# Patient Record
Sex: FEMALE | Race: BLACK OR AFRICAN AMERICAN | NOT HISPANIC OR LATINO | Employment: OTHER | URBAN - METROPOLITAN AREA
[De-identification: names, ages, dates, MRNs, and addresses within clinical notes are randomized per-mention and may not be internally consistent; named-entity substitution may affect disease eponyms.]

---

## 2017-01-09 ENCOUNTER — ANESTHESIA EVENT (OUTPATIENT)
Dept: SURGERY | Facility: HOSPITAL | Age: 67
End: 2017-01-09
Payer: MEDICARE

## 2017-01-09 DIAGNOSIS — Z01.818 PREOPERATIVE TESTING: Primary | ICD-10-CM

## 2017-01-09 RX ORDER — FLUOXETINE HYDROCHLORIDE 40 MG/1
CAPSULE ORAL
Qty: 30 CAPSULE | Refills: 5 | Status: SHIPPED | OUTPATIENT
Start: 2017-01-09 | End: 2017-03-07

## 2017-01-09 NOTE — ANESTHESIA PREPROCEDURE EVALUATION
Pre Admission Screening  Gaye Quintanilla RN      []Hide copied text  Anesthesia Assessment: Preoperative EQUATION     Planned Procedure: Procedure(s) (LRB):  CYSTOSCOPY (N/A)  INJECTION-BULKING AGENT URETERAL OUTLET-MACROPLASTIQUE (N/A)  Requested Anesthesia Type:Monitor Anesthesia Care  Surgeon: Kristen Melo MD  Service: Urology  Known or anticipated Date of Surgery:1/17/2017     Surgeon notes: reviewed     Electronic QUestionnaire Assessment completed via nurse interview with patient.                    Suze Chino [[id number]] - 66 y.o. Female         Providers Outside of [facility]       No data to display        Surgical Risk Level       Surgical Risk Level:   1              caRDScore (Clinical Anesthesia Rapid Decision Score)         Low  Total Score: 14       14 Sum of Clinical Scores        caRDScores (Grouped)       caRDScore - Ane:   6                       caRDScore - CVD:   2                       caRDScore - Pul:   3                       caRDScore - Met:   3                       caRDScore - Phy:   0              caRDScore Items             Pre-admit from 1/17/2017 in [facility]      Anesthesia        Has decreased jaw mobility/TMJ dysfunction   Yes [jaw pops and hurts]      Has sleep apnea confirmed by a sleep study / on CPAP   Yes      Has GERD, hiatal hernia, or chronic heartburn/dyspepsia requiring Rx some or all times   Yes      Has chronic anxiety   Yes      CVD        Activity similar to best ability for maximal activity or exercise   METS 4      Diagnosed with high blood pressure   Yes      Typical BP runs <150/90   Yes      Known heart murmur / could be due to valve abnormality   Yes [HX MVP]      Pulmonary        Some SOB with moderate activity or exertion   Yes      Has sleep apnea confirmed by a sleep study / on CPAP   Yes      Metabolic        Type 2 Diabetes   Yes [prediabetic-last A1c 6.0 2015]      Is on oral Rx and/or non-insulin injectable for diabetes   Yes       Physiologic        Obesity Status   Mild Obesity (BMI 30-34.9)        Flags       Red Flag Score:   0                       Yellow Flag Score:   7              Red Flags             Pre-admit from 1/17/2017 in [facility]      Obesity Status   Mild Obesity (BMI 30-34.9)        Yellow Flags             Pre-admit from 1/17/2017 in [facility]      Obesity Status   Mild Obesity (BMI 30-34.9)      NSAID   Yes [daily celebrex]      Has decreased jaw mobility/TMJ dysfunction   Yes [jaw pops and hurts]      Some SOB with moderate activity or exertion   Yes      Has sleep apnea confirmed by a sleep study / on CPAP   Yes      Known heart murmur / could be due to valve abnormality   Yes [HX MVP]      Has pain   Yes      No A1C in last 6 months or results unknown   Yes        PONV Risk Score (assumes periop narcotic use = +1, Max=4)       PONV Risk Score:   3              PONV Risk Factors  Total Score: 2       1 Female      1 Non-Smoker at present        Sleep Apnea  Total Score: 1       1 Has sleep apnea confirmed by a sleep study / on CPAP        ARMANDO STOP-Bang Risk Factors (Max=8)  Total Score: 3       1 Has loud snoring      1 Takes medication for high blood pressure      1 Age >50        ARMANDO Risk Level - 1 (Low), 2 (Moderate), 3 (High)       ARMANDO Risk Level:   2              RCRI (Revised Cardiac Risk Indices of ACC/AHA guidelines, Max=6)  Total Score: 0         CAD Risk Factors  Total Score: 3       1 Female. Age >55      1 Diagnosed with high blood pressure      1 Has Diabetes        CHADS Score if applicable (history of atrial fib/flutter, Max=6)  Total Score: 2       1 Diagnosed with high blood pressure      1 Has Diabetes        Maximal Exercise Capacity             Pre-admit from 1/17/2017 in [facility]      Maximal Exercise Capacity   METS 4        Summary of Dependence  Total Score: 1       1 Is totally independent of others for activities of daily  living        Phone Fraility Score (Max = 17)  Total Score: 2       1 Uses 5 or more meds on reg basis      1 Has a problem losing control of urine        Pain Factors             Pre-admit from 1/17/2017 in [facility]      Has pain   Yes      Location and description of pain   headache, jaw [and knee pain]      Typical Pain Scores   0 to 4      Not using strong pain medications   Yes [daily celebrex]        Risk Triggers (Evidence-Based Risk Triggers)         Pulmonary Risk Triggers  Total Score: 4       1 Age > or = 60      1 Obesity Status: Mild Obesity (BMI 30-34.9)      1 Some SOB with moderate activity or exertion      1 Has sleep apnea confirmed by a sleep study / on CPAP        Renal Risk Triggers  Total Score: 2       1 Diagnosed with high blood pressure      1 Type 2 Diabetes        Delirium Risk Triggers  Total Score: 0         Urologic Risk Triggers  Total Score: 1       1 Has a problem losing control of urine        Logistics         Pre-op Clinic Logistics  Total Score: 8       1 Has had anesthesia, either as adult or as a child      1 Previous transfusions      2 NSAID      1 Takes medication for high blood pressure      1 Known heart murmur / could be due to valve abnormality      2 No A1C in last 6 months or results unknown        DOSC Logistics  Total Score: 1       1 NSAID        Discharge Logistics  Total Score: 2       1 Some SOB with moderate activity or exertion      1 Has sleep apnea confirmed by a sleep study / on CPAP        Discharge Planning             Pre-admit from 1/17/2017 in [facility]      Discharge Planning        Will assist patient 24/7, if needed   sister      Who will transport you to therapy, if need   sister        Fast Track <For office use only>       Total Score: 11          Surgical Risk Level Assessment                       Triage considerations:      The patient has no apparent active cardiac condition (No unstable coronary  Syndrome such as severe unstable angina or recent [<1 month] myocardial infarction, decompensated CHF, severe valvular disease or significant arrhythmia)     Previous anesthesia records:MAC and No problems PT DOES HAVE TMJ Airway/Jaw/Neck:  Airway Findings: Mouth Opening: Normal Tongue: Normal General Airway Assessment: Adult Mallampati: IV Improves to III with phonation. TM Distance: 4-6 cm      Last PCP note: 3-6 months ago , within [facility]  Dr Lewis 10/2016  Subspecialty notes: n/a     Other important co-morbidities: HTN, MVP, ARMANDO, borderline diabetic, GERD      Tests already available:  Available tests, 3-6 months ago , within [facility] . 8/2016 CBC, CMP. 2015 A1c. 2014 EKG      Instructions given. (See in Nurse's note)     Optimization:  Anesthesia Preop Clinic Assessment Indicated-not required for fast track pt      Plan:   Testing: BMP  Patient has previously scheduled Medical Appointment:none  Navigation: Tests Scheduled. Am of surgery          Straight Line to surgery.       Electronically signed by Gaye Quintanilla RN at 1/9/2017  1:56 PM        Pre-admit on 1/17/2017              Detailed Report                                                                                                                        01/09/2017  Suze Chino is a 66 y.o., female.    [facility] Anesthesia Evaluation    I have reviewed the Patient Summary Reports.    I have reviewed the Nursing Notes.   I have reviewed the Medications.     Review of Systems      Physical Exam  General:  Obesity    Airway/Jaw/Neck:  Airway Findings: Mouth Opening: Normal Tongue: Large  General Airway Assessment: Average  Mallampati: III  Improves to II with phonation.  TM Distance: Normal, at least 6 cm  Jaw/Neck Findings:  Neck ROM: Normal ROM            Mental Status:  Mental Status Findings:  Alert and Oriented         Anesthesia Plan  Type of Anesthesia, risks & benefits discussed:  Anesthesia Type:  general, MAC  Patient's Preference:   Intra-op  Monitoring Plan:   Intra-op Monitoring Plan Comments:   Post Op Pain Control Plan:   Post Op Pain Control Plan Comments:   Induction:   IV  Beta Blocker:  Patient is not currently on a Beta-Blocker (No further documentation required).       Informed Consent: Patient understands risks and agrees with Anesthesia plan.  Questions answered. Anesthesia consent signed with patient.  ASA Score: 2     Day of Surgery Review of History & Physical:    H&P update referred to the surgeon.         Ready For Surgery From Anesthesia Perspective.

## 2017-01-16 ENCOUNTER — TELEPHONE (OUTPATIENT)
Dept: UROLOGY | Facility: CLINIC | Age: 67
End: 2017-01-16

## 2017-01-16 NOTE — TELEPHONE ENCOUNTER
Called pt to confirm arrival time of 10am for procedure. Gave pt NPO instructions and gave pt opportunity to ask questions. Pt verbalized understanding.

## 2017-01-17 ENCOUNTER — ANESTHESIA (OUTPATIENT)
Dept: SURGERY | Facility: HOSPITAL | Age: 67
End: 2017-01-17
Payer: MEDICARE

## 2017-01-17 PROBLEM — N39.3 SUI (STRESS URINARY INCONTINENCE, FEMALE): Status: ACTIVE | Noted: 2017-01-17

## 2017-01-17 PROCEDURE — 25000003 PHARM REV CODE 250: Performed by: NURSE ANESTHETIST, CERTIFIED REGISTERED

## 2017-01-17 PROCEDURE — D9220A PRA ANESTHESIA: Mod: CRNA,,, | Performed by: NURSE ANESTHETIST, CERTIFIED REGISTERED

## 2017-01-17 PROCEDURE — D9220A PRA ANESTHESIA: Mod: ANES,,, | Performed by: ANESTHESIOLOGY

## 2017-01-17 PROCEDURE — 25000003 PHARM REV CODE 250: Performed by: STUDENT IN AN ORGANIZED HEALTH CARE EDUCATION/TRAINING PROGRAM

## 2017-01-17 PROCEDURE — 63600175 PHARM REV CODE 636 W HCPCS: Performed by: NURSE ANESTHETIST, CERTIFIED REGISTERED

## 2017-01-17 RX ORDER — PROPOFOL 10 MG/ML
VIAL (ML) INTRAVENOUS CONTINUOUS PRN
Status: DISCONTINUED | OUTPATIENT
Start: 2017-01-17 | End: 2017-01-17

## 2017-01-17 RX ORDER — MIDAZOLAM HYDROCHLORIDE 1 MG/ML
INJECTION, SOLUTION INTRAMUSCULAR; INTRAVENOUS
Status: DISCONTINUED | OUTPATIENT
Start: 2017-01-17 | End: 2017-01-17

## 2017-01-17 RX ORDER — LIDOCAINE HCL/PF 100 MG/5ML
SYRINGE (ML) INTRAVENOUS
Status: DISCONTINUED | OUTPATIENT
Start: 2017-01-17 | End: 2017-01-17

## 2017-01-17 RX ORDER — PROPOFOL 10 MG/ML
VIAL (ML) INTRAVENOUS
Status: DISCONTINUED | OUTPATIENT
Start: 2017-01-17 | End: 2017-01-17

## 2017-01-17 RX ORDER — ALBUTEROL SULFATE 90 UG/1
AEROSOL, METERED RESPIRATORY (INHALATION)
Status: DISCONTINUED | OUTPATIENT
Start: 2017-01-17 | End: 2017-01-17

## 2017-01-17 RX ORDER — ONDANSETRON 2 MG/ML
INJECTION INTRAMUSCULAR; INTRAVENOUS
Status: DISCONTINUED | OUTPATIENT
Start: 2017-01-17 | End: 2017-01-17

## 2017-01-17 RX ORDER — DEXAMETHASONE SODIUM PHOSPHATE 4 MG/ML
INJECTION, SOLUTION INTRA-ARTICULAR; INTRALESIONAL; INTRAMUSCULAR; INTRAVENOUS; SOFT TISSUE
Status: DISCONTINUED | OUTPATIENT
Start: 2017-01-17 | End: 2017-01-17

## 2017-01-17 RX ORDER — GLYCOPYRROLATE 0.2 MG/ML
INJECTION INTRAMUSCULAR; INTRAVENOUS
Status: DISCONTINUED | OUTPATIENT
Start: 2017-01-17 | End: 2017-01-17

## 2017-01-17 RX ORDER — FENTANYL CITRATE 50 UG/ML
INJECTION, SOLUTION INTRAMUSCULAR; INTRAVENOUS
Status: DISCONTINUED | OUTPATIENT
Start: 2017-01-17 | End: 2017-01-17

## 2017-01-17 RX ORDER — KETAMINE HCL IN 0.9 % NACL 50 MG/5 ML
SYRINGE (ML) INTRAVENOUS
Status: DISCONTINUED | OUTPATIENT
Start: 2017-01-17 | End: 2017-01-17

## 2017-01-17 RX ADMIN — PROPOFOL 70 MG: 10 INJECTION, EMULSION INTRAVENOUS at 12:01

## 2017-01-17 RX ADMIN — DEXAMETHASONE SODIUM PHOSPHATE 4 MG: 4 INJECTION, SOLUTION INTRAMUSCULAR; INTRAVENOUS at 12:01

## 2017-01-17 RX ADMIN — Medication 2 G: at 12:01

## 2017-01-17 RX ADMIN — FENTANYL CITRATE 50 MCG: 50 INJECTION, SOLUTION INTRAMUSCULAR; INTRAVENOUS at 12:01

## 2017-01-17 RX ADMIN — PROPOFOL 100 MG: 10 INJECTION, EMULSION INTRAVENOUS at 12:01

## 2017-01-17 RX ADMIN — ONDANSETRON 4 MG: 2 INJECTION INTRAMUSCULAR; INTRAVENOUS at 12:01

## 2017-01-17 RX ADMIN — PROPOFOL 150 MCG/KG/MIN: 10 INJECTION, EMULSION INTRAVENOUS at 12:01

## 2017-01-17 RX ADMIN — Medication 20 MG: at 12:01

## 2017-01-17 RX ADMIN — GLYCOPYRROLATE 0.2 MG: 0.2 INJECTION, SOLUTION INTRAMUSCULAR; INTRAVENOUS at 12:01

## 2017-01-17 RX ADMIN — LIDOCAINE HYDROCHLORIDE 50 MG: 20 INJECTION, SOLUTION INTRAVENOUS at 12:01

## 2017-01-17 RX ADMIN — ALBUTEROL SULFATE 10 PUFF: 90 AEROSOL, METERED RESPIRATORY (INHALATION) at 12:01

## 2017-01-17 RX ADMIN — MIDAZOLAM HYDROCHLORIDE 2 MG: 1 INJECTION, SOLUTION INTRAMUSCULAR; INTRAVENOUS at 11:01

## 2017-01-17 NOTE — TRANSFER OF CARE
"Anesthesia Transfer of Care Note    Patient: Suze Chino    Procedure(s) Performed: Procedure(s) (LRB):  CYSTOSCOPY (N/A)  INJECTION-BULKING AGENT URETERAL OUTLET-MACROPLASTIQUE (N/A)    Patient location: PACU    Anesthesia Type: general    Transport from OR: Transported from OR on 6-10 L/min O2 by face mask with adequate spontaneous ventilation    Post pain: adequate analgesia    Post assessment: no apparent anesthetic complications and tolerated procedure well    Post vital signs: stable    Level of consciousness: awake and alert    Nausea/Vomiting: no vomiting    Complications: none          Last vitals:   Visit Vitals    BP (!) 105/59    Pulse 92    Temp 36.7 °C (98.1 °F) (Oral)    Resp 16    Ht 5' 8" (1.727 m)    Wt 90.7 kg (200 lb)    SpO2 97%    Breastfeeding No    BMI 30.41 kg/m2     "

## 2017-01-17 NOTE — ANESTHESIA POSTPROCEDURE EVALUATION
"Anesthesia Post Evaluation    Patient: Suze Chino    Procedure(s) Performed: Procedure(s) (LRB):  CYSTOSCOPY (N/A)  INJECTION-BULKING AGENT URETERAL OUTLET-MACROPLASTIQUE (N/A)    Final Anesthesia Type: general  Patient location during evaluation: PACU  Patient participation: No - Unable to Participate, Other Reason (see comments)  Level of consciousness: awake  Post-procedure vital signs: reviewed and stable  Pain management: adequate  Airway patency: patent  PONV status at discharge: No PONV  Anesthetic complications: no      Cardiovascular status: stable  Respiratory status: unassisted  Hydration status: euvolemic  Follow-up not needed.        Visit Vitals    /72    Pulse 76    Temp 36.7 °C (98.1 °F) (Oral)    Resp 17    Ht 5' 8" (1.727 m)    Wt 90.7 kg (200 lb)    SpO2 98%    Breastfeeding No    BMI 30.41 kg/m2       Pain/Ravi Score: Pain Assessment Performed: Yes (1/17/2017  1:56 PM)  Presence of Pain: denies (1/17/2017  1:56 PM)  Pain Rating Prior to Med Admin: 5 (1/17/2017  1:25 PM)  Ravi Score: 10 (1/17/2017  1:56 PM)      "

## 2017-01-17 NOTE — ANESTHESIA RELEASE NOTE
"Anesthesia Release from PACU Note    Patient: Suze Chino    Procedure(s) Performed: Procedure(s) (LRB):  CYSTOSCOPY (N/A)  INJECTION-BULKING AGENT URETERAL OUTLET-MACROPLASTIQUE (N/A)    Anesthesia type: general    Post pain: Adequate analgesia    Post assessment: no apparent anesthetic complications    Last Vitals:   Visit Vitals    /72    Pulse 76    Temp 36.7 °C (98.1 °F) (Oral)    Resp 17    Ht 5' 8" (1.727 m)    Wt 90.7 kg (200 lb)    SpO2 98%    Breastfeeding No    BMI 30.41 kg/m2       Post vital signs: stable    Level of consciousness: awake    Nausea/Vomiting: no nausea/no vomiting    Complications: none    Airway Patency: patent    Respiratory: unassisted    Cardiovascular: stable and blood pressure at baseline    Hydration: euvolemic  "

## 2017-01-24 ENCOUNTER — APPOINTMENT (OUTPATIENT)
Dept: OPHTHALMOLOGY | Facility: CLINIC | Age: 67
End: 2017-01-24
Payer: MEDICARE

## 2017-01-24 ENCOUNTER — OFFICE VISIT (OUTPATIENT)
Dept: OPHTHALMOLOGY | Facility: CLINIC | Age: 67
End: 2017-01-24
Payer: MEDICARE

## 2017-01-24 DIAGNOSIS — H40.1133 PRIMARY OPEN ANGLE GLAUCOMA OF BOTH EYES, SEVERE STAGE: Primary | ICD-10-CM

## 2017-01-24 DIAGNOSIS — H25.13 NUCLEAR SCLEROSIS, BILATERAL: ICD-10-CM

## 2017-01-24 PROCEDURE — 92083 EXTENDED VISUAL FIELD XM: CPT | Mod: PBBFAC,PO | Performed by: OPHTHALMOLOGY

## 2017-01-24 PROCEDURE — 99999 PR PBB SHADOW E&M-EST. PATIENT-LVL I: CPT | Mod: PBBFAC,,, | Performed by: OPHTHALMOLOGY

## 2017-01-24 PROCEDURE — 92133 CPTRZD OPH DX IMG PST SGM ON: CPT | Mod: PBBFAC,PO | Performed by: OPHTHALMOLOGY

## 2017-01-24 PROCEDURE — 92012 INTRM OPH EXAM EST PATIENT: CPT | Mod: S$PBB,,, | Performed by: OPHTHALMOLOGY

## 2017-01-24 PROCEDURE — 99211 OFF/OP EST MAY X REQ PHY/QHP: CPT | Mod: PBBFAC,PO,25 | Performed by: OPHTHALMOLOGY

## 2017-01-24 RX ORDER — BETAXOLOL HYDROCHLORIDE 5 MG/ML
1 SOLUTION/ DROPS OPHTHALMIC 2 TIMES DAILY
Qty: 5 ML | Refills: 6 | Status: SHIPPED | OUTPATIENT
Start: 2017-01-24 | End: 2017-04-07

## 2017-01-24 NOTE — PROGRESS NOTES
HPI     Glaucoma    Additional comments: Latanoprost OU QHS (slows pulse rate), Trusopt bid   ou           Comments   Patient denies changes from last vist. Using gtts as directed. 98%   compliant      Referred by Dr. Lorenzana  Bleb Needling with [facility] Inj. OD 9/17/15  LTG   H/O Non-compliance   Intolerant of Coag Drops    Stopped Alphagan OS TID because of dermatitis    Trab OD     repeat SLT od done 8/9/2012 - good initial response IOP 18 to 13  Primary SLT done os 9/13/2012 - minimal response 18 to 16  SLT os 9/29/2016    Latanoprost OU QHS (slows pulse rate), Trusopt bid ou       Last edited by Rhiannon Arambula MA on 1/24/2017  2:20 PM. (History)            Assessment /Plan     For exam results, see Encounter Report.      ICD-10-CM ICD-9-CM    1. Primary open angle glaucoma of both eyes, severe stage H40.1133 365.11 Arvizu Visual Field - OU - Extended - Both Eyes     365.73 Posterior Segment OCT Optic Nerve- Both eyes      betaxolol 0.5% (BETOPTIC-S) 0.5 % Drop     IOP not within acceptable range relative to target IOP with risk of irreversible visual loss. Additional treatment required.  Discussed options, risks, and benefits of additional medication, SLT laser, or incisional glaucoma surgery.     recommend betoptic bid ou    Patient chooses as above    Reviewed importance of continued compliance with treatment and follow up.     Will perform visual fields more often.      2. Nuclear sclerosis, bilateral H25.13 366.16        Latanoprost qhs ou, trusopt tid ou,   Add betoptic bid ou.    Return to clinic 2 months with iop check (betoptic trial)

## 2017-02-01 ENCOUNTER — OFFICE VISIT (OUTPATIENT)
Dept: UROLOGY | Facility: CLINIC | Age: 67
End: 2017-02-01
Payer: MEDICARE

## 2017-02-01 VITALS
DIASTOLIC BLOOD PRESSURE: 86 MMHG | WEIGHT: 205 LBS | BODY MASS INDEX: 31.07 KG/M2 | SYSTOLIC BLOOD PRESSURE: 149 MMHG | HEIGHT: 68 IN | HEART RATE: 55 BPM

## 2017-02-01 DIAGNOSIS — Z98.890 POST-OPERATIVE STATE: Primary | ICD-10-CM

## 2017-02-01 PROBLEM — N39.3 SUI (STRESS URINARY INCONTINENCE, FEMALE): Status: RESOLVED | Noted: 2017-01-17 | Resolved: 2017-02-01

## 2017-02-01 PROCEDURE — 99999 PR PBB SHADOW E&M-EST. PATIENT-LVL III: CPT | Mod: PBBFAC,,, | Performed by: UROLOGY

## 2017-02-01 PROCEDURE — 99213 OFFICE O/P EST LOW 20 MIN: CPT | Mod: PBBFAC | Performed by: UROLOGY

## 2017-02-01 PROCEDURE — 99024 POSTOP FOLLOW-UP VISIT: CPT | Mod: ,,, | Performed by: UROLOGY

## 2017-02-01 NOTE — PROGRESS NOTES
CHIEF COMPLAINT:    Mrs. Chino is a 66 y.o. female presenting for a follow up after injection with Macroplastique.  .    PRESENTING ILLNESS:    Suze Chino is a 66 y.o. female who returns after injection with Macroplastique.  She states that overall, she is doing well.  She had laryngospasm after and is still sore at the base of her tongue and under her jaw.  She states that she has had coughing and sneezing and no stress incontinence.  She has not tried dancing as of yet but anticipates it will be OK.      Allergies:  Alphagan [brimonidine]    Medications:  Outpatient Encounter Prescriptions as of 2/1/2017   Medication Sig Dispense Refill    alprazolam (XANAX) 0.25 MG tablet Take 1 tablet (0.25 mg total) by mouth 3 (three) times daily as needed for Anxiety. 30 tablet 0    betaxolol 0.5% (BETOPTIC-S) 0.5 % Drop Place 1 drop into both eyes 2 (two) times daily. 5 mL 6    celecoxib (CELEBREX) 200 MG capsule TAKE ONE CAPSULE BY MOUTH EVERY DAY 30 capsule 11    DIPHENHYDRAMINE HCL (BENADRYL ALLERGY ORAL) Take 1 tablet by mouth as needed.      dorzolamide (TRUSOPT) 2 % ophthalmic solution Place 1 drop into both eyes 3 (three) times daily. 10 mL 6    ERGOCALCIFEROL, VITAMIN D2, (VITAMIN D ORAL) Take by mouth as needed.      estradiol (ESTRACE) 1 MG tablet TAKE 1 TABLET (1 MG TOTAL) BY MOUTH ONCE DAILY. 30 tablet 11    fluoxetine (PROZAC) 40 MG capsule TAKE ONE CAPSULE BY MOUTH EVERY DAY 30 capsule 5    hydrochlorothiazide (HYDRODIURIL) 25 MG tablet TAKE 1 TABLET BY MOUTH EVERY DAY 90 tablet 0    LACTOBACILLUS ACIDOPHILUS (PROBIOTIC ORAL) Take by mouth once daily.      latanoprost 0.005 % ophthalmic solution       metformin (GLUCOPHAGE-XR) 500 MG 24 hr tablet TAKE 1 TABLET BY MOUTH TWICE A DAY 60 tablet 5    pantoprazole (PROTONIX) 40 MG tablet TAKE 1 TABLET BY MOUTH EVERY DAY 90 tablet 3    polyethylene glycol (GLYCOLAX) 17 gram PwPk Take 17 g by mouth once daily. 30 packet 0    meclizine (ANTIVERT) 25  mg tablet Take 1 tablet (25 mg total) by mouth every 8 (eight) hours as needed for Dizziness. 20 tablet 0    ondansetron (ZOFRAN) 4 MG tablet Take 1 tablet (4 mg total) by mouth every 8 (eight) hours as needed. 12 tablet 0     No facility-administered encounter medications on file as of 2/1/2017.          PHYSICAL EXAMINATION:    The patient generally appears in good health, is appropriately interactive, and is in no apparent distress.    Skin: No lesions.    Mental: Cooperative with normal affect.    Neuro: Grossly intact.    HEENT: Normal. No evidence of lymphadenopathy.    Chest: normal inspiratory effort.    Extremities: No clubbing, cyanosis, or edema      LABS:    UA 1.005, pH 7, otherwise, negative    IMPRESSION:    Encounter Diagnoses   Name Primary?    Post-operative state Yes       PLAN:    1.  Follow up as needed.  She will know when she needs another injection   2.  Discussed that I had asked anesthesia if there was any other precautions to take given the history of laryngospasms, they stated no.

## 2017-02-17 RX ORDER — ALPRAZOLAM 0.25 MG/1
TABLET ORAL
Qty: 30 TABLET | OUTPATIENT
Start: 2017-02-17

## 2017-02-21 RX ORDER — METFORMIN HYDROCHLORIDE 500 MG/1
500 TABLET, EXTENDED RELEASE ORAL 2 TIMES DAILY
Qty: 60 TABLET | Refills: 5 | Status: SHIPPED | OUTPATIENT
Start: 2017-02-21 | End: 2017-06-27 | Stop reason: SDUPTHER

## 2017-02-24 ENCOUNTER — LAB VISIT (OUTPATIENT)
Dept: LAB | Facility: HOSPITAL | Age: 67
End: 2017-02-24
Attending: FAMILY MEDICINE
Payer: MEDICARE

## 2017-02-24 DIAGNOSIS — E03.9 HYPOTHYROIDISM: ICD-10-CM

## 2017-02-24 DIAGNOSIS — I10 ESSENTIAL HYPERTENSION, BENIGN: ICD-10-CM

## 2017-02-24 LAB
ANION GAP SERPL CALC-SCNC: 8 MMOL/L
BUN SERPL-MCNC: 24 MG/DL
CALCIUM SERPL-MCNC: 10.2 MG/DL
CHLORIDE SERPL-SCNC: 101 MMOL/L
CO2 SERPL-SCNC: 29 MMOL/L
CREAT SERPL-MCNC: 1 MG/DL
EST. GFR  (AFRICAN AMERICAN): >60 ML/MIN/1.73 M^2
EST. GFR  (NON AFRICAN AMERICAN): 58.8 ML/MIN/1.73 M^2
GLUCOSE SERPL-MCNC: 97 MG/DL
POTASSIUM SERPL-SCNC: 4.1 MMOL/L
SODIUM SERPL-SCNC: 138 MMOL/L
TSH SERPL DL<=0.005 MIU/L-ACNC: 2.91 UIU/ML

## 2017-02-24 PROCEDURE — 84443 ASSAY THYROID STIM HORMONE: CPT

## 2017-02-24 PROCEDURE — 36415 COLL VENOUS BLD VENIPUNCTURE: CPT | Mod: PO

## 2017-02-24 PROCEDURE — 80048 BASIC METABOLIC PNL TOTAL CA: CPT

## 2017-03-07 ENCOUNTER — OFFICE VISIT (OUTPATIENT)
Dept: FAMILY MEDICINE | Facility: CLINIC | Age: 67
End: 2017-03-07
Payer: MEDICARE

## 2017-03-07 VITALS
SYSTOLIC BLOOD PRESSURE: 148 MMHG | HEART RATE: 59 BPM | DIASTOLIC BLOOD PRESSURE: 68 MMHG | BODY MASS INDEX: 31.15 KG/M2 | WEIGHT: 205.56 LBS | HEIGHT: 68 IN

## 2017-03-07 DIAGNOSIS — F41.9 ANXIETY: ICD-10-CM

## 2017-03-07 DIAGNOSIS — I10 ESSENTIAL HYPERTENSION: ICD-10-CM

## 2017-03-07 DIAGNOSIS — F32.0 MILD MAJOR DEPRESSION: ICD-10-CM

## 2017-03-07 DIAGNOSIS — M17.11 PRIMARY OSTEOARTHRITIS OF RIGHT KNEE: Primary | ICD-10-CM

## 2017-03-07 PROCEDURE — 99214 OFFICE O/P EST MOD 30 MIN: CPT | Mod: S$PBB,,, | Performed by: FAMILY MEDICINE

## 2017-03-07 PROCEDURE — 99213 OFFICE O/P EST LOW 20 MIN: CPT | Mod: PBBFAC,PO | Performed by: FAMILY MEDICINE

## 2017-03-07 PROCEDURE — 99999 PR PBB SHADOW E&M-EST. PATIENT-LVL III: CPT | Mod: PBBFAC,,, | Performed by: FAMILY MEDICINE

## 2017-03-07 RX ORDER — ALPRAZOLAM 0.25 MG/1
0.25 TABLET ORAL 3 TIMES DAILY PRN
Qty: 30 TABLET | Refills: 1 | Status: SHIPPED | OUTPATIENT
Start: 2017-03-07 | End: 2017-12-05 | Stop reason: SDUPTHER

## 2017-03-07 RX ORDER — LISINOPRIL 10 MG/1
10 TABLET ORAL DAILY
Qty: 30 TABLET | Refills: 11 | Status: SHIPPED | OUTPATIENT
Start: 2017-03-07 | End: 2017-03-14 | Stop reason: SDUPTHER

## 2017-03-07 RX ORDER — FLUOXETINE HYDROCHLORIDE 20 MG/1
60 CAPSULE ORAL DAILY
Qty: 90 CAPSULE | Refills: 11 | Status: SHIPPED | OUTPATIENT
Start: 2017-03-07 | End: 2018-03-03 | Stop reason: SDUPTHER

## 2017-03-07 NOTE — MR AVS SNAPSHOT
[facility] [place], [facility]  [address] [address]  Phone: [phone]  Fax: [phone]                  Suze Chino   3/7/2017 1:20 PM   Office Visit    Description:  Female : 1950   Provider:  Cody Lewis MD   Department:  [facility] [place]           Reason for Visit     Medication Refill     Knee Pain           Diagnoses this Visit        Comments    Primary osteoarthritis of right knee    -  Primary     Essential hypertension         Anxiety         Mild major depression                To Do List           Future Appointments        Provider Department Dept Phone    3/20/2017 11:30 AM London French MD [facility] [phone]    3/27/2017 9:15 AM Macario Shankar MD [place] [facility] [phone]    2017 1:20 PM Cody Lewis MD [facility] [place] [phone]      Goals (5 Years of Data)     None       These Medications        Disp Refills Start End    fluoxetine (PROZAC) 20 MG capsule 90 capsule 11 3/7/2017 3/7/2018    Take 3 capsules (60 mg total) by mouth once daily. - Oral    Pharmacy: [facility] #[id number] - [place]  [address] Ph #: [phone]       alprazolam (XANAX) 0.25 MG tablet 30 tablet 1 3/7/2017     Take 1 tablet (0.25 mg total) by mouth 3 (three) times daily as needed for Anxiety. - Oral    Pharmacy: [facility]/pharmacy #[id number] - [place] - [address] Ph #: [phone]       lisinopril 10 MG tablet 30 tablet 11 3/7/2017 3/7/2018    Take 1 tablet (10 mg total) by mouth once daily. - Oral    Pharmacy: [facility]/pharmacy #[id number] - [place] - [address] Ph #: [phone]         [facility] On Call     [facility] On Call Nurse Care Line -  Assistance  Registered nurses in the [facility] provide clinical advisement, health education, appointment booking, and other advisory services.  Call for this free service at [phone].             Medications           Message regarding  Medications     Verify the changes and/or additions to your medication regime listed below are the same as discussed with your clinician today.  If any of these changes or additions are incorrect, please notify your healthcare provider.        START taking these NEW medications        Refills    fluoxetine (PROZAC) 20 MG capsule 11    Sig: Take 3 capsules (60 mg total) by mouth once daily.    Class: Normal    Route: Oral    lisinopril 10 MG tablet 11    Sig: Take 1 tablet (10 mg total) by mouth once daily.    Class: Normal    Route: Oral      STOP taking these medications     dorzolamide (TRUSOPT) 2 % ophthalmic solution Place 1 drop into both eyes 3 (three) times daily.    LACTOBACILLUS ACIDOPHILUS (PROBIOTIC ORAL) Take by mouth once daily.    latanoprost 0.005 % ophthalmic solution     meclizine (ANTIVERT) 25 mg tablet Take 1 tablet (25 mg total) by mouth every 8 (eight) hours as needed for Dizziness.    ondansetron (ZOFRAN) 4 MG tablet Take 1 tablet (4 mg total) by mouth every 8 (eight) hours as needed.    polyethylene glycol (GLYCOLAX) 17 gram PwPk Take 17 g by mouth once daily.           Verify that the below list of medications is an accurate representation of the medications you are currently taking.  If none reported, the list may be blank. If incorrect, please contact your healthcare provider. Carry this list with you in case of emergency.           Current Medications     betaxolol 0.5% (BETOPTIC-S) 0.5 % Drop Place 1 drop into both eyes 2 (two) times daily.    celecoxib (CELEBREX) 200 MG capsule TAKE ONE CAPSULE BY MOUTH EVERY DAY    DIPHENHYDRAMINE HCL (BENADRYL ALLERGY ORAL) Take 1 tablet by mouth as needed.    estradiol (ESTRACE) 1 MG tablet TAKE 1 TABLET (1 MG TOTAL) BY MOUTH ONCE DAILY.    hydrochlorothiazide (HYDRODIURIL) 25 MG tablet TAKE 1 TABLET BY MOUTH EVERY DAY    metformin (GLUCOPHAGE-XR) 500 MG 24 hr tablet Take 1 tablet (500 mg total) by mouth 2 (two) times daily.    pantoprazole (PROTONIX)  "40 MG tablet TAKE 1 TABLET BY MOUTH EVERY DAY    alprazolam (XANAX) 0.25 MG tablet Take 1 tablet (0.25 mg total) by mouth 3 (three) times daily as needed for Anxiety.    ERGOCALCIFEROL, VITAMIN D2, (VITAMIN D ORAL) Take by mouth as needed.    fluoxetine (PROZAC) 20 MG capsule Take 3 capsules (60 mg total) by mouth once daily.    lisinopril 10 MG tablet Take 1 tablet (10 mg total) by mouth once daily.           Clinical Reference Information           Your Vitals Were     BP Pulse Height Weight BMI    148/68 59 5' 8" (1.727 m) 93.2 kg (205 lb 9.3 oz) 31.26 kg/m2      Blood Pressure          Most Recent Value    BP  (!)  148/68      Allergies as of 3/7/2017     Alphagan [Brimonidine]      Immunizations Administered on Date of Encounter - 3/7/2017     None      Orders Placed During Today's Visit      Normal Orders This Visit    Ambulatory referral to Orthopedics       Language Assistance Services     ATTENTION: Language assistance services are available, free of charge. Please call [phone].      ATENCIÓN: Si habla maurizio, tiene a couch disposición servicios gratuitos de asistencia lingüística. Llame al [phone].     JAYDEN Ý: N?u b?n nói Ti?ng Vi?t, có các d?ch v? h? tr? ngôn ng? mi?n phí dành cho b?n. G?i s? [phone].         [facility] [place] complies with applicable Federal civil rights laws and does not discriminate on the basis of race, color, national origin, age, disability, or sex.        "

## 2017-03-08 NOTE — PROGRESS NOTES
Chronic right knee pain related to arthritis .  Symptoms currently improved today, but comes and goes.  Notices lateral discomfort.  Previously saw orthopedics.  Use knee brace, but seemed to be more uncomfortable with this.  Currently taking Celebrex but only partial improvement.  Denies any recent injury.  Reviewed occasionally previous x-ray.  Walks with limp.    Chronic anxiety, depression.  Occasional Xanax.  Compliant fluoxetine, feels only partial response.  No SI/HI    Blood pressure elevated.  States compliance medication    Past Medical History:  Past Medical History:   Diagnosis Date    Anxiety     Cataract     Depression     GERD (gastroesophageal reflux disease)     Glaucoma     History of colon polyps 2009    history as per patient    History of colonoscopy 2009    ok per pt    Hypertension     Hypothyroid     ?    Mitral valve prolapse     Peptic ulcer disease     with gi bleed    Prediabetes     Right knee DJD     Right knee DJD     Sleep apnea     TMJ (temporomandibular joint disorder)      Past Surgical History:   Procedure Laterality Date    ARGON TRABECULOPLASTY - OU - BOTH EYES      BILATERAL SALPINGOOPHORECTOMY      EYE SURGERY      laser sx    HYSTERECTOMY      peptic ulcer surgery        TRABECULECTOMY Right 4/2/14    OD (dr. grossman)     Social History     Social History    Marital status:      Spouse name: N/A    Number of children: N/A    Years of education: N/A     Occupational History    Not on file.     Social History Main Topics    Smoking status: Never Smoker    Smokeless tobacco: Not on file    Alcohol use Yes      Comment: occasional    Drug use: No    Sexual activity: Not on file     Other Topics Concern    Not on file     Social History Narrative     Family History   Problem Relation Age of Onset    Heart disease Father      before age 50    Diabetes Mother     Glaucoma Mother     Leukemia Mother     Cataracts Mother     Diabetes  Sister     Glaucoma Sister     Diabetes Brother     Glaucoma Brother     Lung cancer Brother      Review of patient's allergies indicates:   Allergen Reactions    Alphagan [brimonidine] Dermatitis     Current Outpatient Prescriptions on File Prior to Visit   Medication Sig Dispense Refill    betaxolol 0.5% (BETOPTIC-S) 0.5 % Drop Place 1 drop into both eyes 2 (two) times daily. 5 mL 6    celecoxib (CELEBREX) 200 MG capsule TAKE ONE CAPSULE BY MOUTH EVERY DAY 30 capsule 11    DIPHENHYDRAMINE HCL (BENADRYL ALLERGY ORAL) Take 1 tablet by mouth as needed.      estradiol (ESTRACE) 1 MG tablet TAKE 1 TABLET (1 MG TOTAL) BY MOUTH ONCE DAILY. 30 tablet 11    hydrochlorothiazide (HYDRODIURIL) 25 MG tablet TAKE 1 TABLET BY MOUTH EVERY DAY 90 tablet 0    metformin (GLUCOPHAGE-XR) 500 MG 24 hr tablet Take 1 tablet (500 mg total) by mouth 2 (two) times daily. 60 tablet 5    pantoprazole (PROTONIX) 40 MG tablet TAKE 1 TABLET BY MOUTH EVERY DAY 90 tablet 3    [DISCONTINUED] fluoxetine (PROZAC) 40 MG capsule TAKE ONE CAPSULE BY MOUTH EVERY DAY 30 capsule 5    ERGOCALCIFEROL, VITAMIN D2, (VITAMIN D ORAL) Take by mouth as needed.      [DISCONTINUED] alprazolam (XANAX) 0.25 MG tablet Take 1 tablet (0.25 mg total) by mouth 3 (three) times daily as needed for Anxiety. 30 tablet 0    [DISCONTINUED] dorzolamide (TRUSOPT) 2 % ophthalmic solution Place 1 drop into both eyes 3 (three) times daily. 10 mL 6    [DISCONTINUED] LACTOBACILLUS ACIDOPHILUS (PROBIOTIC ORAL) Take by mouth once daily.      [DISCONTINUED] latanoprost 0.005 % ophthalmic solution       [DISCONTINUED] meclizine (ANTIVERT) 25 mg tablet Take 1 tablet (25 mg total) by mouth every 8 (eight) hours as needed for Dizziness. 20 tablet 0    [DISCONTINUED] ondansetron (ZOFRAN) 4 MG tablet Take 1 tablet (4 mg total) by mouth every 8 (eight) hours as needed. 12 tablet 0    [DISCONTINUED] polyethylene glycol (GLYCOLAX) 17 gram PwPk Take 17 g by mouth once daily. 30  "packet 0     No current facility-administered medications on file prior to visit.              OBJECTIVE:     Vitals:    03/07/17 1311   BP: (!) 148/68   Pulse: (!) 59   Weight: 93.2 kg (205 lb 9.3 oz)   Height: 5' 8" (1.727 m)     Wt Readings from Last 3 Encounters:   03/07/17 93.2 kg (205 lb 9.3 oz)   02/01/17 93 kg (205 lb 0.4 oz)   01/17/17 90.7 kg (200 lb)     APPEARANCE: Well nourished, well developed, in no acute distress.    HEAD: Normocephalic.  Atraumatic.  No sinus tenderness.  EYES:   Right eye: Pupil reactive.  Conjunctiva clear.    Left eye: Pupil reactive.  Conjunctiva clear.    Both fundi:  Grossly normal to nondilated exam. EOMI.    EARS: TM's intact. Light reflex normal. No retraction or perforation.    NOSE:  clear.  MOUTH & THROAT:  No pharyngeal erythema or exudate. No lesions.  NECK: Supple. No bruits.  No JVD.  No cervical lymphadenopathy.  No thyromegaly.    CHEST: Breath sounds clear bilaterally.  Normal respiratory effort  CARDIOVASCULAR: Normal rate.  Regular rhythm.  No murmurs.  No rub.  No gallops.  ABDOMEN: Bowel sounds normal.  Soft.  No tenderness.  No organomegaly.  PERIPHERAL VASCULAR: No cyanosis.  No clubbing.  No edema.  NEUROLOGIC: No focal findings.  MENTAL STATUS: Alert.  Oriented x 3.  The right knee has full range of motion.  Mild tenderness palpation laterally.  Negative Lachman's and Malinda's no swelling.  Normal gait today.    Suze was seen today for medication refill and knee pain.    Diagnoses and all orders for this visit:    Primary osteoarthritis of right knee  -     Ambulatory referral to Orthopedics    Essential hypertension    Anxiety    Mild major depression    Other orders  -     fluoxetine (PROZAC) 20 MG capsule; Take 3 capsules (60 mg total) by mouth once daily.  -     alprazolam (XANAX) 0.25 MG tablet; Take 1 tablet (0.25 mg total) by mouth 3 (three) times daily as needed for Anxiety.  -     lisinopril 10 MG tablet; Take 1 tablet (10 mg total) by mouth " once daily.      She may continue Celebrex.  Increase fluoxetine, Continue other medication as currently.  Follow-up appointment one month.

## 2017-03-11 DIAGNOSIS — H40.1193 PRIMARY OPEN-ANGLE GLAUCOMA, SEVERE STAGE: ICD-10-CM

## 2017-03-13 RX ORDER — LATANOPROST 50 UG/ML
SOLUTION/ DROPS OPHTHALMIC
Qty: 2.5 ML | Refills: 6 | Status: SHIPPED | OUTPATIENT
Start: 2017-03-13 | End: 2018-01-09 | Stop reason: ALTCHOICE

## 2017-03-14 RX ORDER — LISINOPRIL 10 MG/1
10 TABLET ORAL DAILY
Qty: 90 TABLET | Refills: 3 | Status: SHIPPED | OUTPATIENT
Start: 2017-03-14 | End: 2017-06-16

## 2017-03-17 DIAGNOSIS — M25.569 KNEE PAIN, UNSPECIFIED CHRONICITY, UNSPECIFIED LATERALITY: Primary | ICD-10-CM

## 2017-03-20 ENCOUNTER — HOSPITAL ENCOUNTER (OUTPATIENT)
Dept: RADIOLOGY | Facility: HOSPITAL | Age: 67
Discharge: HOME | End: 2017-03-20
Attending: ORTHOPAEDIC SURGERY
Payer: MEDICARE

## 2017-03-20 ENCOUNTER — OFFICE VISIT (OUTPATIENT)
Dept: ORTHOPEDICS | Facility: CLINIC | Age: 67
End: 2017-03-20
Payer: MEDICARE

## 2017-03-20 VITALS
HEIGHT: 68 IN | BODY MASS INDEX: 30.82 KG/M2 | WEIGHT: 203.38 LBS | DIASTOLIC BLOOD PRESSURE: 80 MMHG | SYSTOLIC BLOOD PRESSURE: 118 MMHG | HEART RATE: 58 BPM

## 2017-03-20 DIAGNOSIS — M25.569 KNEE PAIN, UNSPECIFIED CHRONICITY, UNSPECIFIED LATERALITY: ICD-10-CM

## 2017-03-20 DIAGNOSIS — M25.561 RIGHT KNEE PAIN, UNSPECIFIED CHRONICITY: ICD-10-CM

## 2017-03-20 DIAGNOSIS — M21.061 ACQUIRED VALGUS DEFORMITY KNEE, RIGHT: ICD-10-CM

## 2017-03-20 DIAGNOSIS — M17.11 PRIMARY OSTEOARTHRITIS OF RIGHT KNEE: Primary | ICD-10-CM

## 2017-03-20 PROCEDURE — 73564 X-RAY EXAM KNEE 4 OR MORE: CPT | Mod: 26,LT,, | Performed by: RADIOLOGY

## 2017-03-20 PROCEDURE — 99213 OFFICE O/P EST LOW 20 MIN: CPT | Mod: PBBFAC,PO | Performed by: ORTHOPAEDIC SURGERY

## 2017-03-20 PROCEDURE — 99214 OFFICE O/P EST MOD 30 MIN: CPT | Mod: S$PBB,,, | Performed by: ORTHOPAEDIC SURGERY

## 2017-03-20 PROCEDURE — 73564 X-RAY EXAM KNEE 4 OR MORE: CPT | Mod: 26,RT,, | Performed by: RADIOLOGY

## 2017-03-20 PROCEDURE — 99999 PR PBB SHADOW E&M-EST. PATIENT-LVL III: CPT | Mod: PBBFAC,,, | Performed by: ORTHOPAEDIC SURGERY

## 2017-03-20 RX ORDER — CELECOXIB 200 MG/1
200 CAPSULE ORAL 2 TIMES DAILY
Qty: 60 CAPSULE | Refills: 11 | Status: SHIPPED | OUTPATIENT
Start: 2017-03-20 | End: 2018-10-26 | Stop reason: SDUPTHER

## 2017-03-20 NOTE — MR AVS SNAPSHOT
[facility]  [address] [address]  Phone: [phone]                  Suze Chino   3/20/2017 11:30 AM   Office Visit    Description:  Female : 1950   Provider:  London French MD   Department:  [facility]           Reason for Visit     Right Knee - Pain           Diagnoses this Visit        Comments    Primary osteoarthritis of right knee    -  Primary     Right knee pain, unspecified chronicity         Acquired valgus deformity knee, right                To Do List           Future Appointments        Provider Department Dept Phone    3/27/2017 9:15 AM Macario Shankar MD [facility] [phone]    2017 1:20 PM Cody Lewis MD [facility] [phone]      Goals (5 Years of Data)     None       These Medications        Disp Refills Start End    celecoxib (CELEBREX) 200 MG capsule 60 capsule 11 3/20/2017     Take 1 capsule (200 mg total) by mouth 2 (two) times daily. - Oral    Pharmacy: [facility]/pharmacy #[id number] - [place] - [address] #: [phone]         [facility] On Call     [facility] On Call [facility] Care Line -  Assistance  Registered nurses in the [facility] provide clinical advisement, health education, appointment booking, and other advisory services.  Call for this free service at [phone].             Medications           Message regarding Medications     Verify the changes and/or additions to your medication regime listed below are the same as discussed with your clinician today.  If any of these changes or additions are incorrect, please notify your healthcare provider.        CHANGE how you are taking these medications     Start Taking Instead of    celecoxib (CELEBREX) 200 MG capsule celecoxib (CELEBREX) 200 MG capsule    Dosage:  Take 1 capsule (200 mg total) by mouth 2 (two) times daily. Dosage:  TAKE ONE CAPSULE BY MOUTH EVERY DAY    Reason for Change:  Reorder      "       Verify that the below list of medications is an accurate representation of the medications you are currently taking.  If none reported, the list may be blank. If incorrect, please contact your healthcare provider. Carry this list with you in case of emergency.           Current Medications     alprazolam (XANAX) 0.25 MG tablet Take 1 tablet (0.25 mg total) by mouth 3 (three) times daily as needed for Anxiety.    betaxolol 0.5% (BETOPTIC-S) 0.5 % Drop Place 1 drop into both eyes 2 (two) times daily.    celecoxib (CELEBREX) 200 MG capsule Take 1 capsule (200 mg total) by mouth 2 (two) times daily.    DIPHENHYDRAMINE HCL (BENADRYL ALLERGY ORAL) Take 1 tablet by mouth as needed.    ERGOCALCIFEROL, VITAMIN D2, (VITAMIN D ORAL) Take by mouth as needed.    estradiol (ESTRACE) 1 MG tablet TAKE 1 TABLET (1 MG TOTAL) BY MOUTH ONCE DAILY.    fluoxetine (PROZAC) 20 MG capsule Take 3 capsules (60 mg total) by mouth once daily.    hydrochlorothiazide (HYDRODIURIL) 25 MG tablet TAKE 1 TABLET BY MOUTH EVERY DAY    latanoprost 0.005 % ophthalmic solution PLACE 1 DROP INTO THE LEFT EYE EVERY EVENING.    lisinopril 10 MG tablet Take 1 tablet (10 mg total) by mouth once daily.    metformin (GLUCOPHAGE-XR) 500 MG 24 hr tablet Take 1 tablet (500 mg total) by mouth 2 (two) times daily.    pantoprazole (PROTONIX) 40 MG tablet TAKE 1 TABLET BY MOUTH EVERY DAY           Clinical Reference Information           Your Vitals Were     BP Pulse Height Weight BMI    118/80 58 5' 8" (1.727 m) 92.3 kg (203 lb 6.4 oz) 30.93 kg/m2      Blood Pressure          Most Recent Value    BP  118/80      Allergies as of 3/20/2017     Alphagan [Brimonidine]      Immunizations Administered on Date of Encounter - 3/20/2017     None      Instructions        What is Cosamin® ASU?    Cosamin ASU is an advanced proprietary formulation that combines [id number]® avocado/soybean unsaponifiables (ASU) with Cosamin's FCHG49® glucosamine and pharmaceutical-grade " RGF705® sodium chondroitin sulfate.    For over a decade, Cosamin DS has served as the premium joint health supplement on the market. By combining the exclusive ingredients found in Cosamin DS with ASU, [employer] has made the best even better.    Cosamin ASU is a dual synergistic formula: its specific combination of glucosamine and sodium chondroitin sulfate have demonstrated synergy in stimulating cartilage production,1 while ASU also acts synergistically with glucosamine.  What is ASU and how does it work?    ASU (avocado/soybean unsaponifiables) is obtained from avocados and soybeans. A potent ingredient demonstrated to protect cartilage which leads to improved joint function, ASU complements the effects of the other ingredients. While working through their own primary mechanisms of action, ASU, glucosamine and chondroitin sulfate together deliver comprehensive joint health support. Our trademarked glucosamine hydrochloride, chondroitin sulfate, and avocado/soybean unsaponifiables together were shown in cell studies to be better than the combination of glucosamine and chondroitin sulfate at inhibiting expression of several agents involved in cartilage breakdown.    Cosamin ASU is available at [facility] pharmacies nationwide ([address]) and online.    How do I take Cosamin® ASU?        Maximum Protection:      Take 4 capsules per day. Capsules may be taken all at once or divided with meals throughout the day.           Maintenance:      Once desired effect is noticed, you may gradually reduce the number of capsules to maintain comfort level.      Some individuals may respond sooner than others depending on the status of their cartilage and joint health. Once response has been seen, the number of capsules per day may be decreased to maintain comfort level. Some individuals on this lower level may wish to go back to four capsules a day during the weekends or times of increased activity.      The  maintenance level can also be used long-term to help maintain healthy joints. At any time, the number of capsules may be increased back to four capsules per day.      Where to Buy Cosamin® ASU?:    Retail Stores:        [employer]PL[employer]      [employer]      [facility]      [facility]      [employer]      [facility]      [facility]      [employer]      [facility] with [facility]      [employer]     Online Stores:        [employer]      BJs.com      Costco.com      CVS.com      drugstore.com      iherb.com      Luckyvitamin.com      NutramaxStore.com      Valleynaturals.com      Vitacost.com      VitaminShoppe.com      Walgreens.com          WHAT IS EUFLEXXA®?  EUFLEXXA® is a gel-like, elastic, sterile product containing natural, highly purified hyaluronan (zny-w-hgm-LORY-nan), (also known as sodium hyaluronate). Hyaluronanis a natural substance found in the body and is present in particularly high amounts in joint tissues and in the fluid that fills the joints. The bodys own hyaluronan acts like a lubricant and a shock absorber in the joint, and is needed for the joint to function properly. Osteoarthritis is a condition that involves the wearing down of cartilage (the protective covering on the ends of your bones). In osteoarthritis, there may not be enough hyaluronan, and there may be a decrease in the quality of the hyaluronan in joint fluid and tissues.  EUFLEXXA® comes in pre-filled syringes containing 2 ml (about half a teaspoon) of product. EUFLEXXA® is given by injection directly into the knee joint.    WHAT IS EUFLEXXA® USED FOR?  EUFLEXXA® is used to relieve knee pain due to osteoarthritis. It is used for patients who do not get enough relief from simple pain medications such as acetaminophen or from exercise and physical therapy.    WHAT ARE THE BENEFITS OF EUFLEXXA®?  A clinical study involving 321 patients between the  ages of 50 and 80years of age with knee pain due to osteoarthritis was performed in [facility]. The study investigated the safety and effectiveness of EUFLEXXA®. The patients were placed in one of two groups. One group was given an injection of EUFLEXXA® into one or both knee joints once a week for 3 weeks. The second group was given an injection of another commercially available hyaluronan once a week for 3 weeks. Pain, stiffness and function of the knee joint and patients and doctors judgment of the success of treatment were measured for 12 weeks. Patients with osteoarthritic knee joint pain, who had not obtained painrelief with other medications, experienced pain relief from the EUFLEXXA® injections into the knee joint, similar to those patients who received injections of the other hyaluronan.     Cortisone Injection  What is cortisone?   Cortisone is in a family of medicines called corticosteroids, which are strong anti-inflammatory drugs. Corticosteroids are used for many conditions. They can be taken by mouth or in a variety of other ways including creams, inhalers, or injections (shots).   Because they have a similar name, corticosteroids are sometimes confused with anabolic steroids. They are not the same. Anabolic steroids are a group of drugs that increase muscle mass and strength. These are often used illegally by athletes and can have many harmful side effects.   What is a cortisone injection used for?   A cortisone shot is often used to give short-term pain relief and reduce the swelling from inflammation of a joint, tendon, or bursa (sac that provides cushion in a joint). These problems are common in knee, elbow, and shoulder joints. Reducing the swelling helps relieve pain and discomfort and can speed up recovery from an injury.   A shot of cortisone may also be given to reduce inflammation over the whole body (for example, if you have an allergic reaction or a flare-up of rheumatoid arthritis).   How  is the shot given?   The corticosteroid medicine is usually mixed with a local anesthetic and then injected into the painful area. At first, the shot may feel uncomfortable, but the local anesthetic will help with the discomfort.     What happens after I get the shot?   When the anesthetic wears off, the area where the shot was given may be quite sore. Your healthcare provider may recommend putting an ice bag on the area for 20 to 30 minutes every 3 to 4 hours after the shot and taking an anti-inflammatory medicine (such as ibuprofen). The cortisone will start to reduce the inflammation and give you pain relief within 2 to 3 days. In some cases, the cortisone will permanently relieve your symptoms. In other cases, the pain relief is temporary and can last anywhere from a couple of weeks to years.   How well the shot works depends on many things, including the amount of drug given, the cause of the problem, if the shot is given in the right area, and other factors.   Many chronic (ongoing) inflammatory conditions are caused by overuse. If you continue activities that overuse the injured area, the inflammation may return and the cortisone shot will probably not help that much.   What are the risks?   Side effects from cortisone shots are rare. Possible side effects at the site where the shot was given include:    slight bruising of the skin    shrinkage of the fatty tissue under the skin where the shot was given    loss of skin pigment where the shot was given    increase in pain after the shot    infection    weakening of the tendons or tendon rupture    allergic reaction to the medicine   Diabetics may have a temporary increase in their blood sugar after a shot.   Cortisone can temporarily weaken the immune system. While receiving these shots, you should not get certain vaccines. Also, avoid contact with anyone who has chickenpox or measles, especially if you have never had these diseases. Your immune system  may not be strong enough to fight off the infection while you are taking cortisone.     Published by [facility].           Language Assistance Services     ATTENTION: Language assistance services are available, free of charge. Please call [phone].      ATENCIÓN: Si eduardo steven, tiene a couch disposición servicios gratuitos de asistencia lingüística. Llame al [phone].     CHÚ Ý: N?u b?n nói Ti?ng Vi?t, có các d?ch v? h? tr? ngôn ng? mi?n phí dành cho b?n. G?i s? [phone].         [facility] complies with applicable Federal civil rights laws and does not discriminate on the basis of race, color, national origin, age, disability, or sex.

## 2017-03-20 NOTE — PATIENT INSTRUCTIONS
What is Cosamin® ASU?    Cosamin ASU is an advanced proprietary formulation that combines DQR0621® avocado/soybean unsaponifiables (ASU) with Cosamin's FCHG49® glucosamine and pharmaceutical-grade VIB483® sodium chondroitin sulfate.    For over a decade, Cosamin DS has served as the premium joint health supplement on the market. By combining the exclusive ingredients found in Cosamin DS with ASU, [employer], [employer]. has made the best even better.    Cosamin ASU is a dual synergistic formula: its specific combination of glucosamine and sodium chondroitin sulfate have demonstrated synergy in stimulating cartilage production,1 while ASU also acts synergistically with glucosamine.  What is ASU and how does it work?    ASU (avocado/soybean unsaponifiables) is obtained from avocados and soybeans. A potent ingredient demonstrated to protect cartilage which leads to improved joint function, ASU complements the effects of the other ingredients. While working through their own primary mechanisms of action, ASU, glucosamine and chondroitin sulfate together deliver comprehensive joint health support. Our trademarked glucosamine hydrochloride, chondroitin sulfate, and avocado/soybean unsaponifiables together were shown in cell studies to be better than the combination of glucosamine and chondroitin sulfate at inhibiting expression of several agents involved in cartilage breakdown.    Cosamin ASU is available at leading pharmacies nationwide ([employer]) and online.    How do I take Cosamin® ASU?        Maximum Protection:      Take 4 capsules per day. Capsules may be taken all at once or divided with meals throughout the day.           Maintenance:      Once desired effect is noticed, you may gradually reduce the number of capsules to maintain comfort level.      Some individuals may respond sooner than others depending on the status of their cartilage and joint health. Once response has been seen, the number of  capsules per day may be decreased to maintain comfort level. Some individuals on this lower level may wish to go back to four capsules a day during the weekends or times of increased activity.      The maintenance level can also be used long-term to help maintain healthy joints. At any time, the number of capsules may be increased back to four capsules per day.      Where to Buy Cosamin® ASU?:    Retail Stores:        [employer]      Giant Food      Giant [employer] [facility] [facility]      [facility]      Kroger      Meijer      [facility]      [facility]      Publix      [facility] with [facility] [employer]      [employer]     Online Stores:        Eloxx.com      BJs.com      Costco.com      CVS.com      drugstore.com      iherb.com      Luckyvitamin.com      NutramaxStore.com      Valleynaturals.com      Vitacost.com      VitaminShoppe.com      Walgreens.com          WHAT IS EUFLEXXA®?  EUFLEXXA® is a gel-like, elastic, sterile product containing natural, highly purified hyaluronan (bet-c-kjz-LORY-nan), (also known as sodium hyaluronate). Hyaluronanis a natural substance found in the body and is present in particularly high amounts in joint tissues and in the fluid that fills the joints. The bodys own hyaluronan acts like a lubricant and a shock absorber in the joint, and is needed for the joint to function properly. Osteoarthritis is a condition that involves the wearing down of cartilage (the protective covering on the ends of your bones). In osteoarthritis, there may not be enough hyaluronan, and there may be a decrease in the quality of the hyaluronan in joint fluid and tissues.  EUFLEXXA® comes in pre-filled syringes containing 2 ml (about half a teaspoon) of product. EUFLEXXA® is given by injection directly into the knee joint.    WHAT IS EUFLEXXA® USED FOR?  EUFLEXXA® is used to relieve knee pain due to osteoarthritis. It is used for patients who do  not get enough relief from simple pain medications such as acetaminophen or from exercise and physical therapy.    WHAT ARE THE BENEFITS OF EUFLEXXA®?  A clinical study involving 321 patients between the ages of 50 and 80years of age with knee pain due to osteoarthritis was performed in [facility]. The study investigated the safety and effectiveness of EUFLEXXA®. The patients were placed in one of two groups. One group was given an injection of EUFLEXXA® into one or both knee joints once a week for 3 weeks. The second group was given an injection of another commercially available hyaluronan once a week for 3 weeks. Pain, stiffness and function of the knee joint and patients and doctors judgment of the success of treatment were measured for 12 weeks. Patients with osteoarthritic knee joint pain, who had not obtained painrelief with other medications, experienced pain relief from the EUFLEXXA® injections into the knee joint, similar to those patients who received injections of the other hyaluronan.     Cortisone Injection  What is cortisone?   Cortisone is in a family of medicines called corticosteroids, which are strong anti-inflammatory drugs. Corticosteroids are used for many conditions. They can be taken by mouth or in a variety of other ways including creams, inhalers, or injections (shots).   Because they have a similar name, corticosteroids are sometimes confused with anabolic steroids. They are not the same. Anabolic steroids are a group of drugs that increase muscle mass and strength. These are often used illegally by athletes and can have many harmful side effects.   What is a cortisone injection used for?   A cortisone shot is often used to give short-term pain relief and reduce the swelling from inflammation of a joint, tendon, or bursa (sac that provides cushion in a joint). These problems are common in knee, elbow, and shoulder joints. Reducing the swelling helps relieve pain and discomfort and can speed  up recovery from an injury.   A shot of cortisone may also be given to reduce inflammation over the whole body (for example, if you have an allergic reaction or a flare-up of rheumatoid arthritis).   How is the shot given?   The corticosteroid medicine is usually mixed with a local anesthetic and then injected into the painful area. At first, the shot may feel uncomfortable, but the local anesthetic will help with the discomfort.     What happens after I get the shot?   When the anesthetic wears off, the area where the shot was given may be quite sore. Your healthcare provider may recommend putting an ice bag on the area for 20 to 30 minutes every 3 to 4 hours after the shot and taking an anti-inflammatory medicine (such as ibuprofen). The cortisone will start to reduce the inflammation and give you pain relief within 2 to 3 days. In some cases, the cortisone will permanently relieve your symptoms. In other cases, the pain relief is temporary and can last anywhere from a couple of weeks to years.   How well the shot works depends on many things, including the amount of drug given, the cause of the problem, if the shot is given in the right area, and other factors.   Many chronic (ongoing) inflammatory conditions are caused by overuse. If you continue activities that overuse the injured area, the inflammation may return and the cortisone shot will probably not help that much.   What are the risks?   Side effects from cortisone shots are rare. Possible side effects at the site where the shot was given include:    slight bruising of the skin    shrinkage of the fatty tissue under the skin where the shot was given    loss of skin pigment where the shot was given    increase in pain after the shot    infection    weakening of the tendons or tendon rupture    allergic reaction to the medicine   Diabetics may have a temporary increase in their blood sugar after a shot.   Cortisone can temporarily weaken the immune  system. While receiving these shots, you should not get certain vaccines. Also, avoid contact with anyone who has chickenpox or measles, especially if you have never had these diseases. Your immune system may not be strong enough to fight off the infection while you are taking cortisone.     Published by [employer].

## 2017-03-20 NOTE — LETTER
March 21, 2017      Cody Lewis MD  [address] [facility] [address]           [place] [facility]  [address] [facility] [address]  Phone: [phone]          Patient: Suze Chino   MR Number: [id number]   YOB: 1950   Date of Visit: 3/20/2017       Dear Dr. Cody Lewsi:    Thank you for referring Suze Chino to me for evaluation. Attached you will find relevant portions of my assessment and plan of care.    If you have questions, please do not hesitate to call me. I look forward to following Suze Chino along with you.    Sincerely,    London French MD    Enclosure  CC:  No Recipients    If you would like to receive this communication electronically, please contact [email or web address] or [phone] to request more information on [employer] Link access.    For providers and/or their staff who would like to refer a patient to [facility], please contact us through our one-stop-shop provider referral line, [facility], at [phone].    If you feel you have received this communication in error or would no longer like to receive these types of communications, please e-mail [email or web address]

## 2017-03-21 PROBLEM — M25.561 RIGHT KNEE PAIN: Status: ACTIVE | Noted: 2017-03-21

## 2017-03-21 PROBLEM — M21.061 ACQUIRED GENU VALGUM OF RIGHT KNEE: Status: ACTIVE | Noted: 2017-03-21

## 2017-03-21 NOTE — PROGRESS NOTES
CC:right KNEE pain    HISTORY OF PRESENT ILLNESS:  Suze Chino is a 66 y.o. right hand dominant Female who reports right knee pain refractory to conservative mgmt.     History isnegative fortrauma but has been having pain since 2015.    Today the patient rates pain at a 8/10 on visual analog scale.      The patient has tried Celebrex to make the pain better with some relief of the pain.    She reports that the pain is worse with walking; running; dancing; wearing high heels.  It does wake the patient at night.    negative for mechanical symptoms, negative forinstability    It does affect the performance of ADLs. It does affect the ability to perform exercises or recreational activities which include: dancing; walking; running.    Occupation: Retired     Past Medical History:   Diagnosis Date    Anxiety     Cataract     Depression     GERD (gastroesophageal reflux disease)     Glaucoma     History of colon polyps 2009    history as per patient    History of colonoscopy 2009    ok per pt    Hypertension     Hypothyroid     ?    Mitral valve prolapse     Peptic ulcer disease     with gi bleed    Prediabetes     Right knee DJD     Right knee DJD     Sleep apnea     TMJ (temporomandibular joint disorder)        Past Surgical History:   Procedure Laterality Date    ARGON TRABECULOPLASTY - OU - BOTH EYES      BILATERAL SALPINGOOPHORECTOMY      EYE SURGERY      laser sx    HYSTERECTOMY      peptic ulcer surgery        TRABECULECTOMY Right 4/2/14    OD (dr. grossman)       Family History   Problem Relation Age of Onset    Heart disease Father      before age 50    Diabetes Mother     Glaucoma Mother     Leukemia Mother     Cataracts Mother     Diabetes Sister     Glaucoma Sister     Diabetes Brother     Glaucoma Brother     Lung cancer Brother          Current Outpatient Prescriptions:     alprazolam (XANAX) 0.25 MG tablet, Take 1 tablet (0.25 mg total) by mouth 3 (three) times daily as  needed for Anxiety., Disp: 30 tablet, Rfl: 1    betaxolol 0.5% (BETOPTIC-S) 0.5 % Drop, Place 1 drop into both eyes 2 (two) times daily., Disp: 5 mL, Rfl: 6    celecoxib (CELEBREX) 200 MG capsule, Take 1 capsule (200 mg total) by mouth 2 (two) times daily., Disp: 60 capsule, Rfl: 11    DIPHENHYDRAMINE HCL (BENADRYL ALLERGY ORAL), Take 1 tablet by mouth as needed., Disp: , Rfl:     ERGOCALCIFEROL, VITAMIN D2, (VITAMIN D ORAL), Take by mouth as needed., Disp: , Rfl:     estradiol (ESTRACE) 1 MG tablet, TAKE 1 TABLET (1 MG TOTAL) BY MOUTH ONCE DAILY., Disp: 30 tablet, Rfl: 11    fluoxetine (PROZAC) 20 MG capsule, Take 3 capsules (60 mg total) by mouth once daily., Disp: 90 capsule, Rfl: 11    hydrochlorothiazide (HYDRODIURIL) 25 MG tablet, TAKE 1 TABLET BY MOUTH EVERY DAY, Disp: 90 tablet, Rfl: 0    latanoprost 0.005 % ophthalmic solution, PLACE 1 DROP INTO THE LEFT EYE EVERY EVENING., Disp: 2.5 mL, Rfl: 6    lisinopril 10 MG tablet, Take 1 tablet (10 mg total) by mouth once daily., Disp: 90 tablet, Rfl: 3    metformin (GLUCOPHAGE-XR) 500 MG 24 hr tablet, Take 1 tablet (500 mg total) by mouth 2 (two) times daily., Disp: 60 tablet, Rfl: 5    pantoprazole (PROTONIX) 40 MG tablet, TAKE 1 TABLET BY MOUTH EVERY DAY, Disp: 90 tablet, Rfl: 3    Review of patient's allergies indicates:   Allergen Reactions    Alphagan [brimonidine] Dermatitis       Review of Systems   Constitutional: Positive for diaphoresis.        Weight gain; difficulty sleeping   HENT: Positive for ear pain, nosebleeds and tinnitus.    Respiratory: Positive for cough and shortness of breath.    Cardiovascular: Positive for chest pain and palpitations.        HTN   Gastrointestinal: Positive for abdominal pain, blood in stool, constipation, diarrhea, heartburn, nausea and vomiting.        Ulcers; hemorrhoids   Genitourinary:        Incontinence   Musculoskeletal: Positive for back pain, joint pain and myalgias.        Weakness; stiffness    Neurological: Positive for dizziness, tingling, sensory change and headaches.   Endo/Heme/Allergies:        Anemia; Thyroid; cholesterol; Diabetes   Psychiatric/Behavioral: Positive for depression. The patient is nervous/anxious.    All other systems reviewed and are negative.      OBJECTIVE:  Vitals:    03/20/17 1123   BP: 118/80   Pulse: (!) 58       PHYSICAL EXAMINATION    General:  The patient is alert and oriented x 3.  Mood is pleasant.  Observation of ears, eyes and nose reveal no gross abnormalities.  No labored breathing observed.    Bilateral KNEE EXAMINATION     OBSERVATION / INSPECTION   Gait:   antalgic   Alignment:  Neutral   Scars:   None   Muscle atrophy: Mild  Effusion:  None   Warmth:  None   Discoloration:   none     TENDERNESS / CREPITUS (T / C):          T / C      T / C   Patella   +/ -   Lateral joint line   - / -    Peripatellar medial  +  Medial joint line    ++ / -    Peripatellar lateral +  Medial plica   - / -    Patellar tendon -   Popliteal fossa   - / -    Quad tendon   -   Gastrocnemius   -   Prepatellar Bursa - / -   Quadricep   -   Tibial tubercle  -  Thigh/hamstring  -   Pes anserine/HS -  Fibula    -   ITB   - / -  Tibia     -   Tib/fib joint  - / -  LCL    -     MFC   - / -   MCL: Proximal  -    LFC   - / -    Distal   -          ROM: (* = pain)  PASSIVE   ACTIVE    Left :   5 / 0 / 145   5 / 0 / 145     Right :    5 / 0 / 145   5 / 0 / 145    Patellofemoral examination:  See above noted areas of tenderness.   Patella position    Subluxation / dislocation: Centered           Sup. / Inf;   Normal   Crepitus (PF):    ++  Patellar Mobility:       Medial-lateral:   Normal    Superior-inferior:  Normal    Inferior tilt   Normal    Patellar tendon:  Normal   Lateral tilt:    Normal   J-sign:     None   Patellofemoral grind:   ++      MENISCAL SIGNS:     Pain on terminal extension:  +  Pain on terminal flexion:  +  Malindas maneuver:  + for pain  Squat     + posterior joint  pain    LIGAMENT EXAMINATION:  ACL / Lachman:  normal (-1 to 2mm)    PCL-Post.  drawer: normal 0 to 2mm  MCL- Valgus:  normal 0 to 2mm  LCL- Varus:  normal 0 to 2mm    STRENGTH: (* = with pain) PAINFUL SIDE   Quadricep   5/5   Hamstrin/5    EXTREMITY NEURO-VASCULAR EXAMINATION:   Sensation:  Grossly intact to light touch all dermatomal regions.   Motor Function:  Fully intact motor function at hip, knee, foot and ankle    Vascular status:  DP and PT pulses 2+, brisk capillary refill, symmetric.       Xrays: ordered and reviewed personally by me (standing AP/flexion, lateral, sunrise) show:  Findings:  AP and PA flexion standing views of both knees as well as lateral and Merchant views of both knees were obtained.  There is right greater than left degenerative change.  Suprapatellar joint effusion present on the right.  No acute fracture or dislocation.       ASSESSMENT:    Bilateral Knee Osteoarthritis with right knee pain exacerbation    PLAN:   Celebrex has worked well for the patient in the past, however she is only taking one per day. I have recommended increasing to two per day.  Information regarding Euflexxa; Cortisone provided as well  F/U if she desires injection  All questions were answered, pt will contact us for questions or concerns in the interim.

## 2017-03-27 ENCOUNTER — PATIENT OUTREACH (OUTPATIENT)
Dept: ADMINISTRATIVE | Facility: HOSPITAL | Age: 67
End: 2017-03-27
Payer: MEDICARE

## 2017-03-27 ENCOUNTER — OFFICE VISIT (OUTPATIENT)
Dept: OPHTHALMOLOGY | Facility: CLINIC | Age: 67
End: 2017-03-27
Payer: MEDICARE

## 2017-03-27 DIAGNOSIS — H25.13 NUCLEAR SCLEROSIS, BILATERAL: ICD-10-CM

## 2017-03-27 DIAGNOSIS — H40.1133 PRIMARY OPEN ANGLE GLAUCOMA OF BOTH EYES, SEVERE STAGE: Primary | ICD-10-CM

## 2017-03-27 DIAGNOSIS — Z12.31 ENCOUNTER FOR SCREENING MAMMOGRAM FOR BREAST CANCER: Primary | ICD-10-CM

## 2017-03-27 PROCEDURE — 92012 INTRM OPH EXAM EST PATIENT: CPT | Mod: S$PBB,,, | Performed by: OPHTHALMOLOGY

## 2017-03-27 PROCEDURE — 99999 PR PBB SHADOW E&M-EST. PATIENT-LVL I: CPT | Mod: PBBFAC,,, | Performed by: OPHTHALMOLOGY

## 2017-03-27 PROCEDURE — 99211 OFF/OP EST MAY X REQ PHY/QHP: CPT | Mod: PBBFAC | Performed by: OPHTHALMOLOGY

## 2017-03-27 RX ORDER — DORZOLAMIDE HCL 20 MG/ML
1 SOLUTION/ DROPS OPHTHALMIC 2 TIMES DAILY
COMMUNITY
End: 2017-05-05 | Stop reason: SDUPTHER

## 2017-03-27 NOTE — PROGRESS NOTES
HPI     Here for 2 mos. IOP check, Betoptic trial.  Pt. Stopped Betoptic due to   decreased pulse rate, extreme fatigue.    Referred by Dr. Lorenzana  Bleb Needling with [facility] Inj. OD 9/17/15  LTG   H/O Non-compliance   Intolerant of Coag Drops    Stopped Alphagan OS TID because of dermatitis    Trab OD     repeat SLT od done 8/9/2012 - good initial response IOP 18 to 13  Primary SLT done os 9/13/2012 - minimal response 18 to 16  SLT os 9/29/2016    Latanoprost OU QHS (slows pulse rate),   Trusopt bid OU  Betoptic bid OU  Stopped due to extreme fatigue after using for 1 week.       Last edited by Dory Johnson on 3/27/2017  9:56 AM.         Assessment /Plan     For exam results, see Encounter Report.      ICD-10-CM ICD-9-CM    1. Primary open angle glaucoma of both eyes, severe stage H40.1133 365.11 Increased IOP, pt is intolerant of most meds and will likely need surgery     May need Trab OS, repeat SLT OD      365.73    2. Nuclear sclerosis, bilateral H25.13 366.16        Latanoprost OU QHS (slows pulse rate),   Trusopt bid OU    RETURN TO CLINIC 1 months HVF, DOA (test OS HVF first)

## 2017-03-27 NOTE — LETTER
March 27, 2017    Suze Chino  [address]  [place]            [facility]  [address]  [facility] [address]  Phone: [phone] Dear Mrs. Chino:    [facility] is committed to your overall health.  To help you get the most out of each of your visits, we will review your information to make sure you are up to date on all of your recommended tests and/or procedures.      Cody Lewis MD has found that you may be due for   Health Maintenance Due   Topic    TETANUS VACCINE     Zoster Vaccine     Pneumococcal (65+) (1 of 2 - PCV13)    Influenza Vaccine     Mammogram         If you have had any of the above done at another facility, please bring the records or information with you so that your record at [facility] will be complete.    If you are currently taking medication, please bring it with you to your appointment for review.    We will be happy to assist you with scheduling any necessary appointments or you may contact the [facility] appointment desk at [phone] to schedule at your convenience.     Thank you for choosing [facility] for your healthcare needs,        If you have any questions or concerns, please don't hesitate to call.    Sincerely,    Megan HUMPHRIES LPN  Care Coordination Department  [facility]

## 2017-04-07 ENCOUNTER — OFFICE VISIT (OUTPATIENT)
Dept: FAMILY MEDICINE | Facility: CLINIC | Age: 67
End: 2017-04-07
Payer: MEDICARE

## 2017-04-07 VITALS
DIASTOLIC BLOOD PRESSURE: 73 MMHG | HEART RATE: 54 BPM | SYSTOLIC BLOOD PRESSURE: 136 MMHG | HEIGHT: 68 IN | BODY MASS INDEX: 30.15 KG/M2 | WEIGHT: 198.94 LBS

## 2017-04-07 DIAGNOSIS — Z12.31 ENCOUNTER FOR SCREENING MAMMOGRAM FOR MALIGNANT NEOPLASM OF BREAST: ICD-10-CM

## 2017-04-07 DIAGNOSIS — I10 ESSENTIAL HYPERTENSION: Primary | ICD-10-CM

## 2017-04-07 DIAGNOSIS — F41.9 ANXIETY: ICD-10-CM

## 2017-04-07 PROCEDURE — 99213 OFFICE O/P EST LOW 20 MIN: CPT | Mod: S$PBB,,, | Performed by: FAMILY MEDICINE

## 2017-04-07 PROCEDURE — 99999 PR PBB SHADOW E&M-EST. PATIENT-LVL III: CPT | Mod: PBBFAC,,, | Performed by: FAMILY MEDICINE

## 2017-04-07 PROCEDURE — 99213 OFFICE O/P EST LOW 20 MIN: CPT | Mod: PBBFAC,PO | Performed by: FAMILY MEDICINE

## 2017-04-07 RX ORDER — MINERAL OIL
180 ENEMA (ML) RECTAL DAILY PRN
COMMUNITY
End: 2021-09-23

## 2017-04-07 NOTE — MR AVS SNAPSHOT
[facility]  [address] [address]  Phone: [phone]  Fax: [phone]                  Suze Chino   2017 1:20 PM   Office Visit    Description:  Female : 1950   Provider:  Cody Lewis MD   Department:  [facility]           Reason for Visit     Follow-up           Diagnoses this Visit        Comments    Essential hypertension    -  Primary     Anxiety         Encounter for screening mammogram for malignant neoplasm of breast                To Do List           Future Appointments        Provider Department Dept Phone    2017 9:00 AM [facility] DEXA1C [facility] [phone]    2017 12:00 PM MILLIGAN, VISUAL-ONE [facility] - Ophthalmology [phone]    2017 1:00 PM Macario Shankar MD [facility] Ophthalmology [phone]      Goals (5 Years of Data)     None      [facility]     [facility] Nurse Care Line -  Assistance  Unless otherwise directed by your provider, please contact [facility], our nurse care line that is available for  assistance.     Registered nurses in the [facility] Center provide: appointment scheduling, clinical advisement, health education, and other advisory services.  Call: [phone] (toll free)               Medications           Message regarding Medications     Verify the changes and/or additions to your medication regime listed below are the same as discussed with your clinician today.  If any of these changes or additions are incorrect, please notify your healthcare provider.        STOP taking these medications     betaxolol 0.5% (BETOPTIC-S) 0.5 % Drop Place 1 drop into both eyes 2 (two) times daily.           Verify that the below list of medications is an accurate representation of the medications you are currently taking.  If none reported, the list may be blank. If incorrect, please contact your healthcare provider. Carry this list with you in case  "of emergency.           Current Medications     alprazolam (XANAX) 0.25 MG tablet Take 1 tablet (0.25 mg total) by mouth 3 (three) times daily as needed for Anxiety.    celecoxib (CELEBREX) 200 MG capsule Take 1 capsule (200 mg total) by mouth 2 (two) times daily.    DIPHENHYDRAMINE HCL (BENADRYL ALLERGY ORAL) Take 1 tablet by mouth as needed.    dorzolamide (TRUSOPT) 2 % ophthalmic solution Place 1 drop into both eyes 2 (two) times daily.    ERGOCALCIFEROL, VITAMIN D2, (VITAMIN D ORAL) Take by mouth as needed.    estradiol (ESTRACE) 1 MG tablet TAKE 1 TABLET (1 MG TOTAL) BY MOUTH ONCE DAILY.    fexofenadine (ALLEGRA) 180 MG tablet Take 180 mg by mouth once daily.    fluoxetine (PROZAC) 20 MG capsule Take 3 capsules (60 mg total) by mouth once daily.    hydrochlorothiazide (HYDRODIURIL) 25 MG tablet TAKE 1 TABLET BY MOUTH EVERY DAY    latanoprost 0.005 % ophthalmic solution PLACE 1 DROP INTO THE LEFT EYE EVERY EVENING.    lisinopril 10 MG tablet Take 1 tablet (10 mg total) by mouth once daily.    metformin (GLUCOPHAGE-XR) 500 MG 24 hr tablet Take 1 tablet (500 mg total) by mouth 2 (two) times daily.    pantoprazole (PROTONIX) 40 MG tablet TAKE 1 TABLET BY MOUTH EVERY DAY           Clinical Reference Information           Your Vitals Were     BP Pulse Height Weight BMI    136/73 54 5' 8" (1.727 m) 90.2 kg (198 lb 15.4 oz) 30.25 kg/m2      Blood Pressure          Most Recent Value    BP  136/73      Allergies as of 4/7/2017     Alphagan [Brimonidine]      Immunizations Administered on Date of Encounter - 4/7/2017     None      Orders Placed During Today's Visit     Future Labs/Procedures Expected by Expires    Mammo Digital Screening Bilat with CAD  4/7/2017 4/7/2018      Language Assistance Services     ATTENTION: Language assistance services are available, free of charge. Please call [phone].      ATENCIÓN: Si habla español, tiene a couch disposición servicios gratuitos de asistencia lingüística. Llame al " [phone].     JAYDEN Ý: N?u b?n nói Ti?ng Vi?t, có các d?ch v? h? tr? ngôn ng? mi?n phí dành cho b?n. G?i s? [phone].         [facility] complies with applicable Federal civil rights laws and does not discriminate on the basis of race, color, national origin, age, disability, or sex.

## 2017-04-08 NOTE — PROGRESS NOTES
Follow-up anxiety, depression.  Occasional Xanax.  Compliant fluoxetine, feels better with recent dose increase.  No SI/HI    Blood better with medication.  Compliant.  No complaint of side effects    Past Medical History:  Past Medical History:   Diagnosis Date    Anxiety     Cataract     Depression     GERD (gastroesophageal reflux disease)     Glaucoma     History of colon polyps 2009    history as per patient    History of colonoscopy 2009    ok per pt    Hypertension     Hypothyroid     ?    Mitral valve prolapse     Peptic ulcer disease     with gi bleed    Prediabetes     Right knee DJD     Right knee DJD     Sleep apnea     TMJ (temporomandibular joint disorder)      Past Surgical History:   Procedure Laterality Date    ARGON TRABECULOPLASTY - OU - BOTH EYES      BILATERAL SALPINGOOPHORECTOMY      EYE SURGERY      laser sx    HYSTERECTOMY      peptic ulcer surgery        TRABECULECTOMY Right 4/2/14    OD (dr. grossman)     Social History     Social History    Marital status:      Spouse name: N/A    Number of children: N/A    Years of education: N/A     Occupational History    Not on file.     Social History Main Topics    Smoking status: Never Smoker    Smokeless tobacco: Not on file    Alcohol use Yes      Comment: occasional    Drug use: No    Sexual activity: Not on file     Other Topics Concern    Not on file     Social History Narrative     Family History   Problem Relation Age of Onset    Heart disease Father      before age 50    Diabetes Mother     Glaucoma Mother     Leukemia Mother     Cataracts Mother     Diabetes Sister     Glaucoma Sister     Diabetes Brother     Glaucoma Brother     Lung cancer Brother      Review of patient's allergies indicates:   Allergen Reactions    Alphagan [brimonidine] Dermatitis     Current Outpatient Prescriptions on File Prior to Visit   Medication Sig Dispense Refill    alprazolam (XANAX) 0.25 MG tablet Take 1  "tablet (0.25 mg total) by mouth 3 (three) times daily as needed for Anxiety. 30 tablet 1    celecoxib (CELEBREX) 200 MG capsule Take 1 capsule (200 mg total) by mouth 2 (two) times daily. 60 capsule 11    DIPHENHYDRAMINE HCL (BENADRYL ALLERGY ORAL) Take 1 tablet by mouth as needed.      dorzolamide (TRUSOPT) 2 % ophthalmic solution Place 1 drop into both eyes 2 (two) times daily.      ERGOCALCIFEROL, VITAMIN D2, (VITAMIN D ORAL) Take by mouth as needed.      estradiol (ESTRACE) 1 MG tablet TAKE 1 TABLET (1 MG TOTAL) BY MOUTH ONCE DAILY. 30 tablet 11    fluoxetine (PROZAC) 20 MG capsule Take 3 capsules (60 mg total) by mouth once daily. 90 capsule 11    hydrochlorothiazide (HYDRODIURIL) 25 MG tablet TAKE 1 TABLET BY MOUTH EVERY DAY 90 tablet 0    latanoprost 0.005 % ophthalmic solution PLACE 1 DROP INTO THE LEFT EYE EVERY EVENING. (Patient taking differently: PLACE 1 DROP INTO BOTH EYES 1x a day) 2.5 mL 6    lisinopril 10 MG tablet Take 1 tablet (10 mg total) by mouth once daily. 90 tablet 3    metformin (GLUCOPHAGE-XR) 500 MG 24 hr tablet Take 1 tablet (500 mg total) by mouth 2 (two) times daily. 60 tablet 5    pantoprazole (PROTONIX) 40 MG tablet TAKE 1 TABLET BY MOUTH EVERY DAY 90 tablet 3    [DISCONTINUED] betaxolol 0.5% (BETOPTIC-S) 0.5 % Drop Place 1 drop into both eyes 2 (two) times daily. 5 mL 6     No current facility-administered medications on file prior to visit.        Vitals:    04/07/17 1317   BP: 136/73   Pulse: (!) 54   Weight: 90.2 kg (198 lb 15.4 oz)   Height: 5' 8" (1.727 m)       Wt Readings from Last 3 Encounters:   04/07/17 90.2 kg (198 lb 15.4 oz)   03/20/17 92.3 kg (203 lb 6.4 oz)   03/07/17 93.2 kg (205 lb 9.3 oz)       APPEARANCE: Well nourished, well developed, in no acute distress.    HEAD: Normocephalic.  Atraumatic.  EYES:   Right eye: Pupil reactive.  Conjunctiva clear.    Left eye: Pupil reactive.  Conjunctiva clear.    NECK: Supple. No bruits.  No JVD.  No cervical " lymphadenopathy.  No thyromegaly.    CHEST: Breath sounds clear bilaterally.  Normal respiratory effort  CARDIOVASCULAR: Normal rate.  Regular rhythm.  No murmurs.  No rub.  No gallops.   No edema.  MENTAL STATUS: Alert.  Oriented x 3.    Suze was seen today for follow-up.    Diagnoses and all orders for this visit:    Essential hypertension    Anxiety    Encounter for screening mammogram for malignant neoplasm of breast  -     Mammo Digital Screening Bilat with CAD; Future      She may continue current medication.

## 2017-04-13 ENCOUNTER — HOSPITAL ENCOUNTER (OUTPATIENT)
Dept: RADIOLOGY | Facility: HOSPITAL | Age: 67
Discharge: HOME | End: 2017-04-13
Attending: FAMILY MEDICINE
Payer: MEDICARE

## 2017-04-13 DIAGNOSIS — Z12.31 ENCOUNTER FOR SCREENING MAMMOGRAM FOR MALIGNANT NEOPLASM OF BREAST: ICD-10-CM

## 2017-04-13 PROCEDURE — 77067 SCR MAMMO BI INCL CAD: CPT | Mod: 26,,, | Performed by: RADIOLOGY

## 2017-04-13 PROCEDURE — 77067 SCR MAMMO BI INCL CAD: CPT | Mod: TC

## 2017-04-13 PROCEDURE — 77063 BREAST TOMOSYNTHESIS BI: CPT | Mod: 26,,, | Performed by: RADIOLOGY

## 2017-05-02 ENCOUNTER — APPOINTMENT (OUTPATIENT)
Dept: OPHTHALMOLOGY | Facility: CLINIC | Age: 67
End: 2017-05-02
Payer: MEDICARE

## 2017-05-02 ENCOUNTER — OFFICE VISIT (OUTPATIENT)
Dept: OPHTHALMOLOGY | Facility: CLINIC | Age: 67
End: 2017-05-02
Payer: MEDICARE

## 2017-05-02 DIAGNOSIS — H40.1233 LOW-TENSION GLAUCOMA OF BOTH EYES, SEVERE STAGE: ICD-10-CM

## 2017-05-02 DIAGNOSIS — H25.13 NUCLEAR SCLEROSIS, BILATERAL: ICD-10-CM

## 2017-05-02 DIAGNOSIS — H40.1133 PRIMARY OPEN ANGLE GLAUCOMA OF BOTH EYES, SEVERE STAGE: Primary | ICD-10-CM

## 2017-05-02 PROCEDURE — 99212 OFFICE O/P EST SF 10 MIN: CPT | Mod: PBBFAC,25,PO | Performed by: OPHTHALMOLOGY

## 2017-05-02 PROCEDURE — 92083 EXTENDED VISUAL FIELD XM: CPT | Mod: PBBFAC,PO | Performed by: OPHTHALMOLOGY

## 2017-05-02 PROCEDURE — 99999 PR PBB SHADOW E&M-EST. PATIENT-LVL II: CPT | Mod: PBBFAC,,, | Performed by: OPHTHALMOLOGY

## 2017-05-02 PROCEDURE — 92014 COMPRE OPH EXAM EST PT 1/>: CPT | Mod: S$PBB,,, | Performed by: OPHTHALMOLOGY

## 2017-05-02 PROCEDURE — 92250 FUNDUS PHOTOGRAPHY W/I&R: CPT | Mod: PBBFAC,PO | Performed by: OPHTHALMOLOGY

## 2017-05-02 NOTE — PROGRESS NOTES
HPI     Here for IOP check, HVF review and FD with SDPs.  Pt states vision   unchanged since last visit.    Bleb Needling with [facility] Inj. OD 9/17/15  LTG   H/O Non-compliance   Intolerant of Coag Drops    Stopped Alphagan OS TID because of dermatitis  Stopped Betoptic bid ou due to slow pulse rate and feeling tired.  Trab OD     repeat SLT od done 8/9/2012 - good initial response IOP 18 to 13  Primary SLT done os 9/13/2012 - minimal response 18 to 16  SLT os 9/29/2016    Latanoprost OU QHS (slows pulse rate),   Trusopt bid ou 98% compliance.       Last edited by Dory Johnson on 5/2/2017  1:14 PM.         Assessment /Plan     For exam results, see Encounter Report.      ICD-10-CM ICD-9-CM    1. Primary open angle glaucoma of both eyes, severe stage H40.1133 365.11 Arvizu Visual Field - OU - Extended - Both Eyes     365.73 Color Fundus Photography - OU - Both Eyes Done today    2. Nuclear sclerosis, bilateral H25.13 366.16 Trace, discussed RBA with xen-gel    3. Low-tension glaucoma of both eyes, severe stage H40.1233 365.12      365.73        Will repeat HVF in early June, if improved in the left eye , may wait in surgery-   If still worse, will likely consider to book Xen-Gel stent   Continue   Latanoprost OU QHS (slows pulse rate),   Trusopt bid ou 98% compliance.   Encourage proper compliance      RTC June 24/2 HVF and IOP,

## 2017-05-05 RX ORDER — DORZOLAMIDE HCL 20 MG/ML
1 SOLUTION/ DROPS OPHTHALMIC 2 TIMES DAILY
Qty: 10 ML | Refills: 6 | Status: SHIPPED | OUTPATIENT
Start: 2017-05-05 | End: 2018-01-09 | Stop reason: SDUPTHER

## 2017-05-05 NOTE — TELEPHONE ENCOUNTER
----- Message from Hue Alicea sent at 5/5/2017 11:09 AM CDT -----  Contact: pt  Need refill on dorzolamide pls [facility] at [phone] pt can be called back at [phone] pt is waiting

## 2017-05-17 RX ORDER — HYDROCHLOROTHIAZIDE 25 MG/1
TABLET ORAL
Qty: 90 TABLET | Refills: 3 | Status: SHIPPED | OUTPATIENT
Start: 2017-05-17 | End: 2018-03-17 | Stop reason: SDUPTHER

## 2017-05-31 ENCOUNTER — TELEPHONE (OUTPATIENT)
Dept: FAMILY MEDICINE | Facility: CLINIC | Age: 67
End: 2017-05-31

## 2017-06-16 ENCOUNTER — LAB VISIT (OUTPATIENT)
Dept: LAB | Facility: HOSPITAL | Age: 67
End: 2017-06-16
Attending: FAMILY MEDICINE
Payer: MEDICARE

## 2017-06-16 ENCOUNTER — OFFICE VISIT (OUTPATIENT)
Dept: FAMILY MEDICINE | Facility: CLINIC | Age: 67
End: 2017-06-16
Payer: MEDICARE

## 2017-06-16 VITALS
HEIGHT: 68 IN | HEART RATE: 61 BPM | TEMPERATURE: 98 F | WEIGHT: 197.56 LBS | DIASTOLIC BLOOD PRESSURE: 87 MMHG | BODY MASS INDEX: 29.94 KG/M2 | SYSTOLIC BLOOD PRESSURE: 146 MMHG

## 2017-06-16 DIAGNOSIS — F63.3 HAIR PULLING: ICD-10-CM

## 2017-06-16 DIAGNOSIS — R53.83 FATIGUE, UNSPECIFIED TYPE: ICD-10-CM

## 2017-06-16 DIAGNOSIS — G47.30 SLEEP APNEA, UNSPECIFIED TYPE: ICD-10-CM

## 2017-06-16 DIAGNOSIS — F32.0 MILD MAJOR DEPRESSION: ICD-10-CM

## 2017-06-16 DIAGNOSIS — I10 ESSENTIAL HYPERTENSION: Primary | ICD-10-CM

## 2017-06-16 DIAGNOSIS — F41.9 ANXIETY: ICD-10-CM

## 2017-06-16 LAB
ALBUMIN SERPL BCP-MCNC: 4 G/DL
ALP SERPL-CCNC: 98 U/L
ALT SERPL W/O P-5'-P-CCNC: 18 U/L
ANION GAP SERPL CALC-SCNC: 8 MMOL/L
AST SERPL-CCNC: 24 U/L
BASOPHILS # BLD AUTO: 0.03 K/UL
BASOPHILS NFR BLD: 0.4 %
BILIRUB SERPL-MCNC: 0.8 MG/DL
BUN SERPL-MCNC: 23 MG/DL
CALCIUM SERPL-MCNC: 10.3 MG/DL
CHLORIDE SERPL-SCNC: 100 MMOL/L
CO2 SERPL-SCNC: 29 MMOL/L
CREAT SERPL-MCNC: 1.1 MG/DL
DIFFERENTIAL METHOD: ABNORMAL
EOSINOPHIL # BLD AUTO: 0.1 K/UL
EOSINOPHIL NFR BLD: 1.7 %
ERYTHROCYTE [DISTWIDTH] IN BLOOD BY AUTOMATED COUNT: 13.1 %
EST. GFR  (AFRICAN AMERICAN): >60 ML/MIN/1.73 M^2
EST. GFR  (NON AFRICAN AMERICAN): 52.4 ML/MIN/1.73 M^2
GLUCOSE SERPL-MCNC: 102 MG/DL
HCT VFR BLD AUTO: 42.7 %
HGB BLD-MCNC: 13.6 G/DL
LYMPHOCYTES # BLD AUTO: 2.3 K/UL
LYMPHOCYTES NFR BLD: 32.4 %
MCH RBC QN AUTO: 28.8 PG
MCHC RBC AUTO-ENTMCNC: 31.9 %
MCV RBC AUTO: 91 FL
MONOCYTES # BLD AUTO: 0.4 K/UL
MONOCYTES NFR BLD: 5.7 %
NEUTROPHILS # BLD AUTO: 4.3 K/UL
NEUTROPHILS NFR BLD: 59.7 %
PLATELET # BLD AUTO: 364 K/UL
PMV BLD AUTO: 11.6 FL
POTASSIUM SERPL-SCNC: 3.9 MMOL/L
PROT SERPL-MCNC: 7.7 G/DL
RBC # BLD AUTO: 4.72 M/UL
SODIUM SERPL-SCNC: 137 MMOL/L
TSH SERPL DL<=0.005 MIU/L-ACNC: 1.77 UIU/ML
WBC # BLD AUTO: 7.15 K/UL

## 2017-06-16 PROCEDURE — 99214 OFFICE O/P EST MOD 30 MIN: CPT | Mod: S$PBB,,, | Performed by: FAMILY MEDICINE

## 2017-06-16 PROCEDURE — 1159F MED LIST DOCD IN RCRD: CPT | Mod: ,,, | Performed by: FAMILY MEDICINE

## 2017-06-16 PROCEDURE — 84443 ASSAY THYROID STIM HORMONE: CPT

## 2017-06-16 PROCEDURE — 80053 COMPREHEN METABOLIC PANEL: CPT

## 2017-06-16 PROCEDURE — 36415 COLL VENOUS BLD VENIPUNCTURE: CPT | Mod: PO

## 2017-06-16 PROCEDURE — 99999 PR PBB SHADOW E&M-EST. PATIENT-LVL IV: CPT | Mod: PBBFAC,,, | Performed by: FAMILY MEDICINE

## 2017-06-16 PROCEDURE — 85025 COMPLETE CBC W/AUTO DIFF WBC: CPT

## 2017-06-16 PROCEDURE — 1126F AMNT PAIN NOTED NONE PRSNT: CPT | Mod: ,,, | Performed by: FAMILY MEDICINE

## 2017-06-16 RX ORDER — LOSARTAN POTASSIUM 50 MG/1
50 TABLET ORAL DAILY
Qty: 30 TABLET | Refills: 11 | Status: SHIPPED | OUTPATIENT
Start: 2017-06-16 | End: 2017-06-21 | Stop reason: SDUPTHER

## 2017-06-16 NOTE — PROGRESS NOTES
Patient presents with multiple somatic complaints.  Primarily has been feeling more fatigued for couple of weeks.  She has sleep difficulties.  Previously diagnosed sleep apnea, but did not tolerate CPAP.  This was approximately 10+ years ago.  She also had some sleep disturbances where she was waking up with nightmares.  She does have some mild chronic depression and anxiety which is mostly controlled, still has some symptoms, but states functional.  Not interested in other medication today.  She did stop lisinopril recently because she states she had a pimple develop on her lip and thought it might be related.  She does not clearly describe angioedema.  Blood pressure is elevated.  States she also has a habit of pulling her hair.  Also grinds her teeth during the day    Past Medical History:  Past Medical History:   Diagnosis Date    Anxiety     Cataract     Depression     GERD (gastroesophageal reflux disease)     Glaucoma     History of colon polyps 2009    history as per patient    History of colonoscopy 2009    ok per pt    Hypertension     Hypothyroid     ?    Mitral valve prolapse     Peptic ulcer disease     with gi bleed    Prediabetes     Right knee DJD     Right knee DJD     Sleep apnea     TMJ (temporomandibular joint disorder)      Past Surgical History:   Procedure Laterality Date    ARGON TRABECULOPLASTY - OU - BOTH EYES      BILATERAL SALPINGOOPHORECTOMY      EYE SURGERY      laser sx    HYSTERECTOMY      peptic ulcer surgery        TRABECULECTOMY Right 4/2/14    OD (dr. grossman)     Social History     Social History    Marital status:      Spouse name: N/A    Number of children: N/A    Years of education: N/A     Occupational History    Not on file.     Social History Main Topics    Smoking status: Never Smoker    Smokeless tobacco: Not on file    Alcohol use Yes      Comment: occasional    Drug use: No    Sexual activity: Not on file     Other Topics  Concern    Not on file     Social History Narrative    No narrative on file     Family History   Problem Relation Age of Onset    Heart disease Father      before age 50    Diabetes Mother     Glaucoma Mother     Leukemia Mother     Cataracts Mother     Diabetes Sister     Glaucoma Sister     Diabetes Brother     Glaucoma Brother     Lung cancer Brother      Review of patient's allergies indicates:   Allergen Reactions    Betoptic [betaxolol] Other (See Comments)     Fatigue, slowed heart rate.    Alphagan [brimonidine] Dermatitis     Current Outpatient Prescriptions on File Prior to Visit   Medication Sig Dispense Refill    alprazolam (XANAX) 0.25 MG tablet Take 1 tablet (0.25 mg total) by mouth 3 (three) times daily as needed for Anxiety. 30 tablet 1    celecoxib (CELEBREX) 200 MG capsule Take 1 capsule (200 mg total) by mouth 2 (two) times daily. (Patient taking differently: Take 200 mg by mouth once daily. ) 60 capsule 11    DIPHENHYDRAMINE HCL (BENADRYL ALLERGY ORAL) Take 1 tablet by mouth as needed.      dorzolamide (TRUSOPT) 2 % ophthalmic solution Place 1 drop into both eyes 2 (two) times daily. 10 mL 6    estradiol (ESTRACE) 1 MG tablet TAKE 1 TABLET (1 MG TOTAL) BY MOUTH ONCE DAILY. 30 tablet 11    fexofenadine (ALLEGRA) 180 MG tablet Take 180 mg by mouth once daily.      fluoxetine (PROZAC) 20 MG capsule Take 3 capsules (60 mg total) by mouth once daily. 90 capsule 11    hydrochlorothiazide (HYDRODIURIL) 25 MG tablet TAKE 1 TABLET BY MOUTH EVERY DAY 90 tablet 3    latanoprost 0.005 % ophthalmic solution PLACE 1 DROP INTO THE LEFT EYE EVERY EVENING. (Patient taking differently: PLACE 1 DROP INTO BOTH EYES 1x a day) 2.5 mL 6    metformin (GLUCOPHAGE-XR) 500 MG 24 hr tablet Take 1 tablet (500 mg total) by mouth 2 (two) times daily. 60 tablet 5    pantoprazole (PROTONIX) 40 MG tablet TAKE 1 TABLET BY MOUTH EVERY DAY 90 tablet 3    ERGOCALCIFEROL, VITAMIN D2, (VITAMIN D ORAL) Take by  "mouth as needed.      [DISCONTINUED] lisinopril 10 MG tablet Take 1 tablet (10 mg total) by mouth once daily. 90 tablet 3     No current facility-administered medications on file prior to visit.            ROS:  GENERAL: No fever, chills.   CARDIOVASCULAR: Denies exertional chest pain, PND, orthopnea or reduced exercise tolerance.  ABDOMEN: Appetite fine. Denies diarrhea, abdominal pain, hematemesis or blood in stool.  URINARY: No flank pain, dysuria or hematuria.      OBJECTIVE:     Vitals:    06/16/17 1051   BP: (!) 146/87   Pulse: 61   Temp: 98.2 °F (36.8 °C)   TempSrc: Oral   Weight: 89.6 kg (197 lb 8.5 oz)   Height: 5' 8" (1.727 m)     Wt Readings from Last 3 Encounters:   06/16/17 89.6 kg (197 lb 8.5 oz)   04/07/17 90.2 kg (198 lb 15.4 oz)   03/20/17 92.3 kg (203 lb 6.4 oz)     APPEARANCE: Well nourished, well developed, in no acute distress.    HEAD: Normocephalic.  Atraumatic.  No sinus tenderness.  EYES:   Right eye: Pupil reactive.  Conjunctiva clear.    Left eye: Pupil reactive.  Conjunctiva clear.    Both fundi:  Grossly normal to nondilated exam. EOMI.    EARS: TM's intact. Light reflex normal. No retraction or perforation.    NOSE:  clear.  MOUTH & THROAT:  No pharyngeal erythema or exudate. No lesions.  NECK: Supple. No bruits.  No JVD.  No cervical lymphadenopathy.  No thyromegaly.    CHEST: Breath sounds clear bilaterally.  Normal respiratory effort  CARDIOVASCULAR: Normal rate.  Regular rhythm.  No murmurs.  No rub.  No gallops.  ABDOMEN: Bowel sounds normal.  Soft.  No tenderness.  No organomegaly.  PERIPHERAL VASCULAR: No cyanosis.  No clubbing.  No edema.  NEUROLOGIC: No focal findings.  MENTAL STATUS: Alert.  Oriented x 3.    Suze was seen today for fatigue, breathing problem and insomnia.    Diagnoses and all orders for this visit:    Essential hypertension    Anxiety    Mild major depression    Hair pulling    Sleep apnea, unspecified type  -     TSH; Future  -     Ambulatory consult to " Sleep Disorders    Fatigue, unspecified type  -     CBC auto differential; Future  -     Comprehensive metabolic panel; Future  -     TSH; Future    Other orders  -     losartan (COZAAR) 50 MG tablet; Take 1 tablet (50 mg total) by mouth once daily.     continue other medication as currently.  Declines psychology or psychiatry evaluation for now.  Recheck blood pressure with nurse in 4 weeks

## 2017-06-16 NOTE — PROGRESS NOTES
Left message on voice mail to return call if appt date and time not acceptable, for [place] for BP check, appt letter mailed

## 2017-06-21 RX ORDER — LOSARTAN POTASSIUM 50 MG/1
50 TABLET ORAL DAILY
Qty: 90 TABLET | Refills: 0 | Status: SHIPPED | OUTPATIENT
Start: 2017-06-21 | End: 2018-06-21

## 2017-06-27 RX ORDER — METFORMIN HYDROCHLORIDE 500 MG/1
500 TABLET, EXTENDED RELEASE ORAL 2 TIMES DAILY
Qty: 180 TABLET | Refills: 1 | Status: SHIPPED | OUTPATIENT
Start: 2017-06-27 | End: 2018-06-27 | Stop reason: SDUPTHER

## 2017-07-13 ENCOUNTER — TELEPHONE (OUTPATIENT)
Dept: FAMILY MEDICINE | Facility: CLINIC | Age: 67
End: 2017-07-13

## 2017-07-13 ENCOUNTER — CLINICAL SUPPORT (OUTPATIENT)
Dept: FAMILY MEDICINE | Facility: CLINIC | Age: 67
End: 2017-07-13
Payer: MEDICARE

## 2017-07-13 VITALS — HEART RATE: 57 BPM | SYSTOLIC BLOOD PRESSURE: 132 MMHG | DIASTOLIC BLOOD PRESSURE: 67 MMHG

## 2017-07-13 DIAGNOSIS — I10 ESSENTIAL HYPERTENSION: Primary | ICD-10-CM

## 2017-07-13 PROCEDURE — 99212 OFFICE O/P EST SF 10 MIN: CPT | Mod: PBBFAC,PO

## 2017-07-13 PROCEDURE — 99499 UNLISTED E&M SERVICE: CPT | Mod: S$PBB,,, | Performed by: FAMILY MEDICINE

## 2017-07-13 PROCEDURE — 99999 PR PBB SHADOW E&M-EST. PATIENT-LVL II: CPT | Mod: PBBFAC,,,

## 2017-07-18 ENCOUNTER — OFFICE VISIT (OUTPATIENT)
Dept: OPHTHALMOLOGY | Facility: CLINIC | Age: 67
End: 2017-07-18
Payer: MEDICARE

## 2017-07-18 ENCOUNTER — APPOINTMENT (OUTPATIENT)
Dept: OPHTHALMOLOGY | Facility: CLINIC | Age: 67
End: 2017-07-18
Payer: MEDICARE

## 2017-07-18 DIAGNOSIS — H25.13 NUCLEAR SCLEROSIS, BILATERAL: ICD-10-CM

## 2017-07-18 DIAGNOSIS — H40.1233 LOW-TENSION GLAUCOMA OF BOTH EYES, SEVERE STAGE: ICD-10-CM

## 2017-07-18 DIAGNOSIS — H40.1133 PRIMARY OPEN ANGLE GLAUCOMA OF BOTH EYES, SEVERE STAGE: Primary | ICD-10-CM

## 2017-07-18 PROCEDURE — 92012 INTRM OPH EXAM EST PATIENT: CPT | Mod: S$PBB,,, | Performed by: OPHTHALMOLOGY

## 2017-07-18 PROCEDURE — 99999 PR PBB SHADOW E&M-EST. PATIENT-LVL II: CPT | Mod: PBBFAC,,, | Performed by: OPHTHALMOLOGY

## 2017-07-18 PROCEDURE — 92083 EXTENDED VISUAL FIELD XM: CPT | Mod: PBBFAC,PO | Performed by: OPHTHALMOLOGY

## 2017-07-18 PROCEDURE — 99212 OFFICE O/P EST SF 10 MIN: CPT | Mod: PBBFAC,PO,25 | Performed by: OPHTHALMOLOGY

## 2017-07-18 RX ORDER — BIMATOPROST 0.3 MG/ML
1 SOLUTION/ DROPS OPHTHALMIC NIGHTLY
Qty: 2.5 ML | Refills: 6 | Status: SHIPPED | OUTPATIENT
Start: 2017-07-18 | End: 2017-07-18 | Stop reason: SDUPTHER

## 2017-07-18 RX ORDER — BIMATOPROST 0.3 MG/ML
1 SOLUTION/ DROPS OPHTHALMIC NIGHTLY
Qty: 2.5 ML | Refills: 6 | Status: SHIPPED | OUTPATIENT
Start: 2017-07-18 | End: 2018-07-18

## 2017-07-18 NOTE — PROGRESS NOTES
HPI     Patient returns for a 2 month f review and iop check, patient states   she is 95% compliant with drop usage.    Last edited by PETR Rojas on 7/18/2017 10:32 AM. (History)            Assessment /Plan     For exam results, see Encounter Report.      ICD-10-CM ICD-9-CM    1. Primary open angle glaucoma of both eyes, severe stage H40.1133 365.11 Arvizu Visual Field - OU - Extended - Both Eyes     365.73 bimatoprost (LUMIGAN) 0.03 % ophthalmic drops    IOP not within acceptable range relative to target IOP with risk of irreversible visual loss. Additional treatment required.  Discussed options, risks, and benefits of additional medication, SLT laser, or incisional glaucoma surgery.     recommend lumigan 0.03% qhs ou. Will do 0.01% if too expensive.     Patient chooses above. Consider SLT os or Trab OS if IOP not improved.     Reviewed importance of continued compliance with treatment and follow up.      2. Nuclear sclerosis, bilateral H25.13 366.16    3. Low-tension glaucoma of both eyes, severe stage H40.1233 365.12 Arvizu Visual Field - OU - Extended - Both Eyes     365.73      D/c latanoprost  Begin lumigan 0.03% qhs ou (if not too expensive. Will do 0.01% if needed)  Continue dorzolamide bid ou  Return to clinic 3 weeks with IOP check.

## 2017-07-21 ENCOUNTER — TELEPHONE (OUTPATIENT)
Dept: PULMONOLOGY | Facility: CLINIC | Age: 67
End: 2017-07-21

## 2017-07-21 DIAGNOSIS — G47.30 SLEEP DISORDER BREATHING: Primary | ICD-10-CM

## 2017-08-03 ENCOUNTER — HOSPITAL ENCOUNTER (OUTPATIENT)
Dept: RADIOLOGY | Facility: HOSPITAL | Age: 67
Discharge: HOME | End: 2017-08-03
Attending: INTERNAL MEDICINE
Payer: MEDICARE

## 2017-08-03 ENCOUNTER — OFFICE VISIT (OUTPATIENT)
Dept: SLEEP MEDICINE | Facility: CLINIC | Age: 67
End: 2017-08-03
Payer: MEDICARE

## 2017-08-03 VITALS
HEIGHT: 68 IN | WEIGHT: 204.56 LBS | OXYGEN SATURATION: 99 % | RESPIRATION RATE: 18 BRPM | DIASTOLIC BLOOD PRESSURE: 68 MMHG | SYSTOLIC BLOOD PRESSURE: 118 MMHG | HEART RATE: 64 BPM | BODY MASS INDEX: 31 KG/M2

## 2017-08-03 DIAGNOSIS — G47.52 DREAM ENACTMENT BEHAVIOR: ICD-10-CM

## 2017-08-03 DIAGNOSIS — G47.30 SLEEP DISORDER BREATHING: ICD-10-CM

## 2017-08-03 DIAGNOSIS — G47.50 PARASOMNIA: ICD-10-CM

## 2017-08-03 DIAGNOSIS — G47.33 OBSTRUCTIVE SLEEP APNEA SYNDROME: Primary | ICD-10-CM

## 2017-08-03 PROCEDURE — 99999 PR PBB SHADOW E&M-EST. PATIENT-LVL III: CPT | Mod: PBBFAC,,, | Performed by: INTERNAL MEDICINE

## 2017-08-03 PROCEDURE — 1159F MED LIST DOCD IN RCRD: CPT | Mod: ,,, | Performed by: INTERNAL MEDICINE

## 2017-08-03 PROCEDURE — 1125F AMNT PAIN NOTED PAIN PRSNT: CPT | Mod: ,,, | Performed by: INTERNAL MEDICINE

## 2017-08-03 PROCEDURE — 71020 XR CHEST PA AND LATERAL: CPT | Mod: 26,,, | Performed by: RADIOLOGY

## 2017-08-03 PROCEDURE — 99205 OFFICE O/P NEW HI 60 MIN: CPT | Mod: S$PBB,,, | Performed by: INTERNAL MEDICINE

## 2017-08-03 PROCEDURE — 99213 OFFICE O/P EST LOW 20 MIN: CPT | Mod: PBBFAC,25,PO | Performed by: INTERNAL MEDICINE

## 2017-08-03 RX ORDER — LISINOPRIL 10 MG/1
TABLET ORAL
COMMUNITY
Start: 2017-06-30 | End: 2018-02-21

## 2017-08-03 NOTE — PROGRESS NOTES
History & Physical    SUBJECTIVE:     History of Present Illness:  Patient is a 66 y.o. female presents with  disorder breathing.  Patient referred to me by Dr. Mario Lewis  Patient has a history of obstructive sleep apnea.  He reading a sleep study was not available to me.    The only study available was on done on 3/23/2004   she was titrated to CPAP 10 cm water pressure.  Patient tells me she used the machine for a while and quit around 2007 2008.    She may have had a repeat sleep study done at [facility].  She does not have a CPAP machine next  The real bothersome sleep symptoms today because she has this recurrent episodes of dream enactment  This looks like REM behavioral disorder  Events occur once every 2-3 months.  Denies walking out of house, eating raw food, engaging in physical activity or complex motor activity  Scratched herself, fell out of bed, kicked something  Retired   She was placed on Prozac  Patient has lots of anxiety proximal to sleep and isn't currently is sleeping in her living room  She has snoring and daytime sleepiness.  Munith sleepiness score is 10  She frequently gets dry mouth and sinus trouble  Sleeps with light on.      Bedtime is between 10 PM until 12 midnight.      Wakeup time is 8 AM in the morning she frequently may wake up at 2 or 3 AM in the morning    STOP - BANG Questionnaire:     1. Snoring : Do you snore loudly ?    Yes    2. Tired : Do you often feel tired, fatigued, or sleepy during daytime? Yes    3. Observed: Has anyone observed you stop breathing during your sleep?   Yes     4. Blood pressure : Do you have or are you being treated for high blood pressure?   Yes    5. BMI :BMI more than 35 kg/m2?   No    6. Age : Age over 50 yr old?   Yes    7. Neck circumference: Neck circumference greater than 40 cm?   No    8. Gender: Gender male?   No    High risk of ARMANDO: Yes 5 - 8         References:   STOP Questionnaire   A Tool to Screen Patients for  Obstructive Sleep Apnea: MADELEINE PikeC.P.C., LEIA Rosales.B.B.S., Jony Rodríguez M.D.,Earlene Mclaughlin, Ph.D., LEIA eMndoza.B.B.S.,_ Beba Zuluaga.,_ Dakota Hauser M.D., Jarred Barajas, F.R.C.P.C.; Anesthesiology 2008; 108:812-21 Copyright © 2008, the American Society of Anesthesiologists, Inc. [employer] Inc[employer]        Chief Complaint   Patient presents with    Sleeping Problem     rev cxr       Review of patient's allergies indicates:   Allergen Reactions    Betoptic [betaxolol] Other (See Comments)     Fatigue, slowed heart rate.    Alphagan [brimonidine] Dermatitis       Current Outpatient Prescriptions   Medication Sig Dispense Refill    alprazolam (XANAX) 0.25 MG tablet Take 1 tablet (0.25 mg total) by mouth 3 (three) times daily as needed for Anxiety. 30 tablet 1    bimatoprost (LUMIGAN) 0.03 % ophthalmic drops Place 1 drop into both eyes every evening. 2.5 mL 6    celecoxib (CELEBREX) 200 MG capsule Take 1 capsule (200 mg total) by mouth 2 (two) times daily. (Patient taking differently: Take 200 mg by mouth once daily. ) 60 capsule 11    DIPHENHYDRAMINE HCL (BENADRYL ALLERGY ORAL) Take 1 tablet by mouth as needed.      dorzolamide (TRUSOPT) 2 % ophthalmic solution Place 1 drop into both eyes 2 (two) times daily. 10 mL 6    ERGOCALCIFEROL, VITAMIN D2, (VITAMIN D ORAL) Take by mouth as needed.      estradiol (ESTRACE) 1 MG tablet TAKE 1 TABLET (1 MG TOTAL) BY MOUTH ONCE DAILY. 30 tablet 11    fexofenadine (ALLEGRA) 180 MG tablet Take 180 mg by mouth once daily.      fluoxetine (PROZAC) 20 MG capsule Take 3 capsules (60 mg total) by mouth once daily. 90 capsule 11    hydrochlorothiazide (HYDRODIURIL) 25 MG tablet TAKE 1 TABLET BY MOUTH EVERY DAY 90 tablet 3    latanoprost 0.005 % ophthalmic solution PLACE 1 DROP INTO THE LEFT EYE EVERY EVENING. (Patient taking differently: PLACE 1 DROP INTO BOTH EYES 1x a day) 2.5 mL 6    lisinopril 10 MG tablet        losartan (COZAAR) 50 MG tablet Take 1 tablet (50 mg total) by mouth once daily. 90 tablet 0    metformin (GLUCOPHAGE-XR) 500 MG 24 hr tablet Take 1 tablet (500 mg total) by mouth 2 (two) times daily. 180 tablet 1    pantoprazole (PROTONIX) 40 MG tablet TAKE 1 TABLET BY MOUTH EVERY DAY 90 tablet 3     No current facility-administered medications for this visit.        Past Medical History:   Diagnosis Date    Anxiety     Cataract     Depression     GERD (gastroesophageal reflux disease)     Glaucoma     History of colon polyps 2009    history as per patient    History of colonoscopy 2009    ok per pt    Hypertension     Hypothyroid     ?    Mitral valve prolapse     Peptic ulcer disease     with gi bleed    Prediabetes     Right knee DJD     Right knee DJD     Sleep apnea     TMJ (temporomandibular joint disorder)      Past Surgical History:   Procedure Laterality Date    ARGON TRABECULOPLASTY - OU - BOTH EYES      BILATERAL SALPINGOOPHORECTOMY      EYE SURGERY      laser sx    HYSTERECTOMY      peptic ulcer surgery        TRABECULECTOMY Right 4/2/14    OD (dr. grossman)     Family History   Problem Relation Age of Onset    Heart disease Father      before age 50    Diabetes Mother     Glaucoma Mother     Leukemia Mother     Cataracts Mother     Diabetes Sister     Glaucoma Sister     Diabetes Brother     Glaucoma Brother     Lung cancer Brother      Social History   Substance Use Topics    Smoking status: Never Smoker    Smokeless tobacco: Never Used    Alcohol use Yes      Comment: occasional        Review of Systems:  Review of Systems   Constitutional: Positive for fatigue.   HENT: Negative.    Eyes: Negative.    Respiratory: Positive for apnea.    Cardiovascular: Negative.    Gastrointestinal: Negative.    Endocrine: Negative.    Genitourinary: Negative.    Musculoskeletal: Negative.    Allergic/Immunologic: Negative.    Neurological: Negative.    Hematological:  "Negative.    Psychiatric/Behavioral: Negative.        OBJECTIVE:     Vital Signs (Most Recent)  Pulse: 64 (08/03/17 1352)  Resp: 18 (08/03/17 1352)  BP: 118/68 (08/03/17 1352)  SpO2: 99 % (08/03/17 1352)  5' 8" (1.727 m)  92.8 kg (204 lb 9.4 oz)     Physical Exam:  Physical Exam   Constitutional: She is oriented to person, place, and time. She appears well-developed and well-nourished.   HENT:   Head: Normocephalic and atraumatic.   Nose: Nose normal.   Mouth/Throat: Oropharynx is clear and moist.   Mallampati score 2   Eyes: Conjunctivae and EOM are normal. Pupils are equal, round, and reactive to light.   Neck: Normal range of motion. Neck supple. No JVD present.   Neck 16"   Cardiovascular: Normal rate, regular rhythm, normal heart sounds and intact distal pulses.    No murmur heard.  Pulmonary/Chest: Effort normal and breath sounds normal. No respiratory distress.   Musculoskeletal: Normal range of motion. She exhibits no tenderness.   Neurological: She is alert and oriented to person, place, and time. She has normal reflexes.   Nursing note and vitals reviewed.      Laboratory  CBC: Reviewed  BMP: Reviewed    Diagnostic Results:  PFT  Done Maloy    Spirometry and flow volume loops are not indicative of obstructive lung   disease.  The FEV1/FVC is 83.      Total lung capacity is slightly low at 77% of predicted.     Diffusion capacity for carbon monoxide is mildly diminished at 78% of   predicted.    IMPRESSION:  Very mild restriction and DLCO impairment.  This can be   consistent with a parenchymal lung process.  Overall, no significant   ventilatory impairment based on the FEV1 which is 84% of predicted at 2.3   liters.\    Two-view chest x-ray was reviewed clear lung fields    ASSESSMENT/PLAN:     Problem List Items Addressed This Visit     Obstructive sleep apnea syndrome - Primary    Dream enactment behavior     Counseled about REM dream enactment  Avoid stress  Avoid alcohol  Good sleep hygeine  No " sharp objects or fire arms in bed room  Place door alarm on bedroom door.               Parasomnia      Other Visit Diagnoses    None.         PLAN:Plan      Based on age pneumonia vaccination is recommended patient will defer this to her primary care.    Return in about 6 weeks (around 9/14/2017) for Sleep Hygeine, regular bed and wake time, PSG.    This note was prepared using voice recognition system and is likely to have sound alike errors that may have been overlooked even after proof reading.  Please call me with any questions    Discussed diagnosis, its evaluation, treatment and usual course. All questions answered.    Thank you for the courtesy of participating in the care of this patient; DR Cody Bourne MD

## 2017-08-03 NOTE — LETTER
August 3, 2017      Cody Lewis MD  [address] [address] [address]           [facility] [place] [facility]  [address] [place] [address]  Phone: [phone]          Patient: Suze Chino   MR Number: [id number]   YOB: 1950   Date of Visit: 8/3/2017       Dear Dr. Cody Lewis:    Thank you for referring Suze Chino to me for evaluation. Attached you will find relevant portions of my assessment and plan of care.    If you have questions, please do not hesitate to call me. I look forward to following Suze Chino along with you.    Sincerely,    Khoa Bourne MD    Enclosure  CC:  No Recipients    If you would like to receive this communication electronically, please contact [email or web address] or [phone] to request more information on [facility] access.    For providers and/or their staff who would like to refer a patient to [facility], please contact us through our one-stop-shop provider referral line, Elian Linder, at [phone].    If you feel you have received this communication in error or would no longer like to receive these types of communications, please e-mail [email or web address]

## 2017-08-03 NOTE — ASSESSMENT & PLAN NOTE
Counseled about REM dream enactment  Avoid stress  Avoid alcohol  Good sleep hygeine  No sharp objects or fire arms in bed room  Place door alarm on bedroom door.

## 2017-08-03 NOTE — PATIENT INSTRUCTIONS
Safe sleeping environment -- All patients with RBD and their bed partners should be counseled on ways to alter the sleeping environment to prevent injury. For patients with mild symptoms, this may be all that is needed.  Safety for both patients and bed partners is the paramount concern. Firearms should not be accessible, and sharp or easily breakable items (such as lamps) should be removed from the immediate sleeping area. In the event of continued vigorous behaviors, sleeping alone is advised. Many patients resort to using padded bed rails or sleeping in a sleeping bag [79].  Other novel strategies are in development. Exiting the bed while acting out a dream is a high-risk behavior that may result in traumatic injury [83]. A bed alarm that delivers a customized calming message at the onset of dream enactment can prevent a patient from exiting the bed and avert sleep-related injury [84].    Your provider has scheduled you for a sleep study.   You should be receiving a phone call from the sleep lab shortly after your study has been approved by your insurance. Please make sure you have your current phone numbers in the [facility] system. If you do not hear from anyone in the next 10 business days, please call the sleep lab at [phone] to schedule your sleep study. The sleep studies are performed at [facility] seven nights a week.  When you are scheduling your sleep study, they will also make you a follow up appointment with your provider. This follow up appointment will be 10-14 days after your sleep study to review the results. If it is noted that you do have sleep apnea on your initial sleep study, you may receive a call back for a second night study with the CPAP before you come back to the office.

## 2017-08-07 ENCOUNTER — OFFICE VISIT (OUTPATIENT)
Dept: OPHTHALMOLOGY | Facility: CLINIC | Age: 67
End: 2017-08-07
Payer: MEDICARE

## 2017-08-07 DIAGNOSIS — H40.1133 PRIMARY OPEN ANGLE GLAUCOMA OF BOTH EYES, SEVERE STAGE: Primary | ICD-10-CM

## 2017-08-07 DIAGNOSIS — H25.13 NUCLEAR SCLEROSIS, BILATERAL: ICD-10-CM

## 2017-08-07 PROCEDURE — 92012 INTRM OPH EXAM EST PATIENT: CPT | Mod: S$PBB,,, | Performed by: OPHTHALMOLOGY

## 2017-08-07 PROCEDURE — 99999 PR PBB SHADOW E&M-EST. PATIENT-LVL II: CPT | Mod: PBBFAC,,, | Performed by: OPHTHALMOLOGY

## 2017-08-07 PROCEDURE — 99212 OFFICE O/P EST SF 10 MIN: CPT | Mod: PBBFAC | Performed by: OPHTHALMOLOGY

## 2017-08-07 RX ORDER — KETOROLAC TROMETHAMINE 5 MG/ML
1 SOLUTION OPHTHALMIC 4 TIMES DAILY
Qty: 5 ML | Refills: 1 | Status: SHIPPED | OUTPATIENT
Start: 2017-08-07 | End: 2017-08-14

## 2017-08-07 NOTE — PROGRESS NOTES
HPI     Here for IOP check.    Referred by Dr. Lorenzana  Bleb Needling with [facility] Inj. OD 9/17/15  LTG   H/O Non-compliance   Intolerant of Coag Drops    Stopped Alphagan OS TID because of dermatitis  Stopped Betoptic bid ou due to slow pulse rate and feeling tired.  Trab OD     repeat SLT od done 8/9/2012 - good initial response IOP 18 to 13  Primary SLT done os 9/13/2012 - minimal response 18 to 16  SLT os 9/29/2016    OU LUMIGAN 0.03% QHS, DORZOLAMIDE BID    Last edited by Dory Johnson on 8/7/2017 10:31 AM. (History)            Assessment /Plan     For exam results, see Encounter Report.      ICD-10-CM ICD-9-CM    1. Primary open angle glaucoma of both eyes, severe stage H40.1133 365.11 Good response to Lumigan 0.03% OS but OD still higher than ideal.     365.73    2. Nuclear sclerosis, bilateral H25.13 366.16 follow       OU LUMIGAN 0.03% QHS, try to increase DORZOLAMIDE to TID OU    Return to clinic for Slt OD

## 2017-08-12 RX ORDER — LISINOPRIL 10 MG/1
10 TABLET ORAL DAILY
Qty: 90 TABLET | Refills: 3 | OUTPATIENT
Start: 2017-08-12

## 2017-08-17 ENCOUNTER — TELEPHONE (OUTPATIENT)
Dept: OPHTHALMOLOGY | Facility: CLINIC | Age: 67
End: 2017-08-17

## 2017-08-17 NOTE — TELEPHONE ENCOUNTER
Left message for Ms. Chino that Dr. Shankar forgot he was out of office on Monday and Tuesday.  I will ask Dr. Hallman to see her if she wishes on Monday or Tuesday afternoon.  Please call back and we will reschedule appointment

## 2017-08-23 DIAGNOSIS — G47.33 OBSTRUCTIVE SLEEP APNEA SYNDROME: Primary | ICD-10-CM

## 2017-08-23 DIAGNOSIS — G47.52 DREAM ENACTMENT BEHAVIOR: ICD-10-CM

## 2017-08-23 DIAGNOSIS — G47.50 PARASOMNIA: ICD-10-CM

## 2017-08-28 ENCOUNTER — OFFICE VISIT (OUTPATIENT)
Dept: OPHTHALMOLOGY | Facility: CLINIC | Age: 67
End: 2017-08-28
Payer: MEDICARE

## 2017-08-28 DIAGNOSIS — Z98.890 POST-OPERATIVE STATE: Primary | ICD-10-CM

## 2017-08-28 DIAGNOSIS — H40.1133 PRIMARY OPEN ANGLE GLAUCOMA OF BOTH EYES, SEVERE STAGE: ICD-10-CM

## 2017-08-28 PROCEDURE — 99211 OFF/OP EST MAY X REQ PHY/QHP: CPT | Mod: PBBFAC | Performed by: OPHTHALMOLOGY

## 2017-08-28 PROCEDURE — 92012 INTRM OPH EXAM EST PATIENT: CPT | Mod: S$PBB,,, | Performed by: OPHTHALMOLOGY

## 2017-08-28 PROCEDURE — 99999 PR PBB SHADOW E&M-EST. PATIENT-LVL I: CPT | Mod: PBBFAC,,, | Performed by: OPHTHALMOLOGY

## 2017-08-28 NOTE — PROGRESS NOTES
HPI     Sp SLT OD 8/17/17     Bleb Needling with [facility] Inj. OD 9/17/15  LTG   H/O Non-compliance   Intolerant of Coag Drops    Stopped Alphagan OS TID because of dermatitis  Stopped Betoptic bid ou due to slow pulse rate and feeling tired.  Trab OD     repeat SLT od done 8/9/2012 - good initial response IOP 18 to 13, SLT OD   08/17/17  Primary SLT done os 9/13/2012 - minimal response 18 to 16  SLT os 9/29/2016  LATANOPROST slows pulse rate    OU LUMIGAN 0.03% QHS (slows pulse rate), DORZOLAMIDE BID        Last edited by Macario Shankar MD on 8/28/2017 11:20 AM. (History)            Assessment /Plan     For exam results, see Encounter Report.      ICD-10-CM ICD-9-CM    1. Post-operative state Z98.890 V45.89        S/P SLT right eye   Doing well  Stop Ketorolac  Continue glaucoma medications as ordered  RTC 2 months    OU LUMIGAN 0.03% QHS , DORZOLAMIDE BID OU

## 2017-09-14 ENCOUNTER — HOSPITAL ENCOUNTER (OUTPATIENT)
Dept: SLEEP MEDICINE | Facility: HOSPITAL | Age: 67
Discharge: HOME | End: 2017-09-14
Attending: INTERNAL MEDICINE
Payer: MEDICARE

## 2017-09-14 DIAGNOSIS — G47.50 PARASOMNIA: ICD-10-CM

## 2017-09-14 DIAGNOSIS — G47.33 OBSTRUCTIVE SLEEP APNEA SYNDROME: ICD-10-CM

## 2017-09-14 DIAGNOSIS — G47.52 DREAM ENACTMENT BEHAVIOR: ICD-10-CM

## 2017-09-14 DIAGNOSIS — R06.83 PRIMARY SNORING: ICD-10-CM

## 2017-09-14 PROCEDURE — 95810 POLYSOM 6/> YRS 4/> PARAM: CPT

## 2017-09-14 PROCEDURE — 95810 POLYSOM 6/> YRS 4/> PARAM: CPT | Mod: 26,,, | Performed by: INTERNAL MEDICINE

## 2017-09-14 NOTE — Clinical Note
"STUDY PARAMETERS: The study was performed with a sleep technologist in attendance for the entire test period.  Video monitoring was carried out throughout the study, and the following clinical parameters were recorded:  SUMMARY STATEMENTS: 1. Findings related to sleep diagnoses: " Moderate snoring " The overall AHI was 0.7 (4 events). 2. EEG abnormalities: " Delayed sleep latency. Wake after sleep onset was 61 minutes. " No PLM's " No abnormal activity seen during REM sleep. " Slow-wave sleep was reduced  3. ECG abnormalities: " Average HR 50 BPM.   INTERPRETATION:  1. Normal Apnea hypopnea index 2. Primary snoring 3. Clinical history suggest parasomnia. 4. Reduced slow wave sleep and low sleep efficiency 5. No periodic limb movements  RECOMMENDATIONS:   1. No PSG criteria for Obstructive sleep apnea however no supine sleep was observed during this study. If suspicion remain high then repeat testing indicated. 2. Pharmacological interventions for parasomnia to be considered  3. Evalu"

## 2017-09-18 ENCOUNTER — PATIENT MESSAGE (OUTPATIENT)
Dept: SLEEP MEDICINE | Facility: HOSPITAL | Age: 67
End: 2017-09-18

## 2017-09-18 NOTE — PROCEDURES
"STUDY PARAMETERS: The study was performed with a sleep technologist in attendance for the entire test period.  Video monitoring was carried out throughout the study, and the following clinical parameters were recorded:    SUMMARY STATEMENTS:  1. Findings related to sleep diagnoses:   Moderate snoring   The overall AHI was 0.7 (4 events).  2. EEG abnormalities:   Delayed sleep latency. Wake after sleep onset was 61 minutes.   No PLMs   No abnormal activity seen during REM sleep.   Slow-wave sleep was reduced    3. ECG abnormalities:   Average HR 50 BPM.     INTERPRETATION:   1. Normal Apnea hypopnea index  2. Primary snoring  3. Clinical history suggest parasomnia.  4. Reduced slow wave sleep and low sleep efficiency  5. No periodic limb movements    RECOMMENDATIONS:     1. No PSG criteria for Obstructive sleep apnea however no supine sleep was observed during this study. If suspicion remain high then repeat testing indicated.  2. Pharmacological interventions for parasomnia to be considered   3. Evaluation for ENT, gastric and or airway disease causing sleep disruption at night.  4. Optimism therapy for reflux  5. Multiple sleep latency testing if symptoms dont resolve.      See imported Sleep Study result in "Chart Review" under the   "Media tab".      (This Sleep Study was interpreted by a Board Certified Sleep   Specialist who conducted an epoch-by-epoch review of the entire   raw data recording.)     (The indication for this sleep study was reviewed and deemed   appropriate by [facility] Practice Parameters or other reasons by a   Board Certified Sleep Specialist.)    Khoa Bourne MD      "

## 2017-09-19 ENCOUNTER — OFFICE VISIT (OUTPATIENT)
Dept: PULMONOLOGY | Facility: CLINIC | Age: 67
End: 2017-09-19
Payer: MEDICARE

## 2017-09-19 VITALS
DIASTOLIC BLOOD PRESSURE: 80 MMHG | OXYGEN SATURATION: 98 % | RESPIRATION RATE: 18 BRPM | HEART RATE: 65 BPM | SYSTOLIC BLOOD PRESSURE: 132 MMHG | WEIGHT: 200.38 LBS | BODY MASS INDEX: 30.37 KG/M2 | HEIGHT: 68 IN

## 2017-09-19 DIAGNOSIS — F32.0 MILD MAJOR DEPRESSION: ICD-10-CM

## 2017-09-19 DIAGNOSIS — K21.9 GASTROESOPHAGEAL REFLUX DISEASE, ESOPHAGITIS PRESENCE NOT SPECIFIED: ICD-10-CM

## 2017-09-19 DIAGNOSIS — R06.83 PRIMARY SNORING: Primary | ICD-10-CM

## 2017-09-19 DIAGNOSIS — G47.54: ICD-10-CM

## 2017-09-19 DIAGNOSIS — J30.9 ALLERGIC SINUSITIS: ICD-10-CM

## 2017-09-19 PROCEDURE — 99214 OFFICE O/P EST MOD 30 MIN: CPT | Mod: PBBFAC | Performed by: INTERNAL MEDICINE

## 2017-09-19 PROCEDURE — 99999 PR PBB SHADOW E&M-EST. PATIENT-LVL IV: CPT | Mod: PBBFAC,,, | Performed by: INTERNAL MEDICINE

## 2017-09-19 PROCEDURE — 99214 OFFICE O/P EST MOD 30 MIN: CPT | Mod: S$PBB,,, | Performed by: INTERNAL MEDICINE

## 2017-09-19 PROCEDURE — 3079F DIAST BP 80-89 MM HG: CPT | Mod: ,,, | Performed by: INTERNAL MEDICINE

## 2017-09-19 PROCEDURE — 1159F MED LIST DOCD IN RCRD: CPT | Mod: ,,, | Performed by: INTERNAL MEDICINE

## 2017-09-19 PROCEDURE — 3075F SYST BP GE 130 - 139MM HG: CPT | Mod: ,,, | Performed by: INTERNAL MEDICINE

## 2017-09-19 NOTE — ASSESSMENT & PLAN NOTE
Has lots of stress and depression since mother  3 years ago  Prefers to sleep in living room on couch in 4 bed room house

## 2017-09-19 NOTE — PROGRESS NOTES
"Subjective:       Patient ID: Suze Chino is a 66 y.o. female.    Chief Complaint: Sleep Apnea    Mrs. Suze Zhang is 66 years old  She had diagnostic polysomnography Study  Results were reviewed with patient.  Of interest patient had a prior history of obstructive sleep apnea and had a CPAP machine  Study did not show any PSG criteria for obstructive sleep apnea  We discussed with patient various aspects of her sleep study.  There was robust amount of REM sleep.  Patient has poor sleep hygiene spending most of the night on her couch in the living room.  She'll documented history of depression on Prozac.  This has been triggered after her mother   She is reluctant to seek any interventions  With regard to her cyanosis she is taking some ALLERGY medication.  She is agreeable to getting  sinus x-rays  She also wants to see a dentist about her TMJ and snoring and braces  I like her to see Dr. Anthony Mcintyre  Patient will come to see me in 6 months to review sleep diary and sleep hygiene      Review of Systems   Constitutional: Negative.    HENT:        TMJ   Eyes: Negative.    Respiratory: Negative.  Negative for snoring, sputum production, shortness of breath, wheezing, previous hospitialization due to pulmonary problems, pleurisy and use of rescue inhaler.    Genitourinary: Negative.    Endocrine: endocrine negative   Musculoskeletal: Negative.    Skin: Negative.    Gastrointestinal: Positive for acid reflux.   Neurological: Negative.    Psychiatric/Behavioral: Positive for sleep disturbance.       Objective:       Vitals:    17 1141   BP: 132/80   Pulse: 65   Resp: 18   SpO2: 98%   Weight: 90.9 kg (200 lb 6.4 oz)   Height: 5' 8" (1.727 m)     Physical Exam   Constitutional: She appears well-developed and well-nourished.   Musculoskeletal: Normal range of motion.   Skin: Skin is warm.   Psychiatric: She has a normal mood and affect.   Nursing note and vitals reviewed.    Personal Diagnostic Review  PSG: " normal    SUMMARY STATEMENTS:  1. Findings related to sleep diagnoses:  · Moderate snoring  · The overall AHI was 0.7 (4 events).  2. EEG abnormalities:  · Delayed sleep latency. Wake after sleep onset was 61 minutes.  · No PLMs  · No abnormal activity seen during REM sleep.  · Slow-wave sleep was reduced     3. ECG abnormalities:  · Average HR 50 BPM.      INTERPRETATION:   1. Normal Apnea hypopnea index  2. Primary snoring  3. Clinical history suggest parasomnia.  4. Reduced slow wave sleep and low sleep efficiency  5. No periodic limb movements     RECOMMENDATIONS:      1. No PSG criteria for Obstructive sleep apnea however no supine sleep was observed during this study. If suspicion remain high then repeat testing indicated.  2. Pharmacological interventions for parasomnia to be considered   3. Evaluation for ENT, gastric and or airway disease causing sleep disruption at night.  4. Optimism therapy for reflux  5. Multiple sleep latency testing if symptoms dont resolve.     No flowsheet data found.      Assessment:       Problem List Items Addressed This Visit     Mild major depression     Declines psychology eval           GERD (gastroesophageal reflux disease)     PPI  HOB 30 degrees           Secondary parasomnia     Has lots of stress and depression since mother  3 years ago  Prefers to sleep in living room on couch in 4 bed room house           Primary snoring - Primary     Consider Dental appliance   Also has Bruxism and TMJ  Could try THEREVENT         Allergic sinusitis     Allegra  Flonase   Benadryl    Tried xyzal did not work    No recent sinus xrays             Relevant Orders    X-Ray Sinuses 3 or more views      Other Visit Diagnoses    None.       Plan:         Return in about 6 months (around 3/19/2018) for Sleep diary , Morning exercise,, Sleep Hygeine, regular bed and wake time, Xray sinuses.    This note was prepared using voice recognition system and is likely to have sound alike errors  that may have been overlooked even after proof reading.  Please call me with any questions    Time spent: 20 minutes in face to face  discussion concerning diagnosis, prognosis, review of lab and test results, benefits of treatment as well as management of disease, counseling of patient and coordination of care between various health  care providers . Greater than half the time spent was used for coordination of care and counseling of patient.     Khoa Bourne    Pulmonary/Critical care/Sleepmedicine

## 2017-09-22 ENCOUNTER — HOSPITAL ENCOUNTER (OUTPATIENT)
Dept: RADIOLOGY | Facility: HOSPITAL | Age: 67
Discharge: HOME | End: 2017-09-22
Attending: INTERNAL MEDICINE
Payer: MEDICARE

## 2017-09-22 DIAGNOSIS — J30.9 ALLERGIC SINUSITIS: ICD-10-CM

## 2017-09-22 PROCEDURE — 70220 X-RAY EXAM OF SINUSES: CPT | Mod: TC,PO

## 2017-09-22 PROCEDURE — 70220 X-RAY EXAM OF SINUSES: CPT | Mod: 26,,, | Performed by: RADIOLOGY

## 2017-10-30 ENCOUNTER — OFFICE VISIT (OUTPATIENT)
Dept: OPHTHALMOLOGY | Facility: CLINIC | Age: 67
End: 2017-10-30
Payer: MEDICARE

## 2017-10-30 DIAGNOSIS — H25.13 NUCLEAR SCLEROSIS, BILATERAL: ICD-10-CM

## 2017-10-30 DIAGNOSIS — H40.1133 PRIMARY OPEN ANGLE GLAUCOMA OF BOTH EYES, SEVERE STAGE: Primary | ICD-10-CM

## 2017-10-30 PROCEDURE — 92012 INTRM OPH EXAM EST PATIENT: CPT | Mod: S$PBB,,, | Performed by: OPHTHALMOLOGY

## 2017-10-30 PROCEDURE — 99211 OFF/OP EST MAY X REQ PHY/QHP: CPT | Mod: PBBFAC | Performed by: OPHTHALMOLOGY

## 2017-10-30 PROCEDURE — 99999 PR PBB SHADOW E&M-EST. PATIENT-LVL I: CPT | Mod: PBBFAC,,, | Performed by: OPHTHALMOLOGY

## 2017-10-30 RX ORDER — METFORMIN HYDROCHLORIDE 500 MG/1
500 TABLET ORAL 2 TIMES DAILY WITH MEALS
COMMUNITY
End: 2018-06-27

## 2017-10-30 RX ORDER — BIMATOPROST 0.3 MG/ML
1 SOLUTION/ DROPS OPHTHALMIC NIGHTLY
COMMUNITY
End: 2018-01-09 | Stop reason: SDUPTHER

## 2017-10-30 NOTE — PROGRESS NOTES
HPI     Patient returns for a 2 month iop check, patient states she is 95%   compliant with drop usage.    Lumigan qd OU  Dorz TID OU    Last edited by Macario Shankar MD on 10/30/2017 11:27 AM. (History)            Assessment /Plan     For exam results, see Encounter Report.      ICD-10-CM ICD-9-CM    1. Primary open angle glaucoma of both eyes, severe stage H40.1133 365.11 No real effect from the Slt OD  Will watch closely and follow Hvf and gOCT     365.73    2. Nuclear sclerosis, bilateral H25.13 366.16 follow       Lumigan qd OU  Dorz TID OU  Return to clinic 2 months with Hvf and gOCT

## 2017-11-20 RX ORDER — ESTRADIOL 1 MG/1
TABLET ORAL
Qty: 30 TABLET | Refills: 5 | Status: SHIPPED | OUTPATIENT
Start: 2017-11-20 | End: 2018-04-18

## 2017-11-27 RX ORDER — PANTOPRAZOLE SODIUM 40 MG/1
TABLET, DELAYED RELEASE ORAL
Qty: 90 TABLET | Refills: 3 | Status: SHIPPED | OUTPATIENT
Start: 2017-11-27 | End: 2019-01-18 | Stop reason: SDUPTHER

## 2017-12-05 RX ORDER — ALPRAZOLAM 0.25 MG/1
TABLET ORAL
Qty: 30 TABLET | Refills: 0 | Status: SHIPPED | OUTPATIENT
Start: 2017-12-05 | End: 2018-02-22 | Stop reason: SDUPTHER

## 2017-12-14 ENCOUNTER — TELEPHONE (OUTPATIENT)
Dept: FAMILY MEDICINE | Facility: CLINIC | Age: 67
End: 2017-12-14

## 2017-12-14 NOTE — TELEPHONE ENCOUNTER
----- Message from Krystin Castillo sent at 12/14/2017 11:29 AM CST -----  Contact: [phone]   Pt would like a completed form for a handicap tag, please call back when form is completed at [phone].  Thx-AH

## 2018-01-09 ENCOUNTER — OFFICE VISIT (OUTPATIENT)
Dept: OPHTHALMOLOGY | Facility: CLINIC | Age: 68
End: 2018-01-09
Payer: MEDICARE

## 2018-01-09 DIAGNOSIS — H40.1133 PRIMARY OPEN ANGLE GLAUCOMA OF BOTH EYES, SEVERE STAGE: Primary | ICD-10-CM

## 2018-01-09 DIAGNOSIS — H25.13 NUCLEAR SCLEROSIS, BILATERAL: ICD-10-CM

## 2018-01-09 DIAGNOSIS — Z91.148 NONCOMPLIANCE WITH MEDICATION REGIMEN: ICD-10-CM

## 2018-01-09 PROCEDURE — 92133 CPTRZD OPH DX IMG PST SGM ON: CPT | Mod: PBBFAC,PO | Performed by: OPHTHALMOLOGY

## 2018-01-09 PROCEDURE — 99211 OFF/OP EST MAY X REQ PHY/QHP: CPT | Mod: PBBFAC,PO,25 | Performed by: OPHTHALMOLOGY

## 2018-01-09 PROCEDURE — 99999 PR PBB SHADOW E&M-EST. PATIENT-LVL I: CPT | Mod: PBBFAC,,, | Performed by: OPHTHALMOLOGY

## 2018-01-09 PROCEDURE — 92012 INTRM OPH EXAM EST PATIENT: CPT | Mod: S$PBB,,, | Performed by: OPHTHALMOLOGY

## 2018-01-09 RX ORDER — DORZOLAMIDE HCL 20 MG/ML
1 SOLUTION/ DROPS OPHTHALMIC 3 TIMES DAILY
Qty: 10 ML | Refills: 6
Start: 2018-01-09 | End: 2019-07-02 | Stop reason: SDUPTHER

## 2018-01-09 NOTE — PROGRESS NOTES
HPI     Glaucoma    Additional comments: dorz-sola bid ou, lumigan qhs ou           Comments   Patient returns for a 2 month iop check, and goct hvf not scheduled,   patient states she has she only used 75% last month and thus far this   month.    Referred by Dr. Lorenzana  Bleb Needling with [facility] Inj. OD 9/17/15  LTG   H/O Non-compliance   Intolerant of Coag Drops    Stopped Alphagan OS TID because of dermatitis  Stopped Betoptic bid ou due to slow pulse rate and feeling tired.  Trab OD     repeat SLT od done 8/9/2012 - good initial response IOP 18 to 13, SLT OD   08/17/17  Primary SLT done os 9/13/2012 - minimal response 18 to 16  SLT os 9/29/2016  LATANOPROST slows pulse rate    OU LUMIGAN 0.03% QHS (slows pulse rate), DORZOLAMIDE BID (patient non   compliant with drop usage)       Last edited by Macario Shankar MD on 1/9/2018 10:33 AM. (History)            Assessment /Plan     For exam results, see Encounter Report.      ICD-10-CM ICD-9-CM    1. Primary open angle glaucoma of both eyes, severe stage H40.1133 365.11 Posterior Segment OCT Optic Nerve- Both eyes     365.73 dorzolamide (TRUSOPT) 2 % ophthalmic solution  IOP slightly above ideal, failed previous meds   Add truspot TID OU    2. Nuclear sclerosis, bilateral H25.13 366.16   You were found to have an early cataract in your eye(s) today, however the cataract is not affecting your activities of daily living, such as reading and driving.You do not need  surgery at this time. We will recheck your cataract at your next visit. You are welcome to call for an earlier appointment if your vision gets worse.      3. Noncompliance with medication regimen Z91.14 V15.81 Pt will try another med and increase compliance with treatment/dosing        Continue Lumigan QHS OU  Add Dorzolamide TID OU     Return to clinic 3 months DOA, HVF and SDP

## 2018-02-21 ENCOUNTER — OFFICE VISIT (OUTPATIENT)
Dept: SURGERY | Facility: CLINIC | Age: 68
End: 2018-02-21
Payer: MEDICARE

## 2018-02-21 VITALS
DIASTOLIC BLOOD PRESSURE: 65 MMHG | TEMPERATURE: 98 F | BODY MASS INDEX: 30.41 KG/M2 | HEIGHT: 68 IN | WEIGHT: 200.63 LBS | SYSTOLIC BLOOD PRESSURE: 123 MMHG | HEART RATE: 57 BPM

## 2018-02-21 DIAGNOSIS — N64.4 BREAST PAIN: Primary | ICD-10-CM

## 2018-02-21 PROCEDURE — 1125F AMNT PAIN NOTED PAIN PRSNT: CPT | Mod: ,,, | Performed by: SURGERY

## 2018-02-21 PROCEDURE — 1159F MED LIST DOCD IN RCRD: CPT | Mod: ,,, | Performed by: SURGERY

## 2018-02-21 PROCEDURE — 99213 OFFICE O/P EST LOW 20 MIN: CPT | Mod: PBBFAC,PO | Performed by: SURGERY

## 2018-02-21 PROCEDURE — 99203 OFFICE O/P NEW LOW 30 MIN: CPT | Mod: S$PBB,,, | Performed by: SURGERY

## 2018-02-21 PROCEDURE — 99999 PR PBB SHADOW E&M-EST. PATIENT-LVL III: CPT | Mod: PBBFAC,,, | Performed by: SURGERY

## 2018-02-21 RX ORDER — ALPRAZOLAM 0.25 MG/1
TABLET ORAL
Qty: 30 TABLET | Refills: 0 | OUTPATIENT
Start: 2018-02-21

## 2018-02-22 ENCOUNTER — OFFICE VISIT (OUTPATIENT)
Dept: FAMILY MEDICINE | Facility: CLINIC | Age: 68
End: 2018-02-22
Payer: MEDICARE

## 2018-02-22 VITALS
WEIGHT: 199.81 LBS | HEIGHT: 68 IN | BODY MASS INDEX: 30.28 KG/M2 | HEART RATE: 66 BPM | SYSTOLIC BLOOD PRESSURE: 135 MMHG | DIASTOLIC BLOOD PRESSURE: 77 MMHG

## 2018-02-22 DIAGNOSIS — Z86.39 HISTORY OF HYPOTHYROIDISM: ICD-10-CM

## 2018-02-22 DIAGNOSIS — F32.0 MILD MAJOR DEPRESSION: ICD-10-CM

## 2018-02-22 DIAGNOSIS — I10 ESSENTIAL HYPERTENSION: ICD-10-CM

## 2018-02-22 DIAGNOSIS — R73.03 PREDIABETES: ICD-10-CM

## 2018-02-22 DIAGNOSIS — F41.9 ANXIETY: Primary | ICD-10-CM

## 2018-02-22 PROCEDURE — 1126F AMNT PAIN NOTED NONE PRSNT: CPT | Mod: ,,, | Performed by: FAMILY MEDICINE

## 2018-02-22 PROCEDURE — 99214 OFFICE O/P EST MOD 30 MIN: CPT | Mod: S$PBB,,, | Performed by: FAMILY MEDICINE

## 2018-02-22 PROCEDURE — 1159F MED LIST DOCD IN RCRD: CPT | Mod: ,,, | Performed by: FAMILY MEDICINE

## 2018-02-22 PROCEDURE — 99213 OFFICE O/P EST LOW 20 MIN: CPT | Mod: PBBFAC,PO | Performed by: FAMILY MEDICINE

## 2018-02-22 PROCEDURE — 99999 PR PBB SHADOW E&M-EST. PATIENT-LVL III: CPT | Mod: PBBFAC,,, | Performed by: FAMILY MEDICINE

## 2018-02-22 RX ORDER — ALPRAZOLAM 0.25 MG/1
0.25 TABLET ORAL 3 TIMES DAILY PRN
Qty: 30 TABLET | Refills: 5 | Status: SHIPPED | OUTPATIENT
Start: 2018-02-22 | End: 2019-01-18 | Stop reason: SDUPTHER

## 2018-02-22 NOTE — PROGRESS NOTES
Breast Surgery  [facility]  Department of Surgery      REFERRING PROVIDER: Aaareferral Self  No address on file    Chief Complaint: Consult (New Patient Left Breast Pain .)      Subjective:      Patient ID: Suze Chino is a 67 y.o. female who presents with L  breast pain that is mostly sharp but sometimes dull and achy.  It comes and goes and was first noted about 6 months ago.  There have been no changes noted in terms of masses, skin or nipple changes of either breast.    Patient does not routinely do self breast exams.  Patient has not noted a change on breast exam.  Patient denies nipple discharge. Patient denies to previous breast biopsy but has had callbacks for US due to dense breasts. Patient denies a personal history of breast cancer.      GYN History:  Age of menarche was 11. Age of menopause was 52 at the time of her hysterectomy and BSO for fibroids but was having regular cycles until that time.  Patient denies hormonal therapy. Patient is . Age of first live birth was 31.     Past Medical History:   Diagnosis Date    Anxiety     Cataract     Depression     Diabetes mellitus     GERD (gastroesophageal reflux disease)     Glaucoma     History of colon polyps     history as per patient    History of colonoscopy 2009    ok per pt    Hypertension     Hypothyroid     ?    Mitral valve prolapse     Peptic ulcer disease     with gi bleed    Prediabetes     Right knee DJD     Right knee DJD     Sleep apnea     TMJ (temporomandibular joint disorder)      Past Surgical History:   Procedure Laterality Date    ARGON TRABECULOPLASTY - OU - BOTH EYES      BILATERAL SALPINGOOPHORECTOMY      EYE SURGERY      laser sx    HYSTERECTOMY      peptic ulcer surgery        TRABECULECTOMY Right 14    OD (dr. grossman)     Current Outpatient Prescriptions on File Prior to Visit   Medication Sig Dispense Refill    bimatoprost (LUMIGAN) 0.03 % ophthalmic drops Place 1 drop into both  eyes every evening. 2.5 mL 6    celecoxib (CELEBREX) 200 MG capsule Take 1 capsule (200 mg total) by mouth 2 (two) times daily. (Patient taking differently: Take 200 mg by mouth once daily. ) 60 capsule 11    DIPHENHYDRAMINE HCL (BENADRYL ALLERGY ORAL) Take 1 tablet by mouth as needed.      dorzolamide (TRUSOPT) 2 % ophthalmic solution Place 1 drop into both eyes 3 (three) times daily. 10 mL 6    ERGOCALCIFEROL, VITAMIN D2, (VITAMIN D ORAL) Take by mouth as needed.      fexofenadine (ALLEGRA) 180 MG tablet Take 180 mg by mouth once daily.      fluoxetine (PROZAC) 20 MG capsule Take 3 capsules (60 mg total) by mouth once daily. 90 capsule 11    hydrochlorothiazide (HYDRODIURIL) 25 MG tablet TAKE 1 TABLET BY MOUTH EVERY DAY 90 tablet 3    losartan (COZAAR) 50 MG tablet Take 1 tablet (50 mg total) by mouth once daily. 90 tablet 0    metFORMIN (GLUCOPHAGE) 500 MG tablet Take 500 mg by mouth 2 (two) times daily with meals.      estradiol (ESTRACE) 1 MG tablet TAKE 1 TABLET EVERY DAY 30 tablet 5    pantoprazole (PROTONIX) 40 MG tablet TAKE 1 TABLET BY MOUTH EVERY DAY 90 tablet 3     No current facility-administered medications on file prior to visit.      Social History     Social History    Marital status:      Spouse name: N/A    Number of children: N/A    Years of education: N/A     Occupational History    Not on file.     Social History Main Topics    Smoking status: Never Smoker    Smokeless tobacco: Never Used    Alcohol use Yes      Comment: occasional    Drug use: No    Sexual activity: Not on file     Other Topics Concern    Not on file     Social History Narrative    No narrative on file     Family History   Problem Relation Age of Onset    Heart disease Father      before age 50    Diabetes Mother     Glaucoma Mother     Leukemia Mother     Cataracts Mother     Breast cancer Mother 60    Diabetes Sister     Glaucoma Sister     Diabetes Brother     Glaucoma Brother      "Lung cancer Brother     Breast cancer Other 40     genetics -        Review of Systems   Constitutional: Negative for appetite change, chills, fever and unexpected weight change.   HENT: Negative for facial swelling, postnasal drip and sore throat.    Eyes: Negative for redness and itching.   Respiratory: Negative for chest tightness and shortness of breath.    Cardiovascular: Negative for chest pain and palpitations.   Gastrointestinal: Negative for blood in stool, diarrhea, nausea and vomiting.   Genitourinary: Negative for difficulty urinating and dysuria.   Musculoskeletal: Negative for arthralgias and joint swelling.   Skin: Negative for rash and wound.   Neurological: Negative for dizziness and syncope.   Hematological: Negative for adenopathy.   Psychiatric/Behavioral: Negative for agitation. The patient is not nervous/anxious.      Objective:   /65 (BP Location: Left arm, Patient Position: Sitting, BP Method: Medium (Automatic))   Pulse (!) 57   Temp 97.9 °F (36.6 °C) (Oral)   Ht 5' 8" (1.727 m)   Wt 91 kg (200 lb 9.6 oz)   BMI 30.50 kg/m²     Physical Exam   Constitutional: She appears well-developed and well-nourished.   HENT:   Head: Normocephalic.   Eyes: No scleral icterus.   Neck: Neck supple. No tracheal deviation present.   Cardiovascular: Normal rate and regular rhythm.    Pulmonary/Chest: Breath sounds normal. No respiratory distress. Right breast exhibits no inverted nipple, no mass, no nipple discharge and no skin change. Left breast exhibits no inverted nipple, no mass, no nipple discharge and no skin change.   Abdominal: Soft. She exhibits no mass. There is no tenderness.   Musculoskeletal: She exhibits no edema.   Lymphadenopathy:     She has no cervical adenopathy.   Neurological: She is alert.   Skin: No rash noted. No erythema.     Psychiatric: She has a normal mood and affect.       Radiology review: Images personally reviewed by me in the clinic. From 4/2017.  Mammogram:There " are scattered fibroglandular densities.    No significant masses, significant calcifications, or other abnormalities  are seen.  Digital tomosynthesis was performed and used in the interpretation of the  images.  Images were evaluated with a Computer Aided Detection (CAD) system.    Impression  There is no mammographic evidence of malignancy.    Routine follow-up mammogram in 1 year is recommended.    Assessment:       1. Breast pain        Plan:     There are no concerning findings on exam and this pain has been ongoing for about 6 months.  Will proceed with her bilateral diagnostic MMG 4/2018.  I have advised her to try anti-inflammatory, limit caffeine intake (currently 32 oz per day of coke), and try heating pad.  I recommended a good support bra to help also.  I will see her back when she is here for the mammogram.     All her questions were answered.    Total time spent with the patient: 45 minutes.  30 minutes of face to face consultation and 15 minutes of chart review and coordination of care.

## 2018-02-22 NOTE — PROGRESS NOTES
Anxiety fairly well controlled with infrequent Xanax use.  Follow depression stable.  Still gets some symptoms intermittently.  Has sleep issues.  Poor sleep hygiene.  Pending follow-up with pulmonary and sleep medicine.  Prediabetes needs follow-up laboratory.  Previous hypothyroidism has been euthyroid with normal TSH off medication.  No SI/HI.  Not interested in seeing psychiatry at present.    Past Medical History:  Past Medical History:   Diagnosis Date    Anxiety     Cataract     Depression     GERD (gastroesophageal reflux disease)     Glaucoma     History of colon polyps 2009    history as per patient    History of colonoscopy 2009    ok per pt    Hypertension     Hypothyroid     ?    Mitral valve prolapse     Peptic ulcer disease     with gi bleed    Prediabetes     Right knee DJD     Right knee DJD     Sleep apnea     TMJ (temporomandibular joint disorder)      Past Surgical History:   Procedure Laterality Date    ARGON TRABECULOPLASTY - OU - BOTH EYES      BILATERAL SALPINGOOPHORECTOMY      EYE SURGERY      laser sx    HYSTERECTOMY      peptic ulcer surgery        TRABECULECTOMY Right 4/2/14    OD (dr. grossman)     Social History     Social History    Marital status:      Spouse name: N/A    Number of children: N/A    Years of education: N/A     Occupational History    Not on file.     Social History Main Topics    Smoking status: Never Smoker    Smokeless tobacco: Never Used    Alcohol use Yes      Comment: occasional    Drug use: No    Sexual activity: Not on file     Other Topics Concern    Not on file     Social History Narrative    No narrative on file     Family History   Problem Relation Age of Onset    Heart disease Father      before age 50    Diabetes Mother     Glaucoma Mother     Leukemia Mother     Cataracts Mother     Diabetes Sister     Glaucoma Sister     Diabetes Brother     Glaucoma Brother     Lung cancer Brother      Review of  patient's allergies indicates:   Allergen Reactions    Betoptic [betaxolol] Other (See Comments)     Fatigue, slowed heart rate.    Alphagan [brimonidine] Dermatitis     Current Outpatient Prescriptions on File Prior to Visit   Medication Sig Dispense Refill    bimatoprost (LUMIGAN) 0.03 % ophthalmic drops Place 1 drop into both eyes every evening. 2.5 mL 6    celecoxib (CELEBREX) 200 MG capsule Take 1 capsule (200 mg total) by mouth 2 (two) times daily. (Patient taking differently: Take 200 mg by mouth once daily. ) 60 capsule 11    DIPHENHYDRAMINE HCL (BENADRYL ALLERGY ORAL) Take 1 tablet by mouth as needed.      dorzolamide (TRUSOPT) 2 % ophthalmic solution Place 1 drop into both eyes 3 (three) times daily. 10 mL 6    ERGOCALCIFEROL, VITAMIN D2, (VITAMIN D ORAL) Take by mouth as needed.      fexofenadine (ALLEGRA) 180 MG tablet Take 180 mg by mouth once daily.      fluoxetine (PROZAC) 20 MG capsule Take 3 capsules (60 mg total) by mouth once daily. 90 capsule 11    hydrochlorothiazide (HYDRODIURIL) 25 MG tablet TAKE 1 TABLET BY MOUTH EVERY DAY 90 tablet 3    losartan (COZAAR) 50 MG tablet Take 1 tablet (50 mg total) by mouth once daily. 90 tablet 0    metFORMIN (GLUCOPHAGE) 500 MG tablet Take 500 mg by mouth 2 (two) times daily with meals.      pantoprazole (PROTONIX) 40 MG tablet TAKE 1 TABLET BY MOUTH EVERY DAY 90 tablet 3    [DISCONTINUED] ALPRAZolam (XANAX) 0.25 MG tablet TAKE 1 TABLET BY MOUTH 3 TIMES DAILY AS NEEDED FOR ANXIETY. 30 tablet 0    estradiol (ESTRACE) 1 MG tablet TAKE 1 TABLET EVERY DAY 30 tablet 5     No current facility-administered medications on file prior to visit.            ROS:  GENERAL: No fever, chills,  or significant weight changes.   CARDIOVASCULAR: Denies chest pain, PND, orthopnea or reduced exercise tolerance.  ABDOMEN: Appetite fine. Denies diarrhea, abdominal pain, hematemesis or blood in stool.  URINARY: No flank pain, dysuria or hematuria.      OBJECTIVE:  "    Vitals:    02/22/18 1047   BP: 135/77   Pulse: 66   Weight: 90.6 kg (199 lb 12.8 oz)   Height: 5' 8" (1.727 m)     Wt Readings from Last 3 Encounters:   02/22/18 90.6 kg (199 lb 12.8 oz)   02/21/18 91 kg (200 lb 9.6 oz)   09/19/17 90.9 kg (200 lb 6.4 oz)     APPEARANCE: Well nourished, well developed, in no acute distress.    HEAD: Normocephalic.  Atraumatic.  No sinus tenderness.  EYES:   Right eye: Pupil reactive.  Conjunctiva clear.    Left eye: Pupil reactive.  Conjunctiva clear.    Both fundi:  Grossly normal to nondilated exam. EOMI.    EARS: TM's intact. Light reflex normal. No retraction or perforation.    NOSE:  clear.  MOUTH & THROAT:  No pharyngeal erythema or exudate. No lesions.  NECK: Supple. No bruits.  No JVD.  No cervical lymphadenopathy.  No thyromegaly.    CHEST: Breath sounds clear bilaterally.  Normal respiratory effort  CARDIOVASCULAR: Normal rate.  Regular rhythm.  No murmurs.  No rub.  No gallops.  ABDOMEN: Bowel sounds normal.  Soft.  No tenderness.  No organomegaly.  PERIPHERAL VASCULAR: No cyanosis.  No clubbing.  No edema.  NEUROLOGIC: No focal findings.  MENTAL STATUS: Alert.  Oriented x 3.          Suze was seen today for medication refill.    Diagnoses and all orders for this visit:    Anxiety    Mild major depression    Essential hypertension  -     Comprehensive metabolic panel; Future  -     Lipid panel; Future  -     TSH; Future    Prediabetes    History of hypothyroidism    Other orders  -     ALPRAZolam (XANAX) 0.25 MG tablet; Take 1 tablet (0.25 mg total) by mouth 3 (three) times daily as needed for Anxiety.      Continue current medication.  Check laboratory in June.  Keep follow-up sleep medicine.  "

## 2018-02-25 PROBLEM — N64.4 BREAST PAIN: Status: ACTIVE | Noted: 2018-02-25

## 2018-03-05 RX ORDER — FLUOXETINE HYDROCHLORIDE 20 MG/1
CAPSULE ORAL
Qty: 90 CAPSULE | Refills: 11 | Status: SHIPPED | OUTPATIENT
Start: 2018-03-05 | End: 2019-01-18 | Stop reason: SDUPTHER

## 2018-03-19 RX ORDER — HYDROCHLOROTHIAZIDE 25 MG/1
TABLET ORAL
Qty: 90 TABLET | Refills: 1 | Status: SHIPPED | OUTPATIENT
Start: 2018-03-19 | End: 2018-06-11

## 2018-04-18 ENCOUNTER — HOSPITAL ENCOUNTER (OUTPATIENT)
Dept: RADIOLOGY | Facility: HOSPITAL | Age: 68
Discharge: HOME | End: 2018-04-18
Attending: SURGERY
Payer: MEDICARE

## 2018-04-18 ENCOUNTER — OFFICE VISIT (OUTPATIENT)
Dept: SURGERY | Facility: CLINIC | Age: 68
End: 2018-04-18
Payer: MEDICARE

## 2018-04-18 VITALS
BODY MASS INDEX: 30.41 KG/M2 | WEIGHT: 200.63 LBS | TEMPERATURE: 98 F | HEIGHT: 68 IN | HEART RATE: 56 BPM | DIASTOLIC BLOOD PRESSURE: 75 MMHG | SYSTOLIC BLOOD PRESSURE: 135 MMHG

## 2018-04-18 DIAGNOSIS — N64.4 BREAST PAIN: Primary | ICD-10-CM

## 2018-04-18 DIAGNOSIS — N64.4 BREAST PAIN: ICD-10-CM

## 2018-04-18 PROCEDURE — 77066 DX MAMMO INCL CAD BI: CPT | Mod: TC,PO

## 2018-04-18 PROCEDURE — 76642 ULTRASOUND BREAST LIMITED: CPT | Mod: 26,LT,, | Performed by: RADIOLOGY

## 2018-04-18 PROCEDURE — 99999 PR PBB SHADOW E&M-EST. PATIENT-LVL III: CPT | Mod: PBBFAC,,, | Performed by: SURGERY

## 2018-04-18 PROCEDURE — 99213 OFFICE O/P EST LOW 20 MIN: CPT | Mod: S$PBB,,, | Performed by: SURGERY

## 2018-04-18 PROCEDURE — 77066 DX MAMMO INCL CAD BI: CPT | Mod: 26,,, | Performed by: RADIOLOGY

## 2018-04-18 PROCEDURE — 76642 ULTRASOUND BREAST LIMITED: CPT | Mod: TC,PO,LT

## 2018-04-18 PROCEDURE — 99213 OFFICE O/P EST LOW 20 MIN: CPT | Mod: PBBFAC,25,PO | Performed by: SURGERY

## 2018-04-18 PROCEDURE — 77062 BREAST TOMOSYNTHESIS BI: CPT | Mod: 26,,, | Performed by: RADIOLOGY

## 2018-04-18 NOTE — PROGRESS NOTES
Breast Surgery  [facility]  Department of Surgery    Chief Complaint: Follow-up (Follow up Breast Pain w/Bilat MMG @ 1pm .)    Subjective:      Patient ID: Suze Chino is a 67 y.o. female who presents for follow with imaging for left breast pain. The pain remains an intermittent sharp pain that radiates toward her axilla. The quaility of the pain has not changed since her previous visit 6 months ago. She did not try to decrease caffeine intake (continues to drink 32 oz), take NSAIDS (due to history of PUD), or use heating pad.  There have been no changes in self breast exam. No reported masses, skin or nipple changes, nipple discharge or retracted of either breast.    Patient denies to previous breast biopsy but has had callbacks for US due to dense breasts. Patient denies a personal history of breast cancer.    GYN History:  Age of menarche was 11. Age of menopause was 52 at the time of her hysterectomy and BSO for fibroids but was having regular cycles until that time.  Patient denies hormonal therapy. Patient is . Age of first live birth was 31.     Past Medical History:   Diagnosis Date    Anxiety     Cataract     Depression     Diabetes mellitus     GERD (gastroesophageal reflux disease)     Glaucoma     History of colon polyps 2009    history as per patient    History of colonoscopy     ok per pt    Hypertension     Hypothyroid     ?    Mitral valve prolapse     Peptic ulcer disease     with gi bleed    Prediabetes     Right knee DJD     Right knee DJD     Sleep apnea     TMJ (temporomandibular joint disorder)      Past Surgical History:   Procedure Laterality Date    ARGON TRABECULOPLASTY - OU - BOTH EYES      BILATERAL SALPINGOOPHORECTOMY      EYE SURGERY      laser sx    HYSTERECTOMY      peptic ulcer surgery        TRABECULECTOMY Right 14    OD (dr. grossman)     Current Outpatient Prescriptions on File Prior to Visit   Medication Sig Dispense Refill     ALPRAZolam (XANAX) 0.25 MG tablet Take 1 tablet (0.25 mg total) by mouth 3 (three) times daily as needed for Anxiety. 30 tablet 5    bimatoprost (LUMIGAN) 0.03 % ophthalmic drops Place 1 drop into both eyes every evening. 2.5 mL 6    celecoxib (CELEBREX) 200 MG capsule Take 1 capsule (200 mg total) by mouth 2 (two) times daily. (Patient taking differently: Take 200 mg by mouth once daily. ) 60 capsule 11    DIPHENHYDRAMINE HCL (BENADRYL ALLERGY ORAL) Take 1 tablet by mouth as needed.      fexofenadine (ALLEGRA) 180 MG tablet Take 180 mg by mouth once daily.      FLUoxetine (PROZAC) 20 MG capsule TAKE 3 CAPSULES BY MOUTH ONCE DAILY. 90 capsule 11    hydroCHLOROthiazide (HYDRODIURIL) 25 MG tablet TAKE 1 TABLET EVERY DAY 90 tablet 1    losartan (COZAAR) 50 MG tablet Take 1 tablet (50 mg total) by mouth once daily. 90 tablet 0    metFORMIN (GLUCOPHAGE) 500 MG tablet Take 500 mg by mouth 2 (two) times daily with meals.      pantoprazole (PROTONIX) 40 MG tablet TAKE 1 TABLET BY MOUTH EVERY DAY 90 tablet 3    [DISCONTINUED] ERGOCALCIFEROL, VITAMIN D2, (VITAMIN D ORAL) Take by mouth as needed.      dorzolamide (TRUSOPT) 2 % ophthalmic solution Place 1 drop into both eyes 3 (three) times daily. 10 mL 6    [DISCONTINUED] estradiol (ESTRACE) 1 MG tablet TAKE 1 TABLET EVERY DAY 30 tablet 5     No current facility-administered medications on file prior to visit.      Social History     Social History    Marital status:      Spouse name: N/A    Number of children: N/A    Years of education: N/A     Occupational History    Not on file.     Social History Main Topics    Smoking status: Never Smoker    Smokeless tobacco: Never Used    Alcohol use Yes      Comment: occasional    Drug use: No    Sexual activity: Not on file     Other Topics Concern    Not on file     Social History Narrative    No narrative on file     Family History   Problem Relation Age of Onset    Heart disease Father      before  "age 50    Diabetes Mother     Glaucoma Mother     Leukemia Mother     Cataracts Mother     Breast cancer Mother 60    Diabetes Sister     Glaucoma Sister     Diabetes Brother     Glaucoma Brother     Lung cancer Brother     Breast cancer Other 40     genetics -        Review of Systems   Constitutional: Negative for appetite change, chills, fever and unexpected weight change.   HENT: Negative for facial swelling, postnasal drip and sore throat.    Eyes: Negative for redness and itching.   Respiratory: Negative for chest tightness and shortness of breath.    Cardiovascular: Negative for chest pain and palpitations.   Gastrointestinal: Positive for blood in stool and nausea. Negative for diarrhea and vomiting.        Reflux   Genitourinary: Negative for difficulty urinating and dysuria.   Musculoskeletal: Negative for arthralgias and joint swelling.   Skin: Negative for rash and wound.   Neurological: Negative for dizziness and syncope.   Hematological: Negative for adenopathy.   Psychiatric/Behavioral: Negative for agitation. The patient is not nervous/anxious.      Objective:   /75 (BP Location: Right arm, Patient Position: Sitting, BP Method: Medium (Automatic))   Pulse (!) 56   Temp 97.9 °F (36.6 °C) (Oral)   Ht 5' 8" (1.727 m)   Wt 91 kg (200 lb 9.6 oz)   BMI 30.50 kg/m²     Physical Exam   Constitutional: She appears well-developed and well-nourished.   HENT:   Head: Normocephalic.   Eyes: No scleral icterus.   Neck: Neck supple. No tracheal deviation present.   Cardiovascular: Normal rate and regular rhythm.    Pulmonary/Chest: Breath sounds normal. No respiratory distress. Right breast exhibits no inverted nipple, no mass, no nipple discharge and no skin change. Left breast exhibits no inverted nipple, no mass, no nipple discharge and no skin change.   Abdominal: Soft. She exhibits no mass. There is no tenderness.   Musculoskeletal: She exhibits no edema.   Lymphadenopathy:     She has no " cervical adenopathy.   Neurological: She is alert.   Skin: No rash noted. No erythema.     Psychiatric: She has a normal mood and affect.     Radiology review:  MMG and Ultrasound 4/18/18  There is no mammographic or sonographic evidence of malignancy.     BI-RADS Category 1: Negative     Recommendation:  Routine screening mammogram in 1 year is recommended.  Assessment:       68 yo female with left breast pain with normal annual MMG.  Plan:     Bilateral MMG and U/s today 4/18/18 showed no abnormality of evidence of malignancy.   Again advised her to limit caffeine intake (currently 32 oz per day of coke) and try heating pad if pain continues. Also recommended trying evening primrose oil   Recommend her to resume regular annual screening MMG. Return to clinic PRN.

## 2018-04-23 ENCOUNTER — TELEPHONE (OUTPATIENT)
Dept: GASTROENTEROLOGY | Facility: CLINIC | Age: 68
End: 2018-04-23

## 2018-04-23 ENCOUNTER — PATIENT MESSAGE (OUTPATIENT)
Dept: GASTROENTEROLOGY | Facility: CLINIC | Age: 68
End: 2018-04-23

## 2018-04-23 ENCOUNTER — OFFICE VISIT (OUTPATIENT)
Dept: GASTROENTEROLOGY | Facility: CLINIC | Age: 68
End: 2018-04-23
Payer: MEDICARE

## 2018-04-23 VITALS
HEIGHT: 68 IN | BODY MASS INDEX: 30.37 KG/M2 | SYSTOLIC BLOOD PRESSURE: 124 MMHG | HEART RATE: 78 BPM | DIASTOLIC BLOOD PRESSURE: 69 MMHG | WEIGHT: 200.38 LBS

## 2018-04-23 DIAGNOSIS — K21.9 GASTROESOPHAGEAL REFLUX DISEASE WITHOUT ESOPHAGITIS: ICD-10-CM

## 2018-04-23 DIAGNOSIS — E88.819 INSULIN RESISTANCE: ICD-10-CM

## 2018-04-23 DIAGNOSIS — K62.5 RECTAL BLEEDING: Primary | ICD-10-CM

## 2018-04-23 DIAGNOSIS — R10.13 ABDOMINAL PAIN, EPIGASTRIC: ICD-10-CM

## 2018-04-23 PROCEDURE — 99999 PR PBB SHADOW E&M-EST. PATIENT-LVL III: CPT | Mod: PBBFAC,,, | Performed by: INTERNAL MEDICINE

## 2018-04-23 PROCEDURE — 99213 OFFICE O/P EST LOW 20 MIN: CPT | Mod: PBBFAC | Performed by: INTERNAL MEDICINE

## 2018-04-23 PROCEDURE — 99204 OFFICE O/P NEW MOD 45 MIN: CPT | Mod: S$PBB,,, | Performed by: INTERNAL MEDICINE

## 2018-04-23 NOTE — LETTER
April 23, 2018      Cody Lewis MD  [address] [facility] [address]           [facility]ly [facility]  [address] [place] [id number]  Phone: [phone]  Fax: [phone]          Patient: Suze Chino   MR Number: [id number]   YOB: 1950   Date of Visit: 4/23/2018       Dear Dr. Cody Lewis:    Thank you for referring Suze Chino to me for evaluation. Attached you will find relevant portions of my assessment and plan of care.    If you have questions, please do not hesitate to call me. I look forward to following Suze Chino along with you.    Sincerely,    Solo Aguirre MD    Enclosure  CC:  No Recipients    If you would like to receive this communication electronically, please contact [email or web address] or [phone] to request more information on [employer] Link access.    For providers and/or their staff who would like to refer a patient to [facility], please contact us through our one-stop-shop provider referral line, [facility], at [phone].    If you feel you have received this communication in error or would no longer like to receive these types of communications, please e-mail [email or web address]

## 2018-04-23 NOTE — PROGRESS NOTES
Subjective:       Patient ID: Suze Chino is a 67 y.o. female.    Chief Complaint: GI Problem and Rectal Bleeding    GI Problem   The primary symptoms include abdominal pain, nausea and hematochezia. Primary symptoms do not include fever, weight loss, fatigue, vomiting, dysuria, arthralgias or rash. The illness began more than 7 days ago. The problem has been resolved.   The illness is also significant for bloating. The illness does not include chills, anorexia, constipation, tenesmus or back pain. Significant associated medical issues include GERD. Associated medical issues do not include inflammatory bowel disease, liver disease, gastric bypass, bowel resection or irritable bowel syndrome.   Rectal Bleeding   This is a new problem. The current episode started 1 to 4 weeks ago. The problem occurs intermittently. The problem has been resolved. Associated symptoms include abdominal pain and nausea. Pertinent negatives include no anorexia, arthralgias, chills, coughing, diaphoresis, fatigue, fever, headaches, neck pain, rash, sore throat, swollen glands, vomiting or weakness. Nothing aggravates the symptoms. She has tried nothing for the symptoms.   Gastroesophageal Reflux   She complains of abdominal pain, heartburn and nausea. She reports no coughing, no sore throat or no wheezing. This is a chronic problem. The current episode started more than 1 year ago. The problem has been waxing and waning. The heartburn duration is several minutes. The heartburn is located in the substernum. The heartburn wakes her from sleep. The heartburn limits her activity. The heartburn changes with position. The symptoms are aggravated by certain foods and lying down. Pertinent negatives include no fatigue, orthopnea or weight loss. Risk factors include obesity and lack of exercise. She has tried a PPI for the symptoms. The treatment provided moderate relief. Past procedures include an EGD.     Past Medical History:   Diagnosis Date     Anxiety     Cataract     Colon polyp     Depression     Diabetes mellitus     GERD (gastroesophageal reflux disease)     Glaucoma     History of colon polyps 2009    history as per patient    History of colonoscopy 2009    ok per pt    Hypertension     Hypothyroid     ?    Mitral valve prolapse     Peptic ulcer disease     with gi bleed    Prediabetes     Right knee DJD     Right knee DJD     Sleep apnea     TMJ (temporomandibular joint disorder)      Past Surgical History:   Procedure Laterality Date    ARGON TRABECULOPLASTY - OU - BOTH EYES      BILATERAL SALPINGOOPHORECTOMY      COLONOSCOPY  2015    EYE SURGERY      laser sx    HYSTERECTOMY      TRABECULECTOMY Right 4/2/14    OD (dr. grossman)    UPPER GASTROINTESTINAL ENDOSCOPY  2003     Current Outpatient Prescriptions on File Prior to Visit   Medication Sig Dispense Refill    ALPRAZolam (XANAX) 0.25 MG tablet Take 1 tablet (0.25 mg total) by mouth 3 (three) times daily as needed for Anxiety. 30 tablet 5    bimatoprost (LUMIGAN) 0.03 % ophthalmic drops Place 1 drop into both eyes every evening. 2.5 mL 6    celecoxib (CELEBREX) 200 MG capsule Take 1 capsule (200 mg total) by mouth 2 (two) times daily. (Patient taking differently: Take 200 mg by mouth once daily. ) 60 capsule 11    DIPHENHYDRAMINE HCL (BENADRYL ALLERGY ORAL) Take 1 tablet by mouth as needed.      dorzolamide (TRUSOPT) 2 % ophthalmic solution Place 1 drop into both eyes 3 (three) times daily. 10 mL 6    fexofenadine (ALLEGRA) 180 MG tablet Take 180 mg by mouth once daily.      FLUoxetine (PROZAC) 20 MG capsule TAKE 3 CAPSULES BY MOUTH ONCE DAILY. 90 capsule 11    hydroCHLOROthiazide (HYDRODIURIL) 25 MG tablet TAKE 1 TABLET EVERY DAY 90 tablet 1    losartan (COZAAR) 50 MG tablet Take 1 tablet (50 mg total) by mouth once daily. 90 tablet 0    metFORMIN (GLUCOPHAGE) 500 MG tablet Take 500 mg by mouth 2 (two) times daily with meals.      pantoprazole (PROTONIX)  40 MG tablet TAKE 1 TABLET BY MOUTH EVERY DAY 90 tablet 3     No current facility-administered medications on file prior to visit.      Review of patient's allergies indicates:   Allergen Reactions    Betoptic [betaxolol] Other (See Comments)     Fatigue, slowed heart rate.    Alphagan [brimonidine] Dermatitis     Social History     Social History    Marital status:      Spouse name: N/A    Number of children: N/A    Years of education: N/A     Occupational History    Not on file.     Social History Main Topics    Smoking status: Never Smoker    Smokeless tobacco: Never Used    Alcohol use Yes      Comment: occasional    Drug use: No    Sexual activity: Not on file     Other Topics Concern    Not on file     Social History Narrative    No narrative on file     Family History   Problem Relation Age of Onset    Heart disease Father      before age 50    Diabetes Mother     Glaucoma Mother     Leukemia Mother     Cataracts Mother     Breast cancer Mother 60    Diabetes Sister     Glaucoma Sister     Diabetes Brother     Glaucoma Brother     Lung cancer Brother     Breast cancer Other 40     genetics -    Colon cancer Maternal Aunt        Review of Systems   Constitutional: Negative for activity change, chills, diaphoresis, fatigue, fever and weight loss.   HENT: Negative for ear pain, facial swelling, nosebleeds, rhinorrhea, sneezing, sore throat and trouble swallowing.    Eyes: Negative for photophobia, pain and visual disturbance.   Respiratory: Negative for apnea, cough, chest tightness, shortness of breath and wheezing.    Gastrointestinal: Positive for abdominal pain, anal bleeding, bloating, heartburn, hematochezia and nausea. Negative for anorexia, blood in stool, constipation, rectal pain and vomiting.   Genitourinary: Negative for decreased urine volume, difficulty urinating, dysuria, flank pain and hematuria.   Musculoskeletal: Negative for arthralgias, back pain, gait  problem, neck pain and neck stiffness.   Skin: Negative for pallor and rash.   Neurological: Negative for seizures, syncope, speech difficulty, weakness and headaches.   Hematological: Negative for adenopathy. Does not bruise/bleed easily.   Psychiatric/Behavioral: Negative for behavioral problems, confusion and hallucinations.       Objective:      Physical Exam   Constitutional: She is oriented to person, place, and time. She appears well-developed and well-nourished.   HENT:   Head: Normocephalic and atraumatic.   Eyes: EOM are normal. Pupils are equal, round, and reactive to light.   Neck: Normal range of motion. Neck supple. No thyromegaly present.   Cardiovascular: Normal rate, regular rhythm, normal heart sounds and intact distal pulses.    No murmur heard.  Pulmonary/Chest: Effort normal and breath sounds normal. No respiratory distress. She has no wheezes. She has no rales.   Abdominal: Soft. Bowel sounds are normal. She exhibits no distension and no mass. There is no tenderness.   Musculoskeletal: Normal range of motion. She exhibits no edema or tenderness.   Lymphadenopathy:     She has no cervical adenopathy.   Neurological: She is alert and oriented to person, place, and time. She has normal reflexes. No cranial nerve deficit.   Skin: Skin is warm and dry. No erythema.   Psychiatric: She has a normal mood and affect. Her behavior is normal.       Assessment:       1. Rectal bleeding    2. Gastroesophageal reflux disease without esophagitis    3. Abdominal pain, epigastric    4. Insulin resistance        Plan:       Rectal bleeding  -     Fecal Immunochemical Test (iFOBT); Future; Expected date: 04/23/2018    Gastroesophageal reflux disease without esophagitis  -     Case request GI: ESOPHAGOGASTRODUODENOSCOPY (EGD)    Abdominal pain, epigastric    Insulin resistance  -     Ambulatory referral to [facility] Diabetes Prevention Program      Risks, benefits and alternative options were discussed with the  patient. Risks including but not limited to infection, bleeding, heart or respiratory problems and perforation that may require surgery were all explained to the patient. The patient had a chance for questions if there were doubts or concerns about the test. The referring provider will be notified that our consultation is complete and available through the patient's records.    Thanks for letting us participate in the care of this nice patient,    Solo Aguirre

## 2018-04-23 NOTE — PATIENT INSTRUCTIONS
Rectal bleeding  -     Fecal Immunochemical Test (iFOBT); Future; Expected date: 04/23/2018    Gastroesophageal reflux disease without esophagitis  -     Case request GI: ESOPHAGOGASTRODUODENOSCOPY (EGD)    Abdominal pain, epigastric    Insulin resistance  -     Ambulatory referral to [facility]      Thanks for trusting us with your healthcare needs and using [facility]. If you want to ask us a question, you can do so by replying to this message or by calling [phone].    Sincerely,    Solo Aguirre M.D.      To rate your experience with Dr. Aguirre please click on the link below:    [email or web address]          GERD (Adult)    The esophagus is a tube that carries food from the mouth to the stomach. A valve at the lower end of the esophagus prevents stomach acid from flowing upward. When this valve doesn't work properly, stomach contents may repeatedly flow back up (reflux) into the esophagus. This is called gastroesophageal reflux disease (GERD). GERD can irritate the esophagus. It can cause problems with swallowing or breathing. In severe cases, GERD can cause recurrent pneumonia or other serious problems.  Symptoms of reflux include burning, pressure or sharp pain in the upper abdomen or mid to lower chest. The pain can spread to the neck, back, or shoulder. There may be belching, an acid taste in the back of the throat, chronic cough, or sore throat or hoarseness. GERD symptoms often occur during the day after a big meal. They can also occur at night when lying down.   Home care  Lifestyle changes can help reduce symptoms. If needed, medicines may be prescribed. Symptoms often improve with treatment, but if treatment is stopped, the symptoms often return after a few months. So most persons with GERD will need to continue treatment.  Lifestyle changes  · Limit or avoid fatty, fried, and spicy foods, as well as coffee, chocolate, mint, and  "foods with high acid content such as tomatoes and citrus fruit and juices (orange, grapefruit, lemon).  · Dont eat large meals, especially at night. Frequent, smaller meals are best. Do not lie down right after eating. And dont eat anything 3 hours before going to bed.  · Avoid drinking alcohol and smoking. As much as possible, stay away from second hand smoke.  · If you are overweight, losing weight will reduce symptoms.   · Avoid wearing tight clothing around your stomach area.  · If your symptoms occur during sleep, use a foam wedge to elevate your upper body (not just your head.) Or, place 4" blocks under the head of your bed.  Medicines  If needed, medicines can help relieve the symptoms of GERD and prevent damage to the esophagus. Discuss a medicine plan with your healthcare provider. This may include one or more of the following medicines:  · Antacids to help neutralize the normal acids in your stomach.  · Acid blockers (H2 blockers) to decrease acid production.  · Acid inhibitors (PPIs) to decrease acid production in a different way than the blockers. They may work better, but can take a little longer to take effect.  Take an antacid 30-60 minutes after eating and at bedtime, but not at the same time as an acid blocker.  Try not to take medicines such as ibuprofen and aspirin. If you are taking aspirin for your heart or other medical reasons, talk to your healthcare provider about stopping it.  Follow-up care  Follow up with your healthcare provider or as advised by our staff.  When to seek medical advice  Call your healthcare provider if any of the following occur:  · Stomach pain gets worse or moves to the lower right abdomen (appendix area)  · Chest pain appears or gets worse, or spreads to the back, neck, shoulder, or arm  · Frequent vomiting (cant keep down liquids)  · Blood in the stool or vomit (red or black in color)  · Feeling weak or dizzy  · Fever of 100.4ºF (38ºC) or higher, or as directed by " your healthcare provider  Date Last Reviewed: 6/23/2015  © 7305-1434 [employer]. [address] [place] [address]. All rights reserved. This information is not intended as a substitute for professional medical care. Always follow your healthcare professional's instructions.        Tips to Control Acid Reflux    To control acid reflux, youll need to make some basic diet and lifestyle changes. The simple steps outlined below may be all youll need to ease discomfort.  Watch what you eat  · Avoid fatty foods and spicy foods.  · Eat fewer acidic foods, such as citrus and tomato-based foods. These can increase symptoms.  · Limit drinking alcohol, caffeine, and fizzy beverages. All increase acid reflux.  · Try limiting chocolate, peppermint, and spearmint. These can worsen acid reflux in some people.  Watch when you eat  · Avoid lying down for 3 hours after eating.  · Do not snack before going to bed.  Raise your head  Raising your head and upper body by 4 to 6 inches helps limit reflux when youre lying down. Put blocks under the head of your bed frame to raise it.  Other changes  · Lose weight, if you need to  · Dont exercise near bedtime  · Avoid tight-fitting clothes  · Limit aspirin and ibuprofen  · Stop smoking   Date Last Reviewed: 7/1/2016  © 7673-9959 [employer]. [address] [place] [address]. All rights reserved. This information is not intended as a substitute for professional medical care. Always follow your healthcare professional's instructions.        Lifestyle Changes for Controlling GERD  When you have GERD, stomach acid feels as if its backing up toward your mouth. Whether or not you take medicine to control your GERD, your symptoms can often be improved with lifestyle changes. Talk to your healthcare provider about the following suggestions. These suggestions may help you get relief from your symptoms.      Raise your head  Reflux is more likely to  strike when youre lying down flat, because stomach fluid can flow backward more easily. Raising the head of your bed 4 to 6 inches can help. To do this:  · Slide blocks or books under the legs at the head of your bed. Or, place a wedge under the mattress. Many foam stores can make a suitable wedge for you. The wedge should run from your waist to the top of your head.  · Dont just prop your head on several pillows. This increases pressure on your stomach. It can make GERD worse.  Watch your eating habits  Certain foods may increase the acid in your stomach or relax the lower esophageal sphincter. This makes GERD more likely. Its best to avoid the following if they cause you symptoms:  · Coffee, tea, and carbonated drinks (with and without caffeine)  · Fatty, fried, or spicy food  · Mint, chocolate, onions, and tomatoes  · Peppermint  · Any other foods that seem to irritate your stomach or cause you pain  Relieve the pressure  Tips include the following:  · Eat smaller meals, even if you have to eat more often.  · Dont lie down right after you eat. Wait a few hours for your stomach to empty.  · Avoid tight belts and tight-fitting clothes.  · Lose excess weight.  Tobacco and alcohol  Avoid smoking tobacco and drinking alcohol. They can make GERD symptoms worse.  Date Last Reviewed: 7/1/2016 © 2000-2017 [employer]. [address] [place] [address]. All rights reserved. This information is not intended as a substitute for professional medical care. Always follow your healthcare professional's instructions.        Metabolic Syndrome: Losing Excess Weight    Metabolic syndrome is a set of five health factors that can lead to serious health problems. The factors greatly increase your risk for diabetes, heart attack, or stroke. Extra weight with a large waist is one of the factors for metabolic syndrome. Being overweight or obese means that you weigh too much for what is healthy for your height.  A large waist size is 40 inches or more for men, and 35 inches or more for women.  But you can take steps to lose weight and lower your risk for serious health problems.  Benefits of weight loss  Even with a small weight loss, you may have more energy and feel better. Losing even a small amount of weight can affect your blood pressure, triglycerides, HDL cholesterol, and blood sugar. You may be able to take less medicine for blood pressure, cholesterol, or blood sugar. Or you may be able to stop taking medicine. As you lose weight, your risk for diabetes, heart attack, and stroke will get lower.  Getting started  The best way to lose weight is to do it gradually. For example, lose 1/2 to 1 pound a week. You will need to be more active and eat healthier foods. Make sure to:  · Exercise every day. Talk with your healthcare provider to make sure it is safe for you to exercise. Make sure you start slowly. Begin with 10 to 15 minutes of activity. Try to exercise or be active for at least 30 minutes most days of the week. You can exercise all at once or break it up into 10- or 15-minute sessions. And think about other ways you can be more active throughout the day.  · Eat healthy foods. Most successful dieters make changes in what, when, and how much they eat. The best way to lose weight is to eat fewer calories. You should make sure you check your portion sizes, eat breakfast, plan your meals and snacks, and eat slowly.  Working with your healthcare provider  Talk with your healthcare provider. He or she can guide you through the process of losing weight. As you begin to make changes, your healthcare provider will:  · Check your weight loss progress  · Check your blood pressure and blood test results  · Talk with you about your results  · Make suggestions about diet and exercise  · Recommend other experts or programs  · Make changes to your medicines and help with any side effects  Getting additional support  It can be  hard to make healthy lifestyle changes. It may take some time to create new habits.  Your healthcare provider may suggest other experts or programs to help you, such as:  · Health . A health  gives ongoing support and makes suggestions to help you with healthy lifestyle changes, like weight loss.  · Weight loss programs. There are many safe weight loss programs. Some are free or low-cost.  · Dietitian. He or she can help you make changes to your diet.  · Exercise specialist. He or she can help you with an exercise plan.  · Occupational therapist. He or she can help you make lifestyle changes to help you lose weight more effectively, particularly if you already have health issues or complications.   · Counselor. A counselor can help you deal with your feelings and emotions. There are psychiatrists, psychologists, and social workers who specialize in weight problems.  · Bariatric or obesity specialist. These healthcare providers are experts in obesity. They can help with diet, exercise, behavioral therapy or counseling, medicines for weight loss, and very low-calorie diets.  · Bariatric surgeon. Weight loss surgery may be a choice. But it is only advised for people who are over a certain weight, who have health problems because of their weight, and who have not been able to lose weight with other treatments.  Keeping the weight off  After losing weight, keeping it off can be even harder. Dont give up. Make sure to:  · Keep exercising. That means at least 40 to 60 minutes most days of the week.  · Keep eating healthy foods. Continue eating foods that are healthy and avoiding those that arent.  · Stay motivated. Watch your health improve. If you eat something unhealthy or skip exercising, dont give up. Simply make your next choice a healthy one.  Date Last Reviewed: 7/1/2016  © 8118-2092 [employer]. [address] [place] [address], [place] [address]. All rights reserved. This information is not  intended as a substitute for professional medical care. Always follow your healthcare professional's instructions.        Metabolic Syndrome  Metabolic syndrome is a set of five health factors that can lead to serious health problems. The factors greatly increase your risk for diabetes, heart attack, or stroke.  What is metabolic syndrome?  Metabolic syndrome means you have three or more of the following five health factors:  · Extra weight with a large waist. Being overweight or obese means that you weigh too much for what is healthy for your height. A large waist size is 40 inches or more for men, 35 inches or more for women.  · High blood pressure (hypertension). Blood pressure is the force of blood against the inside of your blood vessels as your heart pumps. High blood pressure is a measurement of 130/85 mmHg or higher. This makes your heart work harder. And it may damage your blood vessels.  · High triglyceride level. Triglycerides are fats in the blood. A high triglyceride level is 150 mg/dL or more. If your level is high, you may have fatty deposits on the walls of your blood vessels.  · Low HDL cholesterol. HDL is known as good cholesterol. It protects your blood vessels from harmful LDL cholesterol. Low HDL cholesterol means less than 40 mg/dL for men or 50 mg/dL for women. If your level is low, your blood vessels are not as protected.  · High blood sugar (glucose). This means you have too much glucose in your blood. This happens when your body is unable to break down glucose as it should. Too much fasting glucose is 100 mg/dL or more. The extra glucose in the blood can damage the blood vessels.  Symptoms of metabolic syndrome  Most of the health factors that make up metabolic syndrome dont have symptoms, other than extra weight and waist size.  Diagnosing metabolic syndrome  Your healthcare provider will diagnose metabolic syndrome based on a physical exam and tests. He or she will check your weight  and waist size. Your blood pressure will be measured with a blood pressure cuff. Blood tests will be done to check your levels of triglycerides, HDL cholesterol, and blood glucose.  Treatment for metabolic syndrome  The goals of treatment for metabolic syndrome are to:  · Lower your risk for heart disease and stroke  · Prevent or manage type 2 diabetes  Treatment for metabolic syndrome includes:  · Healthy lifestyle changes. This includes losing the extra weight, being physically active, following a healthy diet, and quitting smoking. Get help from your healthcare provider to make these changes. He or she can give you information, offer support, and recommend other resources or specialists.  · Medicines. You may take medicine to control blood pressure, cholesterol, or blood sugar levels. You may also take other medicines to reduce your risk for heart and blood vessel problems.  Possible complications of metabolic syndrome  If not treated, metabolic syndrome can lead to:  · Diabetes. This condition can lead to kidney disease, vision loss, and foot or leg amputation.  · Heart attack (myocardial infarction)  · Stroke  · Other heart and blood vessel problems (cardiovascular disease)  · Obstructive sleep apnea  · Chronic kidney disease  · Gout   Can metabolic syndrome be prevented?  In many cases, metabolic syndrome can be prevented by having a healthy lifestyle.  Sticking to healthy changes  Making lifestyle changes isnt always easy. Make sure to:  · Create a plan with the help of your healthcare provider.  · See your healthcare provider regularly. Make changes to your plan as needed.  · Keep in mind that you will have times where its hard to stick to your plan.  · Work at it every day. Talk with your healthcare provider if you need help.  Date Last Reviewed: 6/1/2016  © 8586-2442 [employer]. [address] [place] [address], [place] [address]. All rights reserved. This information is not intended as a substitute  for professional medical care. Always follow your healthcare professional's instructions.

## 2018-04-26 ENCOUNTER — SURGERY (OUTPATIENT)
Age: 68
End: 2018-04-26

## 2018-04-26 ENCOUNTER — HOSPITAL ENCOUNTER (OUTPATIENT)
Facility: HOSPITAL | Age: 68
Discharge: HOME | End: 2018-04-26
Attending: INTERNAL MEDICINE | Admitting: INTERNAL MEDICINE
Payer: MEDICARE

## 2018-04-26 ENCOUNTER — ANESTHESIA EVENT (OUTPATIENT)
Dept: ENDOSCOPY | Facility: HOSPITAL | Age: 68
End: 2018-04-26
Payer: MEDICARE

## 2018-04-26 ENCOUNTER — ANESTHESIA (OUTPATIENT)
Dept: ENDOSCOPY | Facility: HOSPITAL | Age: 68
End: 2018-04-26
Payer: MEDICARE

## 2018-04-26 ENCOUNTER — LAB VISIT (OUTPATIENT)
Dept: LAB | Facility: HOSPITAL | Age: 68
End: 2018-04-26
Attending: INTERNAL MEDICINE
Payer: MEDICARE

## 2018-04-26 VITALS
HEIGHT: 68 IN | RESPIRATION RATE: 18 BRPM | SYSTOLIC BLOOD PRESSURE: 132 MMHG | BODY MASS INDEX: 30.16 KG/M2 | WEIGHT: 199 LBS | HEART RATE: 60 BPM | OXYGEN SATURATION: 99 % | TEMPERATURE: 98 F | DIASTOLIC BLOOD PRESSURE: 68 MMHG

## 2018-04-26 DIAGNOSIS — K21.9 GERD (GASTROESOPHAGEAL REFLUX DISEASE): ICD-10-CM

## 2018-04-26 DIAGNOSIS — K62.5 RECTAL BLEEDING: ICD-10-CM

## 2018-04-26 LAB
GLUCOSE SERPL-MCNC: 100 MG/DL (ref 70–110)
HEMOCCULT STL QL IA: NEGATIVE
POCT GLUCOSE: 100 MG/DL (ref 70–110)

## 2018-04-26 PROCEDURE — 43239 EGD BIOPSY SINGLE/MULTIPLE: CPT | Performed by: INTERNAL MEDICINE

## 2018-04-26 PROCEDURE — 27201012 HC FORCEPS, HOT/COLD, DISP: Performed by: INTERNAL MEDICINE

## 2018-04-26 PROCEDURE — 43239 EGD BIOPSY SINGLE/MULTIPLE: CPT | Mod: ,,, | Performed by: INTERNAL MEDICINE

## 2018-04-26 PROCEDURE — 25000003 PHARM REV CODE 250: Performed by: INTERNAL MEDICINE

## 2018-04-26 PROCEDURE — 37000009 HC ANESTHESIA EA ADD 15 MINS: Performed by: INTERNAL MEDICINE

## 2018-04-26 PROCEDURE — 88305 TISSUE EXAM BY PATHOLOGIST: CPT | Performed by: PATHOLOGY

## 2018-04-26 PROCEDURE — 25000003 PHARM REV CODE 250: Performed by: NURSE ANESTHETIST, CERTIFIED REGISTERED

## 2018-04-26 PROCEDURE — 82274 ASSAY TEST FOR BLOOD FECAL: CPT

## 2018-04-26 PROCEDURE — 88305 TISSUE EXAM BY PATHOLOGIST: CPT | Mod: 26,,, | Performed by: PATHOLOGY

## 2018-04-26 PROCEDURE — 37000008 HC ANESTHESIA 1ST 15 MINUTES: Performed by: INTERNAL MEDICINE

## 2018-04-26 PROCEDURE — 63600175 PHARM REV CODE 636 W HCPCS: Performed by: NURSE ANESTHETIST, CERTIFIED REGISTERED

## 2018-04-26 RX ORDER — LIDOCAINE HCL/PF 100 MG/5ML
SYRINGE (ML) INTRAVENOUS
Status: DISCONTINUED | OUTPATIENT
Start: 2018-04-26 | End: 2018-04-26

## 2018-04-26 RX ORDER — SODIUM CHLORIDE, SODIUM LACTATE, POTASSIUM CHLORIDE, CALCIUM CHLORIDE 600; 310; 30; 20 MG/100ML; MG/100ML; MG/100ML; MG/100ML
INJECTION, SOLUTION INTRAVENOUS CONTINUOUS
Status: DISCONTINUED | OUTPATIENT
Start: 2018-04-26 | End: 2018-04-26 | Stop reason: HOSPADM

## 2018-04-26 RX ORDER — PROPOFOL 10 MG/ML
INJECTION, EMULSION INTRAVENOUS
Status: DISCONTINUED | OUTPATIENT
Start: 2018-04-26 | End: 2018-04-26

## 2018-04-26 RX ORDER — SODIUM CHLORIDE, SODIUM LACTATE, POTASSIUM CHLORIDE, CALCIUM CHLORIDE 600; 310; 30; 20 MG/100ML; MG/100ML; MG/100ML; MG/100ML
INJECTION, SOLUTION INTRAVENOUS CONTINUOUS PRN
Status: DISCONTINUED | OUTPATIENT
Start: 2018-04-26 | End: 2018-04-26

## 2018-04-26 RX ADMIN — SODIUM CHLORIDE, SODIUM LACTATE, POTASSIUM CHLORIDE, AND CALCIUM CHLORIDE: 600; 310; 30; 20 INJECTION, SOLUTION INTRAVENOUS at 11:04

## 2018-04-26 RX ADMIN — PROPOFOL 140 MG: 10 INJECTION, EMULSION INTRAVENOUS at 11:04

## 2018-04-26 RX ADMIN — PROPOFOL 40 MG: 10 INJECTION, EMULSION INTRAVENOUS at 11:04

## 2018-04-26 RX ADMIN — SODIUM CHLORIDE, SODIUM LACTATE, POTASSIUM CHLORIDE, AND CALCIUM CHLORIDE: .6; .31; .03; .02 INJECTION, SOLUTION INTRAVENOUS at 10:04

## 2018-04-26 RX ADMIN — LIDOCAINE HYDROCHLORIDE 100 MG: 20 INJECTION, SOLUTION INTRAVENOUS at 11:04

## 2018-04-26 NOTE — ANESTHESIA PREPROCEDURE EVALUATION
04/26/2018  Suze Chino is a 67 y.o., female.    Anesthesia Evaluation    I have reviewed the Patient Summary Reports.    I have reviewed the Nursing Notes.   I have reviewed the Medications.     Review of Systems  Anesthesia Hx:  No problems with previous Anesthesia    Social:  Social Alcohol Use, Non-Smoker    Hematology/Oncology:     Oncology Normal    -- Anemia:   EENT/Dental:   chronic allergic rhinitis   Cardiovascular:   Exercise tolerance: poor Hypertension, well controlled Mitral valve prolapse  RBBB   Pulmonary:   Sleep Apnea    Renal/:  Renal/ Normal     Hepatic/GI:   PUD, GERD, well controlled 0000 last drink of fluid.   Musculoskeletal:   Arthritis     Neurological:  Neurology Normal    Endocrine:   Diabetes, type 2 Hypothyroidism    Dermatological:  Skin Normal    Psych:   Psychiatric History          Physical Exam  General:  Well nourished, Obesity    Airway/Jaw/Neck:  Airway Findings: Mallampati: IV                Anesthesia Plan  Type of Anesthesia, risks & benefits discussed:  Anesthesia Type:  MAC  Patient's Preference:   Intra-op Monitoring Plan:   Intra-op Monitoring Plan Comments:   Post Op Pain Control Plan:   Post Op Pain Control Plan Comments:   Induction:   IV  Beta Blocker:  Patient is not currently on a Beta-Blocker (No further documentation required).       Informed Consent: Patient understands risks and agrees with Anesthesia plan.  Questions answered. Anesthesia consent signed with patient.  ASA Score: 3     Day of Surgery Review of History & Physical: I have interviewed and examined the patient. I have reviewed the patient's H&P dated: 04/26/18. There are no significant changes.  H&P update referred to the provider.         Ready For Surgery From Anesthesia Perspective.

## 2018-04-26 NOTE — TRANSFER OF CARE
"Anesthesia Transfer of Care Note    Patient: Suze Chino    Procedure(s) Performed: Procedure(s) (LRB):  ESOPHAGOGASTRODUODENOSCOPY (EGD) (Left)    Patient location: PACU    Anesthesia Type: MAC    Transport from OR: Transported from OR on room air with adequate spontaneous ventilation    Post pain: adequate analgesia    Post assessment: no apparent anesthetic complications    Post vital signs: stable    Level of consciousness: awake    Nausea/Vomiting: no nausea/vomiting    Complications: none    Transfer of care protocol was followed      Last vitals:   Visit Vitals  BP (!) 140/69 (BP Location: Left arm, Patient Position: Lying)   Pulse (!) 50   Temp 36.9 °C (98.4 °F) (Oral)   Resp 16   Ht 5' 8" (1.727 m)   Wt 90.3 kg (199 lb)   SpO2 96%   Breastfeeding? No   BMI 30.26 kg/m²     "

## 2018-04-26 NOTE — ANESTHESIA POSTPROCEDURE EVALUATION
"Anesthesia Post Evaluation    Patient: Suze Chino    Procedure(s) Performed: Procedure(s) (LRB):  ESOPHAGOGASTRODUODENOSCOPY (EGD) (Left)    Final Anesthesia Type: MAC  Patient location during evaluation: PACU  Patient participation: Yes- Able to Participate  Level of consciousness: awake and alert  Post-procedure vital signs: reviewed and stable  Pain management: adequate  Airway patency: patent  PONV status at discharge: No PONV  Anesthetic complications: no      Cardiovascular status: blood pressure returned to baseline  Respiratory status: unassisted, spontaneous ventilation and room air  Hydration status: euvolemic  Follow-up not needed.        Visit Vitals  BP (!) 140/69 (BP Location: Left arm, Patient Position: Lying)   Pulse (!) 50   Temp 36.9 °C (98.4 °F) (Oral)   Resp 16   Ht 5' 8" (1.727 m)   Wt 90.3 kg (199 lb)   SpO2 96%   Breastfeeding? No   BMI 30.26 kg/m²       Pain/Ravi Score: Pain Assessment Performed: Yes (4/26/2018 11:40 AM)  Presence of Pain: denies (4/26/2018 11:40 AM)      "

## 2018-04-26 NOTE — ANESTHESIA RELEASE NOTE
"Anesthesia Release from PACU Note    Patient: Suze Chino    Procedure(s) Performed: Procedure(s) (LRB):  ESOPHAGOGASTRODUODENOSCOPY (EGD) (Left)    Anesthesia type: MAC    Post pain: Adequate analgesia    Post assessment: no apparent anesthetic complications    Last Vitals:   Visit Vitals  BP (!) 140/69 (BP Location: Left arm, Patient Position: Lying)   Pulse (!) 50   Temp 36.9 °C (98.4 °F) (Oral)   Resp 16   Ht 5' 8" (1.727 m)   Wt 90.3 kg (199 lb)   SpO2 96%   Breastfeeding? No   BMI 30.26 kg/m²       Post vital signs: stable    Level of consciousness: awake and alert     Nausea/Vomiting: no nausea/no vomiting    Complications: none    Airway Patency: patent    Respiratory: unassisted, spontaneous ventilation, room air    Cardiovascular: stable    Hydration: euvolemic  "

## 2018-04-26 NOTE — PLAN OF CARE
Dr Pascual came to bedside and discussed findings. NO N/V,  no abdominal pain, no GI bleeding, and vitals stable.  Pt discharged from unit.

## 2018-04-26 NOTE — H&P (VIEW-ONLY)
Subjective:       Patient ID: Suze Chino is a 67 y.o. female.    Chief Complaint: GI Problem and Rectal Bleeding    GI Problem   The primary symptoms include abdominal pain, nausea and hematochezia. Primary symptoms do not include fever, weight loss, fatigue, vomiting, dysuria, arthralgias or rash. The illness began more than 7 days ago. The problem has been resolved.   The illness is also significant for bloating. The illness does not include chills, anorexia, constipation, tenesmus or back pain. Significant associated medical issues include GERD. Associated medical issues do not include inflammatory bowel disease, liver disease, gastric bypass, bowel resection or irritable bowel syndrome.   Rectal Bleeding   This is a new problem. The current episode started 1 to 4 weeks ago. The problem occurs intermittently. The problem has been resolved. Associated symptoms include abdominal pain and nausea. Pertinent negatives include no anorexia, arthralgias, chills, coughing, diaphoresis, fatigue, fever, headaches, neck pain, rash, sore throat, swollen glands, vomiting or weakness. Nothing aggravates the symptoms. She has tried nothing for the symptoms.   Gastroesophageal Reflux   She complains of abdominal pain, heartburn and nausea. She reports no coughing, no sore throat or no wheezing. This is a chronic problem. The current episode started more than 1 year ago. The problem has been waxing and waning. The heartburn duration is several minutes. The heartburn is located in the substernum. The heartburn wakes her from sleep. The heartburn limits her activity. The heartburn changes with position. The symptoms are aggravated by certain foods and lying down. Pertinent negatives include no fatigue, orthopnea or weight loss. Risk factors include obesity and lack of exercise. She has tried a PPI for the symptoms. The treatment provided moderate relief. Past procedures include an EGD.     Past Medical History:   Diagnosis Date     Anxiety     Cataract     Colon polyp     Depression     Diabetes mellitus     GERD (gastroesophageal reflux disease)     Glaucoma     History of colon polyps 2009    history as per patient    History of colonoscopy 2009    ok per pt    Hypertension     Hypothyroid     ?    Mitral valve prolapse     Peptic ulcer disease     with gi bleed    Prediabetes     Right knee DJD     Right knee DJD     Sleep apnea     TMJ (temporomandibular joint disorder)      Past Surgical History:   Procedure Laterality Date    ARGON TRABECULOPLASTY - OU - BOTH EYES      BILATERAL SALPINGOOPHORECTOMY      COLONOSCOPY  2015    EYE SURGERY      laser sx    HYSTERECTOMY      TRABECULECTOMY Right 4/2/14    OD (dr. grossman)    UPPER GASTROINTESTINAL ENDOSCOPY  2003     Current Outpatient Prescriptions on File Prior to Visit   Medication Sig Dispense Refill    ALPRAZolam (XANAX) 0.25 MG tablet Take 1 tablet (0.25 mg total) by mouth 3 (three) times daily as needed for Anxiety. 30 tablet 5    bimatoprost (LUMIGAN) 0.03 % ophthalmic drops Place 1 drop into both eyes every evening. 2.5 mL 6    celecoxib (CELEBREX) 200 MG capsule Take 1 capsule (200 mg total) by mouth 2 (two) times daily. (Patient taking differently: Take 200 mg by mouth once daily. ) 60 capsule 11    DIPHENHYDRAMINE HCL (BENADRYL ALLERGY ORAL) Take 1 tablet by mouth as needed.      dorzolamide (TRUSOPT) 2 % ophthalmic solution Place 1 drop into both eyes 3 (three) times daily. 10 mL 6    fexofenadine (ALLEGRA) 180 MG tablet Take 180 mg by mouth once daily.      FLUoxetine (PROZAC) 20 MG capsule TAKE 3 CAPSULES BY MOUTH ONCE DAILY. 90 capsule 11    hydroCHLOROthiazide (HYDRODIURIL) 25 MG tablet TAKE 1 TABLET EVERY DAY 90 tablet 1    losartan (COZAAR) 50 MG tablet Take 1 tablet (50 mg total) by mouth once daily. 90 tablet 0    metFORMIN (GLUCOPHAGE) 500 MG tablet Take 500 mg by mouth 2 (two) times daily with meals.      pantoprazole (PROTONIX)  40 MG tablet TAKE 1 TABLET BY MOUTH EVERY DAY 90 tablet 3     No current facility-administered medications on file prior to visit.      Review of patient's allergies indicates:   Allergen Reactions    Betoptic [betaxolol] Other (See Comments)     Fatigue, slowed heart rate.    Alphagan [brimonidine] Dermatitis     Social History     Social History    Marital status:      Spouse name: N/A    Number of children: N/A    Years of education: N/A     Occupational History    Not on file.     Social History Main Topics    Smoking status: Never Smoker    Smokeless tobacco: Never Used    Alcohol use Yes      Comment: occasional    Drug use: No    Sexual activity: Not on file     Other Topics Concern    Not on file     Social History Narrative    No narrative on file     Family History   Problem Relation Age of Onset    Heart disease Father      before age 50    Diabetes Mother     Glaucoma Mother     Leukemia Mother     Cataracts Mother     Breast cancer Mother 60    Diabetes Sister     Glaucoma Sister     Diabetes Brother     Glaucoma Brother     Lung cancer Brother     Breast cancer Other 40     genetics -    Colon cancer Maternal Aunt        Review of Systems   Constitutional: Negative for activity change, chills, diaphoresis, fatigue, fever and weight loss.   HENT: Negative for ear pain, facial swelling, nosebleeds, rhinorrhea, sneezing, sore throat and trouble swallowing.    Eyes: Negative for photophobia, pain and visual disturbance.   Respiratory: Negative for apnea, cough, chest tightness, shortness of breath and wheezing.    Gastrointestinal: Positive for abdominal pain, anal bleeding, bloating, heartburn, hematochezia and nausea. Negative for anorexia, blood in stool, constipation, rectal pain and vomiting.   Genitourinary: Negative for decreased urine volume, difficulty urinating, dysuria, flank pain and hematuria.   Musculoskeletal: Negative for arthralgias, back pain, gait  problem, neck pain and neck stiffness.   Skin: Negative for pallor and rash.   Neurological: Negative for seizures, syncope, speech difficulty, weakness and headaches.   Hematological: Negative for adenopathy. Does not bruise/bleed easily.   Psychiatric/Behavioral: Negative for behavioral problems, confusion and hallucinations.       Objective:      Physical Exam   Constitutional: She is oriented to person, place, and time. She appears well-developed and well-nourished.   HENT:   Head: Normocephalic and atraumatic.   Eyes: EOM are normal. Pupils are equal, round, and reactive to light.   Neck: Normal range of motion. Neck supple. No thyromegaly present.   Cardiovascular: Normal rate, regular rhythm, normal heart sounds and intact distal pulses.    No murmur heard.  Pulmonary/Chest: Effort normal and breath sounds normal. No respiratory distress. She has no wheezes. She has no rales.   Abdominal: Soft. Bowel sounds are normal. She exhibits no distension and no mass. There is no tenderness.   Musculoskeletal: Normal range of motion. She exhibits no edema or tenderness.   Lymphadenopathy:     She has no cervical adenopathy.   Neurological: She is alert and oriented to person, place, and time. She has normal reflexes. No cranial nerve deficit.   Skin: Skin is warm and dry. No erythema.   Psychiatric: She has a normal mood and affect. Her behavior is normal.       Assessment:       1. Rectal bleeding    2. Gastroesophageal reflux disease without esophagitis    3. Abdominal pain, epigastric    4. Insulin resistance        Plan:       Rectal bleeding  -     Fecal Immunochemical Test (iFOBT); Future; Expected date: 04/23/2018    Gastroesophageal reflux disease without esophagitis  -     Case request GI: ESOPHAGOGASTRODUODENOSCOPY (EGD)    Abdominal pain, epigastric    Insulin resistance  -     Ambulatory referral to [facility] Diabetes Prevention Program      Risks, benefits and alternative options were discussed with the  patient. Risks including but not limited to infection, bleeding, heart or respiratory problems and perforation that may require surgery were all explained to the patient. The patient had a chance for questions if there were doubts or concerns about the test. The referring provider will be notified that our consultation is complete and available through the patient's records.    Thanks for letting us participate in the care of this nice patient,    Solo Aguirre

## 2018-04-26 NOTE — PROVATION PATIENT INSTRUCTIONS
Discharge Summary/Instructions after an Endoscopic Procedure  Patient Name: Suze Chino  Patient MRN: [id number]  Patient YOB: 1950 Thursday, April 26, 2018 Toi Pascual MD  RESTRICTIONS:  During your procedure today, you received medications for sedation.  These   medications may affect your judgment, balance and coordination.  Therefore,   for 24 hours, you have the following restrictions:   - DO NOT drive a car, operate machinery, make legal/financial decisions,   sign important papers or drink alcohol.    ACTIVITY:  The following day: return to full activity including work, except no heavy   lifting, straining or running for 3 days if polyps were removed.  DIET:  Eat and drink normally unless instructed otherwise.     TREATMENT FOR COMMON SIDE EFFECTS:  - Mild abdominal pain, nausea, belching, bloating or excessive gas:  rest,   eat lightly and use a heating pad.  - Sore Throat: treat with throat lozenges and/or gargle with warm salt   water.  - Because air was used during the procedure, expelling large amounts of air   from your rectum or belching is normal.  - If a bowel prep was taken, you may not have a bowel movement for 1-3 days.    This is normal.  SYMPTOMS TO WATCH FOR AND REPORT TO YOUR PHYSICIAN:  1. Abdominal pain or bloating, other than gas cramps.  2. Chest pain.  3. Back pain.  4. Signs of infection such as: chills or fever occurring within 24 hours   after the procedure.  5. Rectal bleeding, which would show as bright red, maroon, or black stools.   (A tablespoon of blood from the rectum is not serious, especially if   hemorrhoids are present.)  6. Vomiting.  7. Weakness or dizziness.  GO DIRECTLY TO THE NEAREST EMERGENCY ROOM IF YOU HAVE ANY OF THE FOLLOWING:      Difficulty breathing              Chills and/or fever over 101 F   Persistent vomiting and/or vomiting blood   Severe abdominal pain   Severe chest pain   Black, tarry stools   Bleeding- more than one  tablespoon   Any other symptom or condition that you feel may need urgent attention  Your doctor recommends these additional instructions:  If any biopsies were taken, your doctors clinic will contact you in 1 to 2   weeks with any results.  - Patient has a contact number available for emergencies.  The signs and   symptoms of potential delayed complications were discussed with the   patient.  Return to normal activities tomorrow.  Written discharge   instructions were provided to the patient.   - Resume previous diet.   - Continue present medications.   - Await pathology results.   - Return to primary care physician as previously scheduled.   - Discharge patient to home (with escort).  For questions, problems or results please call your physician Toi Pascual MD at Work:  [phone]  If you have any questions about the above instructions, call the GI   department at [phone] or call the endoscopy unit at [phone]   from 7am until 3 pm.  [facility] [place], EMERGENCY ROOM PHONE NUMBER:   [phone]  IF A COMPLICATION OR EMERGENCY SITUATION ARISES AND YOU ARE UNABLE TO REACH   YOUR PHYSICIAN - GO DIRECTLY TO THE EMERGENCY ROOM.  I have read or have had read to me these discharge instructions for my   procedure and have received a written copy.  I understand these   instructions and will follow-up with my physician if I have any questions.     __________________________________       _____________________________________  Nurse Signature                                          Patient/Designated   Responsible Party Signature  Toi Pascual MD  4/26/2018 11:35:29 AM  This report has been verified and signed electronically.

## 2018-04-26 NOTE — DISCHARGE INSTRUCTIONS

## 2018-05-17 ENCOUNTER — TELEPHONE (OUTPATIENT)
Dept: FAMILY MEDICINE | Facility: CLINIC | Age: 68
End: 2018-05-17

## 2018-05-17 NOTE — TELEPHONE ENCOUNTER
Patient saw breast surgeon on 4/18/18 and her estradiol was discontinued.  Patient reports that this is because of her age.  She wants to resume it again, please advise.  Will wait for Dr Lewis to return and advise.

## 2018-05-17 NOTE — TELEPHONE ENCOUNTER
----- Message from Aramis Shaikh sent at 5/17/2018  9:54 AM CDT -----  Contact: pt  She's calling in regards to her Rx medication Estradiol 2 mg, pt is request a new Rx, pls send this [facility] in [place], [place] [facility], [phone] (home)

## 2018-05-18 NOTE — TELEPHONE ENCOUNTER
I would probably not recommend going against the advice of her breat surgeon. Patient needs appointment if would like to discuss

## 2018-06-11 ENCOUNTER — TELEPHONE (OUTPATIENT)
Dept: OPHTHALMOLOGY | Facility: CLINIC | Age: 68
End: 2018-06-11

## 2018-06-11 RX ORDER — HYDROCHLOROTHIAZIDE 25 MG/1
TABLET ORAL
Qty: 90 TABLET | Refills: 0 | Status: SHIPPED | OUTPATIENT
Start: 2018-06-11 | End: 2019-01-18 | Stop reason: SDUPTHER

## 2018-06-11 NOTE — TELEPHONE ENCOUNTER
----- Message from Kavita Alicea sent at 6/11/2018 10:44 AM CDT -----  State she is having some complications and would like to discuss. State when she woke up yesterday her left eye was blood shot red. Patient state she is out of town. Please adv/call [phone].//cw

## 2018-06-11 NOTE — TELEPHONE ENCOUNTER
Spoke with patient and she stated slight irritation in OS  Since awakening yesterday, patient denies vision changes or discharge I informed  her to use artificial tears for comfort and if she should develop discharge she will need to see at Ophtalmologist in [place] where she is vacationing.

## 2018-06-15 ENCOUNTER — LAB VISIT (OUTPATIENT)
Dept: LAB | Facility: HOSPITAL | Age: 68
End: 2018-06-15
Attending: FAMILY MEDICINE
Payer: MEDICARE

## 2018-06-15 DIAGNOSIS — I10 ESSENTIAL HYPERTENSION: ICD-10-CM

## 2018-06-15 LAB
ALBUMIN SERPL BCP-MCNC: 3.9 G/DL
ALP SERPL-CCNC: 106 U/L
ALT SERPL W/O P-5'-P-CCNC: 14 U/L
ANION GAP SERPL CALC-SCNC: 9 MMOL/L
AST SERPL-CCNC: 21 U/L
BILIRUB SERPL-MCNC: 0.9 MG/DL
BUN SERPL-MCNC: 24 MG/DL
CALCIUM SERPL-MCNC: 10.6 MG/DL
CHLORIDE SERPL-SCNC: 103 MMOL/L
CHOLEST SERPL-MCNC: 221 MG/DL
CHOLEST/HDLC SERPL: 3.3 {RATIO}
CO2 SERPL-SCNC: 26 MMOL/L
CREAT SERPL-MCNC: 0.9 MG/DL
EST. GFR  (AFRICAN AMERICAN): >60 ML/MIN/1.73 M^2
EST. GFR  (NON AFRICAN AMERICAN): >60 ML/MIN/1.73 M^2
GLUCOSE SERPL-MCNC: 101 MG/DL
HDLC SERPL-MCNC: 68 MG/DL
HDLC SERPL: 30.8 %
LDLC SERPL CALC-MCNC: 139 MG/DL
NONHDLC SERPL-MCNC: 153 MG/DL
POTASSIUM SERPL-SCNC: 4.2 MMOL/L
PROT SERPL-MCNC: 7.3 G/DL
SODIUM SERPL-SCNC: 138 MMOL/L
TRIGL SERPL-MCNC: 70 MG/DL
TSH SERPL DL<=0.005 MIU/L-ACNC: 2.39 UIU/ML

## 2018-06-15 PROCEDURE — 84443 ASSAY THYROID STIM HORMONE: CPT

## 2018-06-15 PROCEDURE — 80061 LIPID PANEL: CPT

## 2018-06-15 PROCEDURE — 36415 COLL VENOUS BLD VENIPUNCTURE: CPT | Mod: PO

## 2018-06-15 PROCEDURE — 80053 COMPREHEN METABOLIC PANEL: CPT

## 2018-06-18 ENCOUNTER — OFFICE VISIT (OUTPATIENT)
Dept: FAMILY MEDICINE | Facility: CLINIC | Age: 68
End: 2018-06-18
Payer: MEDICARE

## 2018-06-18 ENCOUNTER — TELEPHONE (OUTPATIENT)
Dept: FAMILY MEDICINE | Facility: CLINIC | Age: 68
End: 2018-06-18

## 2018-06-18 VITALS
TEMPERATURE: 98 F | HEART RATE: 62 BPM | SYSTOLIC BLOOD PRESSURE: 130 MMHG | HEIGHT: 68 IN | WEIGHT: 202 LBS | BODY MASS INDEX: 30.62 KG/M2 | DIASTOLIC BLOOD PRESSURE: 66 MMHG

## 2018-06-18 DIAGNOSIS — J02.9 SORE THROAT: Primary | ICD-10-CM

## 2018-06-18 DIAGNOSIS — E83.52 HYPERCALCEMIA: ICD-10-CM

## 2018-06-18 DIAGNOSIS — E78.5 ELEVATED LIPIDS: Primary | ICD-10-CM

## 2018-06-18 DIAGNOSIS — E89.41 SYMPTOMATIC STATES ASSOCIATED WITH ARTIFICIAL MENOPAUSE: ICD-10-CM

## 2018-06-18 DIAGNOSIS — E83.52 SERUM CALCIUM ELEVATED: ICD-10-CM

## 2018-06-18 DIAGNOSIS — E03.9 HYPOTHYROIDISM, UNSPECIFIED TYPE: ICD-10-CM

## 2018-06-18 LAB — DEPRECATED S PYO AG THROAT QL EIA: NEGATIVE

## 2018-06-18 PROCEDURE — 99213 OFFICE O/P EST LOW 20 MIN: CPT | Mod: S$PBB,,, | Performed by: FAMILY MEDICINE

## 2018-06-18 PROCEDURE — 99214 OFFICE O/P EST MOD 30 MIN: CPT | Mod: PBBFAC,PO | Performed by: FAMILY MEDICINE

## 2018-06-18 PROCEDURE — 87081 CULTURE SCREEN ONLY: CPT

## 2018-06-18 PROCEDURE — 99999 PR PBB SHADOW E&M-EST. PATIENT-LVL IV: CPT | Mod: PBBFAC,,, | Performed by: FAMILY MEDICINE

## 2018-06-18 PROCEDURE — 87880 STREP A ASSAY W/OPTIC: CPT | Mod: PO

## 2018-06-18 RX ORDER — ESTRADIOL 0.5 MG/1
0.5 TABLET ORAL DAILY
Qty: 30 TABLET | Refills: 11 | Status: SHIPPED | OUTPATIENT
Start: 2018-06-18 | End: 2019-06-03 | Stop reason: SDUPTHER

## 2018-06-18 NOTE — TELEPHONE ENCOUNTER
----- Message from Cody Lewis MD sent at 6/18/2018 12:47 PM CDT -----  Calcium slightly elevated.  Lipids mildly elevated consistent with increased cardiac risk.  Recommend pravastatin 20 mg daily and recheck CMP, PTH, vitamin-D, lipid in 3 months.... My nurse will contact you to review.  Thanks,  Dr. Lewis

## 2018-06-18 NOTE — TELEPHONE ENCOUNTER
Pt states she does not want to take medication for lipid level. Pt states she would like to try to improve this with her diet.

## 2018-06-18 NOTE — PROGRESS NOTES
Complains of a mildly scratchy throat past 2 days.  No fever chills.  No swollen glands.  42-year-old son apparently had strep.  A niece had strep.  She denies any significant upper respiratory symptoms otherwise.    She does complain of hot flashes after getting off of Estrace earlier this year.  She wants to get back on the medication.  Discussed risks benefits.  Mammogram up-to-date.    Past Medical History:  Past Medical History:   Diagnosis Date    Anxiety     Cataract     Colon polyp     Depression     Diabetes mellitus     GERD (gastroesophageal reflux disease)     Glaucoma     History of colon polyps 2009    history as per patient    History of colonoscopy 2009    ok per pt    Hypertension     Hypothyroid     ?    Mitral valve prolapse     Peptic ulcer disease     with gi bleed    Prediabetes     Right knee DJD     Right knee DJD     Sleep apnea     TMJ (temporomandibular joint disorder)      Past Surgical History:   Procedure Laterality Date    ARGON TRABECULOPLASTY - OU - BOTH EYES      BILATERAL SALPINGOOPHORECTOMY      COLONOSCOPY  2015    EYE SURGERY      laser sx    HYSTERECTOMY      TRABECULECTOMY Right 4/2/14    OD (dr. grossman)    UPPER GASTROINTESTINAL ENDOSCOPY  2003     Social History     Social History    Marital status:      Spouse name: N/A    Number of children: N/A    Years of education: N/A     Occupational History    Not on file.     Social History Main Topics    Smoking status: Never Smoker    Smokeless tobacco: Never Used    Alcohol use Yes      Comment: occasional    Drug use: No    Sexual activity: Not on file     Other Topics Concern    Not on file     Social History Narrative    No narrative on file     Family History   Problem Relation Age of Onset    Heart disease Father         before age 50    Diabetes Mother     Glaucoma Mother     Leukemia Mother     Cataracts Mother     Breast cancer Mother 60    Diabetes Sister      "Glaucoma Sister     Diabetes Brother     Glaucoma Brother     Lung cancer Brother     Breast cancer Other 40        genetics -    Colon cancer Maternal Aunt      Review of patient's allergies indicates:   Allergen Reactions    Betoptic [betaxolol] Other (See Comments)     Fatigue, slowed heart rate.    Alphagan [brimonidine] Dermatitis     Current Outpatient Prescriptions on File Prior to Visit   Medication Sig Dispense Refill    ALPRAZolam (XANAX) 0.25 MG tablet Take 1 tablet (0.25 mg total) by mouth 3 (three) times daily as needed for Anxiety. 30 tablet 5    bimatoprost (LUMIGAN) 0.03 % ophthalmic drops Place 1 drop into both eyes every evening. 2.5 mL 6    celecoxib (CELEBREX) 200 MG capsule Take 1 capsule (200 mg total) by mouth 2 (two) times daily. (Patient taking differently: Take 200 mg by mouth once daily. ) 60 capsule 11    DIPHENHYDRAMINE HCL (BENADRYL ALLERGY ORAL) Take 1 tablet by mouth as needed.      dorzolamide (TRUSOPT) 2 % ophthalmic solution Place 1 drop into both eyes 3 (three) times daily. 10 mL 6    fexofenadine (ALLEGRA) 180 MG tablet Take 180 mg by mouth once daily.      FLUoxetine (PROZAC) 20 MG capsule TAKE 3 CAPSULES BY MOUTH ONCE DAILY. 90 capsule 11    hydroCHLOROthiazide (HYDRODIURIL) 25 MG tablet TAKE 1 TABLET EVERY DAY 90 tablet 0    losartan (COZAAR) 50 MG tablet Take 1 tablet (50 mg total) by mouth once daily. 90 tablet 0    metFORMIN (GLUCOPHAGE) 500 MG tablet Take 500 mg by mouth 2 (two) times daily with meals.      pantoprazole (PROTONIX) 40 MG tablet TAKE 1 TABLET BY MOUTH EVERY DAY 90 tablet 3     No current facility-administered medications on file prior to visit.            OBJECTIVE:     Vitals:    06/18/18 1049   BP: 130/66   Pulse: 62   Temp: 97.8 °F (36.6 °C)   Weight: 91.6 kg (202 lb)   Height: 5' 8" (1.727 m)     Wt Readings from Last 3 Encounters:   06/18/18 91.6 kg (202 lb)   04/26/18 90.3 kg (199 lb)   04/23/18 90.9 kg (200 lb 6.4 oz)     APPEARANCE: " Well nourished, well developed, in no acute distress.    HEAD: Normocephalic.  Atraumatic.  No sinus tenderness.  EYES:   Right eye: Pupil reactive.  Conjunctiva clear.    Left eye: Pupil reactive.  Conjunctiva clear.     EOMI.    EARS: TM's intact. Light reflex normal. No retraction or perforation.    NOSE:  clear.  MOUTH & THROAT:  No pharyngeal erythema or exudate. No lesions.  NECK: Supple. No bruits.  No JVD.  No cervical lymphadenopathy.  No thyromegaly.    CHEST: Breath sounds clear bilaterally.  Normal respiratory effort  CARDIOVASCULAR: Normal rate.  Regular rhythm.  No murmurs.  No rub.  No gallops.  MENTAL STATUS: Alert.  Oriented x 3.      Suze was seen today for sore throat.    Diagnoses and all orders for this visit:    Sore throat  -     Throat Screen, Rapid    Symptomatic states associated with artificial menopause    Other orders  -     estradiol (ESTRACE) 0.5 MG tablet; Take 1 tablet (0.5 mg total) by mouth once daily.  -     Strep A culture, throat      Discussed risk and benefit of estrogen replacement.  Recommended shortness use at lowest dose necessary.  She is not interested in other treatment options at present and want to get back on the medication.  She may use Tylenol.  Follow up if symptoms not resolving

## 2018-06-20 LAB — BACTERIA THROAT CULT: NORMAL

## 2018-06-27 RX ORDER — METFORMIN HYDROCHLORIDE 500 MG/1
TABLET, EXTENDED RELEASE ORAL
Qty: 180 TABLET | Refills: 1 | Status: SHIPPED | OUTPATIENT
Start: 2018-06-27 | End: 2019-01-18 | Stop reason: SDUPTHER

## 2018-07-06 PROBLEM — M25.512 ACUTE PAIN OF LEFT SHOULDER: Status: ACTIVE | Noted: 2018-07-06

## 2018-07-06 PROBLEM — K59.01 SLOW TRANSIT CONSTIPATION: Status: ACTIVE | Noted: 2018-07-06

## 2018-07-06 PROBLEM — R07.9 CHEST PAIN: Status: ACTIVE | Noted: 2018-07-06

## 2018-07-06 PROBLEM — E11.9 DIABETES MELLITUS: Status: ACTIVE | Noted: 2018-07-06

## 2018-07-07 PROBLEM — M25.512 ACUTE PAIN OF LEFT SHOULDER: Status: RESOLVED | Noted: 2018-07-06 | Resolved: 2018-07-07

## 2018-07-12 ENCOUNTER — OFFICE VISIT (OUTPATIENT)
Dept: NEUROLOGY | Facility: CLINIC | Age: 68
End: 2018-07-12
Payer: MEDICARE

## 2018-07-12 ENCOUNTER — OFFICE VISIT (OUTPATIENT)
Dept: FAMILY MEDICINE | Facility: CLINIC | Age: 68
End: 2018-07-12
Payer: MEDICARE

## 2018-07-12 VITALS
RESPIRATION RATE: 20 BRPM | BODY MASS INDEX: 30.18 KG/M2 | WEIGHT: 199.13 LBS | DIASTOLIC BLOOD PRESSURE: 70 MMHG | HEIGHT: 68 IN | SYSTOLIC BLOOD PRESSURE: 135 MMHG | HEART RATE: 60 BPM

## 2018-07-12 VITALS
WEIGHT: 198 LBS | SYSTOLIC BLOOD PRESSURE: 130 MMHG | DIASTOLIC BLOOD PRESSURE: 76 MMHG | HEART RATE: 61 BPM | BODY MASS INDEX: 30.01 KG/M2 | HEIGHT: 68 IN | TEMPERATURE: 99 F

## 2018-07-12 DIAGNOSIS — J30.9 ALLERGIC RHINITIS, UNSPECIFIED SEASONALITY, UNSPECIFIED TRIGGER: ICD-10-CM

## 2018-07-12 DIAGNOSIS — H93.13 TINNITUS OF BOTH EARS: ICD-10-CM

## 2018-07-12 DIAGNOSIS — R42 VERTIGO: Primary | ICD-10-CM

## 2018-07-12 DIAGNOSIS — R07.89 CHEST PRESSURE: Primary | ICD-10-CM

## 2018-07-12 DIAGNOSIS — K21.9 GASTROESOPHAGEAL REFLUX DISEASE WITHOUT ESOPHAGITIS: ICD-10-CM

## 2018-07-12 DIAGNOSIS — R73.03 PREDIABETES: ICD-10-CM

## 2018-07-12 DIAGNOSIS — R42 DIZZINESS: ICD-10-CM

## 2018-07-12 DIAGNOSIS — H91.90 HEARING LOSS, UNSPECIFIED HEARING LOSS TYPE, UNSPECIFIED LATERALITY: ICD-10-CM

## 2018-07-12 DIAGNOSIS — Z86.39 HISTORY OF HYPOTHYROIDISM: ICD-10-CM

## 2018-07-12 PROCEDURE — 99214 OFFICE O/P EST MOD 30 MIN: CPT | Mod: PBBFAC,27,PN | Performed by: PSYCHIATRY & NEUROLOGY

## 2018-07-12 PROCEDURE — 99999 PR PBB SHADOW E&M-EST. PATIENT-LVL IV: CPT | Mod: PBBFAC,,, | Performed by: PSYCHIATRY & NEUROLOGY

## 2018-07-12 PROCEDURE — 99205 OFFICE O/P NEW HI 60 MIN: CPT | Mod: S$PBB,,, | Performed by: PSYCHIATRY & NEUROLOGY

## 2018-07-12 PROCEDURE — 99214 OFFICE O/P EST MOD 30 MIN: CPT | Mod: S$PBB,,, | Performed by: FAMILY MEDICINE

## 2018-07-12 PROCEDURE — 99213 OFFICE O/P EST LOW 20 MIN: CPT | Mod: PBBFAC,PO | Performed by: FAMILY MEDICINE

## 2018-07-12 PROCEDURE — 99999 PR PBB SHADOW E&M-EST. PATIENT-LVL III: CPT | Mod: PBBFAC,,, | Performed by: FAMILY MEDICINE

## 2018-07-12 RX ORDER — DIAZEPAM 5 MG/1
5 TABLET ORAL
Qty: 2 TABLET | Refills: 0 | Status: SHIPPED | OUTPATIENT
Start: 2018-07-12 | End: 2019-01-18 | Stop reason: ALTCHOICE

## 2018-07-12 RX ORDER — MECLIZINE HCL 12.5 MG 12.5 MG/1
12.5 TABLET ORAL 3 TIMES DAILY PRN
Qty: 30 TABLET | Refills: 0 | Status: ON HOLD | OUTPATIENT
Start: 2018-07-12 | End: 2021-09-24 | Stop reason: HOSPADM

## 2018-07-12 RX ORDER — FLUTICASONE PROPIONATE 50 MCG
SPRAY, SUSPENSION (ML) NASAL
Qty: 16 G | Refills: 12 | Status: SHIPPED | OUTPATIENT
Start: 2018-07-12 | End: 2021-02-25 | Stop reason: SDUPTHER

## 2018-07-12 NOTE — PROGRESS NOTES
NEUROLOGY  Outpatient CONSULT    [facility]  [address], [place] [address]  [phone] (office) / [phone] (fax)    Patient Name:  Suze Chino  :  1950  MR #:  [id number]  Acct #:  [id number]    Date of Neurology Consult: 2018  Name of Neurologist: Meg French D.O, ABPN, AOBNP, ABEM    Other Physicians:  Cody Lewis MD (Primary Care Physician); Cody Lewis MD (Referring)      Chief Complaint: Dizziness      History of Present Illness (HPI):  Suze Chnio is a 67 y.o. female referred here by her PCP for consultation regarding dizziness.    Patient states that she is having vertigo.  She has had this before, but it recently flared up on Saturday.  She was seen by an ENT for this in .  She had suggested seeing a neurologist at that time, but it got better so she never did.  She did not go to PT for vestibular therapy.  She had episodes of this 15 years ago as well.    She has ear fullness on the left.  She has ringing in both ears, but maybe more in the left ear.  She has slight hearing loss per her ENT assessment.  She has no double vision.  She has no trouble speaking or swallowing.  She had no vomiting this episode, but has with prior episodes.  There is no numbness or weakness.  She has no limb incoordination, but has difficulty walking.    The vertigo has been the same every time she has it.  She feels unsteady.  She will have spinning to the point she can't walk straight.  She states the spinning will be persistent for hours initially, then lets up to just spinning occasionally.  Changing positions will bring it on, such as standing up, sitting down, turning her head or rolling over in bed.      She was given meclizine in the past and was give some this time as well.  She feels that this is helpful.  Sitting still and closing her eyes helps as well.    She cannot identify any particular activity that brings it on.  She was shopping  Saturday when it started.    Treatment to date:   meclizine    Review of Systems:   General: Weight gain: No, Weight Loss: No, Fatigue: Yes,   Fever: No, Chills: No, Night Sweats: Yes, Insomnia: Yes, Excessive sleeping: No   Respiratory:  Cough: Yes, Shortness of Breath: Yes,   Wheezing: No, Excessive Snoring: No, Coughing up blood: No  Endocrine: Heat Intolerance: Yes, Cold Intolerance: No,   Excessive Thirst: No, Excessive Hunger: No,   Eyes:  Blurred Vision: Yes, Double Vision: No,   Light Sensitivity: No, Eye pain: Yes  Musculoskeletal: Muscle Aches/Pain: Yes, Joint Pain/Swelling: Yes, Muscle Cramps: No, Muscle Weakness: No, Neck Pain: No, Back Pain: Yes   Neurological: Difficulty Walking/Falls: Yes, Headache Migraine: Yes, Dizziness/Vertigo: Yes, Fainting: No, Difficulty with Speech: No, Weakness: Yes, Tingling/Numbness: Yes, Tremors: No, Memory Problems: No, Seizures: No, Difficulty Swallowing: No, Altered Taste: No.  Cardiovascular: Chest Pain: Yes, Shortness of Breath: Yes,   Palpitations: No,  Gastrointestinal: Nausea/Vomiting: Yes, Constipation: Yes, Diarrhea: No, Bloody Stools: No   Psych/Cog:  Depression: Yes, Anxiety: Yes, Hallucinations: No, Problems Concentrating: Yes  : Frequent Urination: No, Incontinence: Yes, Blood of Urine: No, Urinary Infections: No, Changes in Sex Drive: Yes   ENT:Hearing Loss: Yes, Earache: Yes, Ringing in Ears: Yes,   Facial Pain: No, Chronic Congestion: No   Immune: Seasonal Allergies: Yes, Hives and/or Rashes: No  The remainder of the review of twelve body systems was reviewed and normal.    Past Medical, Surgical, Family & Social History:   Past Medical History:   Diagnosis Date    Anxiety     Cataract     Colon polyp     Depression     GERD (gastroesophageal reflux disease)     Glaucoma     History of colon polyps 2009    history as per patient    History of colonoscopy 2009    ok per pt    Hypertension     Hypothyroid     ?    Mitral valve prolapse      Peptic ulcer disease     with gi bleed    Prediabetes     Right knee DJD     Right knee DJD     Sleep apnea     TMJ (temporomandibular joint disorder)      Past Surgical History:   Procedure Laterality Date    ARGON TRABECULOPLASTY - OU - BOTH EYES      BILATERAL SALPINGOOPHORECTOMY      COLONOSCOPY  2015    EYE SURGERY      laser sx    HYSTERECTOMY      TRABECULECTOMY Right 4/2/14    OD (dr. grossman)    UPPER GASTROINTESTINAL ENDOSCOPY  2003     Family History   Problem Relation Age of Onset    Heart disease Father         before age 50    Diabetes Mother     Glaucoma Mother     Leukemia Mother     Cataracts Mother     Breast cancer Mother 60    Diabetes Sister     Glaucoma Sister     Diabetes Brother     Glaucoma Brother     Lung cancer Brother     Breast cancer Other 40        genetics -    Colon cancer Maternal Aunt      Alcohol use:  reports that she drinks alcohol.   (Of note, 0.6 oz = 1 beer or 6 oz = 10 beers).  Tobacco use:  reports that she has never smoked. She has never used smokeless tobacco.  Street drug use:  reports that she does not use drugs.  Allergies: Betoptic [betaxolol] and Alphagan [brimonidine].    Home Medications:     Current Outpatient Prescriptions:     ALPRAZolam (XANAX) 0.25 MG tablet, Take 1 tablet (0.25 mg total) by mouth 3 (three) times daily as needed for Anxiety., Disp: 30 tablet, Rfl: 5    bimatoprost (LUMIGAN) 0.03 % ophthalmic drops, Place 1 drop into both eyes every evening., Disp: 2.5 mL, Rfl: 6    celecoxib (CELEBREX) 200 MG capsule, Take 1 capsule (200 mg total) by mouth 2 (two) times daily. (Patient taking differently: Take 200 mg by mouth once daily. ), Disp: 60 capsule, Rfl: 11    DIPHENHYDRAMINE HCL (BENADRYL ALLERGY ORAL), Take 1 tablet by mouth as needed., Disp: , Rfl:     dorzolamide (TRUSOPT) 2 % ophthalmic solution, Place 1 drop into both eyes 3 (three) times daily., Disp: 10 mL, Rfl: 6    estradiol (ESTRACE) 0.5 MG tablet, Take 1  "tablet (0.5 mg total) by mouth once daily., Disp: 30 tablet, Rfl: 11    fexofenadine (ALLEGRA) 180 MG tablet, Take 180 mg by mouth daily as needed. , Disp: , Rfl:     FLUoxetine (PROZAC) 20 MG capsule, TAKE 3 CAPSULES BY MOUTH ONCE DAILY., Disp: 90 capsule, Rfl: 11    hydroCHLOROthiazide (HYDRODIURIL) 25 MG tablet, TAKE 1 TABLET EVERY DAY, Disp: 90 tablet, Rfl: 0    losartan (COZAAR) 50 MG tablet, Take 50 mg by mouth once daily., Disp: , Rfl:     metFORMIN (GLUCOPHAGE-XR) 500 MG 24 hr tablet, TAKE 1 TABLET BY MOUTH 2 TIMES DAILY., Disp: 180 tablet, Rfl: 1    pantoprazole (PROTONIX) 40 MG tablet, TAKE 1 TABLET BY MOUTH EVERY DAY, Disp: 90 tablet, Rfl: 3    fluticasone (FLONASE) 50 mcg/actuation nasal spray, Use 2 sprays to each nostril daily, Disp: 16 g, Rfl: 12    meclizine (ANTIVERT) 12.5 mg tablet, Take 1 tablet (12.5 mg total) by mouth 3 (three) times daily as needed., Disp: 30 tablet, Rfl: 0    Physical Examination:  /70   Pulse 60   Resp 20   Ht 5' 8" (1.727 m)   Wt 90.3 kg (199 lb 1.6 oz)   BMI 30.27 kg/m²     GENERAL:  General appearance: Well, non-toxic appearing.  No apparent distress.  Fundi exam: normal.  Neck: supple.  Carotid auscultation: normal.  Heart auscultation: normal.  Peripheral pulses: normal.  Extremities: normal.    MENTAL STATUS:  Alertness, attention span & concentration: normal.  Language: normal.  Orientation to self, place & time:  normal.  Memory, recent & remote: normal.  Fund of knowledge: normal.    SPEECH:  Clear and fluent.  Follows complex commands.    CRANIAL NERVES:  Cranial Nerves II-XII were examined.  II - Visual fields: normal.  III, IV, VI: PERRL, EOMI, No ptosis, No nystagmus.  V - Facial sensation: normal.  VII - Face symmetry & mobility: normal.  VIII - Hearing: normal.  IX, X - Palate: mobile & midline.  XI - Shoulder shrug: normal.  XII - Tongue protrusion: normal.    GROSS MOTOR:  Gait & station: difficulty with tandem gait, typically going to the " left.  Tone: normal.  Abnormal movements: none.  Finger-nose & Heel-knee-shin: normal.  Rapid alternating movements & drift: normal.  Romberg: absent    MUSCLE STRENGTH:     Fascics Atrophy RIGHT    LEFT Atrophy Fascics     5 Neck Ext. 5       5 Neck Flex 5       5 Deltoids 5       5 Sh.Ext.Rot. 5       5 Sh.Int.Rot. 5       5 Biceps 5       5 Triceps 5       5 Forearm.Pr. 5       5 Wrist Ext. 5       5 Wrist Flex 5       5 Finger Ext. 5       5 Finger Flex 5       5 FPL 5       5 Inteross. 5                         5 Iliopsoas 5       5 Hip Abduct 5       5 Hip Adduct 5       5 Quads 5       5 Hams 5       5 Dorsiflex 5       5 Plantar Flex 5       5 Ankle Wally 5       5 Ankle Invert 5       5 Toe Ext. 5       5 Toe Flex 5                         REFLEXES:    RIGHT Reflex   LEFT   2+ Biceps 2+   2+ Brachiorad. 2+   2+ Triceps 2+        2+ Patellar 2+   2+ Ankle 2+        Down PLANTAR Down     SENSORY:  Light touch: Normal throughout.  Sharp touch: Normal throughout.  Vibration: Normal throughout.      Diagnostic Data Reviewed:   Records were reviewed. She was seen by ENT in 2016.  At that time she was complaining of vertigo with tinnitus, ear fullness and ear pain.  She was also complaining of unilateral headache.  She was set up for testing including VNG and hearing testing.  She was advised to see neurology for headache.  The VNG was reported as normal.    Assessment and Plan:  Suze Chino is a 67 y.o., right handed woman with a history of anxiety, depression, TMJ and sleep apnea now here with complaints of recurrent vertigo.  She has been seen by ENT in the past and underwent a VNG, however it is unclear if she was still symptomatic at that time.  Today she reports the vertigo is starting to subside again.  She has no other complaints related to the vertigo such as diplopia, dysarthria, dysphagia, or ataxia aside from occasional vomiting and difficulty walking.  She does have ear complaints such as ear  fullness and tinnitus.   She was found to have hearing loss on her hearing test.  We reviewed that the most common cause of vertigo is peripheral vertigo.  This is likely what she is experiencing.  We reviewed that meclizine can be helpful for this, but she needs to be cautious because frequent use can make it less effective.  We reviewed significant neurological conditions that result in vertigo including stroke and demyelination.  We also reviewed internal auditory canal conditions such as cysts or schwannomas.  Unfortunately, these conditions cannot be accurately diagnosed without the assistance of imaging.  An MRI of the brain and internal auditory canals with and without contrast has been ordered.  She has already had a VNG, which was reported to be normal.  Given her improvement, this will not be repeated at this time.  She has never been to vestibular therapy. A referral has been placed to PT so she can have an assessment and training on vestibular exercises.      If the MRI is normal, then no further intervention will be needed.  If the MRI IAC is abnormal, follow up with ENT would be indicated.  If she continues to have recurrent vertigo without accompanying symptoms, follow up with ENT would be indicated.     Note, she was given a prescription for Valium 5mg for her MRI.  She will take one an hour before her MRI and a second pill at the MRI if needed. She was informed that she cannot drive herself. She was also warned that she cannot take her Xanax with Valium.    Important to note, also  has a past medical history of Anxiety; Cataract; Colon polyp; Depression; GERD (gastroesophageal reflux disease); Glaucoma; History of colon polyps (2009); History of colonoscopy (2009); Hypertension; Hypothyroid; Mitral valve prolapse; Peptic ulcer disease; Prediabetes; Right knee DJD; Right knee DJD; Sleep apnea; and TMJ (temporomandibular joint disorder).        Meg French D.O, ABPN, AOBNP, ABEM    This note  was created with voice recognition software.  Grammatical, syntax and spelling errors may be inevitable.

## 2018-07-12 NOTE — LETTER
July 12, 2018      Cody Lewis MD  [address] [facility] [address]           [facility]  [address] [place] [address]  Phone: [phone]  Fax: [phone]          Patient: Suze Chino   MR Number: [id number]   YOB: 1950   Date of Visit: 7/12/2018       Dear Dr. Cody Lewis:    Thank you for referring Suze Chino to me for evaluation. Attached you will find relevant portions of my assessment and plan of care.    If you have questions, please do not hesitate to call me. I look forward to following Suze Chino along with you.    Sincerely,    Meg French, DO    Enclosure  CC:  No Recipients    If you would like to receive this communication electronically, please contact [email or web address] or [phone] to request more information on [facility] access.    For providers and/or their staff who would like to refer a patient to [facility], please contact us through our one-stop-shop provider referral line, [facility], at [phone].    If you feel you have received this communication in error or would no longer like to receive these types of communications, please e-mail [email or web address]

## 2018-07-12 NOTE — Clinical Note
This since patient does not think she can walk on a treadmill I went ahead and put in a pharmalogical stress echocardiogram order.  Please assist in scheduling.

## 2018-07-12 NOTE — PROGRESS NOTES
Follow-up after hospitalization as per below discharge summary for chest discomfort.  She has not scheduled the stress echocardiogram as yet.  She still gets some intermittent chest pressure.  She did have a slightly decreased TSH, though she had a normal TSH just last month.  She has not been on any type of thyroid medication for a couple of years.  She occasionally gets some heartburn despite Protonix, but only a couple of times per month and she uses an over-the-counter antacid as a supplement.    She also complains of some chronic intermittent dizziness/vertigo.  Occasionally feels wobbly.  Previous negative ENT evaluation.  She gets chronic headaches couple times a week for which she uses Tylenol.  She does complain of some mild postnasal drainage. No fever.    [facility] Medicine  Discharge Summary        Patient Name: Suze Chino  MRN: [id number]  Admission Date: 7/6/2018  Hospital Length of Stay: 0 days  Discharge Date and Time:  07/08/2018 3:20 PM  Attending Physician: No att. providers found   Discharging Provider: Hola Reeves MD  Primary Care Provider: Cody Lewis MD        HPI:   Patient is a 67-year-old female with history of Gerd, G.I. bleeding, diabetes, hypertension presenting to er for evaluation of retrosternal chest pain radiating into the left shoulder and associate with left upper extremity numbness and SOB. Patient reports that the chest pain is triggered by physical activity like going up a flight of stairs, being followed by dyspnea of exertion. Patient denies any recent cardiac work up, her last stress test was in 2012. Patient also reports strong family history of coronary artery disease with several siblings requiring CABG status post myocardial infarction. Preliminary work up in ER was on remarkable and the patient was asymptomatic at the time of my examination.     * No surgery found *       Hospital Course:   After initial stabilization in ER, INDU was  called to admit for farther evaluation and treatment.   Patient was placed on telemetry floor with a consultation for Cardiology.   Cardiac enzymes were trended and normal.  Chest pain resolved.  Scheduled for outpatient stress echo.      Consults:          Consults          Status Ordering Provider       Inpatient consult to Cardiology  Once     Provider:  Chago Gregory MD    Completed MARGO GEORGE             No new Assessment & Plan notes have been filed under this hospital service since the last note was generated.  Service: Hospital Medicine            Final Active Diagnoses:     Diagnosis Date Noted POA    PRINCIPAL PROBLEM:  Chest pain [R07.9] 07/06/2018 Yes    Diabetes mellitus [E11.9] 07/06/2018 Yes    Slow transit constipation [K59.01] 07/06/2018 Yes    Hypertension [I10]   Yes    Anxiety [F41.9]   Yes       Problems Resolved During this Admission:     Diagnosis Date Noted Date Resolved POA    Acute pain of left shoulder [M25.512] 07/06/2018 07/07/2018 Yes         Discharged Condition: good     Disposition: Home or Self Care     Follow Up:      Follow-up Information      Natanael Stapleton MD. Schedule an appointment as soon as possible for a visit in 1 week.    Specialty:  Cardiology  Why:  Please call for 1 week followup appointment.  Contact information:  [address] [place] [address]  [phone]                 Cody Lewis MD. Schedule an appointment as soon as possible for a visit in 1 week.    Specialty:  Family Medicine  Why:  please call for 1 week followup appointment.  Contact information:  [address] [facility] [id number] [phone]                 Past Medical History:  Past Medical History:   Diagnosis Date    Anxiety     Cataract     Colon polyp     Depression     GERD (gastroesophageal reflux disease)     Glaucoma     History of colon polyps 2009    history as per patient    History of colonoscopy 2009    ok per pt    Hypertension     Hypothyroid      ?    Mitral valve prolapse     Peptic ulcer disease     with gi bleed    Prediabetes     Right knee DJD     Right knee DJD     Sleep apnea     TMJ (temporomandibular joint disorder)      Past Surgical History:   Procedure Laterality Date    ARGON TRABECULOPLASTY - OU - BOTH EYES      BILATERAL SALPINGOOPHORECTOMY      COLONOSCOPY  2015    EYE SURGERY      laser sx    HYSTERECTOMY      TRABECULECTOMY Right 4/2/14    OD (dr. grossman)    UPPER GASTROINTESTINAL ENDOSCOPY  2003     Social History     Social History    Marital status:      Spouse name: N/A    Number of children: N/A    Years of education: N/A     Occupational History    Not on file.     Social History Main Topics    Smoking status: Never Smoker    Smokeless tobacco: Never Used    Alcohol use Yes      Comment: occasional    Drug use: No    Sexual activity: Not Currently     Other Topics Concern    Not on file     Social History Narrative    No narrative on file     Family History   Problem Relation Age of Onset    Heart disease Father         before age 50    Diabetes Mother     Glaucoma Mother     Leukemia Mother     Cataracts Mother     Breast cancer Mother 60    Diabetes Sister     Glaucoma Sister     Diabetes Brother     Glaucoma Brother     Lung cancer Brother     Breast cancer Other 40        genetics -    Colon cancer Maternal Aunt      Review of patient's allergies indicates:   Allergen Reactions    Betoptic [betaxolol] Other (See Comments)     Fatigue, slowed heart rate.    Alphagan [brimonidine] Dermatitis     Current Outpatient Prescriptions on File Prior to Visit   Medication Sig Dispense Refill    ALPRAZolam (XANAX) 0.25 MG tablet Take 1 tablet (0.25 mg total) by mouth 3 (three) times daily as needed for Anxiety. 30 tablet 5    bimatoprost (LUMIGAN) 0.03 % ophthalmic drops Place 1 drop into both eyes every evening. 2.5 mL 6    celecoxib (CELEBREX) 200 MG capsule Take 1 capsule (200 mg  "total) by mouth 2 (two) times daily. (Patient taking differently: Take 200 mg by mouth once daily. ) 60 capsule 11    DIPHENHYDRAMINE HCL (BENADRYL ALLERGY ORAL) Take 1 tablet by mouth as needed.      dorzolamide (TRUSOPT) 2 % ophthalmic solution Place 1 drop into both eyes 3 (three) times daily. 10 mL 6    estradiol (ESTRACE) 0.5 MG tablet Take 1 tablet (0.5 mg total) by mouth once daily. 30 tablet 11    fexofenadine (ALLEGRA) 180 MG tablet Take 180 mg by mouth daily as needed.       FLUoxetine (PROZAC) 20 MG capsule TAKE 3 CAPSULES BY MOUTH ONCE DAILY. 90 capsule 11    hydroCHLOROthiazide (HYDRODIURIL) 25 MG tablet TAKE 1 TABLET EVERY DAY 90 tablet 0    losartan (COZAAR) 50 MG tablet Take 50 mg by mouth once daily.      metFORMIN (GLUCOPHAGE-XR) 500 MG 24 hr tablet TAKE 1 TABLET BY MOUTH 2 TIMES DAILY. 180 tablet 1    pantoprazole (PROTONIX) 40 MG tablet TAKE 1 TABLET BY MOUTH EVERY DAY 90 tablet 3     No current facility-administered medications on file prior to visit.            ROS:  GENERAL: No fever, chills,  or significant weight changes.   CARDIOVASCULAR: Denies chest pain, PND, orthopnea or reduced exercise tolerance.  ABDOMEN: Appetite fine. Denies diarrhea, abdominal pain, hematemesis or blood in stool.  URINARY: No flank pain, dysuria or hematuria.      OBJECTIVE:     Vitals:    07/12/18 0928   BP: 130/76   Pulse: 61   Temp: 98.5 °F (36.9 °C)   Weight: 89.8 kg (198 lb)   Height: 5' 8" (1.727 m)     Wt Readings from Last 3 Encounters:   07/12/18 90.3 kg (199 lb 1.6 oz)   07/12/18 89.8 kg (198 lb)   07/06/18 92.2 kg (203 lb 4.2 oz)     APPEARANCE: Well nourished, well developed, in no acute distress.    HEAD: Normocephalic.  Atraumatic.  No sinus tenderness.  EYES:   Right eye: Pupil reactive.  Conjunctiva clear.    Left eye: Pupil reactive.  Conjunctiva clear.    Both fundi:  Grossly normal to nondilated exam. EOMI.    EARS: TM's intact. Light reflex normal. No retraction or perforation.  "   NOSE:  clear.  MOUTH & THROAT:  No pharyngeal erythema or exudate. No lesions.  NECK: Supple. No bruits.  No JVD.  No cervical lymphadenopathy.  No thyromegaly.    CHEST: Breath sounds clear bilaterally.  Normal respiratory effort  CARDIOVASCULAR: Normal rate.  Regular rhythm.  No murmurs.  No rub.  No gallops.  ABDOMEN: Bowel sounds normal.  Soft.  No tenderness.  No organomegaly.  PERIPHERAL VASCULAR: No cyanosis.  No clubbing.  No edema.  NEUROLOGIC: No focal findings.  MENTAL STATUS: Alert.  Oriented x 3.        Suze was seen today for dizziness and chest pain.    Diagnoses and all orders for this visit:    Chest pressure    Dizziness  -     Ambulatory referral to Neurology    History of hypothyroidism  -     TSH; Future    Prediabetes    Gastroesophageal reflux disease without esophagitis    Allergic rhinitis, unspecified seasonality, unspecified trigger    Other orders  -     Cancel: TSH; Future  -     fluticasone (FLONASE) 50 mcg/actuation nasal spray; Use 2 sprays to each nostril daily  -     meclizine (ANTIVERT) 12.5 mg tablet; Take 1 tablet (12.5 mg total) by mouth 3 (three) times daily as needed.     Will assist in scheduling the stress echocardiogram.  Continue Protonix.  Consider increasing dosage if persistent symptoms and negative cardiac evaluation.  Patient states she is not able to walk on a treadmill due to knee problems.  Will arrange for pharmalogical stress echocardiogram.

## 2018-07-12 NOTE — PATIENT INSTRUCTIONS
Will plan to get an MRI of the brain and internal auditory canals.  This will determine if there is any neurological issue at play here or if you need any surgical intervention from ENT.  If these are normal, then your vertigo is likely due to a benign inner ear issue.      You will take Valium (diazepam) for your MRI.  Take one pill an hour before your MRI.  You may take the second pill at MRI if you need it.  You cannot drive yourself if you are going to use the Valium.  DO NOT TAKE XANAX (ALPRAZOLAM) THE SAME DAY.    Will also plan to have you see PT for vestibular therapy to help with getting rid of the vertigo.

## 2018-07-12 NOTE — PROGRESS NOTES
Patient reports having bad knees and won't be able to walk on treadmill for nuc stress echo.  She has seen cardiology, in [place], in past and will contact them about it.  Informed her to call ASAP and contact me if any issues in getting scheduled.

## 2018-07-13 ENCOUNTER — TELEPHONE (OUTPATIENT)
Dept: FAMILY MEDICINE | Facility: CLINIC | Age: 68
End: 2018-07-13

## 2018-07-13 NOTE — TELEPHONE ENCOUNTER
She can take the metformin.  This will not cause hypoglycemia as it does not change her insulin levels

## 2018-07-13 NOTE — TELEPHONE ENCOUNTER
Pharm stress echo on 8/7/18 at 10:45am, in [place], should patient hold her metformin that am since she has to fast 4 hours, please advise.

## 2018-07-13 NOTE — TELEPHONE ENCOUNTER
Left message on voice mail as such and information provided in a letter to be mailed to her, per her request.

## 2018-07-31 ENCOUNTER — HOSPITAL ENCOUNTER (OUTPATIENT)
Dept: RADIOLOGY | Facility: HOSPITAL | Age: 68
Discharge: HOME | End: 2018-07-31
Attending: PSYCHIATRY & NEUROLOGY
Payer: MEDICARE

## 2018-07-31 DIAGNOSIS — R42 VERTIGO: ICD-10-CM

## 2018-07-31 DIAGNOSIS — H93.13 TINNITUS OF BOTH EARS: ICD-10-CM

## 2018-07-31 DIAGNOSIS — H91.90 HEARING LOSS, UNSPECIFIED HEARING LOSS TYPE, UNSPECIFIED LATERALITY: ICD-10-CM

## 2018-07-31 PROCEDURE — A9585 GADOBUTROL INJECTION: HCPCS | Mod: PO | Performed by: PSYCHIATRY & NEUROLOGY

## 2018-07-31 PROCEDURE — 70553 MRI BRAIN STEM W/O & W/DYE: CPT | Mod: TC,PO

## 2018-07-31 PROCEDURE — 25500020 PHARM REV CODE 255: Mod: PO | Performed by: PSYCHIATRY & NEUROLOGY

## 2018-07-31 PROCEDURE — 70553 MRI BRAIN STEM W/O & W/DYE: CPT | Mod: 26,,, | Performed by: RADIOLOGY

## 2018-07-31 RX ORDER — GADOBUTROL 604.72 MG/ML
9 INJECTION INTRAVENOUS
Status: COMPLETED | OUTPATIENT
Start: 2018-07-31 | End: 2018-07-31

## 2018-07-31 RX ADMIN — GADOBUTROL 9 ML: 604.72 INJECTION INTRAVENOUS at 02:07

## 2018-07-31 NOTE — PROGRESS NOTES
The MRI brain results are normal.  There is no evidence of mass, injury or stroke that would cause vertigo.  The vertigo is not of central origin. There is a benign meningioma.  This is asymptomatic and no intervention is needed.  The patient was notified by portal.

## 2018-08-03 DIAGNOSIS — R07.89 CHEST PRESSURE: Primary | ICD-10-CM

## 2018-08-06 DIAGNOSIS — H40.1133 PRIMARY OPEN ANGLE GLAUCOMA OF BOTH EYES, SEVERE STAGE: ICD-10-CM

## 2018-08-06 RX ORDER — BIMATOPROST 0.3 MG/ML
1 SOLUTION/ DROPS OPHTHALMIC NIGHTLY
Qty: 2.5 ML | Refills: 5 | Status: SHIPPED | OUTPATIENT
Start: 2018-08-06 | End: 2019-08-15 | Stop reason: SDUPTHER

## 2018-08-07 ENCOUNTER — CLINICAL SUPPORT (OUTPATIENT)
Dept: CARDIOLOGY | Facility: CLINIC | Age: 68
End: 2018-08-07
Attending: FAMILY MEDICINE
Payer: MEDICARE

## 2018-08-07 DIAGNOSIS — R07.89 CHEST PRESSURE: ICD-10-CM

## 2018-08-07 LAB
ASCENDING AORTA: 2.88 CM
CV ECHO LV RWT: 0.47 CM
CV STRESS BASE HR: 65
CVPEAKDIABP: 60
CVPEAKSYSBP: 122
CVRESTDIABP: 74
CVRESTSYSBP: 121
DOP CALC LVOT AREA: 3.46 CM2
DOP CALC LVOT DIAMETER: 2.1 CM
DOP CALC LVOT STROKE VOLUME: 57.19 CM3
DOP CALCLVOT PEAK VEL VTI: 16.52 CM
E WAVE DECELERATION TIME: 207.52 MSEC
E/A RATIO: 0.67
E/E' RATIO: 7.38
ECHO LV POSTERIOR WALL: 0.93 CM (ref 0.6–1.1)
FRACTIONAL SHORTENING: 35 % (ref 28–44)
INTERVENTRICULAR SEPTUM: 1.03 CM (ref 0.6–1.1)
IVRT: 0.1 MSEC
LA MAJOR: 3.99 CM
LA MINOR: 3.94 CM
LA WIDTH: 2.84 CM
LEFT ATRIUM SIZE: 3.15 CM
LEFT ATRIUM VOLUME: 30.15 CM3
LEFT INTERNAL DIMENSION IN SYSTOLE: 2.72 CM (ref 2.1–4)
LEFT VENTRICULAR INTERNAL DIMENSION IN DIASTOLE: 4.18 CM (ref 3.5–6)
LEFT VENTRICULAR MASS: 132.42 G
LV LATERAL E/E' RATIO: 6.86
LV SEPTAL E/E' RATIO: 8
MV PEAK A VEL: 0.72 M/S
MV PEAK E VEL: 0.48 M/S
MV STENOSIS PRESSURE HALF TIME: 60.18 MS
MV VALVE AREA P 1/2 METHOD: 3.66 CM2
PISA TR MAX VEL: 2.47 M/S
PULM VEIN S/D RATIO: 1.18
PV PEAK D VEL: 0.34 M/S
PV PEAK S VEL: 0.4 M/S
RA MAJOR: 4.21 CM
RA PRESSURE: 3 MMHG
RA WIDTH: 3.42 CM
SINUS: 3.29 CM
STJ: 3.32 CM
STRESS ECHO POST EXERCISE DUR MIN: 5 MIN
STRESS ECHO POST EXERCISE DUR SEC: 48
TDI LATERAL: 0.07
TDI SEPTAL: 0.06
TDI: 0.07
TR MAX PG: 24.4 MMHG
TV REST PULMONARY ARTERY PRESSURE: 27.4 MMHG

## 2018-08-07 PROCEDURE — 93351 STRESS TTE COMPLETE: CPT | Mod: PBBFAC,PO | Performed by: INTERNAL MEDICINE

## 2018-09-13 RX ORDER — LOSARTAN POTASSIUM 50 MG/1
TABLET ORAL
Qty: 30 TABLET | Refills: 11 | Status: SHIPPED | OUTPATIENT
Start: 2018-09-13 | End: 2018-09-18 | Stop reason: SDUPTHER

## 2018-09-18 ENCOUNTER — LAB VISIT (OUTPATIENT)
Dept: LAB | Facility: HOSPITAL | Age: 68
End: 2018-09-18
Attending: FAMILY MEDICINE
Payer: MEDICARE

## 2018-09-18 DIAGNOSIS — Z86.39 HISTORY OF HYPOTHYROIDISM: ICD-10-CM

## 2018-09-18 DIAGNOSIS — E78.5 ELEVATED LIPIDS: ICD-10-CM

## 2018-09-18 DIAGNOSIS — E83.52 SERUM CALCIUM ELEVATED: ICD-10-CM

## 2018-09-18 DIAGNOSIS — E83.52 HYPERCALCEMIA: ICD-10-CM

## 2018-09-18 DIAGNOSIS — E03.9 HYPOTHYROIDISM, UNSPECIFIED TYPE: ICD-10-CM

## 2018-09-18 LAB
25(OH)D3+25(OH)D2 SERPL-MCNC: 12 NG/ML
ALBUMIN SERPL BCP-MCNC: 3.9 G/DL
ALP SERPL-CCNC: 129 U/L
ALT SERPL W/O P-5'-P-CCNC: 19 U/L
ANION GAP SERPL CALC-SCNC: 10 MMOL/L
AST SERPL-CCNC: 22 U/L
BILIRUB SERPL-MCNC: 0.6 MG/DL
BUN SERPL-MCNC: 24 MG/DL
CALCIUM SERPL-MCNC: 10.4 MG/DL
CHLORIDE SERPL-SCNC: 100 MMOL/L
CHOLEST SERPL-MCNC: 243 MG/DL
CHOLEST/HDLC SERPL: 3.9 {RATIO}
CO2 SERPL-SCNC: 25 MMOL/L
CREAT SERPL-MCNC: 1.2 MG/DL
EST. GFR  (AFRICAN AMERICAN): 54 ML/MIN/1.73 M^2
EST. GFR  (NON AFRICAN AMERICAN): 46.9 ML/MIN/1.73 M^2
GLUCOSE SERPL-MCNC: 90 MG/DL
HDLC SERPL-MCNC: 63 MG/DL
HDLC SERPL: 25.9 %
LDLC SERPL CALC-MCNC: 165.2 MG/DL
NONHDLC SERPL-MCNC: 180 MG/DL
POTASSIUM SERPL-SCNC: 4.3 MMOL/L
PROT SERPL-MCNC: 7.6 G/DL
PTH-INTACT SERPL-MCNC: 185 PG/ML
SODIUM SERPL-SCNC: 135 MMOL/L
TRIGL SERPL-MCNC: 74 MG/DL
TSH SERPL DL<=0.005 MIU/L-ACNC: 2.74 UIU/ML

## 2018-09-18 PROCEDURE — 80053 COMPREHEN METABOLIC PANEL: CPT

## 2018-09-18 PROCEDURE — 36415 COLL VENOUS BLD VENIPUNCTURE: CPT | Mod: PO

## 2018-09-18 PROCEDURE — 82306 VITAMIN D 25 HYDROXY: CPT

## 2018-09-18 PROCEDURE — 83970 ASSAY OF PARATHORMONE: CPT

## 2018-09-18 PROCEDURE — 80061 LIPID PANEL: CPT

## 2018-09-18 PROCEDURE — 84443 ASSAY THYROID STIM HORMONE: CPT

## 2018-09-18 RX ORDER — LOSARTAN POTASSIUM 50 MG/1
50 TABLET ORAL DAILY
Qty: 90 TABLET | Refills: 3 | Status: SHIPPED | OUTPATIENT
Start: 2018-09-18 | End: 2019-01-18 | Stop reason: SDUPTHER

## 2018-10-18 RX ORDER — METFORMIN HYDROCHLORIDE 500 MG/1
TABLET, EXTENDED RELEASE ORAL
Qty: 180 TABLET | Refills: 2 | Status: SHIPPED | OUTPATIENT
Start: 2018-10-18 | End: 2018-11-12

## 2018-10-23 DIAGNOSIS — H40.1133 PRIMARY OPEN ANGLE GLAUCOMA OF BOTH EYES, SEVERE STAGE: ICD-10-CM

## 2018-10-23 RX ORDER — BIMATOPROST 0.3 MG/ML
1 SOLUTION/ DROPS OPHTHALMIC NIGHTLY
Qty: 2.5 ML | Refills: 5 | Status: SHIPPED | OUTPATIENT
Start: 2018-10-23 | End: 2019-05-17 | Stop reason: SDUPTHER

## 2018-10-26 DIAGNOSIS — M25.561 RIGHT KNEE PAIN, UNSPECIFIED CHRONICITY: ICD-10-CM

## 2018-10-26 DIAGNOSIS — M21.061 ACQUIRED VALGUS DEFORMITY KNEE, RIGHT: ICD-10-CM

## 2018-10-26 DIAGNOSIS — M17.11 PRIMARY OSTEOARTHRITIS OF RIGHT KNEE: ICD-10-CM

## 2018-10-26 RX ORDER — CELECOXIB 200 MG/1
200 CAPSULE ORAL DAILY
Qty: 30 CAPSULE | Refills: 5 | Status: SHIPPED | OUTPATIENT
Start: 2018-10-26 | End: 2019-01-18 | Stop reason: SDUPTHER

## 2018-10-26 NOTE — TELEPHONE ENCOUNTER
Patient has been out of Celebrex for a few days and worried about joint pain, Dr London French was prescriber, she is asking if you will refill it, please advise.

## 2018-10-26 NOTE — TELEPHONE ENCOUNTER
----- Message from Patricia Davis sent at 10/26/2018 12:16 PM CDT -----  Contact: pt  Pt would like nurse to return her call regarding getting a prescription filled.

## 2018-11-12 ENCOUNTER — OFFICE VISIT (OUTPATIENT)
Dept: OPHTHALMOLOGY | Facility: CLINIC | Age: 68
End: 2018-11-12
Payer: MEDICARE

## 2018-11-12 DIAGNOSIS — H25.13 NUCLEAR SCLEROSIS, BILATERAL: ICD-10-CM

## 2018-11-12 DIAGNOSIS — Z91.148 NONCOMPLIANCE WITH MEDICATION REGIMEN: ICD-10-CM

## 2018-11-12 DIAGNOSIS — H40.1133 PRIMARY OPEN ANGLE GLAUCOMA OF BOTH EYES, SEVERE STAGE: Primary | ICD-10-CM

## 2018-11-12 DIAGNOSIS — H44.131: ICD-10-CM

## 2018-11-12 PROCEDURE — 99211 OFF/OP EST MAY X REQ PHY/QHP: CPT | Mod: PBBFAC | Performed by: OPHTHALMOLOGY

## 2018-11-12 PROCEDURE — 99999 PR PBB SHADOW E&M-EST. PATIENT-LVL I: CPT | Mod: PBBFAC,,, | Performed by: OPHTHALMOLOGY

## 2018-11-12 PROCEDURE — 92012 INTRM OPH EXAM EST PATIENT: CPT | Mod: S$PBB,,, | Performed by: OPHTHALMOLOGY

## 2018-11-12 NOTE — PROGRESS NOTES
HPI     Patient was last seen on 01/09/18 patient cannot tell me the drops she is   on has no idea, patient is here for an iop check and manifest check,   patient will come to [facility] for dilation .pt had some eye pain Od>OS, feels like sinus pressure, no photphobia and usually in the afternoon/evenings         Referred by Dr. Lorenzana  Bleb Needling with [facility]. OD 9/17/15  LTG   H/O Non-compliance   Intolerant of Coag Drops    Stopped Alphagan OS TID because of dermatitis  Stopped Betoptic bid ou due to slow pulse rate and feeling tired.  Trab OD     repeat SLT od done 8/9/2012 - good initial response IOP 18 to 13, SLT OD   08/17/17  Primary SLT done os 9/13/2012 - minimal response 18 to 16  SLT os 9/29/2016  LATANOPROST slows pulse rate    OU LUMIGAN 0.03% QHS (slows pulse rate), DORZOLAMIDE BID (patient non   compliant with drop usage)               Last edited by PETR Rojas on 11/12/2018 11:27 AM. (History)            Assessment /Plan     For exam results, see Encounter Report.      ICD-10-CM ICD-9-CM    1. Primary open angle glaucoma of both eyes, severe stage H40.1133 365.11 IOP stable at this time - encourage compliance and follow up     365.73    2. Nuclear sclerosis, bilateral H25.13 366.16 va worse today, will likely need phaco    3. Noncompliance with medication regimen Z91.14 V15.81 Encouraged compliance    4. Uveitis, sympathetic, right H44.131 360.11 Based on sxs, OD only   Pt says it improves with Tylenol   Follow at this time        RETURN TO CLINIC 3-4 weeks HVF, GOCT , refract, glare and DOA   OU LUMIGAN 0.03% QHS  DORZOLAMIDE BID (

## 2019-01-02 ENCOUNTER — TELEPHONE (OUTPATIENT)
Dept: INTERNAL MEDICINE | Facility: CLINIC | Age: 69
End: 2019-01-02

## 2019-01-02 NOTE — TELEPHONE ENCOUNTER
----- Message from Melania Tariq sent at 1/2/2019 11:56 AM CST -----  Contact: pt  Pt states she left the building, states it taking to long.

## 2019-01-03 ENCOUNTER — OFFICE VISIT (OUTPATIENT)
Dept: UROLOGY | Facility: CLINIC | Age: 69
End: 2019-01-03
Payer: MEDICARE

## 2019-01-03 VITALS
WEIGHT: 201.75 LBS | BODY MASS INDEX: 30.58 KG/M2 | HEIGHT: 68 IN | DIASTOLIC BLOOD PRESSURE: 84 MMHG | HEART RATE: 62 BPM | SYSTOLIC BLOOD PRESSURE: 139 MMHG

## 2019-01-03 DIAGNOSIS — N30.90 BLADDER INFECTION: Primary | ICD-10-CM

## 2019-01-03 PROCEDURE — 99213 PR OFFICE/OUTPT VISIT, EST, LEVL III, 20-29 MIN: ICD-10-PCS | Mod: S$PBB,,, | Performed by: UROLOGY

## 2019-01-03 PROCEDURE — 81002 URINALYSIS NONAUTO W/O SCOPE: CPT | Mod: PBBFAC | Performed by: UROLOGY

## 2019-01-03 PROCEDURE — 87088 URINE BACTERIA CULTURE: CPT

## 2019-01-03 PROCEDURE — 87086 URINE CULTURE/COLONY COUNT: CPT

## 2019-01-03 PROCEDURE — 99215 OFFICE O/P EST HI 40 MIN: CPT | Mod: PBBFAC,25 | Performed by: UROLOGY

## 2019-01-03 PROCEDURE — 87186 SC STD MICRODIL/AGAR DIL: CPT

## 2019-01-03 PROCEDURE — 51701 INSERT BLADDER CATHETER: CPT | Mod: PBBFAC | Performed by: UROLOGY

## 2019-01-03 PROCEDURE — 99213 OFFICE O/P EST LOW 20 MIN: CPT | Mod: S$PBB,,, | Performed by: UROLOGY

## 2019-01-03 PROCEDURE — 99999 PR PBB SHADOW E&M-EST. PATIENT-LVL V: CPT | Mod: PBBFAC,,, | Performed by: UROLOGY

## 2019-01-03 PROCEDURE — 87077 CULTURE AEROBIC IDENTIFY: CPT

## 2019-01-03 PROCEDURE — 99999 PR PBB SHADOW E&M-EST. PATIENT-LVL V: ICD-10-PCS | Mod: PBBFAC,,, | Performed by: UROLOGY

## 2019-01-03 RX ORDER — SULFAMETHOXAZOLE AND TRIMETHOPRIM 800; 160 MG/1; MG/1
1 TABLET ORAL 2 TIMES DAILY
Qty: 14 TABLET | Refills: 0 | Status: SHIPPED | OUTPATIENT
Start: 2019-01-03 | End: 2019-01-10

## 2019-01-03 NOTE — PROGRESS NOTES
CHIEF COMPLAINT:    Mrs. Chino is a 68 y.o. female presenting for a concerns of a UTI    PRESENTING ILLNESS:    Suze Chino is a 68 y.o. female who states that she felt like she had a UTI on Dec 26 or 27.  She took over the counter Uristat and the symptoms of gross hematuria resolved but she still has suprapubic pressure and discomfort.  She denies malaise, fevers, chills or flank tenderness.      She is status post Macroplastique injection 1/24/2017 and states that she uses a panty liner (1 a day) and is satisfied with the results.      REVIEW OF SYSTEMS:    Review of Systems   Constitutional: Negative.    HENT: Negative.    Eyes: Negative.    Respiratory: Negative.    Cardiovascular: Negative.    Gastrointestinal: Negative.    Genitourinary:        Suprapubic pressure   Musculoskeletal: Positive for joint pain.   Skin: Negative.    Neurological: Negative.    Endo/Heme/Allergies: Negative.    Psychiatric/Behavioral: Negative.        PATIENT HISTORY:    Past Medical History:   Diagnosis Date    Anxiety     Cataract     Colon polyp     Depression     GERD (gastroesophageal reflux disease)     Glaucoma     History of colon polyps 2009    history as per patient    History of colonoscopy 2009    ok per pt    Hypertension     Hypothyroid     ?    Mitral valve prolapse     Peptic ulcer disease     with gi bleed    Prediabetes     Right knee DJD     Right knee DJD     Sleep apnea     TMJ (temporomandibular joint disorder)        Past Surgical History:   Procedure Laterality Date    BILATERAL SALPINGOOPHORECTOMY      COLONOSCOPY  2015    COLONOSCOPY N/A 5/12/2015    Performed by [facility]ay [facility] at [facility] ENDO    CYSTOSCOPY N/A 1/17/2017    [facility]    ESOPHAGOGASTRODUODENOSCOPY (EGD) Left 4/26/2018    Performed by Toi Pascual MD at [facility] ENDO    EYE SURGERY      laser sx    HYSTERECTOMY      INJECTION-BULKING AGENT URETERAL  OUTLET-MACROPLASTIQUE N/A 1/17/2017    [facility]formed [facility] at [facility] OR [facility]    SLT - OU - BOTH EYES      TRABECULECTOMY Right 4/2/14    OD (dr. grossman)    TRABECULECTOMY Right 4/2/2014    Performed by [facility] at [facility] OR [facility]    UPPER GASTROINTESTINAL ENDOSCOPY  2003       Family History   Problem Relation Age of Onset    Heart disease Father         before age 50    Diabetes Mother     Glaucoma Mother     Leukemia Mother     Cataracts Mother     Breast cancer Mother 60    Diabetes Sister     Glaucoma Sister     Diabetes Brother     Glaucoma Brother     Lung cancer Brother     Breast cancer Other 40        genetics -    Colon cancer Maternal Aunt      Socioeconomic History    Marital status:    Tobacco Use    Smoking status: Never Smoker    Smokeless tobacco: Never Used   Substance and Sexual Activity    Alcohol use: Yes     Comment: occasional    Drug use: No    Sexual activity: Not Currently       Allergies:  Betoptic [betaxolol] and Alphagan [brimonidine]    Medications:  Outpatient Encounter Medications as of 1/3/2019   Medication Sig Dispense Refill    ALPRAZolam (XANAX) 0.25 MG tablet Take 1 tablet (0.25 mg total) by mouth 3 (three) times daily as needed for Anxiety. 30 tablet 5    bimatoprost (LUMIGAN) 0.03 % ophthalmic drops PLACE 1 DROP INTO BOTH EYES EVERY EVENING. 2.5 mL 5    celecoxib (CELEBREX) 200 MG capsule Take 1 capsule (200 mg total) by mouth once daily. 30 capsule 5    DIPHENHYDRAMINE HCL (BENADRYL ALLERGY ORAL) Take 1 tablet by mouth as needed.      dorzolamide (TRUSOPT) 2 % ophthalmic solution Place 1 drop into both eyes 3 (three) times daily. 10 mL 6    estradiol (ESTRACE) 0.5 MG tablet Take 1 tablet (0.5 mg total) by mouth once daily. 30 tablet 11    fexofenadine (ALLEGRA) 180 MG tablet Take 180 mg by mouth daily as needed.       FLUoxetine (PROZAC) 20 MG capsule TAKE 3 CAPSULES BY MOUTH ONCE DAILY. 90 capsule 11     fluticasone (FLONASE) 50 mcg/actuation nasal spray Use 2 sprays to each nostril daily 16 g 12    hydroCHLOROthiazide (HYDRODIURIL) 25 MG tablet TAKE 1 TABLET EVERY DAY 90 tablet 0    losartan (COZAAR) 50 MG tablet Take 1 tablet (50 mg total) by mouth once daily. 90 tablet 3    metFORMIN (GLUCOPHAGE-XR) 500 MG 24 hr tablet TAKE 1 TABLET BY MOUTH 2 TIMES DAILY. 180 tablet 1    pantoprazole (PROTONIX) 40 MG tablet TAKE 1 TABLET BY MOUTH EVERY DAY 90 tablet 3    bimatoprost (LUMIGAN) 0.03 % ophthalmic drops PLACE 1 DROP INTO BOTH EYES EVERY EVENING. 2.5 mL 5    diazePAM (VALIUM) 5 MG tablet Take 1 tablet (5 mg total) by mouth On call Procedure (claustrophobia). Take one pill one hour before your MRI, may take the second at MRI if needed. 2 tablet 0    meclizine (ANTIVERT) 12.5 mg tablet Take 1 tablet (12.5 mg total) by mouth 3 (three) times daily as needed. 30 tablet 0     No facility-administered encounter medications on file as of 1/3/2019.          PHYSICAL EXAMINATION:    The patient generally appears in good health, is appropriately interactive, and is in no apparent distress.    Skin: No lesions.    Mental: Cooperative with normal affect.    Neuro: Grossly intact.    HEENT: Normal. No evidence of lymphadenopathy.    Chest:  normal inspiratory effort.    Abdomen:  Soft, non-tender. No masses or organomegaly. Bladder is not palpable. No evidence of flank discomfort. No evidence of inguinal hernia.    Extremities: No clubbing, cyanosis, or edema    Normal external female genitalia  Urethral meatus is normal  Urethra and bladder are nontender to bimanual exam  Well supported anteriorly and posteriorly   Uterus and cervix are surgically absent  No adnexal masses  PVR by catheterization was 20 ml    LABS:    Lab Results   Component Value Date    BUN 24 (H) 09/18/2018    CREATININE 1.2 09/18/2018     UA 1,015, pH 7, tr leuk, ++ nitrite, tr blood, otherwise, negative    IMPRESSION:    Encounter Diagnoses   Name  Primary?    Bladder infection Yes       PLAN:    1.  The catheterized specimen was sent for culture  2.  Bactrim prescribed empirically.  Discussed that we may need to change abx based on the culture results.

## 2019-01-06 LAB — BACTERIA UR CULT: NORMAL

## 2019-01-18 ENCOUNTER — OFFICE VISIT (OUTPATIENT)
Dept: FAMILY MEDICINE | Facility: CLINIC | Age: 69
End: 2019-01-18
Payer: MEDICARE

## 2019-01-18 VITALS
SYSTOLIC BLOOD PRESSURE: 123 MMHG | WEIGHT: 202 LBS | HEART RATE: 62 BPM | DIASTOLIC BLOOD PRESSURE: 76 MMHG | HEIGHT: 68 IN | BODY MASS INDEX: 30.62 KG/M2

## 2019-01-18 DIAGNOSIS — F32.0 MILD MAJOR DEPRESSION: ICD-10-CM

## 2019-01-18 DIAGNOSIS — F41.9 ANXIETY: ICD-10-CM

## 2019-01-18 DIAGNOSIS — M25.561 RIGHT KNEE PAIN, UNSPECIFIED CHRONICITY: ICD-10-CM

## 2019-01-18 DIAGNOSIS — I10 ESSENTIAL HYPERTENSION: Primary | ICD-10-CM

## 2019-01-18 DIAGNOSIS — M21.061 ACQUIRED VALGUS DEFORMITY KNEE, RIGHT: ICD-10-CM

## 2019-01-18 DIAGNOSIS — R73.03 PREDIABETES: ICD-10-CM

## 2019-01-18 DIAGNOSIS — M17.11 PRIMARY OSTEOARTHRITIS OF RIGHT KNEE: ICD-10-CM

## 2019-01-18 PROCEDURE — 99999 PR PBB SHADOW E&M-EST. PATIENT-LVL III: ICD-10-PCS | Mod: PBBFAC,,, | Performed by: FAMILY MEDICINE

## 2019-01-18 PROCEDURE — 99999 PR PBB SHADOW E&M-EST. PATIENT-LVL III: CPT | Mod: PBBFAC,,, | Performed by: FAMILY MEDICINE

## 2019-01-18 PROCEDURE — 99214 OFFICE O/P EST MOD 30 MIN: CPT | Mod: S$PBB,,, | Performed by: FAMILY MEDICINE

## 2019-01-18 PROCEDURE — 99214 PR OFFICE/OUTPT VISIT, EST, LEVL IV, 30-39 MIN: ICD-10-PCS | Mod: S$PBB,,, | Performed by: FAMILY MEDICINE

## 2019-01-18 PROCEDURE — 99213 OFFICE O/P EST LOW 20 MIN: CPT | Mod: PBBFAC,PO | Performed by: FAMILY MEDICINE

## 2019-01-18 RX ORDER — LOSARTAN POTASSIUM 50 MG/1
50 TABLET ORAL DAILY
Qty: 90 TABLET | Refills: 3 | Status: SHIPPED | OUTPATIENT
Start: 2019-01-18 | End: 2020-02-17

## 2019-01-18 RX ORDER — PANTOPRAZOLE SODIUM 40 MG/1
40 TABLET, DELAYED RELEASE ORAL DAILY
Qty: 90 TABLET | Refills: 3 | Status: SHIPPED | OUTPATIENT
Start: 2019-01-18 | End: 2020-02-17

## 2019-01-18 RX ORDER — HYDROCHLOROTHIAZIDE 25 MG/1
25 TABLET ORAL DAILY
Qty: 90 TABLET | Refills: 3 | Status: SHIPPED | OUTPATIENT
Start: 2019-01-18 | End: 2020-02-17

## 2019-01-18 RX ORDER — FLUOXETINE HYDROCHLORIDE 20 MG/1
60 CAPSULE ORAL DAILY
Qty: 90 CAPSULE | Refills: 11 | Status: SHIPPED | OUTPATIENT
Start: 2019-01-18 | End: 2019-09-03 | Stop reason: SDUPTHER

## 2019-01-18 RX ORDER — METFORMIN HYDROCHLORIDE 500 MG/1
500 TABLET, EXTENDED RELEASE ORAL 2 TIMES DAILY
Qty: 180 TABLET | Refills: 1 | Status: SHIPPED | OUTPATIENT
Start: 2019-01-18 | End: 2020-03-26

## 2019-01-18 RX ORDER — ALPRAZOLAM 0.25 MG/1
TABLET ORAL
Qty: 30 TABLET | OUTPATIENT
Start: 2019-01-18

## 2019-01-18 RX ORDER — CELECOXIB 200 MG/1
200 CAPSULE ORAL DAILY
Qty: 90 CAPSULE | Refills: 3 | Status: SHIPPED | OUTPATIENT
Start: 2019-01-18 | End: 2020-02-17

## 2019-01-18 RX ORDER — ALPRAZOLAM 0.25 MG/1
0.25 TABLET ORAL 3 TIMES DAILY PRN
Qty: 30 TABLET | Refills: 5 | Status: SHIPPED | OUTPATIENT
Start: 2019-01-18 | End: 2019-07-25 | Stop reason: SDUPTHER

## 2019-01-19 NOTE — PROGRESS NOTES
Patient presents follow-up.  Blood pressure controlled.  Needs refill on medications.  Arthritis controlled Celebrex.  Depression and anxiety doing well.  No SI/HI.  Prediabetes most recent laboratory reviewed.    Past Medical History:  Past Medical History:   Diagnosis Date    Anxiety     Cataract     Colon polyp     Depression     GERD (gastroesophageal reflux disease)     Glaucoma     History of colon polyps 2009    history as per patient    History of colonoscopy 2009    ok per pt    Hypertension     Hypothyroid     ?    Mitral valve prolapse     Peptic ulcer disease     with gi bleed    Prediabetes     Right knee DJD     Right knee DJD     Sleep apnea     TMJ (temporomandibular joint disorder)      Past Surgical History:   Procedure Laterality Date    BILATERAL SALPINGOOPHORECTOMY      COLONOSCOPY  2015    COLONOSCOPY N/A 5/12/2015    Performed by [facility] at [facility] ENDO    CYSTOSCOPY N/A 1/17/2017    Performed by [facility] at [facility] OR [address] [place] [address]    ESOPHAGOGASTRODUODENOSCOPY (EGD) Left 4/26/2018    Performed by Toi [facility]ascual MD at [facility] ENDO    EYE SURGERY      laser sx    HYSTERECTOMY      INJECTION-BULKING AGENT URETERAL OUTLET-MACROPLASTIQUE N/A 1/17/2017    Performed by [facility] at [facility] OR [address] [place] [address]    SLT - OU - BOTH EYES      TRABECULECTOMY Right 4/2/14    OD (dr. grossman)    TRABECULECTOMY Right 4/2/2014    Performed by Daja [facility]man MD at [facility] OR [address] [place] [address]    UPPER GASTROINTESTINAL ENDOSCOPY  2003     Social History     Socioeconomic History    Marital status:      Spouse name: Not on file    Number of children: Not on file    Years of education: Not on file    Highest education level: Not on file   Social Needs    Financial resource strain: Not on file    Food insecurity - worry: Not on file    Food insecurity - inability: Not on file    Transportation needs - medical: Not on file    Transportation needs -  non-medical: Not on file   Occupational History    Not on file   Tobacco Use    Smoking status: Never Smoker    Smokeless tobacco: Never Used   Substance and Sexual Activity    Alcohol use: Yes     Comment: occasional    Drug use: No    Sexual activity: Not Currently   Other Topics Concern    Not on file   Social History Narrative    Not on file     Family History   Problem Relation Age of Onset    Heart disease Father         before age 50    Diabetes Mother     Glaucoma Mother     Leukemia Mother     Cataracts Mother     Breast cancer Mother 60    Diabetes Sister     Glaucoma Sister     Diabetes Brother     Glaucoma Brother     Lung cancer Brother     Breast cancer Other 40        genetics -    Colon cancer Maternal Aunt      Review of patient's allergies indicates:   Allergen Reactions    Betoptic [betaxolol] Other (See Comments)     Fatigue, slowed heart rate.    Alphagan [brimonidine] Dermatitis     Current Outpatient Medications on File Prior to Visit   Medication Sig Dispense Refill    bimatoprost (LUMIGAN) 0.03 % ophthalmic drops PLACE 1 DROP INTO BOTH EYES EVERY EVENING. 2.5 mL 5    bimatoprost (LUMIGAN) 0.03 % ophthalmic drops PLACE 1 DROP INTO BOTH EYES EVERY EVENING. 2.5 mL 5    DIPHENHYDRAMINE HCL (BENADRYL ALLERGY ORAL) Take 1 tablet by mouth as needed.      dorzolamide (TRUSOPT) 2 % ophthalmic solution Place 1 drop into both eyes 3 (three) times daily. 10 mL 6    estradiol (ESTRACE) 0.5 MG tablet Take 1 tablet (0.5 mg total) by mouth once daily. 30 tablet 11    fexofenadine (ALLEGRA) 180 MG tablet Take 180 mg by mouth daily as needed.       fluticasone (FLONASE) 50 mcg/actuation nasal spray Use 2 sprays to each nostril daily 16 g 12    [DISCONTINUED] ALPRAZolam (XANAX) 0.25 MG tablet Take 1 tablet (0.25 mg total) by mouth 3 (three) times daily as needed for Anxiety. 30 tablet 5    [DISCONTINUED] celecoxib (CELEBREX) 200 MG capsule Take 1 capsule (200 mg total) by mouth  "once daily. 30 capsule 5    [DISCONTINUED] FLUoxetine (PROZAC) 20 MG capsule TAKE 3 CAPSULES BY MOUTH ONCE DAILY. 90 capsule 11    [DISCONTINUED] hydroCHLOROthiazide (HYDRODIURIL) 25 MG tablet TAKE 1 TABLET EVERY DAY 90 tablet 0    [DISCONTINUED] losartan (COZAAR) 50 MG tablet Take 1 tablet (50 mg total) by mouth once daily. 90 tablet 3    [DISCONTINUED] metFORMIN (GLUCOPHAGE-XR) 500 MG 24 hr tablet TAKE 1 TABLET BY MOUTH 2 TIMES DAILY. 180 tablet 1    [DISCONTINUED] pantoprazole (PROTONIX) 40 MG tablet TAKE 1 TABLET BY MOUTH EVERY DAY 90 tablet 3    meclizine (ANTIVERT) 12.5 mg tablet Take 1 tablet (12.5 mg total) by mouth 3 (three) times daily as needed. 30 tablet 0    [DISCONTINUED] diazePAM (VALIUM) 5 MG tablet Take 1 tablet (5 mg total) by mouth On call Procedure (claustrophobia). Take one pill one hour before your MRI, may take the second at MRI if needed. 2 tablet 0     No current facility-administered medications on file prior to visit.            ROS:  GENERAL: No fever, chills,  or significant weight changes.   CARDIOVASCULAR: Denies chest pain, PND, orthopnea or reduced exercise tolerance.  ABDOMEN: Appetite fine. Denies diarrhea, abdominal pain, hematemesis or blood in stool.  URINARY: No flank pain, dysuria or hematuria.      OBJECTIVE:     Vitals:    01/18/19 1558   BP: 123/76   Pulse: 62   Weight: 91.6 kg (202 lb)   Height: 5' 8" (1.727 m)     Wt Readings from Last 3 Encounters:   01/18/19 91.6 kg (202 lb)   01/03/19 91.5 kg (201 lb 11.5 oz)   07/12/18 90.3 kg (199 lb 1.6 oz)     APPEARANCE: Well nourished, well developed, in no acute distress.    HEAD: Normocephalic.  Atraumatic.  No sinus tenderness.  EYES:   Right eye: Pupil reactive.  Conjunctiva clear.    Left eye: Pupil reactive.  Conjunctiva clear.    Both fundi:  Grossly normal to nondilated exam. EOMI.    EARS: TM's intact. Light reflex normal. No retraction or perforation.    NOSE:  clear.  MOUTH & THROAT:  No pharyngeal erythema or " exudate. No lesions.  NECK: Supple. No bruits.  No JVD.  No cervical lymphadenopathy.  No thyromegaly.    CHEST: Breath sounds clear bilaterally.  Normal respiratory effort  CARDIOVASCULAR: Normal rate.  Regular rhythm.  No murmurs.  No rub.  No gallops.  ABDOMEN: Bowel sounds normal.  Soft.  No tenderness.  No organomegaly.  PERIPHERAL VASCULAR: No cyanosis.  No clubbing.  No edema.  NEUROLOGIC: No focal findings.  MENTAL STATUS: Alert.  Oriented x 3.        Suze was seen today for medication refill.    Diagnoses and all orders for this visit:    Essential hypertension    Right knee pain, unspecified chronicity  -     celecoxib (CELEBREX) 200 MG capsule; Take 1 capsule (200 mg total) by mouth once daily.    Primary osteoarthritis of right knee  -     celecoxib (CELEBREX) 200 MG capsule; Take 1 capsule (200 mg total) by mouth once daily.    Acquired valgus deformity knee, right  -     celecoxib (CELEBREX) 200 MG capsule; Take 1 capsule (200 mg total) by mouth once daily.    Mild major depression    Anxiety    Prediabetes    Other orders  -     ALPRAZolam (XANAX) 0.25 MG tablet; Take 1 tablet (0.25 mg total) by mouth 3 (three) times daily as needed for Anxiety.  -     FLUoxetine 20 MG capsule; Take 3 capsules (60 mg total) by mouth once daily.  -     losartan (COZAAR) 50 MG tablet; Take 1 tablet (50 mg total) by mouth once daily.  -     metFORMIN (GLUCOPHAGE-XR) 500 MG 24 hr tablet; Take 1 tablet (500 mg total) by mouth 2 (two) times daily.  -     pantoprazole (PROTONIX) 40 MG tablet; Take 1 tablet (40 mg total) by mouth once daily.  -     hydroCHLOROthiazide (HYDRODIURIL) 25 MG tablet; Take 1 tablet (25 mg total) by mouth once daily.      Continue current medication.  Reviewed most recent laboratory.  Follow-up laboratory as scheduled

## 2019-01-31 ENCOUNTER — TELEPHONE (OUTPATIENT)
Dept: OPHTHALMOLOGY | Facility: CLINIC | Age: 69
End: 2019-01-31

## 2019-01-31 NOTE — TELEPHONE ENCOUNTER
RECEIVED A FAX FROM [employer]/ [facility] FOR HER BIMATROPROST 0.3%   PA REF # [id number]  PHONE [phone]

## 2019-02-05 ENCOUNTER — TELEPHONE (OUTPATIENT)
Dept: OPHTHALMOLOGY | Facility: CLINIC | Age: 69
End: 2019-02-05

## 2019-02-12 ENCOUNTER — APPOINTMENT (OUTPATIENT)
Dept: OPHTHALMOLOGY | Facility: CLINIC | Age: 69
End: 2019-02-12
Payer: MEDICARE

## 2019-02-12 ENCOUNTER — OFFICE VISIT (OUTPATIENT)
Dept: OPHTHALMOLOGY | Facility: CLINIC | Age: 69
End: 2019-02-12
Payer: MEDICARE

## 2019-02-12 DIAGNOSIS — H25.12 NUCLEAR SENILE CATARACT OF LEFT EYE: ICD-10-CM

## 2019-02-12 DIAGNOSIS — H25.11 SENILE NUCLEAR SCLEROSIS, RIGHT: ICD-10-CM

## 2019-02-12 DIAGNOSIS — H43.813 PVD (POSTERIOR VITREOUS DETACHMENT), BILATERAL: ICD-10-CM

## 2019-02-12 DIAGNOSIS — H40.1133 PRIMARY OPEN ANGLE GLAUCOMA OF BOTH EYES, SEVERE STAGE: Primary | ICD-10-CM

## 2019-02-12 PROCEDURE — 92083 HUMPHREY VISUAL FIELD - OU - BOTH EYES: ICD-10-PCS | Mod: 26,S$PBB,, | Performed by: OPHTHALMOLOGY

## 2019-02-12 PROCEDURE — 92014 COMPRE OPH EXAM EST PT 1/>: CPT | Mod: S$PBB,,, | Performed by: OPHTHALMOLOGY

## 2019-02-12 PROCEDURE — 92133 POSTERIOR SEGMENT OCT OPTIC NERVE(OCULAR COHERENCE TOMOGRAPHY) - OU - BOTH EYES: ICD-10-PCS | Mod: 26,S$PBB,, | Performed by: OPHTHALMOLOGY

## 2019-02-12 PROCEDURE — 99999 PR PBB SHADOW E&M-EST. PATIENT-LVL I: CPT | Mod: PBBFAC,,, | Performed by: OPHTHALMOLOGY

## 2019-02-12 PROCEDURE — 92083 EXTENDED VISUAL FIELD XM: CPT | Mod: PBBFAC,PN | Performed by: OPHTHALMOLOGY

## 2019-02-12 PROCEDURE — 99211 OFF/OP EST MAY X REQ PHY/QHP: CPT | Mod: PBBFAC,PN | Performed by: OPHTHALMOLOGY

## 2019-02-12 PROCEDURE — 99999 PR PBB SHADOW E&M-EST. PATIENT-LVL I: ICD-10-PCS | Mod: PBBFAC,,, | Performed by: OPHTHALMOLOGY

## 2019-02-12 PROCEDURE — 92133 CPTRZD OPH DX IMG PST SGM ON: CPT | Mod: PBBFAC,PN | Performed by: OPHTHALMOLOGY

## 2019-02-12 PROCEDURE — 92014 PR EYE EXAM, EST PATIENT,COMPREHESV: ICD-10-PCS | Mod: S$PBB,,, | Performed by: OPHTHALMOLOGY

## 2019-02-12 NOTE — PROGRESS NOTES
HPI     Glaucoma      Additional comments: 4 week HVF, GOCT, REFRACT&GLARE, and DOA              Comments     The patient states her eyes are unchanged and she denies any ocular pain   at this time.  90% drop compliance    Referred by Dr. Lorenzana  Bleb Needling with [facility]. OD 9/17/15  LTG   H/O Non-compliance   Intolerant of Coag Drops    Stopped Alphagan OS TID because of dermatitis  Stopped Betoptic bid ou due to slow pulse rate and feeling tired.  Trab OD     repeat SLT od done 8/9/2012 - good initial response IOP 18 to 13, SLT OD   08/17/17  Primary SLT done os 9/13/2012 - minimal response 18 to 16  SLT os 9/29/2016  LATANOPROST slows pulse rate    OU LUMIGAN 0.03% QHS, DORZOLAMIDE BID          Last edited by Ayaka Escobedo on 2/12/2019  1:29 PM. (History)            Assessment /Plan     For exam results, see Encounter Report.      ICD-10-CM ICD-9-CM    1. Primary open angle glaucoma of both eyes, severe stage H40.1133 365.11 Arvizu Visual Field - OU - Extended - Both Eyes     365.73 Posterior Segment OCT Optic Nerve- Both eyes    IOP not within acceptable range right eye relative to target IOP with risk of irreversible visual loss. Additional treatment required.  Discussed options, risks, and benefits of additional medication, SLT laser, or incisional glaucoma surgery.     recommend add rhopressa qd OD     Patient chooses above     Reviewed importance of continued compliance with treatment and follow up.      2. Senile nuclear sclerosis, right H25.11 366.16   You were found to have an early cataract in your eye(s) today, however the cataract is not affecting your activities of daily living, such as reading and driving.You do not need  surgery at this time. We will recheck your cataract at your next visit. You are welcome to call for an earlier appointment if your vision gets worse.      3. Nuclear senile cataract of left eye H25.12 366.16 Stable as above. Follow    4. PVD (posterior vitreous detachment),  bilateral H43.813 379.21 Stable, follow        Add rhopressa qd OD   OU LUMIGAN 0.03% QHS, DORZOLAMIDE BID     Return to clinic 2-3 weeks with IOP check

## 2019-04-04 ENCOUNTER — LAB VISIT (OUTPATIENT)
Dept: LAB | Facility: HOSPITAL | Age: 69
End: 2019-04-04
Attending: INTERNAL MEDICINE
Payer: MEDICARE

## 2019-04-04 ENCOUNTER — OFFICE VISIT (OUTPATIENT)
Dept: ENDOCRINOLOGY | Facility: CLINIC | Age: 69
End: 2019-04-04
Payer: MEDICARE

## 2019-04-04 VITALS
WEIGHT: 204.38 LBS | DIASTOLIC BLOOD PRESSURE: 78 MMHG | SYSTOLIC BLOOD PRESSURE: 142 MMHG | BODY MASS INDEX: 30.98 KG/M2 | OXYGEN SATURATION: 99 % | HEIGHT: 68 IN | HEART RATE: 69 BPM

## 2019-04-04 DIAGNOSIS — E21.3 HYPERPARATHYROIDISM: Primary | ICD-10-CM

## 2019-04-04 DIAGNOSIS — E55.9 VITAMIN D DEFICIENCY: ICD-10-CM

## 2019-04-04 DIAGNOSIS — I10 ESSENTIAL HYPERTENSION: ICD-10-CM

## 2019-04-04 DIAGNOSIS — R73.03 PREDIABETES: ICD-10-CM

## 2019-04-04 DIAGNOSIS — E03.9 ACQUIRED HYPOTHYROIDISM: ICD-10-CM

## 2019-04-04 DIAGNOSIS — E78.2 MIXED HYPERLIPIDEMIA: ICD-10-CM

## 2019-04-04 DIAGNOSIS — E21.3 HYPERPARATHYROIDISM: ICD-10-CM

## 2019-04-04 LAB
25(OH)D3+25(OH)D2 SERPL-MCNC: 8 NG/ML (ref 30–96)
ALBUMIN SERPL BCP-MCNC: 3.8 G/DL (ref 3.5–5.2)
ALP SERPL-CCNC: 118 U/L (ref 55–135)
ALP SERPL-CCNC: 118 U/L (ref 55–135)
ALT SERPL W/O P-5'-P-CCNC: 17 U/L (ref 10–44)
ANION GAP SERPL CALC-SCNC: 6 MMOL/L (ref 8–16)
AST SERPL-CCNC: 24 U/L (ref 10–40)
BILIRUB DIRECT SERPL-MCNC: 0.2 MG/DL (ref 0.1–0.3)
BILIRUB SERPL-MCNC: 0.6 MG/DL (ref 0.1–1)
BUN SERPL-MCNC: 19 MG/DL (ref 8–23)
CALCIUM SERPL-MCNC: 10.3 MG/DL (ref 8.7–10.5)
CHLORIDE SERPL-SCNC: 102 MMOL/L (ref 95–110)
CO2 SERPL-SCNC: 31 MMOL/L (ref 23–29)
CREAT SERPL-MCNC: 1.3 MG/DL (ref 0.5–1.4)
EST. GFR  (AFRICAN AMERICAN): 48.7 ML/MIN/1.73 M^2
EST. GFR  (NON AFRICAN AMERICAN): 42.3 ML/MIN/1.73 M^2
ESTIMATED AVG GLUCOSE: 120 MG/DL (ref 68–131)
GLUCOSE SERPL-MCNC: 91 MG/DL (ref 70–110)
HBA1C MFR BLD HPLC: 5.8 % (ref 4–5.6)
PHOSPHATE SERPL-MCNC: 3 MG/DL (ref 2.7–4.5)
PHOSPHATE SERPL-MCNC: 3 MG/DL (ref 2.7–4.5)
POTASSIUM SERPL-SCNC: 4.1 MMOL/L (ref 3.5–5.1)
PROT SERPL-MCNC: 7.3 G/DL (ref 6–8.4)
PTH-INTACT SERPL-MCNC: 151 PG/ML (ref 9–77)
SODIUM SERPL-SCNC: 139 MMOL/L (ref 136–145)
T3FREE SERPL-MCNC: 2 PG/ML (ref 2.3–4.2)
T4 FREE SERPL-MCNC: 0.86 NG/DL (ref 0.71–1.51)
TSH SERPL DL<=0.005 MIU/L-ACNC: 1.94 UIU/ML (ref 0.4–4)

## 2019-04-04 PROCEDURE — 36415 COLL VENOUS BLD VENIPUNCTURE: CPT

## 2019-04-04 PROCEDURE — 99999 PR PBB SHADOW E&M-EST. PATIENT-LVL III: ICD-10-PCS | Mod: PBBFAC,,, | Performed by: INTERNAL MEDICINE

## 2019-04-04 PROCEDURE — 82247 BILIRUBIN TOTAL: CPT

## 2019-04-04 PROCEDURE — 84075 ASSAY ALKALINE PHOSPHATASE: CPT

## 2019-04-04 PROCEDURE — 84439 ASSAY OF FREE THYROXINE: CPT

## 2019-04-04 PROCEDURE — 83970 ASSAY OF PARATHORMONE: CPT

## 2019-04-04 PROCEDURE — 99204 OFFICE O/P NEW MOD 45 MIN: CPT | Mod: S$PBB,,, | Performed by: INTERNAL MEDICINE

## 2019-04-04 PROCEDURE — 82306 VITAMIN D 25 HYDROXY: CPT

## 2019-04-04 PROCEDURE — 99999 PR PBB SHADOW E&M-EST. PATIENT-LVL III: CPT | Mod: PBBFAC,,, | Performed by: INTERNAL MEDICINE

## 2019-04-04 PROCEDURE — 80069 RENAL FUNCTION PANEL: CPT

## 2019-04-04 PROCEDURE — 86800 THYROGLOBULIN ANTIBODY: CPT

## 2019-04-04 PROCEDURE — 99213 OFFICE O/P EST LOW 20 MIN: CPT | Mod: PBBFAC,PN | Performed by: INTERNAL MEDICINE

## 2019-04-04 PROCEDURE — 83036 HEMOGLOBIN GLYCOSYLATED A1C: CPT

## 2019-04-04 PROCEDURE — 99204 PR OFFICE/OUTPT VISIT, NEW, LEVL IV, 45-59 MIN: ICD-10-PCS | Mod: S$PBB,,, | Performed by: INTERNAL MEDICINE

## 2019-04-04 PROCEDURE — 86376 MICROSOMAL ANTIBODY EACH: CPT

## 2019-04-04 PROCEDURE — 84481 FREE ASSAY (FT-3): CPT

## 2019-04-04 PROCEDURE — 84443 ASSAY THYROID STIM HORMONE: CPT

## 2019-04-04 NOTE — PATIENT INSTRUCTIONS
Understanding Primary Hyperparathyroidism    The parathyroid glands are 4 tiny glands in the neck. They make parathyroid hormone (PTH). PTH controls the amount of calcium and phosphorus in your blood. Primary hyperparathyroidism is when there is too much PTH in your blood. It occurs when one or more of the glands are too active.  The job of PTH is to tell the body how to control calcium. Too much PTH means the body increases the amount of calcium in the blood. This leads to a problem called hypercalcemia. This is when the amount of calcium in the blood is too high. Hypercalcemia can cause serious health problems.  Causes  Hyperparathyroidism can occur when a parathyroid gland becomes enlarged. It can also occur as a complication of another health conditions, such as kidney failure or rickets. In these conditions, calcium is usually not high. This is called secondary hyperparathyroidism.  Whos at risk  The risk factors for this condition include:  · Being a woman (its less common in men)  · Being older (its more likely to occur with age)  · Having parents or siblings with the condition or other endocrine tumors  · Having certain kidney problems  · Taking certain medicines  · Having had radiation treatment in the head or neck  Symptoms  Symptoms of the condition can include:  · Muscle weakness  · Depression  · Tiredness  · Confusion and memory loss  · Poor memory  · Nausea and vomiting  · Pain in the stomach area (abdomen)  · Hard stools (constipation)  · Stomach ulcers  · Need to urinate often  · Kidney stones  · Joint or bone pain  · Bone disease (osteopenia or osteoporosis), an increase in bone fractures  · High blood pressure  · Increased thirst  Treatment  If primary hyperparathyroidism is not treated, it can get worse over time. Treatments include:  · Surgery. This may be done to remove any enlarged parathyroid glands. This lets the amount of calcium in the blood go back to normal. You may need to take  vitamin D and calcium supplements before the surgery. This will reduce the risk of low calcium after the surgery.   · Medicine. This lowers the amount of parathyroid hormone made by the overactive glands.   You and your healthcare provider can discuss your treatment options. Make sure to ask any questions you have.  Date Last Reviewed: 11/1/2016 © 2000-2017 [employer]. [address] [place] [address]. All rights reserved. This information is not intended as a substitute for professional medical care. Always follow your healthcare professional's instructions.        Understanding Primary Hyperparathyroidism    The parathyroid glands are 4 tiny glands in the neck. They make parathyroid hormone (PTH). PTH controls the amount of calcium and phosphorus in your blood. Primary hyperparathyroidism is when there is too much PTH in your blood. It occurs when one or more of the glands are too active.  The job of PTH is to tell the body how to control calcium. Too much PTH means the body increases the amount of calcium in the blood. This leads to a problem called hypercalcemia. This is when the amount of calcium in the blood is too high. Hypercalcemia can cause serious health problems.  Causes  Hyperparathyroidism can occur when a parathyroid gland becomes enlarged. It can also occur as a complication of another health conditions, such as kidney failure or rickets. In these conditions, calcium is usually not high. This is called secondary hyperparathyroidism.  Whos at risk  The risk factors for this condition include:  · Being a woman (its less common in men)  · Being older (its more likely to occur with age)  · Having parents or siblings with the condition or other endocrine tumors  · Having certain kidney problems  · Taking certain medicines  · Having had radiation treatment in the head or neck  Symptoms  Symptoms of the condition can include:  · Muscle  weakness  · Depression  · Tiredness  · Confusion and memory loss  · Poor memory  · Nausea and vomiting  · Pain in the stomach area (abdomen)  · Hard stools (constipation)  · Stomach ulcers  · Need to urinate often  · Kidney stones  · Joint or bone pain  · Bone disease (osteopenia or osteoporosis), an increase in bone fractures  · High blood pressure  · Increased thirst  Treatment  If primary hyperparathyroidism is not treated, it can get worse over time. Treatments include:  · Surgery. This may be done to remove any enlarged parathyroid glands. This lets the amount of calcium in the blood go back to normal. You may need to take vitamin D and calcium supplements before the surgery. This will reduce the risk of low calcium after the surgery.   · Medicine. This lowers the amount of parathyroid hormone made by the overactive glands.   You and your healthcare provider can discuss your treatment options. Make sure to ask any questions you have.  Date Last Reviewed: 11/1/2016  © 2856-3786 [employer]. [address] [place] [address], [place] [address]. All rights reserved. This information is not intended as a substitute for professional medical care. Always follow your healthcare professional's instructions.

## 2019-04-04 NOTE — PROGRESS NOTES
""This note will be shared with the patient"Subjective:       Patient ID: Suze Chino is a 68 y.o. female.  Patient is new to me    Records were reviewed      Chief Complaint: Establish Care      Consultation requested by Dr. Cody Ann    HPI    In review of notes by her PCP Dr. ann back in September 2018 it was noted that her PTH was elevated and at the time her vitamin D was low at 12.  Her calcium was in the normal to high normal range but when I look further back in epic she has had a mild hypercalcemia in the past.  It was recommended at the time for her to see an endocrinologist however patient wanted to find someone locally as she lives in [facility]    Bone density:  Last 1 on file in epic was 2014 and it was normal  Kidney stones:no  Fracture :no  Not on calcium or vitamin    Has arthritis- knee pain    At least the last 2 times her GFR was slightly below 60    Has hypertension  She also wants to discuss and have evaluated her other endocrine conditions including pre diabetes and has history of thyroid issues  She has prediabetes that seems to be under good control as her last A1c was 5.8 and she takes metformin.  However she admits she has not been following a healthy diet or exercising.  She does have fatigue.  Also has hyperlipidemia    She at times in the past had elevation of TSH but is not currently on thyroid medication-she had been on Armor Thyroid about 2 years ago    She complains of some hair loss as well and weight gain  I have reviewed the past medical, family and social history    Review of Systems   Constitutional: Positive for fatigue and unexpected weight change. Negative for appetite change and fever.   HENT: Negative for sore throat and trouble swallowing.    Eyes: Negative for visual disturbance.   Respiratory: Negative for shortness of breath and wheezing.    Cardiovascular: Negative for chest pain, palpitations and leg swelling.   Gastrointestinal: Negative for " diarrhea, nausea and vomiting.   Endocrine: Negative for cold intolerance, heat intolerance, polydipsia, polyphagia and polyuria.   Genitourinary: Negative for difficulty urinating, dysuria and menstrual problem.   Musculoskeletal: Positive for arthralgias. Negative for joint swelling.   Skin: Negative for rash.   Neurological: Negative for dizziness, weakness, numbness and headaches.   Psychiatric/Behavioral: Negative for confusion, dysphoric mood and sleep disturbance.       Objective:      Physical Exam   Constitutional: She is oriented to person, place, and time. She appears well-developed and well-nourished. No distress.   HENT:   Head: Normocephalic and atraumatic.   Right Ear: External ear normal.   Left Ear: External ear normal.   Nose: Nose normal.   Mouth/Throat: Oropharynx is clear and moist. No oropharyngeal exudate.   Eyes: Pupils are equal, round, and reactive to light. Conjunctivae and EOM are normal. No scleral icterus.   Neck: No JVD present. No tracheal deviation present. No thyromegaly present.   Cardiovascular: Normal rate, regular rhythm, normal heart sounds and intact distal pulses. Exam reveals no gallop and no friction rub.   No murmur heard.  Pulmonary/Chest: Effort normal and breath sounds normal. No respiratory distress. She has no wheezes. She has no rales.   Abdominal: Soft. Bowel sounds are normal. She exhibits no distension and no mass. There is no tenderness. There is no rebound and no guarding. No hernia.   Musculoskeletal: She exhibits no edema or deformity.   Lymphadenopathy:     She has no cervical adenopathy.   Neurological: She is alert and oriented to person, place, and time. She has normal reflexes. No cranial nerve deficit.   Skin: Skin is warm. No rash noted. She is not diaphoretic. No erythema.   Psychiatric: She has a normal mood and affect. Her behavior is normal.   Vitals reviewed.        Lab Review:   Office Visit on 01/03/2019   Component Date Value    Urine Culture,  Routine 01/03/2019                      Value:ESCHERICHIA COLI  >100,000 cfu/ml         Assessment:     1. Hyperparathyroidism  Renal function panel    PTH, intact    Vitamin D    Phosphorus    Albumin    Alkaline phosphatase    DXA Bone Density Spine And Hip    Calcium, Timed Urine    Creatinine, urine, timed 24 Hours   2. Vitamin D deficiency  Renal function panel    PTH, intact    Vitamin D    Phosphorus    Albumin    Alkaline phosphatase    DXA Bone Density Spine And Hip   3. Prediabetes  Hemoglobin A1c    Hepatic function panel   4. Acquired hypothyroidism  TSH    T4, free    T3, free   5. Essential hypertension     6. Mixed hyperlipidemia      I explained to patient the pathophysiology and management of hyperparathyroidism.  Her PTH is elevated likely related to primary hyperparathyroidism as her calcium has at times been elevated.  She does have low vitamin D but this can be seen in primary hyperparathyroidism, she could also have some secondary hyperparathyroidism because of the vitamin-D deficiency.    I will re-evaluate her bone density and check 24 urine calcium.    In regards to her pre diabetes I explained to patient that she has multiple cardiovascular risk factors and it is important to abduct the healthy lifestyle and work on portion control and exercise.  Continue metformin.  I did discuss possibly adding Trulicity which has a positive side effect of helping with appetite suppression and weight loss but she is reluctant because of potential side effects    She states she has tried statins for hyperlipidemia but had side effects    I will re-evaluate her thyroid function to see if she needs to be back on thyroid medication    Discussed small amount of weight loss to help with her cardiovascular risk factors-5-10%  Plan:   Hyperparathyroidism  -     Renal function panel; Future; Expected date: 04/04/2019  -     PTH, intact; Future; Expected date: 04/04/2019  -     Vitamin D; Future; Expected date:  04/04/2019  -     Phosphorus; Future; Expected date: 04/04/2019  -     Albumin; Future; Expected date: 04/04/2019  -     Alkaline phosphatase; Future; Expected date: 04/04/2019  -     DXA Bone Density Spine And Hip; Future; Expected date: 04/04/2019  -     Calcium, Timed Urine; Future  -     Creatinine, urine, timed 24 Hours; Future    Vitamin D deficiency  -     Renal function panel; Future; Expected date: 04/04/2019  -     PTH, intact; Future; Expected date: 04/04/2019  -     Vitamin D; Future; Expected date: 04/04/2019  -     Phosphorus; Future; Expected date: 04/04/2019  -     Albumin; Future; Expected date: 04/04/2019  -     Alkaline phosphatase; Future; Expected date: 04/04/2019  -     DXA Bone Density Spine And Hip; Future; Expected date: 04/04/2019    Prediabetes  -     Hemoglobin A1c; Future; Expected date: 04/04/2019  -     Hepatic function panel; Future; Expected date: 04/04/2019    Acquired hypothyroidism  -     TSH; Future; Expected date: 04/04/2019  -     T4, free; Future; Expected date: 04/04/2019  -     T3, free; Future; Expected date: 04/04/2019    Essential hypertension    Mixed hyperlipidemia          No follow-ups on file.

## 2019-04-04 NOTE — LETTER
April 4, 2019      Cody Lewis MD  [address]  [place] [address]           [facility]  [address] [address] [place] [address]  Phone: [phone]  Fax: [phone]          Patient: Suze Chino   MR Number: [id number]   YOB: 1950   Date of Visit: 4/4/2019       Dear Dr. Cody Lewis:    Thank you for referring Suze Chino to me for evaluation. Attached you will find relevant portions of my assessment and plan of care.    If you have questions, please do not hesitate to call me. I look forward to following Suze Chino along with you.    Sincerely,    Silvana Benitez MD    Enclosure  CC:  No Recipients    If you would like to receive this communication electronically, please contact [email or web address] or [phone] to request more information on [employer] access.    For providers and/or their staff who would like to refer a patient to [facility], please contact us through our one-stop-shop provider referral line, [facility] , at [phone].    If you feel you have received this communication in error or would no longer like to receive these types of communications, please e-mail [email or web address]

## 2019-04-05 LAB
THYROGLOB AB SERPL IA-ACNC: <4 IU/ML (ref 0–3.9)
THYROPEROXIDASE IGG SERPL-ACNC: <6 IU/ML

## 2019-04-11 ENCOUNTER — HOSPITAL ENCOUNTER (OUTPATIENT)
Dept: RADIOLOGY | Facility: HOSPITAL | Age: 69
Discharge: HOME | End: 2019-04-11
Attending: INTERNAL MEDICINE
Payer: MEDICARE

## 2019-04-11 DIAGNOSIS — E21.3 HYPERPARATHYROIDISM: ICD-10-CM

## 2019-04-11 DIAGNOSIS — E55.9 VITAMIN D DEFICIENCY: ICD-10-CM

## 2019-04-11 PROCEDURE — 77080 DXA BONE DENSITY AXIAL: CPT | Mod: 26,,, | Performed by: RADIOLOGY

## 2019-04-11 PROCEDURE — 77080 DEXA BONE DENSITY SPINE HIP: ICD-10-PCS | Mod: 26,,, | Performed by: RADIOLOGY

## 2019-04-11 PROCEDURE — 77080 DXA BONE DENSITY AXIAL: CPT | Mod: TC,PO

## 2019-04-22 ENCOUNTER — PATIENT MESSAGE (OUTPATIENT)
Dept: ENDOCRINOLOGY | Facility: CLINIC | Age: 69
End: 2019-04-22

## 2019-04-24 ENCOUNTER — LAB VISIT (OUTPATIENT)
Dept: LAB | Facility: HOSPITAL | Age: 69
End: 2019-04-24
Attending: INTERNAL MEDICINE
Payer: MEDICARE

## 2019-04-24 DIAGNOSIS — E21.0 PRIMARY HYPERPARATHYROIDISM: Primary | ICD-10-CM

## 2019-04-24 DIAGNOSIS — E21.0 PRIMARY HYPERPARATHYROIDISM: ICD-10-CM

## 2019-04-24 LAB
CALCIUM 24H UR-MRATE: NORMAL MG/HR (ref 4–12)
CALCIUM UR-MCNC: <1 MG/DL (ref 0–15)
CALCIUM URINE (MG/SPEC): NORMAL MG/SPEC
CREAT 24H UR-MRATE: 49.7 MG/HR (ref 40–75)
CREAT UR-MCNC: 62 MG/DL (ref 15–325)
CREATININE, URINE (MG/SPEC): 1193.5 MG/SPEC
URINE COLLECTION DURATION: 24 HR
URINE COLLECTION DURATION: 24 HR
URINE VOLUME: 1925 ML
URINE VOLUME: 1925 ML

## 2019-04-24 PROCEDURE — 82570 ASSAY OF URINE CREATININE: CPT

## 2019-04-24 PROCEDURE — 82340 ASSAY OF CALCIUM IN URINE: CPT

## 2019-05-17 ENCOUNTER — OFFICE VISIT (OUTPATIENT)
Dept: OPHTHALMOLOGY | Facility: CLINIC | Age: 69
End: 2019-05-17
Payer: MEDICARE

## 2019-05-17 DIAGNOSIS — Z91.148 NONCOMPLIANCE WITH MEDICATION REGIMEN: ICD-10-CM

## 2019-05-17 DIAGNOSIS — H25.13 NUCLEAR SCLEROSIS, BILATERAL: ICD-10-CM

## 2019-05-17 DIAGNOSIS — H52.7 REFRACTION DISORDER: ICD-10-CM

## 2019-05-17 DIAGNOSIS — H40.1133 PRIMARY OPEN ANGLE GLAUCOMA OF BOTH EYES, SEVERE STAGE: Primary | ICD-10-CM

## 2019-05-17 PROCEDURE — 92015 PR REFRACTION: ICD-10-PCS | Mod: ,,, | Performed by: OPHTHALMOLOGY

## 2019-05-17 PROCEDURE — 99999 PR PBB SHADOW E&M-EST. PATIENT-LVL I: CPT | Mod: PBBFAC,,, | Performed by: OPHTHALMOLOGY

## 2019-05-17 PROCEDURE — 99999 PR PBB SHADOW E&M-EST. PATIENT-LVL I: ICD-10-PCS | Mod: PBBFAC,,, | Performed by: OPHTHALMOLOGY

## 2019-05-17 PROCEDURE — 92015 DETERMINE REFRACTIVE STATE: CPT | Mod: ,,, | Performed by: OPHTHALMOLOGY

## 2019-05-17 PROCEDURE — 99211 OFF/OP EST MAY X REQ PHY/QHP: CPT | Mod: PBBFAC | Performed by: OPHTHALMOLOGY

## 2019-05-17 PROCEDURE — 92012 INTRM OPH EXAM EST PATIENT: CPT | Mod: S$PBB,,, | Performed by: OPHTHALMOLOGY

## 2019-05-17 PROCEDURE — 92012 PR EYE EXAM, EST PATIENT,INTERMED: ICD-10-PCS | Mod: S$PBB,,, | Performed by: OPHTHALMOLOGY

## 2019-05-17 RX ORDER — ACETAZOLAMIDE 250 MG/1
250 TABLET ORAL 4 TIMES DAILY
Qty: 120 TABLET | Refills: 0 | Status: SHIPPED | OUTPATIENT
Start: 2019-05-17 | End: 2019-06-14 | Stop reason: SDUPTHER

## 2019-05-17 NOTE — PROGRESS NOTES
HPI     Patient returns for a 3 month iop check, Patient states she has been 75%   compliant with drop usage.Rhopressa was added to patient's regiment in   Feb. And  Patient was to return in 2 weeks for an iop check and cancelled   her follow up appointment.   Patient is not ready to book [facility] OD .        Referred by Dr. Lorenzana  Bleb Needling with [facility]. OD 9/17/15  LTG   H/O Non-compliance   Intolerant of Coag Drops    Stopped Alphagan OS TID because of dermatitis  Stopped Betoptic bid ou due to slow pulse rate and feeling tired.  Trab OD     repeat SLT od done 8/9/2012 - good initial response IOP 18 to 13, SLT OD   08/17/17  Primary SLT done os 9/13/2012 - minimal response 18 to 16  SLT os 9/29/2016  LATANOPROST slows pulse rate    OU LUMIGAN 0.03% QHS, DORZOLAMIDE TID     Last edited by PETR Rojas on 5/17/2019  9:16 AM. (History)      Pt never started Rhopressa - or stopped it       Assessment /Plan     For exam results, see Encounter Report.      ICD-10-CM ICD-9-CM    1. Primary open angle glaucoma of both eyes, severe stage H40.1133 365.11 IOP not within acceptable range relative to target IOP with risk of irreversible visual loss. Additional treatment required.  Discussed options, risks, and benefits of additional medication, SLT laser, or incisional glaucoma surgery.     recommend sx     Patient chooses to start meds   Pt reqt meds and Diamox   STAN with pt the RBA with DMX long term - she still desires and will increase compliance     Reviewed importance of continued compliance with treatment and follow up.        365.73    2. Noncompliance with medication regimen Z91.14 V15.81 Long History of and pt declines Surgery -    3. Nuclear sclerosis, bilateral H25.13 366.16      Disp MR     OU LUMIGAN 0.03% QHS, DORZOLAMIDE TID    Start  BID   RETURN TO CLINIC 2 month IOP

## 2019-06-03 RX ORDER — ESTRADIOL 0.5 MG/1
0.5 TABLET ORAL DAILY
Qty: 30 TABLET | Refills: 0 | Status: SHIPPED | OUTPATIENT
Start: 2019-06-03 | End: 2019-10-21 | Stop reason: SDUPTHER

## 2019-06-05 ENCOUNTER — OFFICE VISIT (OUTPATIENT)
Dept: FAMILY MEDICINE | Facility: CLINIC | Age: 69
End: 2019-06-05
Payer: MEDICARE

## 2019-06-05 VITALS
HEIGHT: 68 IN | TEMPERATURE: 98 F | BODY MASS INDEX: 31.58 KG/M2 | SYSTOLIC BLOOD PRESSURE: 138 MMHG | HEART RATE: 55 BPM | DIASTOLIC BLOOD PRESSURE: 74 MMHG | WEIGHT: 208.38 LBS

## 2019-06-05 DIAGNOSIS — J30.9 ALLERGIC SINUSITIS: Primary | ICD-10-CM

## 2019-06-05 PROCEDURE — 99213 OFFICE O/P EST LOW 20 MIN: CPT | Mod: S$PBB,,, | Performed by: FAMILY MEDICINE

## 2019-06-05 PROCEDURE — 99999 PR PBB SHADOW E&M-EST. PATIENT-LVL IV: ICD-10-PCS | Mod: PBBFAC,,, | Performed by: FAMILY MEDICINE

## 2019-06-05 PROCEDURE — 99213 PR OFFICE/OUTPT VISIT, EST, LEVL III, 20-29 MIN: ICD-10-PCS | Mod: S$PBB,,, | Performed by: FAMILY MEDICINE

## 2019-06-05 PROCEDURE — 99999 PR PBB SHADOW E&M-EST. PATIENT-LVL IV: CPT | Mod: PBBFAC,,, | Performed by: FAMILY MEDICINE

## 2019-06-05 PROCEDURE — 99214 OFFICE O/P EST MOD 30 MIN: CPT | Mod: PBBFAC,PO | Performed by: FAMILY MEDICINE

## 2019-06-05 RX ORDER — BENZONATATE 200 MG/1
200 CAPSULE ORAL 3 TIMES DAILY PRN
Qty: 30 CAPSULE | Refills: 0 | Status: SHIPPED | OUTPATIENT
Start: 2019-06-05 | End: 2019-11-29 | Stop reason: SDUPTHER

## 2019-06-05 RX ORDER — ALBUTEROL SULFATE 90 UG/1
2 AEROSOL, METERED RESPIRATORY (INHALATION) EVERY 6 HOURS PRN
Qty: 18 G | Refills: 0 | Status: SHIPPED | OUTPATIENT
Start: 2019-06-05 | End: 2020-03-30 | Stop reason: SDUPTHER

## 2019-06-06 NOTE — PROGRESS NOTES
Patient complains of intermittent sinus congestion, sore throat, postnasal drainage, slight cough, no fever. Symptoms started over the past several approximately 2 weeks.  Possibly occasional wheeze.  She wanted to get a refill on albuterol.  She has been using Benadryl.  She feels symptoms allergy related.  She was also exposed to smoke in a casino recently    Past Medical History:  Past Medical History:   Diagnosis Date    Anxiety     Cataract     Colon polyp     Depression     GERD (gastroesophageal reflux disease)     Glaucoma     History of colon polyps 2009    history as per patient    History of colonoscopy 2009    ok per pt    Hypertension     Hypothyroid     ?    Mitral valve prolapse     Peptic ulcer disease     with gi bleed    Prediabetes     Right knee DJD     Right knee DJD     Sleep apnea     TMJ (temporomandibular joint disorder)      Past Surgical History:   Procedure Laterality Date    BILATERAL SALPINGOOPHORECTOMY      COLONOSCOPY  2015    COLONOSCOPY N/A 5/12/2015    Performed [facility]alloway [facility] at [facility] ENDO    CYSTOSCOPY N/A 1/17/2017    [facility] OR 1ST FLR    ESOPHAGOGASTRODUODENOSCOPY (EGD) Left 4/26/2018    Per[facility] by Toi Pascual [facility] at [facility] ENDO    EYE SURGERY      laser sx    HYSTERECTOMY      INJECTION-BULKING AGENT URETERAL OUTLET-MACROPLASTIQUE N/A 1/17/2017    [facility] OR 1ST FLR    SLT - OU - BOTH EYES      TRABECULECTOMY Right 4/2/14    OD (dr. grossman)    TRABECULECTOMY Right 4/2/2014    [facility] at [facility] OR 1ST FLR    UPPER GASTROINTESTINAL ENDOSCOPY  2003     Review of patient's allergies indicates:   Allergen Reactions    Betoptic [betaxolol] Other (See Comments)     Fatigue, slowed heart rate.    Alphagan [brimonidine] Dermatitis     Current Outpatient Medications on File Prior to Visit   Medication Sig Dispense Refill    acetaZOLAMIDE  (DIAMOX) 250 MG tablet Take 1 tablet (250 mg total) by mouth 4 (four) times daily. 120 tablet 0    ALPRAZolam (XANAX) 0.25 MG tablet Take 1 tablet (0.25 mg total) by mouth 3 (three) times daily as needed for Anxiety. 30 tablet 5    bimatoprost (LUMIGAN) 0.03 % ophthalmic drops PLACE 1 DROP INTO BOTH EYES EVERY EVENING. 2.5 mL 5    celecoxib (CELEBREX) 200 MG capsule Take 1 capsule (200 mg total) by mouth once daily. 90 capsule 3    DIPHENHYDRAMINE HCL (BENADRYL ALLERGY ORAL) Take 1 tablet by mouth as needed.      dorzolamide (TRUSOPT) 2 % ophthalmic solution Place 1 drop into both eyes 3 (three) times daily. 10 mL 6    estradiol (ESTRACE) 0.5 MG tablet TAKE 1 TABLET (0.5 MG TOTAL) BY MOUTH ONCE DAILY. 30 tablet 0    fexofenadine (ALLEGRA) 180 MG tablet Take 180 mg by mouth daily as needed.       FLUoxetine 20 MG capsule Take 3 capsules (60 mg total) by mouth once daily. 90 capsule 11    fluticasone (FLONASE) 50 mcg/actuation nasal spray Use 2 sprays to each nostril daily 16 g 12    hydroCHLOROthiazide (HYDRODIURIL) 25 MG tablet Take 1 tablet (25 mg total) by mouth once daily. 90 tablet 3    losartan (COZAAR) 50 MG tablet Take 1 tablet (50 mg total) by mouth once daily. 90 tablet 3    metFORMIN (GLUCOPHAGE-XR) 500 MG 24 hr tablet Take 1 tablet (500 mg total) by mouth 2 (two) times daily. 180 tablet 1    pantoprazole (PROTONIX) 40 MG tablet Take 1 tablet (40 mg total) by mouth once daily. 90 tablet 3    meclizine (ANTIVERT) 12.5 mg tablet Take 1 tablet (12.5 mg total) by mouth 3 (three) times daily as needed. 30 tablet 0    [DISCONTINUED] netarsudil (RHOPRESSA) 0.02 % Drop Place 1 drop into both eyes once daily. 2.5 mL 6     No current facility-administered medications on file prior to visit.      Social History     Socioeconomic History    Marital status:      Spouse name: Not on file    Number of children: Not on file    Years of education: Not on file    Highest education level: Not on  file   Occupational History    Not on file   Social Needs    Financial resource strain: Not on file    Food insecurity:     Worry: Not on file     Inability: Not on file    Transportation needs:     Medical: Not on file     Non-medical: Not on file   Tobacco Use    Smoking status: Never Smoker    Smokeless tobacco: Never Used   Substance and Sexual Activity    Alcohol use: Yes     Comment: occasional    Drug use: No    Sexual activity: Not Currently   Lifestyle    Physical activity:     Days per week: Not on file     Minutes per session: Not on file    Stress: Not on file   Relationships    Social connections:     Talks on phone: Not on file     Gets together: Not on file     Attends [employer]ism service: Not on file     Active member of club or organization: Not on file     Attends meetings of clubs or organizations: Not on file     Relationship status: Not on file   Other Topics Concern    Not on file   Social History Narrative    Not on file     Family History   Problem Relation Age of Onset    Heart disease Father         before age 50    Diabetes Mother     Glaucoma Mother     Leukemia Mother     Cataracts Mother     Breast cancer Mother 60    Diabetes Sister     Glaucoma Sister     Diabetes Brother     Glaucoma Brother     Lung cancer Brother     Breast cancer Other 40        genetics -    Colon cancer Maternal Aunt              Physical Exam: See vital signs note   HEENT:  No percussion maxillary sinus tenderness. Nose mild congestion. Ears: Tympanic membranes intact.  No effusion or erythema. Throat mild erythema no exudate. Mild postnasal drainage.   Neck supple no adenopathy.    Chest: Clear to auscultation. Normal respiratory effort.     Suze was seen today for medication refill and sore throat.    Diagnoses and all orders for this visit:    Allergic sinusitis    Other orders  -     albuterol (PROVENTIL/VENTOLIN HFA) 90 mcg/actuation inhaler; Inhale 2 puffs into the lungs every 6  (six) hours as needed for Wheezing or Shortness of Breath.  -     benzonatate (TESSALON) 200 MG capsule; Take 1 capsule (200 mg total) by mouth 3 (three) times daily as needed for Cough.     She may use Allegra    Follow-up as needed if symptoms not improving with treatment prescribed in next few days to consider antibiotic therapy if not improving.

## 2019-06-14 RX ORDER — ACETAZOLAMIDE 250 MG/1
250 TABLET ORAL 4 TIMES DAILY
Qty: 120 TABLET | Refills: 1 | Status: SHIPPED | OUTPATIENT
Start: 2019-06-14 | End: 2019-07-15

## 2019-07-02 DIAGNOSIS — H40.1133 PRIMARY OPEN ANGLE GLAUCOMA OF BOTH EYES, SEVERE STAGE: ICD-10-CM

## 2019-07-02 RX ORDER — DORZOLAMIDE HCL 20 MG/ML
1 SOLUTION/ DROPS OPHTHALMIC 3 TIMES DAILY
Qty: 10 ML | Refills: 6
Start: 2019-07-02 | End: 2019-07-29 | Stop reason: SDUPTHER

## 2019-07-15 ENCOUNTER — OFFICE VISIT (OUTPATIENT)
Dept: OPHTHALMOLOGY | Facility: CLINIC | Age: 69
End: 2019-07-15
Payer: MEDICARE

## 2019-07-15 DIAGNOSIS — H40.1133 PRIMARY OPEN ANGLE GLAUCOMA OF BOTH EYES, SEVERE STAGE: Primary | ICD-10-CM

## 2019-07-15 DIAGNOSIS — H25.11 SENILE NUCLEAR SCLEROSIS, RIGHT: ICD-10-CM

## 2019-07-15 DIAGNOSIS — Z91.148 NONCOMPLIANCE WITH MEDICATION REGIMEN: ICD-10-CM

## 2019-07-15 DIAGNOSIS — H25.12 NUCLEAR SENILE CATARACT OF LEFT EYE: ICD-10-CM

## 2019-07-15 PROCEDURE — 92014 PR EYE EXAM, EST PATIENT,COMPREHESV: ICD-10-PCS | Mod: S$PBB,,, | Performed by: OPHTHALMOLOGY

## 2019-07-15 PROCEDURE — 99999 PR PBB SHADOW E&M-EST. PATIENT-LVL II: ICD-10-PCS | Mod: PBBFAC,,, | Performed by: OPHTHALMOLOGY

## 2019-07-15 PROCEDURE — 92014 COMPRE OPH EXAM EST PT 1/>: CPT | Mod: S$PBB,,, | Performed by: OPHTHALMOLOGY

## 2019-07-15 PROCEDURE — 99999 PR PBB SHADOW E&M-EST. PATIENT-LVL II: CPT | Mod: PBBFAC,,, | Performed by: OPHTHALMOLOGY

## 2019-07-15 PROCEDURE — 99212 OFFICE O/P EST SF 10 MIN: CPT | Mod: PBBFAC | Performed by: OPHTHALMOLOGY

## 2019-07-15 RX ORDER — POLYMYXIN B SULFATE AND TRIMETHOPRIM 1; 10000 MG/ML; [USP'U]/ML
1 SOLUTION OPHTHALMIC EVERY 4 HOURS
Qty: 1 BOTTLE | Refills: 1 | Status: SHIPPED | OUTPATIENT
Start: 2019-07-15 | End: 2019-07-20

## 2019-07-15 RX ORDER — DIFLUPREDNATE OPHTHALMIC 0.5 MG/ML
1 EMULSION OPHTHALMIC 4 TIMES DAILY
Qty: 1 BOTTLE | Refills: 1 | Status: SHIPPED | OUTPATIENT
Start: 2019-07-15 | End: 2019-08-14

## 2019-07-15 NOTE — PROGRESS NOTES
"HPI     Glaucoma      Additional comments: 2M IOP check              Comments     The patient states her eyes are feeling okay but they do itch. The   patient states she took the diamox pills for 2 weeks then stopped because   she was so tired she was unable to get out of bed. She said she did it in   the past when she was younger but cant handle the DMX now   Pt says" she's tried to be more compliant now with drops that she has ever   been"    Referred by Dr. Lorenzana  Bleb Needling with [facility] OD 9/17/15  LTG   H/O Non-compliance   Intolerant of Coag Drops    Stopped Alphagan OS TID because of dermatitis  Stopped Betoptic bid ou due to slow pulse rate and feeling tired.  Trab OD     repeat SLT od done 8/9/2012 - good initial response IOP 18 to 13, SLT OD   08/17/17  Primary SLT done os 9/13/2012 - minimal response 18 to 16  SLT os 9/29/2016  LATANOPROST slows pulse rate    Referred by Dr. Lorenzana  Bleb Needling with [facility] OD 9/17/15  LTG   H/O Non-compliance   Intolerant of Coag Drops    Stopped Alphagan OS TID because of dermatitis  Stopped Betoptic bid ou due to slow pulse rate and feeling tired.  Trab OD     repeat SLT od done 8/9/2012 - good initial response IOP 18 to 13, SLT OD   08/17/17  Primary SLT done os 9/13/2012 - minimal response 18 to 16  SLT os 9/29/2016  LATANOPROST slows pulse rate      OD Rhopressa Qhs ( patient ran out in March never returned for followup )  OU LUMIGAN 0.03% QHS, DORZOLAMIDE TID  DMX S50 BID (stopped after 2 weeks due to extreme fatigue)          Last edited by Macario Shankar MD on 7/15/2019  9:12 AM. (History)            Assessment /Plan     For exam results, see Encounter Report.      ICD-10-CM ICD-9-CM    1. Primary open angle glaucoma of both eyes, severe stage H40.1133 365.11   Uncontrolled glaucoma OD>OS  on maximum tolerated therapy: Significant risk of continued irreversible glaucomatous vision loss with current level of treatment. Therefore surgical options " were reviewed at great length with the pt and xen gel  recommended in hopes of reducing the IOP to a more tolerable level. A lengthy discussion of the risks, benefits and alternatives was presented to the pt including balancing the immediate risks of vision loss from surgery vs the likelihood of continued vision loss from the inadequately controlled glaucoma. Also discussed the need for frequent, careful follow-up exams for monitoring and other possible postop adjustments.     Stent Place at 1130      365.73    2. Noncompliance with medication regimen Z91.14 V15.81    3. Nuclear senile cataract of left eye H25.12 366.16 Follow for now and would address IOP first     You were found to have an early cataract in your eye(s) today, however the cataract is not affecting your activities of daily living, such as reading and driving.You do not need  surgery at this time. We will recheck your cataract at your next visit. You are welcome to call for an earlier appointment if your vision gets worse.      4. Senile nuclear sclerosis, right H25.11 366.16        Book Xen Gel OD - stent at 1130 - book sx on 8/5/2019  Use Poly and Durezol at pre-op o 8/4 to start   Stop Coag Meds to the OD one day before   Pt denies taking any ASA and blood thinners   STAN with pt re: proper follow up and compliance with meds/appointments       OU LUMIGAN 0.03% QHS, DORZOLAMIDE TID

## 2019-07-23 DIAGNOSIS — Z12.39 SCREENING FOR BREAST CANCER: Primary | ICD-10-CM

## 2019-07-23 RX ORDER — ESTRADIOL 0.5 MG/1
0.5 TABLET ORAL DAILY
Qty: 90 TABLET | Refills: 0 | Status: SHIPPED | OUTPATIENT
Start: 2019-07-23 | End: 2019-10-21 | Stop reason: SDUPTHER

## 2019-07-24 ENCOUNTER — OFFICE VISIT (OUTPATIENT)
Dept: OBSTETRICS AND GYNECOLOGY | Facility: CLINIC | Age: 69
End: 2019-07-24
Payer: MEDICARE

## 2019-07-24 ENCOUNTER — LAB VISIT (OUTPATIENT)
Dept: LAB | Facility: OTHER | Age: 69
End: 2019-07-24
Payer: MEDICARE

## 2019-07-24 VITALS
BODY MASS INDEX: 30.47 KG/M2 | HEIGHT: 68 IN | WEIGHT: 201.06 LBS | SYSTOLIC BLOOD PRESSURE: 136 MMHG | DIASTOLIC BLOOD PRESSURE: 88 MMHG

## 2019-07-24 DIAGNOSIS — R10.32 LEFT LOWER QUADRANT PAIN: ICD-10-CM

## 2019-07-24 DIAGNOSIS — Z11.3 SCREENING FOR STD (SEXUALLY TRANSMITTED DISEASE): Primary | ICD-10-CM

## 2019-07-24 DIAGNOSIS — Z11.3 SCREENING FOR STD (SEXUALLY TRANSMITTED DISEASE): ICD-10-CM

## 2019-07-24 DIAGNOSIS — Z11.4 ENCOUNTER FOR SCREENING FOR HIV: ICD-10-CM

## 2019-07-24 LAB
BILIRUB SERPL-MCNC: NORMAL MG/DL
BLOOD URINE, POC: NORMAL
C TRACH DNA SPEC QL NAA+PROBE: NOT DETECTED
COLOR, POC UA: YELLOW
GLUCOSE UR QL STRIP: NORMAL
KETONES UR QL STRIP: NORMAL
LEUKOCYTE ESTERASE URINE, POC: NORMAL
N GONORRHOEA DNA SPEC QL NAA+PROBE: NOT DETECTED
NITRITE, POC UA: NORMAL
PH, POC UA: 5
PROTEIN, POC: NORMAL
SPECIFIC GRAVITY, POC UA: 1
UROBILINOGEN, POC UA: NORMAL

## 2019-07-24 PROCEDURE — 87491 CHLMYD TRACH DNA AMP PROBE: CPT

## 2019-07-24 PROCEDURE — 36415 COLL VENOUS BLD VENIPUNCTURE: CPT

## 2019-07-24 PROCEDURE — 99999 PR PBB SHADOW E&M-EST. PATIENT-LVL III: CPT | Mod: PBBFAC,,, | Performed by: NURSE PRACTITIONER

## 2019-07-24 PROCEDURE — 81002 URINALYSIS NONAUTO W/O SCOPE: CPT | Mod: PBBFAC | Performed by: NURSE PRACTITIONER

## 2019-07-24 PROCEDURE — 87086 URINE CULTURE/COLONY COUNT: CPT

## 2019-07-24 PROCEDURE — 87481 CANDIDA DNA AMP PROBE: CPT | Mod: 59

## 2019-07-24 PROCEDURE — 87088 URINE BACTERIA CULTURE: CPT

## 2019-07-24 PROCEDURE — 87186 SC STD MICRODIL/AGAR DIL: CPT | Mod: 59

## 2019-07-24 PROCEDURE — 86592 SYPHILIS TEST NON-TREP QUAL: CPT

## 2019-07-24 PROCEDURE — 80074 ACUTE HEPATITIS PANEL: CPT

## 2019-07-24 PROCEDURE — 99203 OFFICE O/P NEW LOW 30 MIN: CPT | Mod: S$PBB,,, | Performed by: NURSE PRACTITIONER

## 2019-07-24 PROCEDURE — 87077 CULTURE AEROBIC IDENTIFY: CPT

## 2019-07-24 PROCEDURE — 99999 PR PBB SHADOW E&M-EST. PATIENT-LVL III: ICD-10-PCS | Mod: PBBFAC,,, | Performed by: NURSE PRACTITIONER

## 2019-07-24 PROCEDURE — 99213 OFFICE O/P EST LOW 20 MIN: CPT | Mod: PBBFAC | Performed by: NURSE PRACTITIONER

## 2019-07-24 PROCEDURE — 99203 PR OFFICE/OUTPT VISIT, NEW, LEVL III, 30-44 MIN: ICD-10-PCS | Mod: S$PBB,,, | Performed by: NURSE PRACTITIONER

## 2019-07-24 PROCEDURE — 87801 DETECT AGNT MULT DNA AMPLI: CPT

## 2019-07-24 PROCEDURE — 86703 HIV-1/HIV-2 1 RESULT ANTBDY: CPT

## 2019-07-24 NOTE — PROGRESS NOTES
CC: Screening for sexually transmitted infection    Suze Chino is a 68 y.o. female  presents for STD screening  No LMP recorded. Patient has had a hysterectomy..  Denies any new rashes or lesions.  Denies pelvic or abdominal pain.  Denies vulvovaginal itching or irritation.  Denies abnormal or foul vaginal discharge. Reports mild suprapubic discomfort without further urinary symptoms.       ROS:  GENERAL: Denies weight gain or weight loss. Feeling well overall.   SKIN: Denies rash or lesions.   HEAD: Denies head injury or headache.   NODES: Denies enlarged lymph nodes.   CHEST: Denies chest pain or shortness of breath.   CARDIOVASCULAR: Denies palpitations or left sided chest pain.   ABDOMEN: No abdominal pain, constipation, diarrhea, nausea, vomiting or rectal bleeding.   URINARY: No frequency, dysuria, hematuria, or burning on urination.  REPRODUCTIVE: See HPI.   BREASTS: The patient performs breast self-examination and denies pain, lumps, or nipple discharge.   HEMATOLOGIC: No easy bruisability or excessive bleeding.   MUSCULOSKELETAL: Denies joint pain or swelling.   NEUROLOGIC: Denies syncope or weakness.   PSYCHIATRIC: Denies depression, anxiety or mood swings.      PHYSICAL EXAM:    APPEARANCE: Well nourished, well developed, in no acute distress.  AFFECT: Alert and oriented x 3  SKIN: Warm, dry, & intact. No acne or hirsutism.  NECK: Neck symmetric  NODES: No inguinal or femoral lymph node enlargement  CHEST:  Easy, even breaths.  ABDOMEN: Soft.  Nontender, nondistended.  PELVIC: Normal external genitalia without lesions.  Normal hair distribution.  Adequate perineal body, normal urethral meatus.    Vagina moist and well rugated without lesions or discharge.  Cervix is surgically absent.  No significant cystocele or rectocele.    Uterus and ovaries are surgically absent.  EXTREMITIES: No edema.    Screening for STD (sexually transmitted disease)  -     Vaginosis Screen by DNA Probe  -     C.  trachomatis/N. gonorrhoeae by AMP DNA [facility]; Cervicovaginal  -     Urine culture  -     POCT urine dipstick without microscope  -     HIV 1/2 Ag/Ab (4th Gen); Future; Expected date: 07/24/2019  -     RPR; Future; Expected date: 07/24/2019  -     Hepatitis panel, acute; Future; Expected date: 07/24/2019    Left lower quadrant pain   -     C. trachomatis/N. gonorrhoeae by AMP DNA [facility]; Cervicovaginal    Encounter for screening for HIV   -     HIV 1/2 Ag/Ab (4th Gen); Future; Expected date: 07/24/2019            Patient was counseled today on prevention of STDs with use of condoms.  We also reviewed A.C.S. Pap guidelines and recommendations for yearly pelvic exams, mammograms and monthly self breast exams; to see her PCP for other health maintenance.     Followup pending lab results

## 2019-07-25 LAB
BACTERIAL VAGINOSIS DNA: NEGATIVE
CANDIDA GLABRATA DNA: NEGATIVE
CANDIDA KRUSEI DNA: NEGATIVE
CANDIDA RRNA VAG QL PROBE: NEGATIVE
HAV IGM SERPL QL IA: NEGATIVE
HBV CORE IGM SERPL QL IA: NEGATIVE
HBV SURFACE AG SERPL QL IA: NEGATIVE
HCV AB SERPL QL IA: NEGATIVE
HIV 1+2 AB+HIV1 P24 AG SERPL QL IA: NEGATIVE
RPR SER QL: NORMAL
T VAGINALIS RRNA GENITAL QL PROBE: NEGATIVE

## 2019-07-25 RX ORDER — ALPRAZOLAM 0.25 MG/1
0.25 TABLET ORAL 3 TIMES DAILY PRN
Qty: 30 TABLET | Refills: 5 | Status: SHIPPED | OUTPATIENT
Start: 2019-07-25 | End: 2020-03-02

## 2019-07-26 ENCOUNTER — PATIENT MESSAGE (OUTPATIENT)
Dept: OBSTETRICS AND GYNECOLOGY | Facility: CLINIC | Age: 69
End: 2019-07-26

## 2019-07-26 LAB — BACTERIA UR CULT: ABNORMAL

## 2019-07-26 RX ORDER — SULFAMETHOXAZOLE AND TRIMETHOPRIM 800; 160 MG/1; MG/1
1 TABLET ORAL 2 TIMES DAILY
Qty: 14 TABLET | Refills: 0 | Status: SHIPPED | OUTPATIENT
Start: 2019-07-26 | End: 2019-08-02

## 2019-07-29 DIAGNOSIS — H40.1133 PRIMARY OPEN ANGLE GLAUCOMA OF BOTH EYES, SEVERE STAGE: ICD-10-CM

## 2019-07-29 RX ORDER — DORZOLAMIDE HCL 20 MG/ML
SOLUTION/ DROPS OPHTHALMIC
Qty: 10 ML | Refills: 3 | Status: SHIPPED | OUTPATIENT
Start: 2019-07-29 | End: 2019-08-25 | Stop reason: SDUPTHER

## 2019-07-29 NOTE — TELEPHONE ENCOUNTER
----- Message from Dory Johnson sent at 7/29/2019  2:20 PM CDT -----  Contact: pt       ----- Message -----  From: Bro Araya  Sent: 7/29/2019   2:05 PM  To: Raad DURAN Staff    Pt would like cb to discuss an upcoming surgery             ..[phone]

## 2019-08-15 DIAGNOSIS — H40.1133 PRIMARY OPEN ANGLE GLAUCOMA OF BOTH EYES, SEVERE STAGE: ICD-10-CM

## 2019-08-15 RX ORDER — BIMATOPROST 0.3 MG/ML
1 SOLUTION/ DROPS OPHTHALMIC NIGHTLY
Qty: 2.5 ML | Refills: 5 | Status: SHIPPED | OUTPATIENT
Start: 2019-08-15 | End: 2019-11-12 | Stop reason: SDUPTHER

## 2019-08-20 DIAGNOSIS — H40.1133 PRIMARY OPEN ANGLE GLAUCOMA OF BOTH EYES, SEVERE STAGE: ICD-10-CM

## 2019-08-22 ENCOUNTER — OFFICE VISIT (OUTPATIENT)
Dept: ENDOCRINOLOGY | Facility: CLINIC | Age: 69
End: 2019-08-22
Payer: MEDICARE

## 2019-08-22 VITALS
SYSTOLIC BLOOD PRESSURE: 142 MMHG | DIASTOLIC BLOOD PRESSURE: 80 MMHG | HEART RATE: 72 BPM | OXYGEN SATURATION: 98 % | BODY MASS INDEX: 30.74 KG/M2 | WEIGHT: 202.81 LBS | HEIGHT: 68 IN

## 2019-08-22 DIAGNOSIS — R73.03 PREDIABETES: ICD-10-CM

## 2019-08-22 DIAGNOSIS — E21.0 PRIMARY HYPERPARATHYROIDISM: Primary | ICD-10-CM

## 2019-08-22 DIAGNOSIS — N28.9 RENAL INSUFFICIENCY: ICD-10-CM

## 2019-08-22 DIAGNOSIS — E55.9 VITAMIN D DEFICIENCY: ICD-10-CM

## 2019-08-22 PROCEDURE — 99214 PR OFFICE/OUTPT VISIT, EST, LEVL IV, 30-39 MIN: ICD-10-PCS | Mod: S$PBB,,, | Performed by: INTERNAL MEDICINE

## 2019-08-22 PROCEDURE — 99214 OFFICE O/P EST MOD 30 MIN: CPT | Mod: S$PBB,,, | Performed by: INTERNAL MEDICINE

## 2019-08-22 PROCEDURE — 99999 PR PBB SHADOW E&M-EST. PATIENT-LVL IV: ICD-10-PCS | Mod: PBBFAC,,, | Performed by: INTERNAL MEDICINE

## 2019-08-22 PROCEDURE — 99214 OFFICE O/P EST MOD 30 MIN: CPT | Mod: PBBFAC | Performed by: INTERNAL MEDICINE

## 2019-08-22 PROCEDURE — 99999 PR PBB SHADOW E&M-EST. PATIENT-LVL IV: CPT | Mod: PBBFAC,,, | Performed by: INTERNAL MEDICINE

## 2019-08-22 NOTE — PROGRESS NOTES
Patient ID: Suze Chino is a 68 y.o. female.  Patient is here for follow up        Chief Complaint: Hypothyroidism      HPI    In review of notes by her PCP Dr. ann back in September 2018 it was noted that her PTH was elevated and at the time her vitamin D was low at 12.  Her calcium was in the normal to high normal range but when I look further back in epic she has had a mild hypercalcemia in the past.  It was recommended at the time for her to see an endocrinologist however patient wanted to find someone locally as she lives in [facility]    Bone density:  I ordered 1 and she had 1 April 2019 and was normal; previous 1 on file in epic was 2014 and it was normal  Kidney stones:no  Fracture :no  Not on calcium   Since her vitamin-D was very low I told her to start on over-the-counter vitamin-D 1000 units for now but she has not yet started the vitamin-D  Has arthritis- knee pain  Twenty-four urine calcium was normal    At least the last 2 times her GFR was slightly below 60    Has hypertension    She has prediabetes that seems to be under good control as her last A1c was 5.8 and she takes metformin.  However at initial visit she admitted she has not been following a healthy diet or exercising.  She does have fatigue.  Also has hyperlipidemia    She at times in the past had elevation of TSH but is not currently on thyroid medication-she had been on Armor Thyroid about 2 years ago I checked thyroid function tests after initial visit and it looked fine and her thyroid antibodies were negative    Overall she feels well with no complaints    I have reviewed the past medical, family and social history    Review of Systems   Constitutional: Negative for appetite change, fatigue, fever and unexpected weight change.   HENT: Negative for sore throat and trouble swallowing.    Eyes: Negative for visual disturbance.   Respiratory: Negative for shortness of breath and wheezing.    Cardiovascular: Negative for  chest pain, palpitations and leg swelling.   Gastrointestinal: Negative for diarrhea, nausea and vomiting.   Endocrine: Negative for cold intolerance, heat intolerance, polydipsia, polyphagia and polyuria.   Genitourinary: Negative for difficulty urinating, dysuria and menstrual problem.   Musculoskeletal: Negative for arthralgias and joint swelling.   Skin: Negative for rash.   Neurological: Negative for dizziness, weakness, numbness and headaches.   Psychiatric/Behavioral: Negative for confusion, dysphoric mood and sleep disturbance.       Objective:      Physical Exam   Constitutional: She appears well-developed and well-nourished. No distress.   HENT:   Head: Normocephalic and atraumatic.   Eyes: Conjunctivae are normal. No scleral icterus.   Neck: No JVD present. No tracheal deviation present. No thyromegaly present.   Cardiovascular: Normal rate, regular rhythm, normal heart sounds and intact distal pulses. Exam reveals no gallop and no friction rub.   No murmur heard.  Pulmonary/Chest: Effort normal and breath sounds normal. No stridor. No respiratory distress. She has no wheezes. She has no rales. She exhibits no tenderness.   Musculoskeletal: She exhibits no edema or deformity.   Lymphadenopathy:     She has no cervical adenopathy.   Neurological: She is alert.   Skin: She is not diaphoretic.   Vitals reviewed.        Lab Review:   Lab Visit on 07/24/2019   Component Date Value    HIV 1/2 Ag/Ab 07/24/2019 Negative     RPR 07/24/2019 Non-reactive     Hepatitis B Surface Ag 07/24/2019 Negative     Hep B C IgM 07/24/2019 Negative     Hep A IgM 07/24/2019 Negative     Hepatitis C Ab 07/24/2019 Negative    Office Visit on 07/24/2019   Component Date Value    Trichomonas vaginalis 07/24/2019 Negative     Candida sp 07/24/2019 Negative     Katiana glabrata DNA 07/24/2019 Negative     Katiana krusei DNA 07/24/2019 Negative     Bacterial vaginosis DNA 07/24/2019 Negative     Chlamydia, Amplified DNA  07/24/2019 Not Detected     N gonorrhoeae, amplified* 07/24/2019 Not Detected     Urine Culture, Routine 07/24/2019 *                    Value:KLEBSIELLA PNEUMONIAE  >100,000 cfu/ml      Color, UA 07/24/2019 yellow     Spec Grav UA 07/24/2019 1.000     pH, UA 07/24/2019 5     WBC, UA 07/24/2019 neg     Nitrite, UA 07/24/2019 neg     Protein 07/24/2019 neg     Glucose, UA 07/24/2019 normal     Ketones, UA 07/24/2019 neg     Urobilinogen, UA 07/24/2019 normal     Bilirubin 07/24/2019 neg     Blood, UA 07/24/2019 neg    Lab Visit on 04/24/2019   Component Date Value    Urine Volume 04/24/2019 1925     Urine Collection Duration 04/24/2019 24     Calcium, Urine 04/24/2019 <1.0     Calcium, 24H Urine 04/24/2019 Unable to calculate     CA Urine (mg/Spec) 04/24/2019 Unable to calculate     Urine Volume 04/24/2019 1925     Urine Collection Duration 04/24/2019 24     Creatinine, Urine 04/24/2019 62.0     Creatinine, Timed Urine 04/24/2019 49.7     Creatinine, Ur (mg/spec) 04/24/2019 1193.5    Lab Visit on 04/04/2019   Component Date Value    Glucose 04/04/2019 91     Sodium 04/04/2019 139     Potassium 04/04/2019 4.1     Chloride 04/04/2019 102     CO2 04/04/2019 31*    BUN, Bld 04/04/2019 19     Calcium 04/04/2019 10.3     Creatinine 04/04/2019 1.3     Albumin 04/04/2019 3.8     Phosphorus 04/04/2019 3.0     eGFR if  04/04/2019 48.7*    eGFR if non African Amer* 04/04/2019 42.3*    Anion Gap 04/04/2019 6*    PTH, Intact 04/04/2019 151.0*    Vit D, 25-Hydroxy 04/04/2019 8*    Phosphorus 04/04/2019 3.0     Albumin 04/04/2019 3.8     Alkaline Phosphatase 04/04/2019 118     TSH 04/04/2019 1.941     Free T4 04/04/2019 0.86     T3, Free 04/04/2019 2.0*    Hemoglobin A1C 04/04/2019 5.8*    Estimated Avg Glucose 04/04/2019 120     Total Protein 04/04/2019 7.3     Albumin 04/04/2019 3.8     Total Bilirubin 04/04/2019 0.6     Bilirubin, Direct 04/04/2019 0.2     AST  04/04/2019 24     ALT 04/04/2019 17     Alkaline Phosphatase 04/04/2019 118     Thyroglobulin Ab Screen 04/04/2019 <4.0     Thyroperoxidase Antibodi* 04/04/2019 <6.0        Assessment:     1. Primary hyperparathyroidism  Renal function panel    PTH-related peptide    Vitamin D    Albumin    As long as remains stable can continue to observe, would do localizing studies only develops complications   2. Prediabetes  Hemoglobin A1c    Stable on metformin, work on healthy lifestyle changes with exercise and diet   3. Vitamin D deficiency  Vitamin D    Will recheck and if still significantly low will start over-the-counter vitamin-D 1000 units   4. Renal insufficiency  Renal function panel    May be related to hypertension and possibly the hyperparathyroidism might be contributing as well     Plan:   Primary hyperparathyroidism  Comments:  As long as remains stable can continue to observe, would do localizing studies only develops complications  Orders:  -     Renal function panel; Future; Expected date: 08/22/2019  -     PTH-related peptide; Future; Expected date: 08/22/2019  -     Vitamin D; Future; Expected date: 08/22/2019  -     Albumin; Future; Expected date: 08/22/2019    Prediabetes  Comments:  Stable on metformin, work on healthy lifestyle changes with exercise and diet  Orders:  -     Hemoglobin A1c; Future; Expected date: 08/22/2019    Vitamin D deficiency  Comments:  Will recheck and if still significantly low will start over-the-counter vitamin-D 1000 units  Orders:  -     Vitamin D; Future; Expected date: 08/22/2019    Renal insufficiency  Comments:  May be related to hypertension and possibly the hyperparathyroidism might be contributing as well  Orders:  -     Renal function panel; Future; Expected date: 08/22/2019          Follow up in about 6 months (around 2/22/2020).    Labs prior to appointment? not applicable     Disclaimer:  This note may have been partially prepared using voice recognition  software and  it may have not been extensively proofed, as such there could be errors within the text such as sound alike errors.

## 2019-08-23 ENCOUNTER — LAB VISIT (OUTPATIENT)
Dept: LAB | Facility: HOSPITAL | Age: 69
End: 2019-08-23
Attending: INTERNAL MEDICINE
Payer: MEDICARE

## 2019-08-23 DIAGNOSIS — N28.9 RENAL INSUFFICIENCY: ICD-10-CM

## 2019-08-23 DIAGNOSIS — R73.03 PREDIABETES: ICD-10-CM

## 2019-08-23 DIAGNOSIS — E55.9 VITAMIN D DEFICIENCY: ICD-10-CM

## 2019-08-23 DIAGNOSIS — E21.0 PRIMARY HYPERPARATHYROIDISM: ICD-10-CM

## 2019-08-23 LAB
25(OH)D3+25(OH)D2 SERPL-MCNC: 13 NG/ML (ref 30–96)
ALBUMIN SERPL BCP-MCNC: 3.9 G/DL (ref 3.5–5.2)
ALBUMIN SERPL BCP-MCNC: 3.9 G/DL (ref 3.5–5.2)
ANION GAP SERPL CALC-SCNC: 8 MMOL/L (ref 8–16)
BUN SERPL-MCNC: 20 MG/DL (ref 8–23)
CALCIUM SERPL-MCNC: 10.4 MG/DL (ref 8.7–10.5)
CHLORIDE SERPL-SCNC: 101 MMOL/L (ref 95–110)
CO2 SERPL-SCNC: 28 MMOL/L (ref 23–29)
CREAT SERPL-MCNC: 1.2 MG/DL (ref 0.5–1.4)
EST. GFR  (AFRICAN AMERICAN): 53.7 ML/MIN/1.73 M^2
EST. GFR  (NON AFRICAN AMERICAN): 46.5 ML/MIN/1.73 M^2
ESTIMATED AVG GLUCOSE: 123 MG/DL (ref 68–131)
GLUCOSE SERPL-MCNC: 97 MG/DL (ref 70–110)
HBA1C MFR BLD HPLC: 5.9 % (ref 4–5.6)
PHOSPHATE SERPL-MCNC: 2.6 MG/DL (ref 2.7–4.5)
POTASSIUM SERPL-SCNC: 4 MMOL/L (ref 3.5–5.1)
SODIUM SERPL-SCNC: 137 MMOL/L (ref 136–145)

## 2019-08-23 PROCEDURE — 82397 CHEMILUMINESCENT ASSAY: CPT

## 2019-08-23 PROCEDURE — 83036 HEMOGLOBIN GLYCOSYLATED A1C: CPT

## 2019-08-23 PROCEDURE — 80069 RENAL FUNCTION PANEL: CPT

## 2019-08-23 PROCEDURE — 82306 VITAMIN D 25 HYDROXY: CPT

## 2019-08-23 PROCEDURE — 36415 COLL VENOUS BLD VENIPUNCTURE: CPT | Mod: PO

## 2019-08-25 DIAGNOSIS — H40.1133 PRIMARY OPEN ANGLE GLAUCOMA OF BOTH EYES, SEVERE STAGE: ICD-10-CM

## 2019-08-26 RX ORDER — DORZOLAMIDE HCL 20 MG/ML
SOLUTION/ DROPS OPHTHALMIC
Qty: 10 ML | Refills: 3 | Status: SHIPPED | OUTPATIENT
Start: 2019-08-26 | End: 2020-10-21 | Stop reason: SDUPTHER

## 2019-08-28 LAB — PTH RELATED PROT SERPL-SCNC: <0.4 PMOL/L

## 2019-09-03 RX ORDER — FLUOXETINE HYDROCHLORIDE 20 MG/1
60 CAPSULE ORAL DAILY
Qty: 90 CAPSULE | Refills: 11 | Status: SHIPPED | OUTPATIENT
Start: 2019-09-03 | End: 2020-09-08

## 2019-09-03 RX ORDER — FLUOXETINE HYDROCHLORIDE 20 MG/1
60 CAPSULE ORAL DAILY
Qty: 90 CAPSULE | Refills: 11 | Status: CANCELLED | OUTPATIENT
Start: 2019-09-03

## 2019-09-03 NOTE — TELEPHONE ENCOUNTER
----- Message from Michelle Milner sent at 9/3/2019 11:06 AM CDT -----  Contact: Pt  Pt is requesting call back in regards to medication refill on Prozac.        Pls call back at [phone]

## 2019-09-03 NOTE — TELEPHONE ENCOUNTER
----- Message from Aida Yoon sent at 9/3/2019  1:24 PM CDT -----  Contact: Self  Patient requesting a call back regarding prescription. She would like to speak to someone before it is sent in. Please call patient back at [phone]

## 2019-09-11 ENCOUNTER — PATIENT MESSAGE (OUTPATIENT)
Dept: ENDOCRINOLOGY | Facility: CLINIC | Age: 69
End: 2019-09-11

## 2019-09-26 ENCOUNTER — OUTSIDE PLACE OF SERVICE (OUTPATIENT)
Dept: ADMINISTRATIVE | Facility: OTHER | Age: 69
End: 2019-09-26
Payer: MEDICARE

## 2019-09-26 PROCEDURE — 66183 PR INSERT ANT SEGMENT DRAIN WO RESERVOIR, EXTERNAL APPROACH: ICD-10-PCS | Mod: RT,,, | Performed by: OPHTHALMOLOGY

## 2019-09-26 PROCEDURE — 66183 INSERT ANT DRAINAGE DEVICE: CPT | Mod: RT,,, | Performed by: OPHTHALMOLOGY

## 2019-09-27 ENCOUNTER — OFFICE VISIT (OUTPATIENT)
Dept: OPHTHALMOLOGY | Facility: CLINIC | Age: 69
End: 2019-09-27
Payer: MEDICARE

## 2019-09-27 DIAGNOSIS — H40.1133 PRIMARY OPEN ANGLE GLAUCOMA OF BOTH EYES, SEVERE STAGE: ICD-10-CM

## 2019-09-27 DIAGNOSIS — Z98.890 POST-OPERATIVE STATE: Primary | ICD-10-CM

## 2019-09-27 PROCEDURE — 99024 PR POST-OP FOLLOW-UP VISIT: ICD-10-PCS | Mod: POP,,, | Performed by: OPHTHALMOLOGY

## 2019-09-27 PROCEDURE — 99999 PR PBB SHADOW E&M-EST. PATIENT-LVL I: ICD-10-PCS | Mod: PBBFAC,,, | Performed by: OPHTHALMOLOGY

## 2019-09-27 PROCEDURE — 99999 PR PBB SHADOW E&M-EST. PATIENT-LVL I: CPT | Mod: PBBFAC,,, | Performed by: OPHTHALMOLOGY

## 2019-09-27 PROCEDURE — 99211 OFF/OP EST MAY X REQ PHY/QHP: CPT | Mod: PBBFAC | Performed by: OPHTHALMOLOGY

## 2019-09-27 PROCEDURE — 99024 POSTOP FOLLOW-UP VISIT: CPT | Mod: POP,,, | Performed by: OPHTHALMOLOGY

## 2019-09-27 NOTE — PROGRESS NOTES
HPI     Post-op Evaluation      Additional comments: S/P Xen Gel OD (09-26-19)              Comments     Patient States had some pain and discomfort last night but improved with   Tylenol, did not use glaucoma drops in either eye last night.      Referred by Dr. Lorenzana    1. COAG/ LTG  -h/o non compliance  -intolerant of coag meds  -TRAB OD  -repeat SLT od done 8/9/2012 - good initial response IOP 18 to 13, SLT OD   08/17/17  -Primary SLT done os 9/13/2012 - minimal response 18 to 16  -Bleb Needling with [facility] Inj. OD 9/17/15  -SLT OS 9/29/2016  Xen Gel OD (09-26-19)          *Stopped Alphagan OS TID because of dermatitis  *Stopped Betoptic bid ou due to slow pulse rate and feeling tired.  *latanoprost slows pulse rate     OD Rhopressa Qhs ( patient ran out in March never returned for followup )  OU LUMIGAN 0.03% QHS, DORZOLAMIDE TID  DMX S50 BID (stopped after 2 weeks due to extreme fatigue)          Last edited by Iris Adams, Patient Care Assistant on 9/27/2019  9:16   AM. (History)            Assessment /Plan     For exam results, see Encounter Report.      ICD-10-CM ICD-9-CM    1. Post-operative state Z98.890 V45.89 S/p xen gel OD 9/26/2019. Doing well. No coag meds OD at this time    2. Primary open angle glaucoma of both eyes, severe stage H40.1133 365.11      365.73      Continue poly and durezol qid OD  OS LUMIGAN 0.03% QHS, DORZOLAMIDE TID  Return to clinic 3 days                *150 ml/hr NS  *replete electrolytes as needed  * NPO  * HSQ

## 2019-09-30 ENCOUNTER — OFFICE VISIT (OUTPATIENT)
Dept: OPHTHALMOLOGY | Facility: CLINIC | Age: 69
End: 2019-09-30
Payer: MEDICARE

## 2019-09-30 DIAGNOSIS — Z98.890 POST-OPERATIVE STATE: Primary | ICD-10-CM

## 2019-09-30 DIAGNOSIS — H40.1133 PRIMARY OPEN ANGLE GLAUCOMA OF BOTH EYES, SEVERE STAGE: ICD-10-CM

## 2019-09-30 PROCEDURE — 99024 PR POST-OP FOLLOW-UP VISIT: ICD-10-PCS | Mod: POP,,, | Performed by: OPHTHALMOLOGY

## 2019-09-30 PROCEDURE — 99024 POSTOP FOLLOW-UP VISIT: CPT | Mod: POP,,, | Performed by: OPHTHALMOLOGY

## 2019-09-30 PROCEDURE — 99999 PR PBB SHADOW E&M-EST. PATIENT-LVL I: ICD-10-PCS | Mod: PBBFAC,,, | Performed by: OPHTHALMOLOGY

## 2019-09-30 PROCEDURE — 99999 PR PBB SHADOW E&M-EST. PATIENT-LVL I: CPT | Mod: PBBFAC,,, | Performed by: OPHTHALMOLOGY

## 2019-09-30 PROCEDURE — 99211 OFF/OP EST MAY X REQ PHY/QHP: CPT | Mod: PBBFAC | Performed by: OPHTHALMOLOGY

## 2019-09-30 NOTE — PROGRESS NOTES
HPI     Post-op Evaluation      Additional comments: Xen Gel OD 9/26/19              Comments     The patient states her right eye  Is doing fine and she denies any ocular   pain at this time.    Referred by Dr. Lorenzana    1. COAG/ LTG  -h/o non compliance  -intolerant of coag meds  -TRAB OD  -repeat SLT od done 8/9/2012 - good initial response IOP 18 to 13, SLT OD   08/17/17  -Primary SLT done os 9/13/2012 - minimal response 18 to 16  -Bleb Needling with [facility] Inj. OD 9/17/15  -SLT OS 9/29/2016  Xen Gel OD (09-26-19)          *Stopped Alphagan OS TID because of dermatitis  *Stopped Betoptic bid ou due to slow pulse rate and feeling tired.  *latanoprost slows pulse rate     OD Rhopressa Qhs ( patient ran out in March never returned for followup )  OD- Durezol QID, Poly QID  OU LUMIGAN 0.03% QHS, DORZOLAMIDE TID  DMX S50 BID (stopped after 2 weeks due to extreme fatigue)          Last edited by Macario Shankar MD on 9/30/2019  8:13 AM. (History)            Assessment /Plan     For exam results, see Encounter Report.      ICD-10-CM ICD-9-CM    1. Post-operative state Z98.890 V45.89    2. Primary open angle glaucoma of both eyes, severe stage H40.1133 365.11      365.73        S/p Xen Gel OD- Doing well   Poly QID and stop on Wed pm  Continue Durezol QID x next visit   RETURN TO CLINIC 1 week     OU LUMIGAN 0.03% QHS, DORZOLAMIDE TID

## 2019-10-07 ENCOUNTER — OFFICE VISIT (OUTPATIENT)
Dept: OPHTHALMOLOGY | Facility: CLINIC | Age: 69
End: 2019-10-07
Payer: MEDICARE

## 2019-10-07 DIAGNOSIS — Z98.890 POST-OPERATIVE STATE: Primary | ICD-10-CM

## 2019-10-07 DIAGNOSIS — H40.1133 PRIMARY OPEN ANGLE GLAUCOMA OF BOTH EYES, SEVERE STAGE: ICD-10-CM

## 2019-10-07 PROCEDURE — 99024 POSTOP FOLLOW-UP VISIT: CPT | Mod: POP,,, | Performed by: OPHTHALMOLOGY

## 2019-10-07 PROCEDURE — 99211 OFF/OP EST MAY X REQ PHY/QHP: CPT | Mod: PBBFAC | Performed by: OPHTHALMOLOGY

## 2019-10-07 PROCEDURE — 99999 PR PBB SHADOW E&M-EST. PATIENT-LVL I: CPT | Mod: PBBFAC,,, | Performed by: OPHTHALMOLOGY

## 2019-10-07 PROCEDURE — 99999 PR PBB SHADOW E&M-EST. PATIENT-LVL I: ICD-10-PCS | Mod: PBBFAC,,, | Performed by: OPHTHALMOLOGY

## 2019-10-07 PROCEDURE — 99024 PR POST-OP FOLLOW-UP VISIT: ICD-10-PCS | Mod: POP,,, | Performed by: OPHTHALMOLOGY

## 2019-10-07 NOTE — PROGRESS NOTES
HPI     Referred by Dr. Lorenzana    1. COAG/ LTG  -h/o non compliance  -intolerant of coag meds  -TRAB OD  -repeat SLT od done 8/9/2012 - good initial response IOP 18 to 13, SLT OD   08/17/17  -Primary SLT done os 9/13/2012 - minimal response 18 to 16  -Bleb Needling with [facility] Inj. OD 9/17/15  -SLT OS 9/29/2016  Xen Gel OD (09-26-19)      *Stopped Alphagan OS TID because of dermatitis  *Stopped Betoptic bid ou due to slow pulse rate and feeling tired.  *latanoprost slows pulse rate   *DMX S50 BID (stopped after 2 weeks due to extreme fatigue)  *OD Rhopressa Qhs ( patient ran out in March never returned for followup )    OD- Durezol QID,  OS LUMIGAN 0.03% QHS, DORZOLAMIDE TID      Last edited by Macario Shankar MD on 10/7/2019  8:19 AM. (History)            Assessment /Plan     For exam results, see Encounter Report.      ICD-10-CM ICD-9-CM    1. Post-operative state Z98.890 V45.89    2. Primary open angle glaucoma of both eyes, severe stage H40.1133 365.11 Doing well 11 days post Xen OD  Excellent response to digital pressure for just 10 seconds  Will follow without massage for 2 days to see how the iop fluctuates.      365.73        OD- Durezol QID,  OS LUMIGAN 0.03% QHS, DORZOLAMIDE TID  Return to clinic 2 days

## 2019-10-08 ENCOUNTER — PATIENT OUTREACH (OUTPATIENT)
Dept: ADMINISTRATIVE | Facility: HOSPITAL | Age: 69
End: 2019-10-08

## 2019-10-08 NOTE — PROGRESS NOTES
Spoke with pt regarding annual and HTN f/u.  Pt scheduled mammogram and annual with Dr. Lewis 10/15/19. Pt declined flu vaccine and HM updated.

## 2019-10-09 ENCOUNTER — OFFICE VISIT (OUTPATIENT)
Dept: OPHTHALMOLOGY | Facility: CLINIC | Age: 69
End: 2019-10-09
Payer: MEDICARE

## 2019-10-09 DIAGNOSIS — H40.1133 PRIMARY OPEN ANGLE GLAUCOMA OF BOTH EYES, SEVERE STAGE: ICD-10-CM

## 2019-10-09 DIAGNOSIS — Z98.890 POST-OPERATIVE STATE: Primary | ICD-10-CM

## 2019-10-09 PROCEDURE — 99024 POSTOP FOLLOW-UP VISIT: CPT | Mod: POP,,, | Performed by: OPHTHALMOLOGY

## 2019-10-09 PROCEDURE — 99211 OFF/OP EST MAY X REQ PHY/QHP: CPT | Mod: PBBFAC | Performed by: OPHTHALMOLOGY

## 2019-10-09 PROCEDURE — 99999 PR PBB SHADOW E&M-EST. PATIENT-LVL I: CPT | Mod: PBBFAC,,, | Performed by: OPHTHALMOLOGY

## 2019-10-09 PROCEDURE — 99024 PR POST-OP FOLLOW-UP VISIT: ICD-10-PCS | Mod: POP,,, | Performed by: OPHTHALMOLOGY

## 2019-10-09 PROCEDURE — 99999 PR PBB SHADOW E&M-EST. PATIENT-LVL I: ICD-10-PCS | Mod: PBBFAC,,, | Performed by: OPHTHALMOLOGY

## 2019-10-09 NOTE — PROGRESS NOTES
HPI     Post-op Evaluation      Additional comments: Xen Gel OD 9/26/19              Comments     The patient states her right eye is doing fine but is a little scratchy.  100% drop compliance    Referred by Dr. Lorenzana    1. COAG/ LTG  -h/o non compliance  -intolerant of coag meds  -TRAB OD  -repeat SLT od done 8/9/2012 - good initial response IOP 18 to 13, SLT OD   08/17/17  -Primary SLT done os 9/13/2012 - minimal response 18 to 16  -Bleb Needling with [facility] Inj. OD 9/17/15  -SLT OS 9/29/2016  Xen Gel OD (09-26-19)      *Stopped Alphagan OS TID because of dermatitis  *Stopped Betoptic bid ou due to slow pulse rate and feeling tired.  *latanoprost slows pulse rate   *DMX S50 BID (stopped after 2 weeks due to extreme fatigue)  *OD Rhopressa Qhs ( patient ran out in March never returned for followup )    OD- Durezol QID  OS LUMIGAN 0.03% QHS, DORZOLAMIDE TID          Last edited by Ayaka Escobedo on 10/9/2019  3:27 PM. (History)            Assessment /Plan     For exam results, see Encounter Report.      ICD-10-CM ICD-9-CM    1. Post-operative state Z98.890 V45.89 S/p xen OD 9/26/2019. Doing well, good IOP after massage.    2. Primary open angle glaucoma of both eyes, severe stage H40.1133 365.11      365.73        Slit lamp photos taken today     Add massage BID OD   Reduce durezol to TID for 1 week then BID for 1 week  OS LUMIGAN 0.03% QHS, DORZOLAMIDE TID   Return to clinic 2 weeks

## 2019-10-21 ENCOUNTER — OFFICE VISIT (OUTPATIENT)
Dept: FAMILY MEDICINE | Facility: CLINIC | Age: 69
End: 2019-10-21
Payer: MEDICARE

## 2019-10-21 ENCOUNTER — OFFICE VISIT (OUTPATIENT)
Dept: OPHTHALMOLOGY | Facility: CLINIC | Age: 69
End: 2019-10-21
Payer: MEDICARE

## 2019-10-21 ENCOUNTER — HOSPITAL ENCOUNTER (OUTPATIENT)
Dept: RADIOLOGY | Facility: HOSPITAL | Age: 69
Discharge: HOME | End: 2019-10-21
Attending: FAMILY MEDICINE
Payer: MEDICARE

## 2019-10-21 VITALS
DIASTOLIC BLOOD PRESSURE: 76 MMHG | WEIGHT: 198 LBS | HEIGHT: 68 IN | HEIGHT: 68 IN | HEART RATE: 68 BPM | SYSTOLIC BLOOD PRESSURE: 124 MMHG | TEMPERATURE: 98 F | BODY MASS INDEX: 30.62 KG/M2 | BODY MASS INDEX: 30.01 KG/M2 | WEIGHT: 202 LBS

## 2019-10-21 DIAGNOSIS — Z79.899 OTHER LONG TERM (CURRENT) DRUG THERAPY: ICD-10-CM

## 2019-10-21 DIAGNOSIS — K21.9 GASTROESOPHAGEAL REFLUX DISEASE WITHOUT ESOPHAGITIS: ICD-10-CM

## 2019-10-21 DIAGNOSIS — F41.9 ANXIETY: ICD-10-CM

## 2019-10-21 DIAGNOSIS — Z12.31 BREAST CANCER SCREENING BY MAMMOGRAM: ICD-10-CM

## 2019-10-21 DIAGNOSIS — Z00.00 ROUTINE HISTORY AND PHYSICAL EXAMINATION OF ADULT: Primary | ICD-10-CM

## 2019-10-21 DIAGNOSIS — E55.9 VITAMIN D DEFICIENCY: ICD-10-CM

## 2019-10-21 DIAGNOSIS — H40.1133 PRIMARY OPEN ANGLE GLAUCOMA OF BOTH EYES, SEVERE STAGE: ICD-10-CM

## 2019-10-21 DIAGNOSIS — E21.0 PRIMARY HYPERPARATHYROIDISM: ICD-10-CM

## 2019-10-21 DIAGNOSIS — I10 ESSENTIAL HYPERTENSION: ICD-10-CM

## 2019-10-21 DIAGNOSIS — Z78.0 MENOPAUSE: ICD-10-CM

## 2019-10-21 DIAGNOSIS — F32.0 MILD MAJOR DEPRESSION: ICD-10-CM

## 2019-10-21 DIAGNOSIS — R73.03 PREDIABETES: ICD-10-CM

## 2019-10-21 DIAGNOSIS — Z98.890 POST-OPERATIVE STATE: Primary | ICD-10-CM

## 2019-10-21 PROCEDURE — 99214 OFFICE O/P EST MOD 30 MIN: CPT | Mod: PBBFAC,27,PO | Performed by: FAMILY MEDICINE

## 2019-10-21 PROCEDURE — 77063 BREAST TOMOSYNTHESIS BI: CPT | Mod: TC,PO

## 2019-10-21 PROCEDURE — 99397 PER PM REEVAL EST PAT 65+ YR: CPT | Mod: S$PBB,,, | Performed by: FAMILY MEDICINE

## 2019-10-21 PROCEDURE — 77063 MAMMO DIGITAL SCREENING BILAT WITH TOMOSYNTHESIS_CAD: ICD-10-PCS | Mod: 26,,, | Performed by: RADIOLOGY

## 2019-10-21 PROCEDURE — 77063 BREAST TOMOSYNTHESIS BI: CPT | Mod: 26,,, | Performed by: RADIOLOGY

## 2019-10-21 PROCEDURE — 99999 PR PBB SHADOW E&M-EST. PATIENT-LVL I: ICD-10-PCS | Mod: PBBFAC,,, | Performed by: OPHTHALMOLOGY

## 2019-10-21 PROCEDURE — 99211 OFF/OP EST MAY X REQ PHY/QHP: CPT | Mod: PBBFAC | Performed by: OPHTHALMOLOGY

## 2019-10-21 PROCEDURE — 77067 SCR MAMMO BI INCL CAD: CPT | Mod: 26,,, | Performed by: RADIOLOGY

## 2019-10-21 PROCEDURE — 77067 MAMMO DIGITAL SCREENING BILAT WITH TOMOSYNTHESIS_CAD: ICD-10-PCS | Mod: 26,,, | Performed by: RADIOLOGY

## 2019-10-21 PROCEDURE — 99999 PR PBB SHADOW E&M-EST. PATIENT-LVL IV: CPT | Mod: PBBFAC,,, | Performed by: FAMILY MEDICINE

## 2019-10-21 PROCEDURE — 99999 PR PBB SHADOW E&M-EST. PATIENT-LVL IV: ICD-10-PCS | Mod: PBBFAC,,, | Performed by: FAMILY MEDICINE

## 2019-10-21 PROCEDURE — 99999 PR PBB SHADOW E&M-EST. PATIENT-LVL I: CPT | Mod: PBBFAC,,, | Performed by: OPHTHALMOLOGY

## 2019-10-21 PROCEDURE — 99024 PR POST-OP FOLLOW-UP VISIT: ICD-10-PCS | Mod: POP,,, | Performed by: OPHTHALMOLOGY

## 2019-10-21 PROCEDURE — 99397 PR PREVENTIVE VISIT,EST,65 & OVER: ICD-10-PCS | Mod: S$PBB,,, | Performed by: FAMILY MEDICINE

## 2019-10-21 PROCEDURE — 99024 POSTOP FOLLOW-UP VISIT: CPT | Mod: POP,,, | Performed by: OPHTHALMOLOGY

## 2019-10-21 RX ORDER — ESTRADIOL 0.5 MG/1
0.5 TABLET ORAL DAILY
Qty: 90 TABLET | Refills: 3 | Status: SHIPPED | OUTPATIENT
Start: 2019-10-21 | End: 2020-10-05

## 2019-10-21 NOTE — PROGRESS NOTES
Patient presents for routine physical.  Blood pressure controlled.  Prediabetes reviewed.  She has primary hyperparathyroidism followed by Endocrine.  Also vitamin-D deficiency.  She has not been compliant with vitamin-D.  She is on estrogen due to menopausal symptoms when she tries stop it.  She wants to continue.  Aware of risks and benefits.  Depression anxiety stable current medication.  She wanted to get her B12 checked however regarding her energy.  Reflux stable.    Suze was seen today for annual exam.    Diagnoses and all orders for this visit:    Routine history and physical examination of adult  -     CBC auto differential; Future  -     Lipid panel; Future  -     Vitamin B12; Future    Essential hypertension  -     CBC auto differential; Future  -     Lipid panel; Future    Prediabetes    Primary hyperparathyroidism    Vitamin D deficiency    Menopause    Other long term (current) drug therapy   -     Vitamin B12; Future    Mild major depression    Anxiety    Gastroesophageal reflux disease without esophagitis    Other orders  -     estradiol (ESTRACE) 0.5 MG tablet; Take 1 tablet (0.5 mg total) by mouth once daily.     Continue current medication.  Continue follow-up Endocrine.  Most recent laboratory reviewed.  States mammogram earlier today.  She declines any immunizations.      Anticipatory guidance: Don't smoke.  Healthy diet and regular exercise recommended.          Past Medical History:  Past Medical History:   Diagnosis Date    Anxiety     Cataract     Colon polyp     Depression     GERD (gastroesophageal reflux disease)     Glaucoma     History of colon polyps 2009    history as per patient    History of colonoscopy 2009    ok per pt    Hypertension     Hypothyroid     ?    Mitral valve prolapse     Peptic ulcer disease     with gi bleed    Prediabetes     Primary hyperparathyroidism     Right knee DJD     Right knee DJD     Sleep apnea     TMJ (temporomandibular joint  disorder)     Vitamin D deficiency      Past Surgical History:   Procedure Laterality Date    BILATERAL SALPINGOOPHORECTOMY      COLONOSCOPY  2015    EYE SURGERY      laser sx    GLAUCOMA SURGERY Right 09/26/2019    Xen Gel    HYSTERECTOMY      OOPHORECTOMY      SLT - OU - BOTH EYES      TRABECULECTOMY Right 4/2/14    OD (dr. grossman)    UPPER GASTROINTESTINAL ENDOSCOPY  2003     Review of patient's allergies indicates:   Allergen Reactions    Betoptic [betaxolol] Other (See Comments)     Fatigue, slowed heart rate.    Alphagan [brimonidine] Dermatitis    Pravastatin Other (See Comments)     aches     Current Outpatient Medications on File Prior to Visit   Medication Sig Dispense Refill    albuterol (PROVENTIL/VENTOLIN HFA) 90 mcg/actuation inhaler Inhale 2 puffs into the lungs every 6 (six) hours as needed for Wheezing or Shortness of Breath. 18 g 0    ALPRAZolam (XANAX) 0.25 MG tablet TAKE 1 TABLET (0.25 MG TOTAL) BY MOUTH 3 (THREE) TIMES DAILY AS NEEDED FOR ANXIETY. 30 tablet 5    bimatoprost (LUMIGAN) 0.03 % ophthalmic drops PLACE 1 DROP INTO BOTH EYES EVERY EVENING. 2.5 mL 5    celecoxib (CELEBREX) 200 MG capsule Take 1 capsule (200 mg total) by mouth once daily. 90 capsule 3    DIPHENHYDRAMINE HCL (BENADRYL ALLERGY ORAL) Take 1 tablet by mouth as needed.      dorzolamide (TRUSOPT) 2 % ophthalmic solution INSTILL 1 DROP INTO BOTH EYES 3 TIMES DAILY 10 mL 3    fexofenadine (ALLEGRA) 180 MG tablet Take 180 mg by mouth daily as needed.       FLUoxetine 20 MG capsule Take 3 capsules (60 mg total) by mouth once daily. 90 capsule 11    fluticasone (FLONASE) 50 mcg/actuation nasal spray Use 2 sprays to each nostril daily 16 g 12    hydroCHLOROthiazide (HYDRODIURIL) 25 MG tablet Take 1 tablet (25 mg total) by mouth once daily. 90 tablet 3    losartan (COZAAR) 50 MG tablet Take 1 tablet (50 mg total) by mouth once daily. 90 tablet 3    metFORMIN (GLUCOPHAGE-XR) 500 MG 24 hr tablet Take 1  tablet (500 mg total) by mouth 2 (two) times daily. 180 tablet 1    pantoprazole (PROTONIX) 40 MG tablet Take 1 tablet (40 mg total) by mouth once daily. 90 tablet 3    [DISCONTINUED] estradiol (ESTRACE) 0.5 MG tablet TAKE 1 TABLET (0.5 MG TOTAL) BY MOUTH ONCE DAILY. 90 tablet 0    meclizine (ANTIVERT) 12.5 mg tablet Take 1 tablet (12.5 mg total) by mouth 3 (three) times daily as needed. 30 tablet 0    [DISCONTINUED] estradiol (ESTRACE) 0.5 MG tablet TAKE 1 TABLET (0.5 MG TOTAL) BY MOUTH ONCE DAILY. (Patient not taking: Reported on 10/21/2019) 30 tablet 0     No current facility-administered medications on file prior to visit.      Social History     Socioeconomic History    Marital status:      Spouse name: Not on file    Number of children: Not on file    Years of education: Not on file    Highest education level: Not on file   Occupational History    Not on file   Social Needs    Financial resource strain: Not on file    Food insecurity:     Worry: Not on file     Inability: Not on file    Transportation needs:     Medical: Not on file     Non-medical: Not on file   Tobacco Use    Smoking status: Never Smoker    Smokeless tobacco: Never Used   Substance and Sexual Activity    Alcohol use: Yes     Comment: occasional    Drug use: No    Sexual activity: Not Currently   Lifestyle    Physical activity:     Days per week: Not on file     Minutes per session: Not on file    Stress: Not on file   Relationships    Social connections:     Talks on phone: Not on file     Gets together: Not on file     Attends Religion service: Not on file     Active member of club or organization: Not on file     Attends meetings of clubs or organizations: Not on file     Relationship status: Not on file   Other Topics Concern    Not on file   Social History Narrative    Not on file     Family History   Problem Relation Age of Onset    Heart disease Father         before age 50    Diabetes Mother      "Glaucoma Mother     Leukemia Mother     Cataracts Mother     Breast cancer Mother 60    Diabetes Sister     Glaucoma Sister     Diabetes Brother     Glaucoma Brother     Lung cancer Brother     Breast cancer Other 40        genetics -    Colon cancer Maternal Aunt              ROS:  GENERAL: No fever, chills,  or significant weight changes.  HEENT: No headache or hearing complaints.  No dysphagia  Eyes: No vision complaints  CHEST: Denies BRISCOE, cyanosis, wheezing, cough and sputum production.  CARDIOVASCULAR: Denies chest pain, PND, orthopnea or reduced exercise tolerance.  ABDOMEN: Appetite fine. Denies diarrhea, abdominal pain, hematemesis or blood in stool.  URINARY: No flank pain, dysuria or hematuria.  MUSCULOSKELETAL: No warmth swelling or tenderness of the joints  NEUROLOGIC: No focal weakness numbness or paresthesia  PSYCHIATRIC: Denies SI/HI        Vitals:    10/21/19 1010   BP: 124/76   Pulse: 68   Temp: 97.9 °F (36.6 °C)   Weight: 89.8 kg (198 lb)   Height: 5' 8" (1.727 m)     Wt Readings from Last 3 Encounters:   10/21/19 89.8 kg (198 lb)   10/21/19 91.6 kg (202 lb)   08/22/19 92 kg (202 lb 13.2 oz)       OBJECTIVE:   APPEARANCE: Well nourished, well developed, in no acute distress.    HEAD: Normocephalic.  Atraumatic.  No sinus tenderness.  EYES:   Right eye: Pupil reactive.  Conjunctiva clear.    Left eye: Pupil reactive.  Conjunctiva clear.  EOMI.    EARS: TM's intact. Light reflex normal. No retraction or perforation.    NOSE:  clear.  MOUTH & THROAT:  No pharyngeal erythema or exudate. No lesions.  NECK: Supple. No bruits.  No JVD.  No cervical lymphadenopathy.  No thyromegaly.    CHEST: Breath sounds clear bilaterally.  Normal respiratory effort  CARDIOVASCULAR: Normal rate.  Regular rhythm.  No murmurs.  No rub.  No gallops.  ABDOMEN: Bowel sounds normal.  Soft.  No tenderness.  No organomegaly.  PERIPHERAL VASCULAR: No cyanosis.  No clubbing.  No edema.  NEUROLOGIC: No focal " findings.  MENTAL STATUS: Alert.  Oriented x 3.

## 2019-10-25 ENCOUNTER — LAB VISIT (OUTPATIENT)
Dept: LAB | Facility: HOSPITAL | Age: 69
End: 2019-10-25
Attending: FAMILY MEDICINE
Payer: MEDICARE

## 2019-10-25 DIAGNOSIS — Z00.00 ROUTINE HISTORY AND PHYSICAL EXAMINATION OF ADULT: ICD-10-CM

## 2019-10-25 DIAGNOSIS — Z79.899 OTHER LONG TERM (CURRENT) DRUG THERAPY: ICD-10-CM

## 2019-10-25 DIAGNOSIS — I10 ESSENTIAL HYPERTENSION: ICD-10-CM

## 2019-10-25 LAB
BASOPHILS # BLD AUTO: 0.05 K/UL (ref 0–0.2)
BASOPHILS NFR BLD: 0.8 % (ref 0–1.9)
CHOLEST SERPL-MCNC: 335 MG/DL (ref 120–199)
CHOLEST/HDLC SERPL: 5 {RATIO} (ref 2–5)
DIFFERENTIAL METHOD: ABNORMAL
EOSINOPHIL # BLD AUTO: 0.1 K/UL (ref 0–0.5)
EOSINOPHIL NFR BLD: 2 % (ref 0–8)
ERYTHROCYTE [DISTWIDTH] IN BLOOD BY AUTOMATED COUNT: 12.9 % (ref 11.5–14.5)
HCT VFR BLD AUTO: 40.7 % (ref 37–48.5)
HDLC SERPL-MCNC: 67 MG/DL (ref 40–75)
HDLC SERPL: 20 % (ref 20–50)
HGB BLD-MCNC: 12.5 G/DL (ref 12–16)
IMM GRANULOCYTES # BLD AUTO: 0.01 K/UL (ref 0–0.04)
IMM GRANULOCYTES NFR BLD AUTO: 0.2 % (ref 0–0.5)
LDLC SERPL CALC-MCNC: 253.6 MG/DL (ref 63–159)
LYMPHOCYTES # BLD AUTO: 2.4 K/UL (ref 1–4.8)
LYMPHOCYTES NFR BLD: 36.7 % (ref 18–48)
MCH RBC QN AUTO: 28.9 PG (ref 27–31)
MCHC RBC AUTO-ENTMCNC: 30.7 G/DL (ref 32–36)
MCV RBC AUTO: 94 FL (ref 82–98)
MONOCYTES # BLD AUTO: 0.6 K/UL (ref 0.3–1)
MONOCYTES NFR BLD: 8.6 % (ref 4–15)
NEUTROPHILS # BLD AUTO: 3.4 K/UL (ref 1.8–7.7)
NEUTROPHILS NFR BLD: 51.7 % (ref 38–73)
NONHDLC SERPL-MCNC: 268 MG/DL
NRBC BLD-RTO: 0 /100 WBC
PLATELET # BLD AUTO: 377 K/UL (ref 150–350)
PMV BLD AUTO: 11.6 FL (ref 9.2–12.9)
RBC # BLD AUTO: 4.32 M/UL (ref 4–5.4)
TRIGL SERPL-MCNC: 72 MG/DL (ref 30–150)
VIT B12 SERPL-MCNC: 959 PG/ML (ref 210–950)
WBC # BLD AUTO: 6.62 K/UL (ref 3.9–12.7)

## 2019-10-25 PROCEDURE — 85025 COMPLETE CBC W/AUTO DIFF WBC: CPT

## 2019-10-25 PROCEDURE — 80061 LIPID PANEL: CPT

## 2019-10-25 PROCEDURE — 82607 VITAMIN B-12: CPT

## 2019-10-25 PROCEDURE — 36415 COLL VENOUS BLD VENIPUNCTURE: CPT | Mod: PO

## 2019-10-28 ENCOUNTER — TELEPHONE (OUTPATIENT)
Dept: FAMILY MEDICINE | Facility: CLINIC | Age: 69
End: 2019-10-28

## 2019-10-28 DIAGNOSIS — E78.00 HIGH CHOLESTEROL: Primary | ICD-10-CM

## 2019-10-28 NOTE — TELEPHONE ENCOUNTER
Message   Received: Today   Message Contents   MD LC Whitman. Staff             Lab okay except for very high cholesterol.  This is significantly higher than any of her previous checks.  Did not tolerate pravastatin in the past.  Recommend recheck lipid in 6 weeks.  If not significantly improved then I would consider alternative cholesterol medication at that time. My nurse will contact you to arrange.   Thanks,   Dr. Lewis

## 2019-11-04 ENCOUNTER — OFFICE VISIT (OUTPATIENT)
Dept: OPHTHALMOLOGY | Facility: CLINIC | Age: 69
End: 2019-11-04
Payer: MEDICARE

## 2019-11-04 DIAGNOSIS — Z98.890 POST-OPERATIVE STATE: Primary | ICD-10-CM

## 2019-11-04 DIAGNOSIS — H40.1133 PRIMARY OPEN ANGLE GLAUCOMA OF BOTH EYES, SEVERE STAGE: ICD-10-CM

## 2019-11-04 PROCEDURE — 99211 OFF/OP EST MAY X REQ PHY/QHP: CPT | Mod: PBBFAC | Performed by: OPHTHALMOLOGY

## 2019-11-04 PROCEDURE — 99024 PR POST-OP FOLLOW-UP VISIT: ICD-10-PCS | Mod: POP,,, | Performed by: OPHTHALMOLOGY

## 2019-11-04 PROCEDURE — 99024 POSTOP FOLLOW-UP VISIT: CPT | Mod: POP,,, | Performed by: OPHTHALMOLOGY

## 2019-11-04 PROCEDURE — 99999 PR PBB SHADOW E&M-EST. PATIENT-LVL I: CPT | Mod: PBBFAC,,, | Performed by: OPHTHALMOLOGY

## 2019-11-04 PROCEDURE — 99999 PR PBB SHADOW E&M-EST. PATIENT-LVL I: ICD-10-PCS | Mod: PBBFAC,,, | Performed by: OPHTHALMOLOGY

## 2019-11-04 RX ORDER — LOTEPREDNOL ETABONATE 5 MG/ML
1 SUSPENSION/ DROPS OPHTHALMIC DAILY
Qty: 5 ML | Refills: 1 | Status: SHIPPED | OUTPATIENT
Start: 2019-11-04 | End: 2019-11-19

## 2019-11-04 NOTE — PROGRESS NOTES
HPI     Patient returns for a 2 week p.o. Visit, patient stopped Durezol   completely since last visit, has only been using glaucoma drops in OS.          Referred by Dr. Lorenzana    1. COAG/ LTG  -h/o non compliance  -intolerant of coag meds  -TRAB OD  -repeat SLT od done 8/9/2012 - good initial response IOP 18 to 13, SLT OD   08/17/17  -Primary SLT done os 9/13/2012 - minimal response 18 to 16  -Bleb Needling with [facility] Inj. OD 9/17/15  -SLT OS 9/29/2016  Xen Gel OD (09-26-19)      *Stopped Alphagan OS TID because of dermatitis  *Stopped Betoptic bid ou due to slow pulse rate and feeling tired.  *latanoprost slows pulse rate   *DMX S50 BID (stopped after 2 weeks due to extreme fatigue)  *OD Rhopressa Qhs ( patient ran out in March never returned for followup )    Massage BID OD  OD- Durezol qd ( patient stopped using on 10/21/19 )  OS- LUMIGAN 0.03% QHS , DORZOLAMIDE TID          Last edited by PETR Rojas on 11/4/2019  8:26 AM. (History)            Assessment /Plan     For exam results, see Encounter Report.      ICD-10-CM ICD-9-CM    1. Post-operative state Z98.890 V45.89    2. Primary open angle glaucoma of both eyes, severe stage H40.1133 365.11      365.73        DOING WELL   Lotemax qd OD  OS- LUMIGAN 0.03% QHS , DORZOLAMIDE TID  Return to clinic 2 weeks

## 2019-11-06 ENCOUNTER — TELEPHONE (OUTPATIENT)
Dept: OPHTHALMOLOGY | Facility: CLINIC | Age: 69
End: 2019-11-06

## 2019-11-06 NOTE — TELEPHONE ENCOUNTER
We received a fax from [facility] stating Lotemax wasn't covered, I then called the Pharm since Dr. Shankar says the pt must have it. I spoke to the Pharmacist Mila and I then reminded her that Lotemax is now available generic. She then said it wasn't covered at this time and then I  asked for the cash price- that was then $ 185.00  She then said she could run it with a rebate coupon with the generic and it would only cost the pt $30.00.  Mila said she would then notify the pt and I told her that Dr Shankar wanted her to start it 2 days ago as she was instructed.

## 2019-11-12 DIAGNOSIS — H40.1133 PRIMARY OPEN ANGLE GLAUCOMA OF BOTH EYES, SEVERE STAGE: ICD-10-CM

## 2019-11-12 RX ORDER — BIMATOPROST 0.3 MG/ML
1 SOLUTION/ DROPS OPHTHALMIC NIGHTLY
Qty: 2.5 ML | Refills: 5 | Status: SHIPPED | OUTPATIENT
Start: 2019-11-12 | End: 2020-10-21 | Stop reason: ALTCHOICE

## 2019-11-19 ENCOUNTER — PATIENT MESSAGE (OUTPATIENT)
Dept: OPHTHALMOLOGY | Facility: CLINIC | Age: 69
End: 2019-11-19

## 2019-11-19 DIAGNOSIS — Z98.890 POST-OPERATIVE STATE: Primary | ICD-10-CM

## 2019-11-19 DIAGNOSIS — H40.1133 PRIMARY OPEN ANGLE GLAUCOMA OF BOTH EYES, SEVERE STAGE: ICD-10-CM

## 2019-11-19 RX ORDER — PREDNISOLONE ACETATE 10 MG/ML
1 SUSPENSION/ DROPS OPHTHALMIC DAILY
Qty: 5 ML | Refills: 1 | Status: SHIPPED | OUTPATIENT
Start: 2019-11-19 | End: 2020-02-24 | Stop reason: ALTCHOICE

## 2019-11-29 RX ORDER — BENZONATATE 200 MG/1
CAPSULE ORAL
Qty: 30 CAPSULE | Refills: 0 | Status: SHIPPED | OUTPATIENT
Start: 2019-11-29 | End: 2020-03-28

## 2019-12-10 ENCOUNTER — OFFICE VISIT (OUTPATIENT)
Dept: OPHTHALMOLOGY | Facility: CLINIC | Age: 69
End: 2019-12-10
Payer: MEDICARE

## 2019-12-10 DIAGNOSIS — H40.1133 PRIMARY OPEN ANGLE GLAUCOMA OF BOTH EYES, SEVERE STAGE: Primary | ICD-10-CM

## 2019-12-10 DIAGNOSIS — H25.13 NUCLEAR SCLEROSIS, BILATERAL: ICD-10-CM

## 2019-12-10 DIAGNOSIS — Z98.890 POST-OPERATIVE STATE: ICD-10-CM

## 2019-12-10 PROCEDURE — 99999 PR PBB SHADOW E&M-EST. PATIENT-LVL I: CPT | Mod: PBBFAC,,, | Performed by: OPHTHALMOLOGY

## 2019-12-10 PROCEDURE — 99024 POSTOP FOLLOW-UP VISIT: CPT | Mod: POP,,, | Performed by: OPHTHALMOLOGY

## 2019-12-10 PROCEDURE — 99211 OFF/OP EST MAY X REQ PHY/QHP: CPT | Mod: PBBFAC | Performed by: OPHTHALMOLOGY

## 2019-12-10 PROCEDURE — 99024 PR POST-OP FOLLOW-UP VISIT: ICD-10-PCS | Mod: POP,,, | Performed by: OPHTHALMOLOGY

## 2019-12-10 PROCEDURE — 99999 PR PBB SHADOW E&M-EST. PATIENT-LVL I: ICD-10-PCS | Mod: PBBFAC,,, | Performed by: OPHTHALMOLOGY

## 2019-12-10 NOTE — PROGRESS NOTES
HPI     Patient returns for a 2 week p.o. Visit, patient is only using   Dorzolamide BID , not TID.os.  Referred by Dr. Lorenzana    1. COAG/ LTG  -h/o non compliance  -intolerant of coag meds  -TRAB OD  -repeat SLT od done 8/9/2012 - good initial response IOP 18 to 13, SLT OD   08/17/17  -Primary SLT done os 9/13/2012 - minimal response 18 to 16  -Bleb Needling with [facility] Inj. OD 9/17/15  -SLT OS 9/29/2016  Xen Gel OD (09-26-19)      *Stopped Alphagan OS TID because of dermatitis  *Stopped Betoptic bid ou due to slow pulse rate and feeling tired.  *latanoprost slows pulse rate   *DMX S50 BID (stopped after 2 weeks due to extreme fatigue)  *OD Rhopressa Qhs ( patient ran out in March never returned for followup )    Massage BID OD ( patient stopped 11/25/19 )  OD- Prednisolone once daily qd ( patient stopped using on 10/21/19 )  OS- LUMIGAN 0.03% QHS , DORZOLAMIDE TID ( patient only uses Bid )    Last edited by Macario Shankar MD on 12/10/2019  2:35 PM. (History)            Assessment /Plan     For exam results, see Encounter Report.      ICD-10-CM ICD-9-CM    1. Primary open angle glaucoma of both eyes, severe stage H40.1133 365.11 IOP not within acceptable range relative to target IOP with risk of irreversible visual loss. Additional treatment required.  Discussed options, risks, and benefits of additional medication, SLT laser, or incisional glaucoma surgery.     recommend Rhopressa Trial     Patient chooses rhopressa trial os     Reviewed importance of continued compliance with treatment and follow up.        365.73    2. Nuclear sclerosis, bilateral H25.13 366.16    3. Post-operative state Z98.890 V45.89        Pt was intolerant of numerous meds in the past Alphagan caused dermatitis, Betoptic and latanoprost  slowed her pulse rate and DMX tablets caused severe fatigue   Therfore we must try rhopressa     Rx sent to  pharm to start PA Process                                      s                                     g    OD- Prednisolone once daily qd   OS- LUMIGAN 0.03% QHS , DORZOLAMIDE TID ,  rhopressa qd        RETURN TO CLINIC 4-5 week IOP

## 2019-12-26 ENCOUNTER — TELEPHONE (OUTPATIENT)
Dept: OPHTHALMOLOGY | Facility: CLINIC | Age: 69
End: 2019-12-26

## 2019-12-26 NOTE — TELEPHONE ENCOUNTER
Called pt and verified her confusion on her med   I called pharm and her copay is  $55.00  She will call me back tomorrow and speak to me directly

## 2019-12-26 NOTE — TELEPHONE ENCOUNTER
----- Message from Saira Abbott sent at 12/26/2019  1:58 PM CST -----  Pt request a phone call, regarding sample eye drops that were given at last visit.

## 2020-01-06 ENCOUNTER — TELEPHONE (OUTPATIENT)
Dept: ENDOCRINOLOGY | Facility: CLINIC | Age: 70
End: 2020-01-06

## 2020-01-06 ENCOUNTER — PATIENT MESSAGE (OUTPATIENT)
Dept: ENDOCRINOLOGY | Facility: CLINIC | Age: 70
End: 2020-01-06

## 2020-01-21 ENCOUNTER — TELEPHONE (OUTPATIENT)
Dept: OPHTHALMOLOGY | Facility: CLINIC | Age: 70
End: 2020-01-21

## 2020-01-21 NOTE — TELEPHONE ENCOUNTER
LEFT MESSAGE OF BOOK OUT FOR 2/10/2020 AND THAT HER APPT WAS MOVED. PT IS ALSO ACTIVE IN PATIENT PORTAL

## 2020-02-15 DIAGNOSIS — M25.561 RIGHT KNEE PAIN, UNSPECIFIED CHRONICITY: ICD-10-CM

## 2020-02-15 DIAGNOSIS — M21.061 ACQUIRED VALGUS DEFORMITY KNEE, RIGHT: ICD-10-CM

## 2020-02-15 DIAGNOSIS — M17.11 PRIMARY OSTEOARTHRITIS OF RIGHT KNEE: ICD-10-CM

## 2020-02-17 RX ORDER — CELECOXIB 200 MG/1
200 CAPSULE ORAL DAILY
Qty: 90 CAPSULE | Refills: 2 | Status: SHIPPED | OUTPATIENT
Start: 2020-02-17 | End: 2020-12-03

## 2020-02-17 RX ORDER — LOSARTAN POTASSIUM 50 MG/1
TABLET ORAL
Qty: 90 TABLET | Refills: 2 | Status: SHIPPED | OUTPATIENT
Start: 2020-02-17 | End: 2020-09-23

## 2020-02-17 RX ORDER — PANTOPRAZOLE SODIUM 40 MG/1
TABLET, DELAYED RELEASE ORAL
Qty: 90 TABLET | Refills: 2 | Status: SHIPPED | OUTPATIENT
Start: 2020-02-17 | End: 2020-12-10

## 2020-02-17 RX ORDER — HYDROCHLOROTHIAZIDE 25 MG/1
TABLET ORAL
Qty: 90 TABLET | Refills: 2 | Status: SHIPPED | OUTPATIENT
Start: 2020-02-17 | End: 2020-12-10

## 2020-02-19 RX ORDER — LOSARTAN POTASSIUM 50 MG/1
TABLET ORAL
Qty: 90 TABLET | Refills: 3 | OUTPATIENT
Start: 2020-02-19

## 2020-02-24 ENCOUNTER — OFFICE VISIT (OUTPATIENT)
Dept: OPHTHALMOLOGY | Facility: CLINIC | Age: 70
End: 2020-02-24
Payer: MEDICARE

## 2020-02-24 ENCOUNTER — TELEPHONE (OUTPATIENT)
Dept: OPHTHALMOLOGY | Facility: HOSPITAL | Age: 70
End: 2020-02-24

## 2020-02-24 DIAGNOSIS — H40.1113 PRIMARY OPEN ANGLE GLAUCOMA (POAG) OF RIGHT EYE, SEVERE STAGE: ICD-10-CM

## 2020-02-24 DIAGNOSIS — H25.11 SENILE NUCLEAR SCLEROSIS, RIGHT: ICD-10-CM

## 2020-02-24 DIAGNOSIS — E11.36 DIABETIC CATARACT OF RIGHT EYE: ICD-10-CM

## 2020-02-24 DIAGNOSIS — E11.36 DIABETIC CATARACT OF LEFT EYE: ICD-10-CM

## 2020-02-24 DIAGNOSIS — H40.1123 PRIMARY OPEN ANGLE GLAUCOMA (POAG) OF LEFT EYE, SEVERE STAGE: Primary | ICD-10-CM

## 2020-02-24 DIAGNOSIS — E11.9 TYPE 2 DIABETES MELLITUS WITHOUT COMPLICATION, WITHOUT LONG-TERM CURRENT USE OF INSULIN: ICD-10-CM

## 2020-02-24 DIAGNOSIS — H25.12 NUCLEAR SENILE CATARACT OF LEFT EYE: ICD-10-CM

## 2020-02-24 PROCEDURE — 92014 COMPRE OPH EXAM EST PT 1/>: CPT | Mod: S$PBB,,, | Performed by: OPHTHALMOLOGY

## 2020-02-24 PROCEDURE — 99212 OFFICE O/P EST SF 10 MIN: CPT | Mod: PBBFAC | Performed by: OPHTHALMOLOGY

## 2020-02-24 PROCEDURE — 99999 PR PBB SHADOW E&M-EST. PATIENT-LVL II: CPT | Mod: PBBFAC,,, | Performed by: OPHTHALMOLOGY

## 2020-02-24 PROCEDURE — 92014 PR EYE EXAM, EST PATIENT,COMPREHESV: ICD-10-PCS | Mod: S$PBB,,, | Performed by: OPHTHALMOLOGY

## 2020-02-24 PROCEDURE — 99999 PR PBB SHADOW E&M-EST. PATIENT-LVL II: ICD-10-PCS | Mod: PBBFAC,,, | Performed by: OPHTHALMOLOGY

## 2020-02-24 RX ORDER — LOTEPREDNOL ETABONATE 5 MG/ML
1 SUSPENSION/ DROPS OPHTHALMIC DAILY
Qty: 5 ML | Refills: 1 | Status: SHIPPED | OUTPATIENT
Start: 2020-02-24 | End: 2020-03-05

## 2020-02-24 RX ORDER — KETOROLAC TROMETHAMINE 5 MG/ML
1 SOLUTION OPHTHALMIC 4 TIMES DAILY
Qty: 1 BOTTLE | Refills: 3 | Status: SHIPPED | OUTPATIENT
Start: 2020-02-24 | End: 2020-02-29

## 2020-02-24 RX ORDER — POLYMYXIN B SULFATE AND TRIMETHOPRIM 1; 10000 MG/ML; [USP'U]/ML
1 SOLUTION OPHTHALMIC EVERY 4 HOURS
Qty: 1 BOTTLE | Refills: 1 | Status: SHIPPED | OUTPATIENT
Start: 2020-02-24 | End: 2020-02-29

## 2020-02-24 RX ORDER — KETOROLAC TROMETHAMINE 5 MG/ML
1 SOLUTION OPHTHALMIC 4 TIMES DAILY
Qty: 1 BOTTLE | Refills: 3 | Status: SHIPPED | OUTPATIENT
Start: 2020-02-24 | End: 2020-02-24

## 2020-02-24 RX ORDER — POLYMYXIN B SULFATE AND TRIMETHOPRIM 1; 10000 MG/ML; [USP'U]/ML
1 SOLUTION OPHTHALMIC EVERY 4 HOURS
Qty: 1 BOTTLE | Refills: 1 | Status: SHIPPED | OUTPATIENT
Start: 2020-02-24 | End: 2020-02-24

## 2020-02-24 NOTE — PROGRESS NOTES
HPI     Glaucoma     Comments: 5 week IOP check              Comments     The patient states her eyes are feeling fine and she denies any ocular   pain at this time.  90% drop compliance    Referred by Dr. Lorenzana    1. COAG/ LTG  -h/o non compliance  -intolerant of coag meds  -TRAB OD  -repeat SLT od done 8/9/2012 - good initial response IOP 18 to 13, SLT OD   08/17/17  -Primary SLT done os 9/13/2012 - minimal response 18 to 16  -Bleb Needling with [facility] Inj. OD 9/17/15  -SLT OS 9/29/2016  Xen Gel OD (09-26-19)      *Stopped Alphagan OS TID because of dermatitis  *Stopped Betoptic bid ou due to slow pulse rate and feeling tired.  *latanoprost slows pulse rate   *DMX S50 BID (stopped after 2 weeks due to extreme fatigue)  *OD Rhopressa Qhs ( patient ran out in March never returned for followup )      OD- Prednisolone once daily qd   OS- LUMIGAN 0.03% QHS , DORZOLAMIDE TID, Rhopressa QD          Last edited by Ayaka Escobedo on 2/24/2020  8:03 AM. (History)            Assessment /Plan     For exam results, see Encounter Report.      ICD-10-CM ICD-9-CM    1. Primary open angle glaucoma (POAG) of left eye, severe stage H40.1123 365.11 IOP not within acceptable range relative to target IOP with risk of irreversible visual loss. Additional treatment required.  Discussed options, risks, and benefits of additional medication, SLT laser, or incisional glaucoma surgery.     recommend Xen OS - external    Patient chooses above    Reviewed importance of continued compliance with treatment and follow up.       Ambulatory Referral to External Surgery     365.73 ketorolac 0.5% (ACULAR) 0.5 % Drop, Poly and Pred for Pre-op                     2. Primary open angle glaucoma (POAG) of right eye, severe stage H40.1113 365.11 Doing well post Xen OD     365.73    3. Nuclear senile cataract of left eye H25.12 366.16 follow   4. Diabetic cataract of left eye E11.36 250.50 follow     366.41    5. Senile nuclear sclerosis, right H25.11  366.16 follow   6. Diabetic cataract of right eye E11.36 250.50 follow     366.41    7. Type 2 diabetes mellitus without complication, without long-term current use of insulin E11.9 250.00 Diabetes with no diabetic retinopathy on dilated exam.   Reviewed diabetic eye precautions including excellent blood sugar control, and importance of regular follow up.              Reduce  Pred  A to M,W F and Sun  Lotemax cost over $300  Proceed with Xen OS  Discussed risks of treatment, need for additional treatment and intense follow up  Will use Pred A , poly, and keto post op

## 2020-02-24 NOTE — TELEPHONE ENCOUNTER
Called patient and left message to idalia Pred SUZY and Sun since Lotemax is $300. She will follow up as scheduled.

## 2020-02-24 NOTE — Clinical Note
Please call patient and tell her to reduce Pred to MWF and Sun in her right eye and to proceed with Xen OSLotemax is $300

## 2020-02-27 ENCOUNTER — TELEPHONE (OUTPATIENT)
Dept: OPHTHALMOLOGY | Facility: CLINIC | Age: 70
End: 2020-02-27

## 2020-02-27 NOTE — TELEPHONE ENCOUNTER
----- Message from Macario Shankar MD sent at 2/24/2020  1:31 PM CST -----  Please call patient and tell her to reduce Pred to MWF and Sun in her right eye and to proceed with Xen OS  Lotemax is $300

## 2020-03-02 RX ORDER — ALPRAZOLAM 0.25 MG/1
TABLET ORAL
Qty: 30 TABLET | Refills: 0 | Status: SHIPPED | OUTPATIENT
Start: 2020-03-02 | End: 2020-05-04 | Stop reason: SDUPTHER

## 2020-03-05 ENCOUNTER — PATIENT MESSAGE (OUTPATIENT)
Dept: OPHTHALMOLOGY | Facility: CLINIC | Age: 70
End: 2020-03-05

## 2020-03-05 RX ORDER — PREDNISOLONE ACETATE 10 MG/ML
1 SUSPENSION/ DROPS OPHTHALMIC DAILY
Qty: 10 ML | Refills: 1 | Status: SHIPPED | OUTPATIENT
Start: 2020-03-05 | End: 2020-06-03

## 2020-03-26 RX ORDER — METFORMIN HYDROCHLORIDE 500 MG/1
TABLET, EXTENDED RELEASE ORAL
Qty: 180 TABLET | Refills: 1 | Status: SHIPPED | OUTPATIENT
Start: 2020-03-26 | End: 2021-02-27

## 2020-03-28 RX ORDER — BENZONATATE 200 MG/1
CAPSULE ORAL
Qty: 30 CAPSULE | Refills: 0 | Status: SHIPPED | OUTPATIENT
Start: 2020-03-28 | End: 2021-07-30

## 2020-03-30 ENCOUNTER — TELEPHONE (OUTPATIENT)
Dept: FAMILY MEDICINE | Facility: CLINIC | Age: 70
End: 2020-03-30

## 2020-03-30 RX ORDER — ALBUTEROL SULFATE 90 UG/1
2 AEROSOL, METERED RESPIRATORY (INHALATION) EVERY 6 HOURS PRN
Qty: 18 G | Refills: 0 | Status: SHIPPED | OUTPATIENT
Start: 2020-03-30 | End: 2020-05-19 | Stop reason: SDUPTHER

## 2020-03-30 NOTE — TELEPHONE ENCOUNTER
----- Message from Paula Yoon sent at 3/30/2020 10:50 AM CDT -----  Contact: PT  PT called in regards to prescription:    albuterol (PROVENTIL/VENTOLIN HFA) 90 mcg/actuation inhaler  Inhale 2 puffs into the lungs every 6 (six) hours as needed for Wheezing or Shortness of Breath. - Inhalation    pt was told it was denied by physician for refill - pt is wanting a call back regarding this please  Callback: [phone]

## 2020-03-30 NOTE — TELEPHONE ENCOUNTER
Patient informed pharmacy requested wrong inhaler so it was denied and Dr Lewis sent in the correct one at 848 today

## 2020-03-30 NOTE — TELEPHONE ENCOUNTER
----- Message from Paulina Arango LPN sent at 3/30/2020 11:04 AM CDT -----  Contact: PT      ----- Message -----  From: Paula Yoon  Sent: 3/30/2020  10:58 AM CDT  To: Christy CASTRO Staff    PT called to reschedule her nurse visit she missed today    Callback: [phone]

## 2020-04-06 ENCOUNTER — NURSE TRIAGE (OUTPATIENT)
Dept: ADMINISTRATIVE | Facility: CLINIC | Age: 70
End: 2020-04-06

## 2020-04-06 NOTE — TELEPHONE ENCOUNTER
Pt states she has been exposed, no fever, headache everyday, fatigue, coughing. Pt stated she had severe difficulty breathing. Informed pt to call 911, encouraged pt to call 911 and pt stated she would rather wait.    Reason for Disposition   SEVERE difficulty breathing (e.g., struggling for each breath, speaks in single words)    Protocols used: CORONAVIRUS (COVID-19) DIAGNOSED OR [id number]

## 2020-04-23 ENCOUNTER — PATIENT MESSAGE (OUTPATIENT)
Dept: FAMILY MEDICINE | Facility: CLINIC | Age: 70
End: 2020-04-23

## 2020-04-23 RX ORDER — ALPRAZOLAM 0.25 MG/1
TABLET ORAL
Qty: 30 TABLET | Refills: 0 | OUTPATIENT
Start: 2020-04-23

## 2020-04-24 ENCOUNTER — PATIENT MESSAGE (OUTPATIENT)
Dept: FAMILY MEDICINE | Facility: CLINIC | Age: 70
End: 2020-04-24

## 2020-04-30 RX ORDER — ALPRAZOLAM 0.25 MG/1
TABLET ORAL
Qty: 30 TABLET | Refills: 0 | OUTPATIENT
Start: 2020-04-30

## 2020-05-01 ENCOUNTER — TELEPHONE (OUTPATIENT)
Dept: FAMILY MEDICINE | Facility: CLINIC | Age: 70
End: 2020-05-01

## 2020-05-01 NOTE — TELEPHONE ENCOUNTER
----- Message from [address] sent at 5/1/2020 11:28 AM CDT -----  Contact: self  Requesting call back regarding getting an appt for med refill. Pt declined virtual visit. Please call back at [phone].

## 2020-05-04 ENCOUNTER — OFFICE VISIT (OUTPATIENT)
Dept: FAMILY MEDICINE | Facility: CLINIC | Age: 70
End: 2020-05-04
Payer: MEDICARE

## 2020-05-04 DIAGNOSIS — F41.9 ANXIETY: Primary | ICD-10-CM

## 2020-05-04 DIAGNOSIS — R07.9 CHEST PAIN, UNSPECIFIED TYPE: ICD-10-CM

## 2020-05-04 DIAGNOSIS — F32.0 MILD MAJOR DEPRESSION: ICD-10-CM

## 2020-05-04 DIAGNOSIS — E55.9 VITAMIN D DEFICIENCY: ICD-10-CM

## 2020-05-04 DIAGNOSIS — E78.00 HIGH CHOLESTEROL: ICD-10-CM

## 2020-05-04 DIAGNOSIS — R73.03 PREDIABETES: ICD-10-CM

## 2020-05-04 DIAGNOSIS — E21.0 PRIMARY HYPERPARATHYROIDISM: ICD-10-CM

## 2020-05-04 DIAGNOSIS — J30.9 ALLERGIC RHINITIS, UNSPECIFIED SEASONALITY, UNSPECIFIED TRIGGER: ICD-10-CM

## 2020-05-04 PROCEDURE — 99443 PR PHYSICIAN TELEPHONE EVALUATION 21-30 MIN: ICD-10-PCS | Mod: 95,,, | Performed by: FAMILY MEDICINE

## 2020-05-04 PROCEDURE — 99443 PR PHYSICIAN TELEPHONE EVALUATION 21-30 MIN: CPT | Mod: 95,,, | Performed by: FAMILY MEDICINE

## 2020-05-04 RX ORDER — MONTELUKAST SODIUM 10 MG/1
10 TABLET ORAL NIGHTLY
COMMUNITY
End: 2020-05-04 | Stop reason: SDUPTHER

## 2020-05-04 RX ORDER — ALPRAZOLAM 0.25 MG/1
0.25 TABLET ORAL 3 TIMES DAILY PRN
Qty: 30 TABLET | Refills: 5 | Status: SHIPPED | OUTPATIENT
Start: 2020-05-04 | End: 2020-12-03 | Stop reason: SDUPTHER

## 2020-05-04 RX ORDER — MONTELUKAST SODIUM 10 MG/1
10 TABLET ORAL NIGHTLY
Qty: 30 TABLET | Refills: 5 | Status: SHIPPED | OUTPATIENT
Start: 2020-05-04 | End: 2021-06-21

## 2020-05-04 NOTE — Clinical Note
Patient past due for follow-up on hyperparathyroidism with Dr. Benitez.  Please contact patient to arrange follow-up  Appointment and  Arrange whatever laboratory Dr. Benitez would like.  Patient is getting a lipid panel done in [place] for me on May 8th and would like to get lab work  For Dr. Benitez done at the same time.

## 2020-05-04 NOTE — PROGRESS NOTES
Primary Care Telemedicine Note    Established Patient - Audio Only Telehealth Visit     The patient location is:  [place]  The chief complaint leading to consultation is:  Per below note  Visit type: Virtual visit with audio only (telephone)   Total time spent with patient:  23 min    Follow-up anxiety.  Uses Xanax less than daily.  Depression stable Prozac.  No SI/HI.  Allergic rhinitis using Singulair and would like refill.  Rarely uses albuterol.  No prior diagnosis of asthma, but apparently has had occasional bronchitis.  She did have negative COVID-19 testing at the beginning of April.  Hyperparathyroidism needs follow-up Endocrine.  Pre diabetes asymptomatic.  Patient does state that she had some chest discomfort with numbness at the left arm about a week ago.  She had this a couple times lasting for an hour.  No exertional chest pain.  She has had previous intermittent similar symptoms going back several years.  Last negative stress echocardiogram August 2018 for the same symptoms.  She does have hyperlipidemia, did not tolerate pravastatin previously.       Diagnoses and all orders for this visit:    Anxiety    Mild major depression    Allergic rhinitis, unspecified seasonality, unspecified trigger    High cholesterol  -     Comprehensive metabolic panel; Future  -     Lipid Panel; Future    Prediabetes    Primary hyperparathyroidism  -     Lipid Panel; Future    Vitamin D deficiency    Chest pain, unspecified type    Other orders  -     ALPRAZolam (XANAX) 0.25 MG tablet; Take 1 tablet (0.25 mg total) by mouth 3 (three) times daily as needed for Anxiety.  -     montelukast (SINGULAIR) 10 mg tablet; Take 1 tablet (10 mg total) by mouth every evening.       Laboratory as above.  Refilled medication as above.  Continue Prozac as well.  Recommend follow-up Endocrine as well.  Consider Crestor regarding lipids pending results.         Past Medical History:  Past Medical History:   Diagnosis Date    Anxiety      Cataract     Colon polyp     Depression     GERD (gastroesophageal reflux disease)     Glaucoma     History of colon polyps 2009    history as per patient    History of colonoscopy 2009    ok per pt    Hypertension     Hypothyroid     ?    Mitral valve prolapse     Peptic ulcer disease     with gi bleed    Prediabetes     Primary hyperparathyroidism     Right knee DJD     Right knee DJD     Sleep apnea     TMJ (temporomandibular joint disorder)     Vitamin D deficiency      Past Surgical History:   Procedure Laterality Date    BILATERAL SALPINGOOPHORECTOMY      COLONOSCOPY  2015    EYE SURGERY      laser sx    GLAUCOMA SURGERY Right 09/26/2019    Xen Gel    HYSTERECTOMY      OOPHORECTOMY      SLT - OU - BOTH EYES      TRABECULECTOMY Right 4/2/14    OD (dr. grossman)    UPPER GASTROINTESTINAL ENDOSCOPY  2003     Social History     Socioeconomic History    Marital status:      Spouse name: Not on file    Number of children: Not on file    Years of education: Not on file    Highest education level: Not on file   Occupational History    Not on file   Social Needs    Financial resource strain: Not on file    Food insecurity:     Worry: Not on file     Inability: Not on file    Transportation needs:     Medical: Not on file     Non-medical: Not on file   Tobacco Use    Smoking status: Never Smoker    Smokeless tobacco: Never Used   Substance and Sexual Activity    Alcohol use: Yes     Comment: occasional    Drug use: No    Sexual activity: Not Currently   Lifestyle    Physical activity:     Days per week: Not on file     Minutes per session: Not on file    Stress: Not on file   Relationships    Social connections:     Talks on phone: Not on file     Gets together: Not on file     Attends Cheondo[employer] service: Not on file     Active member of club or organization: Not on file     Attends meetings of clubs or organizations: Not on file     Relationship status: Not on file    Other Topics Concern    Not on file   Social History Narrative    Not on file     Family History   Problem Relation Age of Onset    Heart disease Father         before age 50    Diabetes Mother     Glaucoma Mother     Leukemia Mother     Cataracts Mother     Breast cancer Mother 60    Diabetes Sister     Glaucoma Sister     Diabetes Brother     Glaucoma Brother     Lung cancer Brother     Breast cancer Other 40        genetics -    Colon cancer Maternal Aunt      Review of patient's allergies indicates:   Allergen Reactions    Betoptic [betaxolol] Other (See Comments)     Fatigue, slowed heart rate.    Alphagan [brimonidine] Dermatitis    Pravastatin Other (See Comments)     aches     Current Outpatient Medications on File Prior to Visit   Medication Sig Dispense Refill    [DISCONTINUED] montelukast (SINGULAIR) 10 mg tablet Take 10 mg by mouth every evening.      albuterol (PROVENTIL/VENTOLIN HFA) 90 mcg/actuation inhaler Inhale 2 puffs into the lungs every 6 (six) hours as needed for Wheezing or Shortness of Breath. 18 g 0    benzonatate (TESSALON) 200 MG capsule TAKE 1 CAPSULE BY MOUTH EVERY DAY 3 TIMES A DAY AS NEEDED FOR COUGH 30 capsule 0    bimatoprost (LUMIGAN) 0.03 % ophthalmic drops PLACE 1 DROP INTO BOTH EYES EVERY EVENING. 2.5 mL 5    celecoxib (CELEBREX) 200 MG capsule TAKE 1 CAPSULE (200 MG TOTAL) BY MOUTH ONCE DAILY. 90 capsule 2    DIPHENHYDRAMINE HCL (BENADRYL ALLERGY ORAL) Take 1 tablet by mouth as needed.      dorzolamide (TRUSOPT) 2 % ophthalmic solution INSTILL 1 DROP INTO BOTH EYES 3 TIMES DAILY 10 mL 3    estradiol (ESTRACE) 0.5 MG tablet Take 1 tablet (0.5 mg total) by mouth once daily. 90 tablet 3    fexofenadine (ALLEGRA) 180 MG tablet Take 180 mg by mouth daily as needed.       FLUoxetine 20 MG capsule Take 3 capsules (60 mg total) by mouth once daily. 90 capsule 11    fluticasone (FLONASE) 50 mcg/actuation nasal spray Use 2 sprays to each nostril daily 16 g  12    hydroCHLOROthiazide (HYDRODIURIL) 25 MG tablet TAKE 1 TABLET BY MOUTH EVERY DAY 90 tablet 2    losartan (COZAAR) 50 MG tablet TAKE 1 TABLET BY MOUTH EVERY DAY 90 tablet 2    meclizine (ANTIVERT) 12.5 mg tablet Take 1 tablet (12.5 mg total) by mouth 3 (three) times daily as needed. 30 tablet 0    metFORMIN (GLUCOPHAGE-XR) 500 MG XR 24hr tablet TAKE 1 TABLET BY MOUTH TWICE A  tablet 1    netarsudil (RHOPRESSA) 0.02 % Drop Place 1 drop into both eyes once daily. 2.5 mL 6    pantoprazole (PROTONIX) 40 MG tablet TAKE 1 TABLET BY MOUTH EVERY DAY 90 tablet 2    prednisoLONE acetate (PRED FORTE) 1 % DrpS Place 1 drop into the right eye once daily. 10 mL 1    [DISCONTINUED] ALPRAZolam (XANAX) 0.25 MG tablet TAKE 1 TABLET BY MOUTH 3 TIMES A DAY AS NEEDED FOR ANXIETY 30 tablet 0     No current facility-administered medications on file prior to visit.          ROS:  GENERAL: No fever, chills.     There were no vitals filed for this visit.    Wt Readings from Last 3 Encounters:   10/21/19 91.6 kg (202 lb)   10/21/19 89.8 kg (198 lb)   08/22/19 92 kg (202 lb 13.2 oz)       The reason for the audio only service rather than synchronous audio and video virtual visit was related to technical difficulties or patient preference/necessity.     Each patient to whom I provide medical services by telemedicine is:  (1) informed of the relationship between the physician and patient and the respective role of any other health care provider with respect to management of the patient; and (2) notified that they may decline to receive medical services by telemedicine and may withdraw from such care at any time. Patient verbally consented to receive this service via voice-only telephone call.    This service was not originating from a related E/M service provided within the previous 7 days nor will  to an E/M service or procedure within the next 24 hours or my soonest available appointment.  Prevailing standard of care  was able to be met in this audio-only visit.

## 2020-05-05 DIAGNOSIS — E21.0 PRIMARY HYPERPARATHYROIDISM: Primary | ICD-10-CM

## 2020-05-08 ENCOUNTER — LAB VISIT (OUTPATIENT)
Dept: LAB | Facility: HOSPITAL | Age: 70
End: 2020-05-08
Attending: FAMILY MEDICINE
Payer: MEDICARE

## 2020-05-08 DIAGNOSIS — E21.0 PRIMARY HYPERPARATHYROIDISM: ICD-10-CM

## 2020-05-08 LAB
25(OH)D3+25(OH)D2 SERPL-MCNC: 11 NG/ML (ref 30–96)
PHOSPHATE SERPL-MCNC: 2.9 MG/DL (ref 2.7–4.5)
PTH-INTACT SERPL-MCNC: 190 PG/ML (ref 9–77)

## 2020-05-08 PROCEDURE — 82306 VITAMIN D 25 HYDROXY: CPT

## 2020-05-08 PROCEDURE — 84100 ASSAY OF PHOSPHORUS: CPT

## 2020-05-08 PROCEDURE — 84165 PATHOLOGIST INTERPRETATION SPE: ICD-10-PCS | Mod: 26,,, | Performed by: PATHOLOGY

## 2020-05-08 PROCEDURE — 83970 ASSAY OF PARATHORMONE: CPT

## 2020-05-08 PROCEDURE — 84165 PROTEIN E-PHORESIS SERUM: CPT

## 2020-05-08 PROCEDURE — 36415 COLL VENOUS BLD VENIPUNCTURE: CPT | Mod: PO

## 2020-05-08 PROCEDURE — 84165 PROTEIN E-PHORESIS SERUM: CPT | Mod: 26,,, | Performed by: PATHOLOGY

## 2020-05-11 LAB
ALBUMIN SERPL ELPH-MCNC: 4.36 G/DL (ref 3.35–5.55)
ALPHA1 GLOB SERPL ELPH-MCNC: 0.24 G/DL (ref 0.17–0.41)
ALPHA2 GLOB SERPL ELPH-MCNC: 0.82 G/DL (ref 0.43–0.99)
B-GLOBULIN SERPL ELPH-MCNC: 0.71 G/DL (ref 0.5–1.1)
GAMMA GLOB SERPL ELPH-MCNC: 1.08 G/DL (ref 0.67–1.58)
PATHOLOGIST INTERPRETATION SPE: NORMAL
PROT SERPL-MCNC: 7.2 G/DL (ref 6–8.4)

## 2020-05-18 ENCOUNTER — TELEPHONE (OUTPATIENT)
Dept: ENDOCRINOLOGY | Facility: CLINIC | Age: 70
End: 2020-05-18

## 2020-05-18 ENCOUNTER — PATIENT OUTREACH (OUTPATIENT)
Dept: ADMINISTRATIVE | Facility: OTHER | Age: 70
End: 2020-05-18

## 2020-05-19 ENCOUNTER — TELEPHONE (OUTPATIENT)
Dept: ENDOCRINOLOGY | Facility: CLINIC | Age: 70
End: 2020-05-19

## 2020-05-19 RX ORDER — ALBUTEROL SULFATE 90 UG/1
2 AEROSOL, METERED RESPIRATORY (INHALATION) EVERY 6 HOURS PRN
Qty: 18 G | Refills: 0 | Status: SHIPPED | OUTPATIENT
Start: 2020-05-19 | End: 2022-02-11

## 2020-05-29 ENCOUNTER — PATIENT OUTREACH (OUTPATIENT)
Dept: ADMINISTRATIVE | Facility: OTHER | Age: 70
End: 2020-05-29

## 2020-05-29 NOTE — PROGRESS NOTES
Patient's chart was reviewed.   Requested updates within Care Everywhere.  Immunizations unable to be reconciled.    Health Maintenance was updated.

## 2020-06-02 ENCOUNTER — OFFICE VISIT (OUTPATIENT)
Dept: ENDOCRINOLOGY | Facility: CLINIC | Age: 70
End: 2020-06-02
Payer: MEDICARE

## 2020-06-02 DIAGNOSIS — N28.9 RENAL INSUFFICIENCY: ICD-10-CM

## 2020-06-02 DIAGNOSIS — R73.03 PREDIABETES: ICD-10-CM

## 2020-06-02 DIAGNOSIS — E21.0 PRIMARY HYPERPARATHYROIDISM: Primary | ICD-10-CM

## 2020-06-02 DIAGNOSIS — E55.9 VITAMIN D DEFICIENCY: ICD-10-CM

## 2020-06-02 PROCEDURE — 99442 PR PHYSICIAN TELEPHONE EVALUATION 11-20 MIN: ICD-10-PCS | Mod: 95,,, | Performed by: INTERNAL MEDICINE

## 2020-06-02 PROCEDURE — 99442 PR PHYSICIAN TELEPHONE EVALUATION 11-20 MIN: CPT | Mod: 95,,, | Performed by: INTERNAL MEDICINE

## 2020-06-02 RX ORDER — ERGOCALCIFEROL 1.25 MG/1
50000 CAPSULE ORAL
Qty: 12 CAPSULE | Refills: 1 | Status: SHIPPED | OUTPATIENT
Start: 2020-06-02 | End: 2020-12-02 | Stop reason: SDUPTHER

## 2020-06-02 NOTE — PROGRESS NOTES
Established Patient - Audio Only Telehealth Visit     The patient location is:  Home  The chief complaint leading to consultation is:  Hyperparathyroidism, vitamin-D deficiency  Visit type: Virtual visit with audio only (telephone)  Total time spent with patient:  15 min       The reason for the audio only service rather than synchronous audio and video virtual visit was related to technical difficulties or patient preference/necessity.     Each patient to whom I provide medical services by telemedicine is:  (1) informed of the relationship between the physician and patient and the respective role of any other health care provider with respect to management of the patient; and (2) notified that they may decline to receive medical services by telemedicine and may withdraw from such care at any time. Patient verbally consented to receive this service via voice-only telephone call.       HPI:      In review of notes by her PCP Dr. Lewis back in September 2018 it was noted that her PTH was elevated and at the time her vitamin D was low at 12.  Her calcium was in the normal to high normal range but when I look further back in epic she has had a mild hypercalcemia in the past.  It was recommended at the time for her to see an endocrinologist however patient wanted to find someone locally as she lives in [facility]    Bone density:  I ordered 1 and she had 1 April 2019 and was normal; previous 1 on file in epic was 2014 and it was normal  Kidney stones:no  Fracture :no  Not on calcium   Since her vitamin-D was very low I told her to start on over-the-counter vitamin-D 1000 units for now but she has not yet started the vitamin-D and does not seem to be taking that consistently as recent vitamin-D is back to being very low at 11  Has arthritis- knee pain  Twenty-four urine calcium was normal    At least the last 2 times her GFR was slightly below 60    Has hypertension    She has prediabetes that seems to be  under good control as her last A1c was 5.8 and she takes metformin.  However at initial visit she admitted she has not been following a healthy diet or exercising.  She does have fatigue.  Also has hyperlipidemia    She at times in the past had elevation of TSH but is not currently on thyroid medication-she had been on Armor Thyroid about 2 years ago I checked thyroid function tests after initial visit and it looked fine and her thyroid antibodies were negative    Overall she feels well with no complaints    I have reviewed the past medical, family and social history      Assessment and plan:  Suze was seen today for hyperparathyroidism.    Diagnoses and all orders for this visit:    Primary hyperparathyroidism  -     Vitamin D; Future  -     PTH, intact; Future  -     Renal function panel; Future    Prediabetes  -     Hemoglobin A1C; Future    Vitamin D deficiency  -     Vitamin D; Future  -     PTH, intact; Future  -     Renal function panel; Future    Renal insufficiency  -     Vitamin D; Future  -     PTH, intact; Future  -     Renal function panel; Future       Patient's vitamin-D is much lower again and her PTH has risen, could have primary hyperparathyroidism along with secondary related to both renal insufficiency and vitamin-D deficiency, start high-dose vitamin-D 11965 IU once a week and check labs in 6 weeks                     This service was not originating from a related E/M service provided within the previous 7 days nor will  to an E/M service or procedure within the next 24 hours or my soonest available appointment.  Prevailing standard of care was able to be met in this audio-only visit.

## 2020-06-02 NOTE — Clinical Note
Schedule labs at the [facility] in [place] in 6 weeks, and she needs a six-month follow-up appointment, can make it an office visit

## 2020-06-25 ENCOUNTER — OFFICE VISIT (OUTPATIENT)
Dept: URGENT CARE | Facility: CLINIC | Age: 70
End: 2020-06-25
Payer: MEDICARE

## 2020-06-25 VITALS
SYSTOLIC BLOOD PRESSURE: 153 MMHG | TEMPERATURE: 98 F | DIASTOLIC BLOOD PRESSURE: 88 MMHG | HEIGHT: 68 IN | WEIGHT: 195 LBS | OXYGEN SATURATION: 97 % | RESPIRATION RATE: 16 BRPM | HEART RATE: 71 BPM | BODY MASS INDEX: 29.55 KG/M2

## 2020-06-25 DIAGNOSIS — Z20.822 CLOSE EXPOSURE TO COVID-19 VIRUS: ICD-10-CM

## 2020-06-25 PROCEDURE — 99211 OFF/OP EST MAY X REQ PHY/QHP: CPT | Mod: S$GLB,,, | Performed by: FAMILY MEDICINE

## 2020-06-25 PROCEDURE — U0003 INFECTIOUS AGENT DETECTION BY NUCLEIC ACID (DNA OR RNA); SEVERE ACUTE RESPIRATORY SYNDROME CORONAVIRUS 2 (SARS-COV-2) (CORONAVIRUS DISEASE [COVID-19]), AMPLIFIED PROBE TECHNIQUE, MAKING USE OF HIGH THROUGHPUT TECHNOLOGIES AS DESCRIBED BY CMS-2020-01-R: HCPCS

## 2020-06-25 PROCEDURE — 99211 PR OFFICE/OUTPT VISIT, EST, LEVL I: ICD-10-PCS | Mod: S$GLB,,, | Performed by: FAMILY MEDICINE

## 2020-06-29 LAB — SARS-COV-2 RNA RESP QL NAA+PROBE: NOT DETECTED

## 2020-09-08 RX ORDER — FLUOXETINE HYDROCHLORIDE 20 MG/1
60 CAPSULE ORAL DAILY
Qty: 90 CAPSULE | Refills: 2 | Status: SHIPPED | OUTPATIENT
Start: 2020-09-08 | End: 2020-12-10

## 2020-09-23 ENCOUNTER — PATIENT OUTREACH (OUTPATIENT)
Dept: ADMINISTRATIVE | Facility: OTHER | Age: 70
End: 2020-09-23

## 2020-09-23 DIAGNOSIS — Z12.31 BREAST CANCER SCREENING BY MAMMOGRAM: Primary | ICD-10-CM

## 2020-09-23 RX ORDER — LOSARTAN POTASSIUM 50 MG/1
TABLET ORAL
Qty: 90 TABLET | Refills: 0 | Status: SHIPPED | OUTPATIENT
Start: 2020-09-23 | End: 2020-12-03

## 2020-09-23 NOTE — PROGRESS NOTES
LINKS immunization registry updated  Care Everywhere updated  Health Maintenance updated  DIS/reviewed for overdue Proactive [facility] Encounters (SHAYLA) health maintenance testing (CRS, Breast Ca, Diabetic Eye Exam)   Orders entered: screening mammogram  Portal message sent to patient with scheduling link for mammogram to be sent on 10/22/20

## 2020-09-24 ENCOUNTER — OFFICE VISIT (OUTPATIENT)
Dept: CARDIOLOGY | Facility: CLINIC | Age: 70
End: 2020-09-24
Payer: MEDICARE

## 2020-09-24 VITALS
BODY MASS INDEX: 30.8 KG/M2 | HEIGHT: 68 IN | WEIGHT: 203.25 LBS | DIASTOLIC BLOOD PRESSURE: 82 MMHG | SYSTOLIC BLOOD PRESSURE: 144 MMHG

## 2020-09-24 DIAGNOSIS — I10 ESSENTIAL HYPERTENSION: ICD-10-CM

## 2020-09-24 DIAGNOSIS — R07.2 PRECORDIAL PAIN: Primary | ICD-10-CM

## 2020-09-24 PROCEDURE — 99214 PR OFFICE/OUTPT VISIT, EST, LEVL IV, 30-39 MIN: ICD-10-PCS | Mod: S$PBB,,, | Performed by: INTERNAL MEDICINE

## 2020-09-24 PROCEDURE — 99999 PR PBB SHADOW E&M-EST. PATIENT-LVL IV: CPT | Mod: PBBFAC,,, | Performed by: INTERNAL MEDICINE

## 2020-09-24 PROCEDURE — 99214 OFFICE O/P EST MOD 30 MIN: CPT | Mod: S$PBB,,, | Performed by: INTERNAL MEDICINE

## 2020-09-24 PROCEDURE — 99214 OFFICE O/P EST MOD 30 MIN: CPT | Mod: PBBFAC,PO | Performed by: INTERNAL MEDICINE

## 2020-09-24 PROCEDURE — 99999 PR PBB SHADOW E&M-EST. PATIENT-LVL IV: ICD-10-PCS | Mod: PBBFAC,,, | Performed by: INTERNAL MEDICINE

## 2020-09-24 NOTE — PROGRESS NOTES
Subjective:    Patient ID:  Suze Chino is a 69 y.o. female who presents for evaluation of chest pain    HPI  She comes with atypical chest pain for the last 3 weeks, not related to any type of activity    Review of Systems   Constitution: Negative for decreased appetite, malaise/fatigue, weight gain and weight loss.   Cardiovascular: Negative for chest pain, dyspnea on exertion, leg swelling, palpitations and syncope.   Respiratory: Negative for cough and shortness of breath.    Gastrointestinal: Negative.    Neurological: Negative for weakness.   All other systems reviewed and are negative.       Objective:      Physical Exam   Constitutional: She is oriented to person, place, and time. She appears well-developed and well-nourished.   HENT:   Head: Normocephalic.   Eyes: Pupils are equal, round, and reactive to light.   Neck: Normal range of motion. Neck supple. No JVD present. Carotid bruit is not present. No thyromegaly present.   Cardiovascular: Normal rate, regular rhythm, normal heart sounds, intact distal pulses and normal pulses. PMI is not displaced. Exam reveals no gallop.   No murmur heard.  Pulmonary/Chest: Effort normal and breath sounds normal.   Abdominal: Soft. Normal appearance. She exhibits no mass. There is no hepatosplenomegaly. There is no abdominal tenderness.   Musculoskeletal: Normal range of motion.         General: No edema.   Neurological: She is alert and oriented to person, place, and time. She has normal strength and normal reflexes. No sensory deficit.   Skin: Skin is warm and intact.   Psychiatric: She has a normal mood and affect.   Nursing note and vitals reviewed.        Assessment:       1. Precordial pain    2. Essential hypertension         Plan:     DSE  Call with results

## 2020-09-29 ENCOUNTER — PATIENT MESSAGE (OUTPATIENT)
Dept: OTHER | Facility: OTHER | Age: 70
End: 2020-09-29

## 2020-10-15 ENCOUNTER — CLINICAL SUPPORT (OUTPATIENT)
Dept: CARDIOLOGY | Facility: CLINIC | Age: 70
End: 2020-10-15
Attending: INTERNAL MEDICINE
Payer: MEDICARE

## 2020-10-15 VITALS
HEIGHT: 68 IN | BODY MASS INDEX: 30.77 KG/M2 | SYSTOLIC BLOOD PRESSURE: 150 MMHG | DIASTOLIC BLOOD PRESSURE: 94 MMHG | WEIGHT: 203 LBS | HEART RATE: 55 BPM

## 2020-10-15 DIAGNOSIS — R07.2 PRECORDIAL PAIN: ICD-10-CM

## 2020-10-15 LAB
ASCENDING AORTA: 2.98 CM
BSA FOR ECHO PROCEDURE: 2.1 M2
CV ECHO LV RWT: 0.51 CM
CV STRESS BASE HR: 64 BPM
DIASTOLIC BLOOD PRESSURE: 94 MMHG
DOP CALC LVOT AREA: 3.2 CM2
DOP CALC LVOT DIAMETER: 2.03 CM
DOP CALC LVOT PEAK VEL: 0.83 M/S
DOP CALC LVOT STROKE VOLUME: 62.56 CM3
DOP CALCLVOT PEAK VEL VTI: 19.34 CM
E WAVE DECELERATION TIME: 213.06 MSEC
E/A RATIO: 0.73
E/E' RATIO: 9.5 M/S
ECHO LV POSTERIOR WALL: 1.04 CM (ref 0.6–1.1)
FRACTIONAL SHORTENING: 29 % (ref 28–44)
INTERVENTRICULAR SEPTUM: 0.95 CM (ref 0.6–1.1)
IVRT: 125.59 MSEC
LA MAJOR: 5.23 CM
LA MINOR: 4.31 CM
LA WIDTH: 3.02 CM
LEFT ATRIUM SIZE: 3.69 CM
LEFT ATRIUM VOLUME INDEX: 21.8 ML/M2
LEFT ATRIUM VOLUME: 44.76 CM3
LEFT INTERNAL DIMENSION IN SYSTOLE: 2.92 CM (ref 2.1–4)
LEFT VENTRICLE DIASTOLIC VOLUME INDEX: 36.2 ML/M2
LEFT VENTRICLE DIASTOLIC VOLUME: 74.46 ML
LEFT VENTRICLE MASS INDEX: 64 G/M2
LEFT VENTRICLE SYSTOLIC VOLUME INDEX: 15.9 ML/M2
LEFT VENTRICLE SYSTOLIC VOLUME: 32.77 ML
LEFT VENTRICULAR INTERNAL DIMENSION IN DIASTOLE: 4.11 CM (ref 3.5–6)
LEFT VENTRICULAR MASS: 131.69 G
LV LATERAL E/E' RATIO: 7.13 M/S
LV SEPTAL E/E' RATIO: 14.25 M/S
MV PEAK A VEL: 0.78 M/S
MV PEAK E VEL: 0.57 M/S
MV STENOSIS PRESSURE HALF TIME: 61.79 MS
MV VALVE AREA P 1/2 METHOD: 3.56 CM2
OHS CV CPX 85 PERCENT MAX PREDICTED HEART RATE MALE: 123
OHS CV CPX MAX PREDICTED HEART RATE: 145
OHS CV CPX PATIENT IS FEMALE: 1
OHS CV CPX PATIENT IS MALE: 0
OHS CV CPX PEAK DIASTOLIC BLOOD PRESSURE: 94 MMHG
OHS CV CPX PEAK HEAR RATE: 127 BPM
OHS CV CPX PEAK RATE PRESSURE PRODUCT: NORMAL
OHS CV CPX PEAK SYSTOLIC BLOOD PRESSURE: 150 MMHG
OHS CV CPX PERCENT MAX PREDICTED HEART RATE ACHIEVED: 87
OHS CV CPX RATE PRESSURE PRODUCT PRESENTING: 9600
PISA TR MAX VEL: 2.63 M/S
PULM VEIN S/D RATIO: 1.29
PV PEAK D VEL: 0.45 M/S
PV PEAK S VEL: 0.58 M/S
RA MAJOR: 4.77 CM
RA PRESSURE: 3 MMHG
RA WIDTH: 2.98 CM
SINUS: 3.1 CM
STJ: 2.79 CM
SYSTOLIC BLOOD PRESSURE: 150 MMHG
TDI LATERAL: 0.08 M/S
TDI SEPTAL: 0.04 M/S
TDI: 0.06 M/S
TR MAX PG: 28 MMHG
TV REST PULMONARY ARTERY PRESSURE: 31 MMHG

## 2020-10-15 PROCEDURE — 99999 PR PBB SHADOW E&M-EST. PATIENT-LVL II: CPT | Mod: PBBFAC,,,

## 2020-10-15 PROCEDURE — 99212 OFFICE O/P EST SF 10 MIN: CPT | Mod: PBBFAC,PO

## 2020-10-15 PROCEDURE — 99999 PR PBB SHADOW E&M-EST. PATIENT-LVL II: ICD-10-PCS | Mod: PBBFAC,,,

## 2020-10-15 PROCEDURE — 93351 STRESS TTE COMPLETE: CPT | Mod: PBBFAC,PO | Performed by: INTERNAL MEDICINE

## 2020-10-15 PROCEDURE — 93351 STRESS ECHO (CUPID ONLY): ICD-10-PCS | Mod: 26,S$PBB,, | Performed by: INTERNAL MEDICINE

## 2020-10-19 ENCOUNTER — TELEPHONE (OUTPATIENT)
Dept: CARDIOLOGY | Facility: CLINIC | Age: 70
End: 2020-10-19

## 2020-10-19 NOTE — TELEPHONE ENCOUNTER
----- Message from Dinorah Garcia sent at 10/19/2020  1:10 PM CDT -----  Regarding: results  Contact: [phone]  Pt is calling to discuss results from ECHO and find out next steps. Please Contact [phone]

## 2020-10-21 ENCOUNTER — OFFICE VISIT (OUTPATIENT)
Dept: OPHTHALMOLOGY | Facility: CLINIC | Age: 70
End: 2020-10-21
Payer: MEDICARE

## 2020-10-21 DIAGNOSIS — H40.1133 PRIMARY OPEN ANGLE GLAUCOMA OF BOTH EYES, SEVERE STAGE: ICD-10-CM

## 2020-10-21 DIAGNOSIS — H40.1123 PRIMARY OPEN ANGLE GLAUCOMA (POAG) OF LEFT EYE, SEVERE STAGE: Primary | ICD-10-CM

## 2020-10-21 DIAGNOSIS — H40.1113 PRIMARY OPEN ANGLE GLAUCOMA (POAG) OF RIGHT EYE, SEVERE STAGE: ICD-10-CM

## 2020-10-21 DIAGNOSIS — E11.9 TYPE 2 DIABETES MELLITUS WITHOUT COMPLICATION, WITHOUT LONG-TERM CURRENT USE OF INSULIN: ICD-10-CM

## 2020-10-21 DIAGNOSIS — Z91.148 NONCOMPLIANCE WITH MEDICATION REGIMEN: ICD-10-CM

## 2020-10-21 PROCEDURE — 99999 PR PBB SHADOW E&M-EST. PATIENT-LVL III: ICD-10-PCS | Mod: PBBFAC,,, | Performed by: OPHTHALMOLOGY

## 2020-10-21 PROCEDURE — 99213 OFFICE O/P EST LOW 20 MIN: CPT | Mod: PBBFAC | Performed by: OPHTHALMOLOGY

## 2020-10-21 PROCEDURE — 92012 PR EYE EXAM, EST PATIENT,INTERMED: ICD-10-PCS | Mod: S$PBB,,, | Performed by: OPHTHALMOLOGY

## 2020-10-21 PROCEDURE — 92012 INTRM OPH EXAM EST PATIENT: CPT | Mod: S$PBB,,, | Performed by: OPHTHALMOLOGY

## 2020-10-21 PROCEDURE — 99999 PR PBB SHADOW E&M-EST. PATIENT-LVL III: CPT | Mod: PBBFAC,,, | Performed by: OPHTHALMOLOGY

## 2020-10-21 RX ORDER — DORZOLAMIDE HCL 20 MG/ML
1 SOLUTION/ DROPS OPHTHALMIC 3 TIMES DAILY
Qty: 10 ML | Refills: 3 | Status: SHIPPED | OUTPATIENT
Start: 2020-10-21 | End: 2021-08-16

## 2020-10-21 RX ORDER — LATANOPROST 50 UG/ML
1 SOLUTION/ DROPS OPHTHALMIC DAILY
Qty: 1 BOTTLE | Refills: 3 | Status: SHIPPED | OUTPATIENT
Start: 2020-10-21 | End: 2021-04-09

## 2020-10-21 NOTE — PROGRESS NOTES
HPI     Glaucoma     Comments: lost to follow up IOP check.              Comments       Pt wants to fill last MR as her rx   1. COAG/ LTG  -h/o non compliance  -intolerant of coag meds  -TRAB OD  -repeat SLT od done 8/9/2012 - good initial response IOP 18 to 13, SLT OD   08/17/17  -Primary SLT done os 9/13/2012 - minimal response 18 to 16  -Bleb Needling with [facility] Inj. OD 9/17/15  -SLT OS 9/29/2016  Xen Gel OD (09-26-19)      *Stopped Alphagan OS TID because of dermatitis  *Stopped Betoptic bid ou due to slow pulse rate and feeling tired.  *latanoprost slows pulse rate   *DMX S50 BID (stopped after 2 weeks due to extreme fatigue)  *OD Rhopressa Qhs ( patient ran out in March never returned for followup )  *lotemax cost over $300      OD- Prednisolone MWFS (states using qd)  OS- LUMIGAN 0.03% QHS , DORZOLAMIDE TID, Rhopressa QD          Last edited by Barbara Estes MA on 10/21/2020  2:16 PM. (History)            Assessment /Plan     For exam results, see Encounter Report.      ICD-10-CM ICD-9-CM    1. Primary open angle glaucoma (POAG) of left eye, severe stage  H40.1123 365.11 uncontrolled os - pt stopped all meds and lost to follow up      365.73    2. Primary open angle glaucoma (POAG) of right eye, severe stage  H40.1113 365.11      365.73    3. Noncompliance with medication regimen  Z91.14 V15.81    4. Type 2 diabetes mellitus without complication, without long-term current use of insulin  E11.9 250.00        OS- LATANOPROST QHS , DORZOLAMIDE TID,   OD- Prednisolone MWFS    RETURN TO CLINIC 1 MONTH IOP

## 2020-10-23 DIAGNOSIS — E11.9 TYPE 2 DIABETES MELLITUS WITHOUT COMPLICATION: ICD-10-CM

## 2020-10-27 ENCOUNTER — PATIENT OUTREACH (OUTPATIENT)
Dept: ADMINISTRATIVE | Facility: HOSPITAL | Age: 70
End: 2020-10-27

## 2020-10-27 DIAGNOSIS — E21.0 PRIMARY HYPERPARATHYROIDISM: Primary | ICD-10-CM

## 2020-10-27 DIAGNOSIS — E55.9 VITAMIN D DEFICIENCY: ICD-10-CM

## 2020-10-27 NOTE — Clinical Note
Pt has lab appt 10/29.  Lipid a1c and cmp already linked. Please order any other labs you would like and I can link them to appt. Thanks.                Landry MOTA LPN  Care Coordination Department  [place] [facility]  [phone]

## 2020-10-29 ENCOUNTER — LAB VISIT (OUTPATIENT)
Dept: LAB | Facility: HOSPITAL | Age: 70
End: 2020-10-29
Attending: FAMILY MEDICINE
Payer: MEDICARE

## 2020-10-29 DIAGNOSIS — E21.0 PRIMARY HYPERPARATHYROIDISM: ICD-10-CM

## 2020-10-29 DIAGNOSIS — E78.00 HIGH CHOLESTEROL: ICD-10-CM

## 2020-10-29 DIAGNOSIS — E55.9 VITAMIN D DEFICIENCY: ICD-10-CM

## 2020-10-29 DIAGNOSIS — E11.9 TYPE 2 DIABETES MELLITUS WITHOUT COMPLICATION: ICD-10-CM

## 2020-10-29 LAB
25(OH)D3+25(OH)D2 SERPL-MCNC: 24 NG/ML (ref 30–96)
ALBUMIN SERPL BCP-MCNC: 3.9 G/DL (ref 3.5–5.2)
ALP SERPL-CCNC: 91 U/L (ref 55–135)
ALT SERPL W/O P-5'-P-CCNC: 15 U/L (ref 10–44)
ANION GAP SERPL CALC-SCNC: 10 MMOL/L (ref 8–16)
AST SERPL-CCNC: 23 U/L (ref 10–40)
BILIRUB SERPL-MCNC: 0.7 MG/DL (ref 0.1–1)
BUN SERPL-MCNC: 20 MG/DL (ref 8–23)
CALCIUM SERPL-MCNC: 10.2 MG/DL (ref 8.7–10.5)
CHLORIDE SERPL-SCNC: 101 MMOL/L (ref 95–110)
CHOLEST SERPL-MCNC: 235 MG/DL (ref 120–199)
CHOLEST/HDLC SERPL: 3.4 {RATIO} (ref 2–5)
CO2 SERPL-SCNC: 29 MMOL/L (ref 23–29)
CREAT SERPL-MCNC: 1.2 MG/DL (ref 0.5–1.4)
EST. GFR  (AFRICAN AMERICAN): 53.3 ML/MIN/1.73 M^2
EST. GFR  (NON AFRICAN AMERICAN): 46.2 ML/MIN/1.73 M^2
ESTIMATED AVG GLUCOSE: 120 MG/DL (ref 68–131)
GLUCOSE SERPL-MCNC: 102 MG/DL (ref 70–110)
HBA1C MFR BLD HPLC: 5.8 % (ref 4–5.6)
HDLC SERPL-MCNC: 69 MG/DL (ref 40–75)
HDLC SERPL: 29.4 % (ref 20–50)
LDLC SERPL CALC-MCNC: 148.2 MG/DL (ref 63–159)
NONHDLC SERPL-MCNC: 166 MG/DL
POTASSIUM SERPL-SCNC: 3.7 MMOL/L (ref 3.5–5.1)
PROT SERPL-MCNC: 7.2 G/DL (ref 6–8.4)
PTH-INTACT SERPL-MCNC: 277 PG/ML (ref 9–77)
SODIUM SERPL-SCNC: 140 MMOL/L (ref 136–145)
TRIGL SERPL-MCNC: 89 MG/DL (ref 30–150)

## 2020-10-29 PROCEDURE — 82306 VITAMIN D 25 HYDROXY: CPT

## 2020-10-29 PROCEDURE — 80061 LIPID PANEL: CPT

## 2020-10-29 PROCEDURE — 80053 COMPREHEN METABOLIC PANEL: CPT

## 2020-10-29 PROCEDURE — 36415 COLL VENOUS BLD VENIPUNCTURE: CPT | Mod: PO

## 2020-10-29 PROCEDURE — 83970 ASSAY OF PARATHORMONE: CPT

## 2020-10-29 PROCEDURE — 83036 HEMOGLOBIN GLYCOSYLATED A1C: CPT

## 2020-11-03 ENCOUNTER — TELEPHONE (OUTPATIENT)
Dept: ADMINISTRATIVE | Facility: HOSPITAL | Age: 70
End: 2020-11-03

## 2020-11-04 ENCOUNTER — HOSPITAL ENCOUNTER (OUTPATIENT)
Dept: RADIOLOGY | Facility: HOSPITAL | Age: 70
Discharge: HOME | End: 2020-11-04
Attending: FAMILY MEDICINE
Payer: MEDICARE

## 2020-11-04 VITALS — BODY MASS INDEX: 30.77 KG/M2 | WEIGHT: 203 LBS | HEIGHT: 68 IN

## 2020-11-04 DIAGNOSIS — Z12.31 BREAST CANCER SCREENING BY MAMMOGRAM: ICD-10-CM

## 2020-11-04 PROCEDURE — 77063 BREAST TOMOSYNTHESIS BI: CPT | Mod: 26,,, | Performed by: RADIOLOGY

## 2020-11-04 PROCEDURE — 77067 SCR MAMMO BI INCL CAD: CPT | Mod: 26,,, | Performed by: RADIOLOGY

## 2020-11-04 PROCEDURE — 77063 MAMMO DIGITAL SCREENING BILAT WITH TOMO: ICD-10-PCS | Mod: 26,,, | Performed by: RADIOLOGY

## 2020-11-04 PROCEDURE — 77067 SCR MAMMO BI INCL CAD: CPT | Mod: TC,PO

## 2020-11-04 PROCEDURE — 77067 MAMMO DIGITAL SCREENING BILAT WITH TOMO: ICD-10-PCS | Mod: 26,,, | Performed by: RADIOLOGY

## 2020-11-20 PROBLEM — N18.30 CHRONIC KIDNEY DISEASE, STAGE 3: Status: ACTIVE | Noted: 2020-11-20

## 2020-11-25 ENCOUNTER — TELEPHONE (OUTPATIENT)
Dept: FAMILY MEDICINE | Facility: CLINIC | Age: 70
End: 2020-11-25

## 2020-11-25 DIAGNOSIS — G89.29 CHRONIC PAIN OF RIGHT KNEE: Primary | ICD-10-CM

## 2020-11-25 DIAGNOSIS — M25.561 CHRONIC PAIN OF RIGHT KNEE: Primary | ICD-10-CM

## 2020-11-25 DIAGNOSIS — M17.10 PRIMARY OSTEOARTHRITIS OF KNEE, UNSPECIFIED LATERALITY: ICD-10-CM

## 2020-11-25 NOTE — TELEPHONE ENCOUNTER
Called patient to schedule, she declined and will call back once she checks her schedule, for orthopedic appt

## 2020-11-25 NOTE — TELEPHONE ENCOUNTER
----- Message from Cody Lewis MD sent at 11/24/2020 12:39 PM CST -----  Spoke with patient.  Recommend avoid NSAIDs, use Tylenol.  She states Tylenol does not work well.  She could try Voltaren gel.  She is interested in seeing orthopedics.  Please put in referral and help get an appointment scheduled with [facility] regarding knee arthritis.

## 2020-11-30 ENCOUNTER — OFFICE VISIT (OUTPATIENT)
Dept: OPHTHALMOLOGY | Facility: CLINIC | Age: 70
End: 2020-11-30
Payer: MEDICARE

## 2020-11-30 DIAGNOSIS — Z91.148 NONCOMPLIANCE WITH MEDICATION REGIMEN: ICD-10-CM

## 2020-11-30 DIAGNOSIS — H40.1133 PRIMARY OPEN ANGLE GLAUCOMA OF BOTH EYES, SEVERE STAGE: Primary | ICD-10-CM

## 2020-11-30 PROCEDURE — 92012 PR EYE EXAM, EST PATIENT,INTERMED: ICD-10-PCS | Mod: S$PBB,,, | Performed by: OPHTHALMOLOGY

## 2020-11-30 PROCEDURE — 92012 INTRM OPH EXAM EST PATIENT: CPT | Mod: S$PBB,,, | Performed by: OPHTHALMOLOGY

## 2020-11-30 PROCEDURE — 99999 PR PBB SHADOW E&M-EST. PATIENT-LVL III: ICD-10-PCS | Mod: PBBFAC,,, | Performed by: OPHTHALMOLOGY

## 2020-11-30 PROCEDURE — 99999 PR PBB SHADOW E&M-EST. PATIENT-LVL III: CPT | Mod: PBBFAC,,, | Performed by: OPHTHALMOLOGY

## 2020-11-30 PROCEDURE — 99213 OFFICE O/P EST LOW 20 MIN: CPT | Mod: PBBFAC | Performed by: OPHTHALMOLOGY

## 2020-11-30 RX ORDER — KETOROLAC TROMETHAMINE 5 MG/ML
1 SOLUTION OPHTHALMIC 4 TIMES DAILY
Qty: 5 ML | Refills: 1 | Status: SHIPPED | OUTPATIENT
Start: 2020-11-30 | End: 2020-12-07

## 2020-11-30 NOTE — PROGRESS NOTES
HPI     Follow-up     Comments: 1 month follow up              Comments     PT C/O: At times patient gets a sharp shooting pain through OD lasting a   few seconds at a time, episodes seem to occur sporadically, states using   drops as directed.      Referred by Dr. Lorenzana    1. COAG/ LTG  -h/o non compliance  -intolerant of coag meds  -TRAB OD  -repeat SLT od done 8/9/2012 - good initial response IOP 18 to 13, SLT OD   08/17/17  -Primary SLT done os 9/13/2012 - minimal response 18 to 16  -Bleb Needling with [facility] Inj. OD 9/17/15  -SLT OS 9/29/2016  Xen Gel OD (09-26-19)      *Stopped Alphagan OS TID because of dermatitis  *Stopped Betoptic bid ou due to slow pulse rate and feeling tired.  *latanoprost slows pulse rate   *DMX S50 BID (stopped after 2 weeks due to extreme fatigue)  *OD Rhopressa Qhs ( patient ran out in March never returned for followup )  *lotemax cost over $300      OD- Prednisolone MWFS (states using qd)   OS- LUMIGAN 0.03% QHS , DORZOLAMIDE TID, Rhopressa QD          Last edited by Iris Adams, Patient Care Assistant on 11/30/2020 11:13   AM. (History)            Assessment /Plan     For exam results, see Encounter Report.      ICD-10-CM ICD-9-CM    1. Primary open angle glaucoma of both eyes, severe stage  H40.1133 365.11 Still not in control ansd pt cancelled sx in the past due to cpvid     She defers xen os still at this time     Book SLT OS     IOP not within acceptable range relative to target IOP with risk of irreversible visual loss. Additional treatment required.  Discussed options, risks, and benefits of additional medication, SLT laser, or incisional glaucoma surgery.     recommend xen sx in the past       Patient chooses slt os only     Reviewed importance of continued compliance with treatment and follow up.        365.73    2. Noncompliance with medication regimen  Z91.14 V15.81      OD- Prednisolone MWFS (states using qd)   OS- LUMIGAN 0.03% QHS , DORZOLAMIDE TID, Rhopressa QD

## 2020-12-02 ENCOUNTER — TELEPHONE (OUTPATIENT)
Dept: FAMILY MEDICINE | Facility: CLINIC | Age: 70
End: 2020-12-02

## 2020-12-02 RX ORDER — ERGOCALCIFEROL 1.25 MG/1
50000 CAPSULE ORAL
Qty: 12 CAPSULE | Refills: 1 | Status: SHIPPED | OUTPATIENT
Start: 2020-12-02 | End: 2021-05-17

## 2020-12-02 RX ORDER — ALPRAZOLAM 0.25 MG/1
TABLET ORAL
Qty: 30 TABLET | OUTPATIENT
Start: 2020-12-02

## 2020-12-02 NOTE — TELEPHONE ENCOUNTER
----- Message from Hakeem Muñoz sent at 12/2/2020  1:54 PM CST -----  Contact: self  Would like to consult with nurse regarding medication being denied for the Xanax.  Pt states she just recently saw the doctor and is unable to schedule an appt at this time or even she would like an audio visit if needed. Please contact Suze hCino @ [phone].  Thanks/As

## 2020-12-02 NOTE — TELEPHONE ENCOUNTER
Kidney function has been stable over several years so probably does not need to see the nephrologist at present.  I would recommend avoiding NSAIDs if possible including the Celebrex that she had prescribed previously..  We do need to make sure that her blood pressure is better controlled.  Does not look like it was where it should be last it was checked.  Recommend follow-up blood pressure check.  If we have continued issues we may have her see the nephrologist regarding blood pressure.

## 2020-12-02 NOTE — TELEPHONE ENCOUNTER
----- Message from Cody Lewis MD sent at 12/2/2020  8:29 AM CST -----  Contact: Suze  Please send prescription request  ----- Message -----  From: Barbara Arnold  Sent: 12/2/2020   8:13 AM CST  To: Cody Lewis MD    Type:  RX Refill Request    Who Called: Cody  Refill or New Rx:Vitamin D  RX Name and Strength:Refill  How is the patient currently taking it? (ex. 1XDay):once a week  Is this a 30 day or 90 day RX:90  Preferred Pharmacy with phone number:  [facility]/pharmacy #[id number] - [place], [place] - [address] [address]  Phone: [phone] Fax: [phone]  Local or Mail Order:Local  Ordering Provider:Pina Story  Would the patient rather a call back or a response via [facility]? call  Best Call Back Number:  Additional Information: Pt needs a new endocrinologist due to being unaware that md is no longer with us

## 2020-12-02 NOTE — TELEPHONE ENCOUNTER
Dr Benitez is no longer with [facility], patient asking if you will prescribe the Vit D until she can establish care with another endocrinologist.  Offered appt at the [facility] this Friday, patient declined, wants to research providers before she schedules.  Also asking if you feel she needs to see a nephrologist.

## 2020-12-03 ENCOUNTER — OFFICE VISIT (OUTPATIENT)
Dept: FAMILY MEDICINE | Facility: CLINIC | Age: 70
End: 2020-12-03
Payer: MEDICARE

## 2020-12-03 VITALS
HEIGHT: 67 IN | HEART RATE: 59 BPM | BODY MASS INDEX: 31.83 KG/M2 | SYSTOLIC BLOOD PRESSURE: 158 MMHG | WEIGHT: 202.81 LBS | DIASTOLIC BLOOD PRESSURE: 84 MMHG | TEMPERATURE: 99 F

## 2020-12-03 DIAGNOSIS — M17.0 PRIMARY OSTEOARTHRITIS OF BOTH KNEES: ICD-10-CM

## 2020-12-03 DIAGNOSIS — F41.9 ANXIETY: ICD-10-CM

## 2020-12-03 DIAGNOSIS — F32.0 MILD MAJOR DEPRESSION: ICD-10-CM

## 2020-12-03 DIAGNOSIS — N18.30 STAGE 3 CHRONIC KIDNEY DISEASE, UNSPECIFIED WHETHER STAGE 3A OR 3B CKD: ICD-10-CM

## 2020-12-03 DIAGNOSIS — I10 ESSENTIAL HYPERTENSION: Primary | ICD-10-CM

## 2020-12-03 DIAGNOSIS — R73.03 PREDIABETES: ICD-10-CM

## 2020-12-03 PROCEDURE — 99214 OFFICE O/P EST MOD 30 MIN: CPT | Mod: PBBFAC,PO | Performed by: FAMILY MEDICINE

## 2020-12-03 PROCEDURE — 99999 PR PBB SHADOW E&M-EST. PATIENT-LVL IV: CPT | Mod: PBBFAC,,, | Performed by: FAMILY MEDICINE

## 2020-12-03 PROCEDURE — 99214 OFFICE O/P EST MOD 30 MIN: CPT | Mod: S$PBB,,, | Performed by: FAMILY MEDICINE

## 2020-12-03 PROCEDURE — 99999 PR PBB SHADOW E&M-EST. PATIENT-LVL IV: ICD-10-PCS | Mod: PBBFAC,,, | Performed by: FAMILY MEDICINE

## 2020-12-03 PROCEDURE — 99214 PR OFFICE/OUTPT VISIT, EST, LEVL IV, 30-39 MIN: ICD-10-PCS | Mod: S$PBB,,, | Performed by: FAMILY MEDICINE

## 2020-12-03 RX ORDER — DICLOFENAC SODIUM 10 MG/G
2 GEL TOPICAL 4 TIMES DAILY
Qty: 200 G | Refills: 11 | Status: SHIPPED | OUTPATIENT
Start: 2020-12-03 | End: 2021-09-23

## 2020-12-03 RX ORDER — LOSARTAN POTASSIUM 100 MG/1
100 TABLET ORAL DAILY
Qty: 90 TABLET | Refills: 0 | Status: SHIPPED | OUTPATIENT
Start: 2020-12-03 | End: 2021-05-24

## 2020-12-03 RX ORDER — ALPRAZOLAM 0.25 MG/1
0.25 TABLET ORAL 3 TIMES DAILY PRN
Qty: 30 TABLET | Refills: 5 | Status: SHIPPED | OUTPATIENT
Start: 2020-12-03 | End: 2021-06-21

## 2020-12-03 NOTE — Clinical Note
Sahra, patient on diabetes registry.  She has prediabetes and has not been diagnosed with diabetes.  Thanks

## 2020-12-03 NOTE — PROGRESS NOTES
Follow-up hypertension blood pressure still elevated.  Anxiety uses Xanax intermittently in addition to Prozac for depression, stable.  No SI/HI.  Pending appointment orthopedics regarding knee arthritis.  Avoiding NSAIDs due to chronic kidney disease asymptomatic.      Suze was seen today for xanax refill and handicapped.    Diagnoses and all orders for this visit:    Essential hypertension    Anxiety    Prediabetes    Mild major depression    Primary osteoarthritis of both knees    Stage 3 chronic kidney disease, unspecified whether stage 3a or 3b CKD    Other orders  -     ALPRAZolam (XANAX) 0.25 MG tablet; Take 1 tablet (0.25 mg total) by mouth 3 (three) times daily as needed for Anxiety.  -     diclofenac sodium (VOLTAREN) 1 % Gel; Apply 2 g topically 4 (four) times daily.  -     losartan (COZAAR) 100 MG tablet; Take 1 tablet (100 mg total) by mouth once daily.      Recheck blood pressure 4 weeks.  Reviewed recent laboratory.  Handicap tag.            Past Medical History:  Past Medical History:   Diagnosis Date    Anxiety     Cataract     Chronic kidney disease, stage 3 11/20/2020    Depression     GERD (gastroesophageal reflux disease)     Glaucoma     History of colon polyps 2009    history as per patient    History of colonoscopy 2009    ok per pt    Hyperplastic colon polyp 05/12/2015    Hypertension     Hypothyroid     ?    Mitral valve prolapse     Peptic ulcer disease     with gi bleed    Prediabetes     Primary hyperparathyroidism     Right knee DJD     Right knee DJD     Sleep apnea     TMJ (temporomandibular joint disorder)     Vitamin D deficiency      Past Surgical History:   Procedure Laterality Date    BILATERAL SALPINGOOPHORECTOMY      COLONOSCOPY  2015    EYE SURGERY      laser sx    GLAUCOMA SURGERY Right 09/26/2019    Xen Gel    HYSTERECTOMY      OOPHORECTOMY      SLT - OU - BOTH EYES      TRABECULECTOMY Right 4/2/14    OD (dr. grossman)    UPPER  GASTROINTESTINAL ENDOSCOPY  2003     Review of patient's allergies indicates:   Allergen Reactions    Betoptic [betaxolol] Other (See Comments)     Fatigue, slowed heart rate.    Alphagan [brimonidine] Dermatitis    Pravastatin Other (See Comments)     aches  aches     Current Outpatient Medications on File Prior to Visit   Medication Sig Dispense Refill    albuterol (PROVENTIL/VENTOLIN HFA) 90 mcg/actuation inhaler Inhale 2 puffs into the lungs every 6 (six) hours as needed for Wheezing or Shortness of Breath. 18 g 0    benzonatate (TESSALON) 200 MG capsule TAKE 1 CAPSULE BY MOUTH EVERY DAY 3 TIMES A DAY AS NEEDED FOR COUGH 30 capsule 0    DIPHENHYDRAMINE HCL (BENADRYL ALLERGY ORAL) Take 1 tablet by mouth as needed.      dorzolamide (TRUSOPT) 2 % ophthalmic solution Place 1 drop into the left eye 3 (three) times daily. 10 mL 3    ergocalciferol (ERGOCALCIFEROL) 50,000 unit Cap Take 1 capsule (50,000 Units total) by mouth every 7 days. 12 capsule 1    estradioL (ESTRACE) 0.5 MG tablet TAKE 1 TABLET BY MOUTH EVERY DAY 30 tablet 11    fexofenadine (ALLEGRA) 180 MG tablet Take 180 mg by mouth daily as needed.       FLUoxetine 20 MG capsule TAKE 3 CAPSULES (60 MG TOTAL) BY MOUTH ONCE DAILY. 90 capsule 2    fluticasone (FLONASE) 50 mcg/actuation nasal spray Use 2 sprays to each nostril daily 16 g 12    hydroCHLOROthiazide (HYDRODIURIL) 25 MG tablet TAKE 1 TABLET BY MOUTH EVERY DAY 90 tablet 2    latanoprost 0.005 % ophthalmic solution Place 1 drop into the left eye once daily. 1 Bottle 3    metFORMIN (GLUCOPHAGE-XR) 500 MG XR 24hr tablet TAKE 1 TABLET BY MOUTH TWICE A  tablet 1    montelukast (SINGULAIR) 10 mg tablet Take 1 tablet (10 mg total) by mouth every evening. 30 tablet 5    pantoprazole (PROTONIX) 40 MG tablet TAKE 1 TABLET BY MOUTH EVERY DAY 90 tablet 2    [DISCONTINUED] ALPRAZolam (XANAX) 0.25 MG tablet Take 1 tablet (0.25 mg total) by mouth 3 (three) times daily as needed for Anxiety.  30 tablet 5    [DISCONTINUED] losartan (COZAAR) 50 MG tablet TAKE 1 TABLET BY MOUTH EVERY DAY 90 tablet 0    ketorolac 0.5% (ACULAR) 0.5 % Drop Place 1 drop into the left eye 4 (four) times daily. for 7 days (Patient not taking: Reported on 12/3/2020) 5 mL 1    meclizine (ANTIVERT) 12.5 mg tablet Take 1 tablet (12.5 mg total) by mouth 3 (three) times daily as needed. 30 tablet 0    netarsudil (RHOPRESSA) 0.02 % Drop Place 1 drop into both eyes once daily. (Patient not taking: Reported on 12/3/2020) 2.5 mL 6    [DISCONTINUED] celecoxib (CELEBREX) 200 MG capsule TAKE 1 CAPSULE (200 MG TOTAL) BY MOUTH ONCE DAILY. (Patient not taking: Reported on 12/3/2020) 90 capsule 2     No current facility-administered medications on file prior to visit.      Social History     Socioeconomic History    Marital status:      Spouse name: Not on file    Number of children: Not on file    Years of education: Not on file    Highest education level: Not on file   Occupational History    Not on file   Social Needs    Financial resource strain: Not on file    Food insecurity     Worry: Not on file     Inability: Not on file    Transportation needs     Medical: Not on file     Non-medical: Not on file   Tobacco Use    Smoking status: Never Smoker    Smokeless tobacco: Never Used   Substance and Sexual Activity    Alcohol use: Yes     Comment: occasional    Drug use: No    Sexual activity: Not Currently   Lifestyle    Physical activity     Days per week: Not on file     Minutes per session: Not on file    Stress: Not on file   Relationships    Social connections     Talks on phone: Not on file     Gets together: Not on file     Attends [employer] service: Not on file     Active member of club or organization: Not on file     Attends meetings of clubs or organizations: Not on file     Relationship status: Not on file   Other Topics Concern    Not on file   Social History Narrative    Not on file     Family History  "  Problem Relation Age of Onset    Heart disease Father         before age 50    Diabetes Mother     Glaucoma Mother     Leukemia Mother     Cataracts Mother     Breast cancer Mother 60    Diabetes Sister     Glaucoma Sister     Diabetes Brother     Glaucoma Brother     Lung cancer Brother     Breast cancer Other 40        genetics -    Colon cancer Maternal Aunt            ROS:  GENERAL: No fever, chills,  or significant weight changes.   CARDIOVASCULAR: Denies chest pain, PND, orthopnea or reduced exercise tolerance.  ABDOMEN: Appetite fine. Denies diarrhea, abdominal pain, hematemesis or blood in stool.  URINARY: No flank pain, dysuria or hematuria.    Vitals:    12/03/20 0849   BP: (!) 158/84   Pulse: (!) 59   Temp: 98.5 °F (36.9 °C)   TempSrc: Oral   Weight: 92 kg (202 lb 12.8 oz)   Height: 5' 7" (1.702 m)     Wt Readings from Last 3 Encounters:   12/03/20 92 kg (202 lb 12.8 oz)   11/04/20 92.1 kg (203 lb)   10/15/20 92.1 kg (203 lb)       OBJECTIVE:   APPEARANCE: Well nourished, well developed, in no acute distress.    HEAD: Normocephalic.  Atraumatic.  No sinus tenderness.  EYES:   Right eye: Pupil reactive.  Conjunctiva clear.    Left eye: Pupil reactive.  Conjunctiva clear.  EOMI.    EARS: TM's intact. Light reflex normal. No retraction or perforation.    NOSE:  clear.  MOUTH & THROAT:  No pharyngeal erythema or exudate. No lesions.  NECK: Supple. No bruits.  No JVD.  No cervical lymphadenopathy.  No thyromegaly.    CHEST: Breath sounds clear bilaterally.  Normal respiratory effort  CARDIOVASCULAR: Normal rate.  Regular rhythm.  No murmurs.  No rub.  No gallops.  ABDOMEN: Bowel sounds normal.  Soft.  No tenderness.  No organomegaly.  PERIPHERAL VASCULAR: No cyanosis.  No clubbing.  No edema.  NEUROLOGIC: No focal findings.  MENTAL STATUS: Alert.  Oriented x 3.        "

## 2020-12-07 ENCOUNTER — PATIENT OUTREACH (OUTPATIENT)
Dept: ADMINISTRATIVE | Facility: OTHER | Age: 70
End: 2020-12-07

## 2020-12-07 NOTE — PROGRESS NOTES
LINKS immunization registry not responding  Care Everywhere updated  Health Maintenance updated  Chart reviewed for overdue Proactive [facility] En[facility] (SHAYLA) health maintenance testing (CRS, Breast Ca, Diabetic Eye Exam)   Orders entered:N/A

## 2020-12-08 DIAGNOSIS — G89.29 CHRONIC PAIN OF RIGHT KNEE: ICD-10-CM

## 2020-12-08 DIAGNOSIS — M25.561 CHRONIC PAIN OF RIGHT KNEE: ICD-10-CM

## 2020-12-08 DIAGNOSIS — M25.561 RIGHT KNEE PAIN, UNSPECIFIED CHRONICITY: Primary | ICD-10-CM

## 2020-12-09 ENCOUNTER — HOSPITAL ENCOUNTER (OUTPATIENT)
Dept: RADIOLOGY | Facility: HOSPITAL | Age: 70
Discharge: HOME | End: 2020-12-09
Attending: ORTHOPAEDIC SURGERY
Payer: MEDICARE

## 2020-12-09 ENCOUNTER — OFFICE VISIT (OUTPATIENT)
Dept: ORTHOPEDICS | Facility: CLINIC | Age: 70
End: 2020-12-09
Payer: MEDICARE

## 2020-12-09 VITALS — HEIGHT: 67 IN | BODY MASS INDEX: 31.83 KG/M2 | WEIGHT: 202.81 LBS

## 2020-12-09 DIAGNOSIS — M25.561 CHRONIC PAIN OF RIGHT KNEE: ICD-10-CM

## 2020-12-09 DIAGNOSIS — M25.561 BILATERAL KNEE PAIN: ICD-10-CM

## 2020-12-09 DIAGNOSIS — G89.29 CHRONIC PAIN OF RIGHT KNEE: ICD-10-CM

## 2020-12-09 DIAGNOSIS — M17.10 PRIMARY OSTEOARTHRITIS OF KNEE, UNSPECIFIED LATERALITY: ICD-10-CM

## 2020-12-09 DIAGNOSIS — M25.562 BILATERAL KNEE PAIN: ICD-10-CM

## 2020-12-09 PROCEDURE — 99999 PR PBB SHADOW E&M-EST. PATIENT-LVL IV: CPT | Mod: PBBFAC,,, | Performed by: ORTHOPAEDIC SURGERY

## 2020-12-09 PROCEDURE — 73562 XR KNEE ORTHO BILAT: ICD-10-PCS | Mod: 26,50,, | Performed by: RADIOLOGY

## 2020-12-09 PROCEDURE — 99999 PR PBB SHADOW E&M-EST. PATIENT-LVL IV: ICD-10-PCS | Mod: PBBFAC,,, | Performed by: ORTHOPAEDIC SURGERY

## 2020-12-09 PROCEDURE — 99203 OFFICE O/P NEW LOW 30 MIN: CPT | Mod: S$PBB,,, | Performed by: ORTHOPAEDIC SURGERY

## 2020-12-09 PROCEDURE — 99203 PR OFFICE/OUTPT VISIT, NEW, LEVL III, 30-44 MIN: ICD-10-PCS | Mod: S$PBB,,, | Performed by: ORTHOPAEDIC SURGERY

## 2020-12-09 PROCEDURE — 73562 X-RAY EXAM OF KNEE 3: CPT | Mod: TC,50,PO

## 2020-12-09 PROCEDURE — 73562 X-RAY EXAM OF KNEE 3: CPT | Mod: 26,50,, | Performed by: RADIOLOGY

## 2020-12-09 PROCEDURE — 99214 OFFICE O/P EST MOD 30 MIN: CPT | Mod: PBBFAC,25,PN | Performed by: ORTHOPAEDIC SURGERY

## 2020-12-09 NOTE — LETTER
December 9, 2020      Cody Lewis MD  [address]  [place] [address]           [facility]  [address] [place] [address]  Phone: [phone]          Patient: Suze Chino   MR Number: [id number]   YOB: 1950   Date of Visit: 12/9/2020       Dear Dr. Cody Lewis:    Thank you for referring Suze Chino to me for evaluation. Attached you will find relevant portions of my assessment and plan of care.    If you have questions, please do not hesitate to call me. I look forward to following Suze Chino along with you.    Sincerely,    Scout Ferrera MD    Enclosure  CC:  No Recipients    If you would like to receive this communication electronically, please contact [email or web address] or [phone] to request more information on [employer] Link access.    For providers and/or their staff who would like to refer a patient to [facility], please contact us through our one-stop-shop provider referral line, [facility] , at [phone].    If you feel you have received this communication in error or would no longer like to receive these types of communications, please e-mail [email or web address]

## 2020-12-09 NOTE — PROGRESS NOTES
70 years old complaining of right greater than left knee pain for about 10 years time gotten worse that she has gotten older.  Pain is 3 on good days, 10 on bad days.  Cold weather seems to bother.  Dancing in high heels also bothersome.  She is also concerned about a mild valgus deformity and she is worried about that progressing to a severe valgus deformity has she is concerned about the appearance of a valgus knee    Exam shows tenderness the joint line without signs infection or instability    X-rays show arthritic changes    Assessment:  Bilateral knee arthrosis    Plan:  Home exercise program, we offered her bracing and therapy, we discussed with her the possibility of injections and lastly arthroplasty    Further History  Aching pain  Worse with activity  Relieved with rest  No other associated symptoms  No other radiation    Further Exam  Alert and oriented  Pleasant  Contralateral limb has appropriate range of motion for age and condition  Contralateral limb has appropriate strength for age and condition  Contralateral limb has appropriate stability  for age and condition  No adenopathy  Pulses are appropriate for current condition  Skin is intact        Chief Complaint    Chief Complaint   Patient presents with    Right Knee - Pain    Left Knee - Pain       HPI  Suze Chino is a 70 y.o.  female who presents with       Past Medical History  Past Medical History:   Diagnosis Date    Anxiety     Cataract     Chronic kidney disease, stage 3 11/20/2020    Depression     GERD (gastroesophageal reflux disease)     Glaucoma     History of colon polyps 2009    history as per patient    History of colonoscopy 2009    ok per pt    Hyperplastic colon polyp 05/12/2015    Hypertension     Hypothyroid     ?    Mitral valve prolapse     Peptic ulcer disease     with gi bleed    Prediabetes     Primary hyperparathyroidism     Right knee DJD     Right knee DJD     Sleep apnea     TMJ  (temporomandibular joint disorder)     Vitamin D deficiency        Past Surgical History  Past Surgical History:   Procedure Laterality Date    BILATERAL SALPINGOOPHORECTOMY      COLONOSCOPY  2015    EYE SURGERY      laser sx    GLAUCOMA SURGERY Right 09/26/2019    Xen Gel    HYSTERECTOMY      OOPHORECTOMY      SLT - OU - BOTH EYES      TRABECULECTOMY Right 4/2/14    OD (dr. grossman)    UPPER GASTROINTESTINAL ENDOSCOPY  2003       Medications  Current Outpatient Medications   Medication Sig    albuterol (PROVENTIL/VENTOLIN HFA) 90 mcg/actuation inhaler Inhale 2 puffs into the lungs every 6 (six) hours as needed for Wheezing or Shortness of Breath.    ALPRAZolam (XANAX) 0.25 MG tablet Take 1 tablet (0.25 mg total) by mouth 3 (three) times daily as needed for Anxiety.    benzonatate (TESSALON) 200 MG capsule TAKE 1 CAPSULE BY MOUTH EVERY DAY 3 TIMES A DAY AS NEEDED FOR COUGH    diclofenac sodium (VOLTAREN) 1 % Gel Apply 2 g topically 4 (four) times daily.    DIPHENHYDRAMINE HCL (BENADRYL ALLERGY ORAL) Take 1 tablet by mouth as needed.    dorzolamide (TRUSOPT) 2 % ophthalmic solution Place 1 drop into the left eye 3 (three) times daily.    ergocalciferol (ERGOCALCIFEROL) 50,000 unit Cap Take 1 capsule (50,000 Units total) by mouth every 7 days.    estradioL (ESTRACE) 0.5 MG tablet TAKE 1 TABLET BY MOUTH EVERY DAY    fexofenadine (ALLEGRA) 180 MG tablet Take 180 mg by mouth daily as needed.     FLUoxetine 20 MG capsule TAKE 3 CAPSULES (60 MG TOTAL) BY MOUTH ONCE DAILY.    fluticasone (FLONASE) 50 mcg/actuation nasal spray Use 2 sprays to each nostril daily    hydroCHLOROthiazide (HYDRODIURIL) 25 MG tablet TAKE 1 TABLET BY MOUTH EVERY DAY    latanoprost 0.005 % ophthalmic solution Place 1 drop into the left eye once daily.    losartan (COZAAR) 100 MG tablet Take 1 tablet (100 mg total) by mouth once daily.    metFORMIN (GLUCOPHAGE-XR) 500 MG XR 24hr tablet TAKE 1 TABLET BY MOUTH TWICE A DAY     montelukast (SINGULAIR) 10 mg tablet Take 1 tablet (10 mg total) by mouth every evening.    netarsudil (RHOPRESSA) 0.02 % Drop Place 1 drop into both eyes once daily.    pantoprazole (PROTONIX) 40 MG tablet TAKE 1 TABLET BY MOUTH EVERY DAY    meclizine (ANTIVERT) 12.5 mg tablet Take 1 tablet (12.5 mg total) by mouth 3 (three) times daily as needed.     No current facility-administered medications for this visit.        Allergies  Review of patient's allergies indicates:   Allergen Reactions    Betoptic [betaxolol] Other (See Comments)     Fatigue, slowed heart rate.    Alphagan [brimonidine] Dermatitis    Pravastatin Other (See Comments)     aches  aches       Family History  Family History   Problem Relation Age of Onset    Heart disease Father         before age 50    Diabetes Mother     Glaucoma Mother     Leukemia Mother     Cataracts Mother     Breast cancer Mother 60    Diabetes Sister     Glaucoma Sister     Diabetes Brother     Glaucoma Brother     Lung cancer Brother     Breast cancer Other 40        genetics -    Colon cancer Maternal Aunt        Social History  Social History     Socioeconomic History    Marital status:      Spouse name: Not on file    Number of children: Not on file    Years of education: Not on file    Highest education level: Not on file   Occupational History    Not on file   Social Needs    Financial resource strain: Not on file    Food insecurity     Worry: Not on file     Inability: Not on file    Transportation needs     Medical: Not on file     Non-medical: Not on file   Tobacco Use    Smoking status: Never Smoker    Smokeless tobacco: Never Used   Substance and Sexual Activity    Alcohol use: Yes     Comment: occasional    Drug use: No    Sexual activity: Not Currently   Lifestyle    Physical activity     Days per week: Not on file     Minutes per session: Not on file    Stress: Not on file   Relationships    Social connections      Talks on phone: Not on file     Gets together: Not on file     Attends Baptism service: Not on file     Active member of club or organization: Not on file     Attends meetings of clubs or organizations: Not on file     Relationship status: Not on file   Other Topics Concern    Not on file   Social History Narrative    Not on file               Review of Systems     Constitutional: Negative    HENT: Negative  Eyes: Negative  Respiratory: Negative  Cardiovascular: Negative  Musculoskeletal: HPI  Skin: Negative  Neurological: Negative  Hematological: Negative  Endocrine: Negative                 Physical Exam    There were no vitals filed for this visit.  Body mass index is 31.77 kg/m².  Physical Examination:     General appearance -  well appearing, and in no distress  Mental status - awake  Neck - supple  Chest -  symmetric air entry  Heart - normal rate   Abdomen - soft      Assessment     1. Chronic pain of right knee    2. Primary osteoarthritis of knee, unspecified laterality          Plan

## 2020-12-10 ENCOUNTER — OUTSIDE PLACE OF SERVICE (OUTPATIENT)
Dept: ADMINISTRATIVE | Facility: OTHER | Age: 70
End: 2020-12-10
Payer: MEDICARE

## 2020-12-10 PROCEDURE — 65855 PR TRABECULOPLASTY LASER SURGERY: ICD-10-PCS | Mod: LT,,, | Performed by: OPHTHALMOLOGY

## 2020-12-10 PROCEDURE — 65855 TRABECULOPLASTY LASER SURG: CPT | Mod: LT,,, | Performed by: OPHTHALMOLOGY

## 2020-12-11 ENCOUNTER — OFFICE VISIT (OUTPATIENT)
Dept: OPHTHALMOLOGY | Facility: CLINIC | Age: 70
End: 2020-12-11
Payer: MEDICARE

## 2020-12-11 ENCOUNTER — PATIENT MESSAGE (OUTPATIENT)
Dept: OTHER | Facility: OTHER | Age: 70
End: 2020-12-11

## 2020-12-11 DIAGNOSIS — Z98.890 POST-OPERATIVE STATE: Primary | ICD-10-CM

## 2020-12-11 DIAGNOSIS — H40.1133 PRIMARY OPEN ANGLE GLAUCOMA OF BOTH EYES, SEVERE STAGE: ICD-10-CM

## 2020-12-11 PROCEDURE — 99212 OFFICE O/P EST SF 10 MIN: CPT | Mod: PBBFAC | Performed by: OPHTHALMOLOGY

## 2020-12-11 PROCEDURE — 92012 PR EYE EXAM, EST PATIENT,INTERMED: ICD-10-PCS | Mod: S$PBB,,, | Performed by: OPHTHALMOLOGY

## 2020-12-11 PROCEDURE — 99999 PR PBB SHADOW E&M-EST. PATIENT-LVL II: CPT | Mod: PBBFAC,,, | Performed by: OPHTHALMOLOGY

## 2020-12-11 PROCEDURE — 92012 INTRM OPH EXAM EST PATIENT: CPT | Mod: S$PBB,,, | Performed by: OPHTHALMOLOGY

## 2020-12-11 PROCEDURE — 99999 PR PBB SHADOW E&M-EST. PATIENT-LVL II: ICD-10-PCS | Mod: PBBFAC,,, | Performed by: OPHTHALMOLOGY

## 2020-12-11 NOTE — PROGRESS NOTES
HPI     Post-op Evaluation     Comments: 1 day s/p SLT OS               Comments     Patient reports as 1 day s/p SLT OS     1. COAG/ LTG  -h/o non compliance  -intolerant of coag meds  -TRAB OD  -repeat SLT od done 8/9/2012 - good initial response IOP 18 to 13, SLT OD   08/17/17  -Primary SLT done os 9/13/2012 - minimal response 18 to 16  -Bleb Needling with [facility] Inj. OD 9/17/15  -SLT OS 9/29/2016 AND REPEAT SLT OS 12/10/2020    Xen Gel OD (09-26-19)      *Stopped Alphagan OS TID because of dermatitis  *Stopped Betoptic bid ou due to slow pulse rate and feeling tired.  *latanoprost slows pulse rate   *DMX S50 BID (stopped after 2 weeks due to extreme fatigue)  *OD Rhopressa Qhs ( patient ran out in March never returned for followup )  *lotemax cost over $300      OD- Prednisolone MWFS (states using qd)   OS- LUMIGAN 0.03% QHS , DORZOLAMIDE TID, Rhopressa QD          Last edited by Jordy Soriano on 12/11/2020 11:10 AM. (History)            Assessment /Plan     For exam results, see Encounter Report.      ICD-10-CM ICD-9-CM    1. Post-operative state  Z98.890 V45.89    2. Primary open angle glaucoma of both eyes, severe stage  H40.1133 365.11      365.73        S/P SLT left eye   Doing well  Ketorolac QID for 7 days operative eye  Continue glaucoma medications as ordered  RTC 4 weeks    OD- Prednisolone MWFS (states using qd)   OS- LUMIGAN 0.03% QHS , DORZOLAMIDE TID, Rhopressa QD

## 2020-12-28 ENCOUNTER — OFFICE VISIT (OUTPATIENT)
Dept: OBSTETRICS AND GYNECOLOGY | Facility: CLINIC | Age: 70
End: 2020-12-28
Payer: MEDICARE

## 2020-12-28 VITALS
SYSTOLIC BLOOD PRESSURE: 150 MMHG | BODY MASS INDEX: 31.39 KG/M2 | WEIGHT: 200 LBS | HEIGHT: 67 IN | DIASTOLIC BLOOD PRESSURE: 90 MMHG

## 2020-12-28 DIAGNOSIS — N76.0 ACUTE VAGINITIS: Primary | ICD-10-CM

## 2020-12-28 DIAGNOSIS — M54.50 LOW BACK PAIN WITHOUT SCIATICA, UNSPECIFIED BACK PAIN LATERALITY, UNSPECIFIED CHRONICITY: ICD-10-CM

## 2020-12-28 DIAGNOSIS — R82.998 OTHER ABNORMAL FINDINGS IN URINE: ICD-10-CM

## 2020-12-28 LAB
BILIRUB SERPL-MCNC: NORMAL MG/DL
BLOOD URINE, POC: NORMAL
CLARITY, POC UA: NORMAL
COLOR, POC UA: NORMAL
GLUCOSE UR QL STRIP: NORMAL
KETONES UR QL STRIP: NORMAL
LEUKOCYTE ESTERASE URINE, POC: NORMAL
NITRITE, POC UA: NORMAL
PH, POC UA: 7
PROTEIN, POC: NORMAL
SPECIFIC GRAVITY, POC UA: NORMAL
UROBILINOGEN, POC UA: NORMAL

## 2020-12-28 PROCEDURE — 99999 PR PBB SHADOW E&M-EST. PATIENT-LVL III: ICD-10-PCS | Mod: PBBFAC,,, | Performed by: PHYSICIAN ASSISTANT

## 2020-12-28 PROCEDURE — 99214 OFFICE O/P EST MOD 30 MIN: CPT | Mod: S$PBB,,, | Performed by: PHYSICIAN ASSISTANT

## 2020-12-28 PROCEDURE — 99214 PR OFFICE/OUTPT VISIT, EST, LEVL IV, 30-39 MIN: ICD-10-PCS | Mod: S$PBB,,, | Performed by: PHYSICIAN ASSISTANT

## 2020-12-28 PROCEDURE — 99999 PR PBB SHADOW E&M-EST. PATIENT-LVL III: CPT | Mod: PBBFAC,,, | Performed by: PHYSICIAN ASSISTANT

## 2020-12-28 PROCEDURE — 87480 CANDIDA DNA DIR PROBE: CPT

## 2020-12-28 PROCEDURE — 87510 GARDNER VAG DNA DIR PROBE: CPT

## 2020-12-28 PROCEDURE — 87088 URINE BACTERIA CULTURE: CPT

## 2020-12-28 PROCEDURE — 87086 URINE CULTURE/COLONY COUNT: CPT

## 2020-12-28 PROCEDURE — 99213 OFFICE O/P EST LOW 20 MIN: CPT | Mod: PBBFAC | Performed by: PHYSICIAN ASSISTANT

## 2020-12-28 PROCEDURE — 87077 CULTURE AEROBIC IDENTIFY: CPT

## 2020-12-28 PROCEDURE — 81002 URINALYSIS NONAUTO W/O SCOPE: CPT | Mod: PBBFAC | Performed by: PHYSICIAN ASSISTANT

## 2020-12-28 PROCEDURE — 87186 SC STD MICRODIL/AGAR DIL: CPT

## 2020-12-28 RX ORDER — NITROFURANTOIN 25; 75 MG/1; MG/1
100 CAPSULE ORAL 2 TIMES DAILY
Qty: 14 CAPSULE | Refills: 0 | Status: SHIPPED | OUTPATIENT
Start: 2020-12-28 | End: 2021-01-04

## 2020-12-28 NOTE — PATIENT INSTRUCTIONS
UTI Causes  Bladder infections are not contagious. You can't get one from someone else, from a toilet seat, or from sharing a bath.  The most common cause of bladder infections is bacteria from the bowels. The bacteria get onto the skin around the opening of the urethra. From there, they can get into the urine and travel up to the bladder, causing inflammation and infection. This usually happens because of:  · Wiping improperly after urinating. Always wipe from front to back.  · Bowel incontinence  · Pregnancy  · Procedures such as having a catheter inserted  · Older age  · Not emptying your bladder. This can allow bacteria a chance to grow in your urine.  · Dehydration  · Constipation  · Sex  · Use of a diaphragm for birth control      UTI Care and prevention  These self-care steps can help prevent future infections:  · Drink plenty of fluids to prevent dehydration and flush out your bladder. Do this unless you must restrict fluids for other health reasons, or your doctor told you not to.  · Proper cleaning after going to the bathroom is important. Wipe from front to back after using the toilet to prevent the spread of bacteria.  · Establish regular bladder habits. Urinate more often. Don't try to hold urine in for a long time.  · Wear loose-fitting clothes and cotton underwear. Avoid tight-fitting pants.  · Avoid vulvovaginal irritants  · Improve your diet and prevent constipation. Eat more fresh fruit and vegetables, and fiber, and less junk and fatty foods.  · Avoid sex until your symptoms are gone.  · Increase fluid intake but avoid caffeine, alcohol, and spicy foods. These can irritate your bladder.  · Drink water prior to intercourse and urinate afterwards and avoid certain positions which could increase likelyhood of UTIs,  · If you use birth control pills and have frequent bladder infections, discuss it with your doctor.  · Signs of pylonephritis: Go to the ED if develops fever, chills, n/v, back pain,  worsening dyuria, hematuria  · Pelvic rest until symptoms resolve    Bladder Irritants  Alcoholic beverages   Apples and apple juice  Cantaloupe    Carbonated beverages  Chili and spicy foods   Chocolate  Citrus fruit    Coffee (including decaffeinated)  Cranberries and cranberry juice Grapes  Guava     Milk Products: milk, cheese, cottage cheese, yogurt, ice cream  Peaches    Pineapple  Plums     Strawberries  Sugar especially artificial sweeteners, saccharin, aspartame, corn sweeteners, honey, fructose, sucrose, lactose  Tea     Tomatoes and tomato juice  Vitamin B complex   Vinegar  *Most people are not sensitive to ALL of these products; your goal is to find the foods that make YOUR symptoms worse     Certain foods and drinks have been associated with worsening symptoms of urinary frequency, urgency, urge incontinence, or bladder pain. If you suffer from any of these conditions, you may wish to try eliminating one or more of these foods from your diet and see if your symptoms improve. If bladder symptoms are related to dietary factors, strict adherence to a diet that eliminates the food should bring marked relief in 10 days. Once you are feeling better, you can begin to add foods back into your diet, one at a time. If symptoms return, you will be able to identify the irritant. As you add foods back to your diet it is very important that you drink significant amounts of water. Low-acid fruit substitutions include apricots, papaya, pears and watermelon. Coffee drinkers can drink Kava or other lowacid instant drinks. Tea drinkers can substitute non-citrus herbal and sun brewed teas. Calcium carbonate co-buffered with calcium ascorbate can be substituted for Vitamin C. Prelief is a dietary supplement that works as an acid blocker for the bladder.     Vaginitis Prevention:  - Avoiding feminine products such as deoderant soaps, body wash, bubble bath, douches, scented toilet paper, deoderant tampons or pads, feminine  wipes, chronic pad use, etc. If you wash with soap make sure its hypo allergic (free of added scents or colors)   - Avoiding other vulvovaginal irritants such as long hot baths, humidity, tight, synthetic clothing, chlorine and sitting around in wet bathing suits  - Wearing cotton underwear, avoiding thong underwear and no underwear to bed-wear loose cotton bottoms to bed   - Taking showers instead of baths and use a hair dryer on cool setting afterwards to dry  - Wearing cotton to exercise and shower immediately after exercise and change clothes  - Using polyurethane condoms without spermicide if sexually active and symptoms are triggered by intercourse  - Use laundry detergent without added perfumes or colors   - Do not have sexual intercourse until symptoms have gone away   - Increase your intake of probiotics--you can get these by eating yogurt/greek yogurt or by buying over the counter probiotic supplements     Probiotic Information:   Any probiotic you choose needs to have ALL of the following components (Each needs to be >10 colony forming units [CFUs]):   - L. Acidophilus  - L. Rhamnosus  - L. Fermentum

## 2020-12-28 NOTE — PROGRESS NOTES
CC: Dysuria    HPI: Pt is a 70 y.o.  female who presents for evaluation of her UTI symptoms.   The patient presents today with lower back pain, suprapubic pressure, clear vaginal discharge, and vaginal odor for the past 4 days. States she has urgency, but that is normal for her. She also reports night sweats. States the odor only lasted 1 day. Still has LBP. The patient denies dysuria, frequency, flank pain, fever, nausea, vomiting, and hematuria. She denies frequent or recurrent UTIs.       Past Medical History:   Diagnosis Date    Anxiety     Cataract     Chronic kidney disease, stage 3 11/20/2020    Depression     GERD (gastroesophageal reflux disease)     Glaucoma     History of colon polyps 2009    history as per patient    History of colonoscopy 2009    ok per pt    Hyperplastic colon polyp 05/12/2015    Hypertension     Hypothyroid     ?    Mitral valve prolapse     Peptic ulcer disease     with gi bleed    Prediabetes     Primary hyperparathyroidism     Right knee DJD     Right knee DJD     Sleep apnea     TMJ (temporomandibular joint disorder)     Vitamin D deficiency        Past Surgical History:   Procedure Laterality Date    BILATERAL SALPINGOOPHORECTOMY      COLONOSCOPY  2015    EYE SURGERY      laser sx    GLAUCOMA SURGERY Right 09/26/2019    Xen Gel    HYSTERECTOMY      OOPHORECTOMY      SLT - OU - BOTH EYES      TRABECULECTOMY Right 4/2/14    OD (dr. grossman)    UPPER GASTROINTESTINAL ENDOSCOPY  2003       Family History   Problem Relation Age of Onset    Heart disease Father         before age 50    Diabetes Mother     Glaucoma Mother     Leukemia Mother     Cataracts Mother     Breast cancer Mother 60    Diabetes Sister     Glaucoma Sister     Diabetes Brother     Glaucoma Brother     Lung cancer Brother     Breast cancer Other 40        genetics -    Colon cancer Maternal Aunt        Social History     Socioeconomic History    Marital status:       Spouse name: Not on file    Number of children: Not on file    Years of education: Not on file    Highest education level: Not on file   Occupational History    Not on file   Social Needs    Financial resource strain: Not on file    Food insecurity     Worry: Not on file     Inability: Not on file    Transportation needs     Medical: Not on file     Non-medical: Not on file   Tobacco Use    Smoking status: Never Smoker    Smokeless tobacco: Never Used   Substance and Sexual Activity    Alcohol use: Yes     Comment: occasional    Drug use: No    Sexual activity: Not Currently   Lifestyle    Physical activity     Days per week: Not on file     Minutes per session: Not on file    Stress: Not on file   Relationships    Social connections     Talks on phone: Not on file     Gets together: Not on file     Attends Christian service: Not on file     Active member of club or organization: Not on file     Attends meetings of clubs or organizations: Not on file     Relationship status: Not on file   Other Topics Concern    Not on file   Social History Narrative    Not on file       Current Outpatient Medications   Medication Sig Dispense Refill    albuterol (PROVENTIL/VENTOLIN HFA) 90 mcg/actuation inhaler Inhale 2 puffs into the lungs every 6 (six) hours as needed for Wheezing or Shortness of Breath. 18 g 0    ALPRAZolam (XANAX) 0.25 MG tablet Take 1 tablet (0.25 mg total) by mouth 3 (three) times daily as needed for Anxiety. 30 tablet 5    benzonatate (TESSALON) 200 MG capsule TAKE 1 CAPSULE BY MOUTH EVERY DAY 3 TIMES A DAY AS NEEDED FOR COUGH 30 capsule 0    diclofenac sodium (VOLTAREN) 1 % Gel Apply 2 g topically 4 (four) times daily. 200 g 11    DIPHENHYDRAMINE HCL (BENADRYL ALLERGY ORAL) Take 1 tablet by mouth as needed.      dorzolamide (TRUSOPT) 2 % ophthalmic solution Place 1 drop into the left eye 3 (three) times daily. 10 mL 3    ergocalciferol (ERGOCALCIFEROL) 50,000 unit Cap Take 1  capsule (50,000 Units total) by mouth every 7 days. 12 capsule 1    estradioL (ESTRACE) 0.5 MG tablet TAKE 1 TABLET BY MOUTH EVERY DAY 30 tablet 11    fexofenadine (ALLEGRA) 180 MG tablet Take 180 mg by mouth daily as needed.       FLUoxetine 20 MG capsule TAKE 3 CAPSULES (60 MG TOTAL) BY MOUTH ONCE DAILY. 90 capsule 3    fluticasone (FLONASE) 50 mcg/actuation nasal spray Use 2 sprays to each nostril daily 16 g 12    hydroCHLOROthiazide (HYDRODIURIL) 25 MG tablet TAKE 1 TABLET BY MOUTH EVERY DAY 90 tablet 0    latanoprost 0.005 % ophthalmic solution Place 1 drop into the left eye once daily. 1 Bottle 3    losartan (COZAAR) 100 MG tablet Take 1 tablet (100 mg total) by mouth once daily. 90 tablet 0    losartan (COZAAR) 100 MG tablet Take 1 tablet (100 mg total) by mouth once daily. 90 tablet 0    meclizine (ANTIVERT) 12.5 mg tablet Take 1 tablet (12.5 mg total) by mouth 3 (three) times daily as needed. 30 tablet 0    metFORMIN (GLUCOPHAGE-XR) 500 MG XR 24hr tablet TAKE 1 TABLET BY MOUTH TWICE A  tablet 1    montelukast (SINGULAIR) 10 mg tablet Take 1 tablet (10 mg total) by mouth every evening. 30 tablet 5    netarsudil (RHOPRESSA) 0.02 % Drop Place 1 drop into both eyes once daily. 2.5 mL 6    nitrofurantoin, macrocrystal-monohydrate, (MACROBID) 100 MG capsule Take 1 capsule (100 mg total) by mouth 2 (two) times daily. for 7 days 14 capsule 0    pantoprazole (PROTONIX) 40 MG tablet TAKE 1 TABLET BY MOUTH EVERY DAY 90 tablet 3     No current facility-administered medications for this visit.        Review of patient's allergies indicates:   Allergen Reactions    Betoptic [betaxolol] Other (See Comments)     Fatigue, slowed heart rate.    Alphagan [brimonidine] Dermatitis    Pravastatin Other (See Comments)     aches  aches         ROS:  GENERAL: Feeling well overall. Denies fever or chills.   SKIN: Denies rash or lesions.   HEAD: Denies head injury or headache.   NODES: Denies enlarged lymph  nodes.   CHEST: Denies chest pain or shortness of breath.   CARDIOVASCULAR: Denies palpitations or left sided chest pain.   ABDOMEN: No abdominal pain, constipation, diarrhea, nausea, vomiting or rectal bleeding.   URINARY: no dysuria, hematuria, or burning on urination. +urgency, suprapubic pressure   REPRODUCTIVE: See HPI.   BREASTS: Denies pain, lumps, or nipple discharge.       PE:   APPEARANCE: Well nourished, well developed, Black or  female in no acute distress.  VULVA: normal appearing vulva with no masses, tenderness or lesions   VAGINA: normal appearing vagina with normal color. NO discharge, no lesions   CERVIX: surgically absent   UTERUS: surgically absent   ADNEXA: surgically absent  ABDOMEN: No suprapubic tenderness  MUSCULOSKELETAL: No CVA tenderness    Diagnosis:  1. Acute vaginitis    2. Low back pain without sciatica, unspecified back pain laterality, unspecified chronicity    3. Other abnormal findings in urine         Plan:     Orders Placed This Encounter    Urine culture    Vaginosis Screen by DNA Probe    POCT URINE DIPSTICK WITHOUT MICROSCOPE    nitrofurantoin, macrocrystal-monohydrate, (MACROBID) 100 MG capsule       UTI Causes  Bladder infections are not contagious. You can't get one from someone else, from a toilet seat, or from sharing a bath.  The most common cause of bladder infections is bacteria from the bowels. The bacteria get onto the skin around the opening of the urethra. From there, they can get into the urine and travel up to the bladder, causing inflammation and infection. This usually happens because of:  · Wiping improperly after urinating. Always wipe from front to back.  · Bowel incontinence  · Pregnancy  · Procedures such as having a catheter inserted  · Older age  · Not emptying your bladder. This can allow bacteria a chance to grow in your urine.  · Dehydration  · Constipation  · Sex  · Use of a diaphragm for birth control      UTI Care and  prevention  These self-care steps can help prevent future infections:  · Drink plenty of fluids to prevent dehydration and flush out your bladder. Do this unless you must restrict fluids for other health reasons, or your doctor told you not to.  · Proper cleaning after going to the bathroom is important. Wipe from front to back after using the toilet to prevent the spread of bacteria.  · Establish regular bladder habits. Urinate more often. Don't try to hold urine in for a long time.  · Wear loose-fitting clothes and cotton underwear. Avoid tight-fitting pants.  · Avoid vulvovaginal irritants  · Improve your diet and prevent constipation. Eat more fresh fruit and vegetables, and fiber, and less junk and fatty foods.  · Avoid sex until your symptoms are gone.  · Increase fluid intake but avoid caffeine, alcohol, and spicy foods. These can irritate your bladder.  · Drink water prior to intercourse and urinate afterwards and avoid certain positions which could increase likelyhood of UTIs,  · If you use birth control pills and have frequent bladder infections, discuss it with your doctor.  · Signs of pylonephritis: Go to the ED if develops fever, chills, n/v, back pain, worsening dyuria, hematuria  · Pelvic rest until symptoms resolve    Bladder Irritants  Alcoholic beverages   Apples and apple juice  Cantaloupe    Carbonated beverages  Chili and spicy foods   Chocolate  Citrus fruit    Coffee (including decaffeinated)  Cranberries and cranberry juice Grapes  Guava     Milk Products: milk, cheese, cottage cheese, yogurt, ice cream  Peaches    Pineapple  Plums     Strawberries  Sugar especially artificial sweeteners, saccharin, aspartame, corn sweeteners, honey, fructose, sucrose, lactose  Tea     Tomatoes and tomato juice  Vitamin B complex   Vinegar  *Most people are not sensitive to ALL of these products; your goal is to find the foods that make YOUR symptoms worse     Certain foods and drinks have been associated  with worsening symptoms of urinary frequency, urgency, urge incontinence, or bladder pain. If you suffer from any of these conditions, you may wish to try eliminating one or more of these foods from your diet and see if your symptoms improve. If bladder symptoms are related to dietary factors, strict adherence to a diet that eliminates the food should bring marked relief in 10 days. Once you are feeling better, you can begin to add foods back into your diet, one at a time. If symptoms return, you will be able to identify the irritant. As you add foods back to your diet it is very important that you drink significant amounts of water. Low-acid fruit substitutions include apricots, papaya, pears and watermelon. Coffee drinkers can drink Kava or other lowacid instant drinks. Tea drinkers can substitute non-citrus herbal and sun brewed teas. Calcium carbonate co-buffered with calcium ascorbate can be substituted for Vitamin C. Prelief is a dietary supplement that works as an acid blocker for the bladder.       Follow-up PRN no resolution of symptoms.

## 2020-12-29 ENCOUNTER — CLINICAL SUPPORT (OUTPATIENT)
Dept: FAMILY MEDICINE | Facility: CLINIC | Age: 70
End: 2020-12-29
Payer: MEDICARE

## 2020-12-29 VITALS — SYSTOLIC BLOOD PRESSURE: 133 MMHG | DIASTOLIC BLOOD PRESSURE: 70 MMHG | HEART RATE: 56 BPM

## 2020-12-29 DIAGNOSIS — I10 ESSENTIAL HYPERTENSION: Primary | ICD-10-CM

## 2020-12-29 LAB
CANDIDA RRNA VAG QL PROBE: NEGATIVE
G VAGINALIS RRNA GENITAL QL PROBE: NEGATIVE
T VAGINALIS RRNA GENITAL QL PROBE: NEGATIVE

## 2020-12-29 PROCEDURE — 99211 OFF/OP EST MAY X REQ PHY/QHP: CPT | Mod: PBBFAC,PO

## 2020-12-29 PROCEDURE — 99999 PR PBB SHADOW E&M-EST. PATIENT-LVL I: ICD-10-PCS | Mod: PBBFAC,,,

## 2020-12-29 PROCEDURE — 99999 PR PBB SHADOW E&M-EST. PATIENT-LVL I: CPT | Mod: PBBFAC,,,

## 2020-12-31 LAB — BACTERIA UR CULT: ABNORMAL

## 2021-01-06 ENCOUNTER — OFFICE VISIT (OUTPATIENT)
Dept: OBSTETRICS AND GYNECOLOGY | Facility: CLINIC | Age: 71
End: 2021-01-06
Attending: OBSTETRICS & GYNECOLOGY
Payer: MEDICARE

## 2021-01-06 VITALS
HEIGHT: 67 IN | BODY MASS INDEX: 31.38 KG/M2 | SYSTOLIC BLOOD PRESSURE: 132 MMHG | WEIGHT: 199.94 LBS | DIASTOLIC BLOOD PRESSURE: 78 MMHG

## 2021-01-06 DIAGNOSIS — Z01.419 ENCOUNTER FOR WELL WOMAN EXAM: Primary | ICD-10-CM

## 2021-01-06 DIAGNOSIS — Z12.31 ENCOUNTER FOR SCREENING MAMMOGRAM FOR MALIGNANT NEOPLASM OF BREAST: ICD-10-CM

## 2021-01-06 DIAGNOSIS — N95.1 MENOPAUSAL SYMPTOMS: ICD-10-CM

## 2021-01-06 PROCEDURE — G0101 PR CA SCREEN;PELVIC/BREAST EXAM: ICD-10-PCS | Mod: S$PBB,,, | Performed by: OBSTETRICS & GYNECOLOGY

## 2021-01-06 PROCEDURE — 99214 OFFICE O/P EST MOD 30 MIN: CPT | Mod: PBBFAC,25 | Performed by: OBSTETRICS & GYNECOLOGY

## 2021-01-06 PROCEDURE — G0101 CA SCREEN;PELVIC/BREAST EXAM: HCPCS | Mod: PBBFAC | Performed by: OBSTETRICS & GYNECOLOGY

## 2021-01-06 PROCEDURE — 99999 PR PBB SHADOW E&M-EST. PATIENT-LVL IV: CPT | Mod: PBBFAC,,, | Performed by: OBSTETRICS & GYNECOLOGY

## 2021-01-06 PROCEDURE — 99999 PR PBB SHADOW E&M-EST. PATIENT-LVL IV: ICD-10-PCS | Mod: PBBFAC,,, | Performed by: OBSTETRICS & GYNECOLOGY

## 2021-01-06 PROCEDURE — G0101 CA SCREEN;PELVIC/BREAST EXAM: HCPCS | Mod: S$PBB,,, | Performed by: OBSTETRICS & GYNECOLOGY

## 2021-01-06 RX ORDER — ESTRADIOL 10 UG/1
10 INSERT VAGINAL
Qty: 24 TABLET | Refills: 3 | Status: SHIPPED | OUTPATIENT
Start: 2021-01-07 | End: 2022-05-23

## 2021-01-13 ENCOUNTER — TELEPHONE (OUTPATIENT)
Dept: OPHTHALMOLOGY | Facility: CLINIC | Age: 71
End: 2021-01-13

## 2021-01-19 ENCOUNTER — TELEPHONE (OUTPATIENT)
Dept: FAMILY MEDICINE | Facility: CLINIC | Age: 71
End: 2021-01-19

## 2021-01-20 ENCOUNTER — TELEPHONE (OUTPATIENT)
Dept: FAMILY MEDICINE | Facility: CLINIC | Age: 71
End: 2021-01-20

## 2021-02-04 ENCOUNTER — TELEPHONE (OUTPATIENT)
Dept: FAMILY MEDICINE | Facility: CLINIC | Age: 71
End: 2021-02-04

## 2021-02-13 ENCOUNTER — PATIENT OUTREACH (OUTPATIENT)
Dept: ADMINISTRATIVE | Facility: OTHER | Age: 71
End: 2021-02-13

## 2021-02-25 ENCOUNTER — OFFICE VISIT (OUTPATIENT)
Dept: FAMILY MEDICINE | Facility: CLINIC | Age: 71
End: 2021-02-25
Payer: MEDICARE

## 2021-02-25 ENCOUNTER — LAB VISIT (OUTPATIENT)
Dept: LAB | Facility: HOSPITAL | Age: 71
End: 2021-02-25
Attending: FAMILY MEDICINE
Payer: MEDICARE

## 2021-02-25 VITALS
HEART RATE: 68 BPM | WEIGHT: 193.63 LBS | TEMPERATURE: 97 F | BODY MASS INDEX: 30.39 KG/M2 | SYSTOLIC BLOOD PRESSURE: 124 MMHG | DIASTOLIC BLOOD PRESSURE: 80 MMHG | HEIGHT: 67 IN

## 2021-02-25 DIAGNOSIS — N18.30 STAGE 3 CHRONIC KIDNEY DISEASE, UNSPECIFIED WHETHER STAGE 3A OR 3B CKD: ICD-10-CM

## 2021-02-25 DIAGNOSIS — F32.0 MILD MAJOR DEPRESSION: ICD-10-CM

## 2021-02-25 DIAGNOSIS — E55.9 VITAMIN D DEFICIENCY: ICD-10-CM

## 2021-02-25 DIAGNOSIS — Z86.16 HISTORY OF 2019 NOVEL CORONAVIRUS DISEASE (COVID-19): ICD-10-CM

## 2021-02-25 DIAGNOSIS — I10 ESSENTIAL HYPERTENSION: ICD-10-CM

## 2021-02-25 DIAGNOSIS — R73.03 PREDIABETES: ICD-10-CM

## 2021-02-25 DIAGNOSIS — R53.83 FATIGUE, UNSPECIFIED TYPE: Primary | ICD-10-CM

## 2021-02-25 DIAGNOSIS — E21.0 PRIMARY HYPERPARATHYROIDISM: ICD-10-CM

## 2021-02-25 LAB
ESTIMATED AVG GLUCOSE: 117 MG/DL (ref 68–131)
HBA1C MFR BLD: 5.7 % (ref 4–5.6)

## 2021-02-25 PROCEDURE — 82306 VITAMIN D 25 HYDROXY: CPT

## 2021-02-25 PROCEDURE — 83970 ASSAY OF PARATHORMONE: CPT

## 2021-02-25 PROCEDURE — 99214 PR OFFICE/OUTPT VISIT, EST, LEVL IV, 30-39 MIN: ICD-10-PCS | Mod: S$PBB,,, | Performed by: FAMILY MEDICINE

## 2021-02-25 PROCEDURE — 83036 HEMOGLOBIN GLYCOSYLATED A1C: CPT

## 2021-02-25 PROCEDURE — 99215 OFFICE O/P EST HI 40 MIN: CPT | Mod: PBBFAC,PO | Performed by: FAMILY MEDICINE

## 2021-02-25 PROCEDURE — 99999 PR PBB SHADOW E&M-EST. PATIENT-LVL V: ICD-10-PCS | Mod: PBBFAC,,, | Performed by: FAMILY MEDICINE

## 2021-02-25 PROCEDURE — 36415 COLL VENOUS BLD VENIPUNCTURE: CPT | Mod: PO

## 2021-02-25 PROCEDURE — 80048 BASIC METABOLIC PNL TOTAL CA: CPT

## 2021-02-25 PROCEDURE — 99999 PR PBB SHADOW E&M-EST. PATIENT-LVL V: CPT | Mod: PBBFAC,,, | Performed by: FAMILY MEDICINE

## 2021-02-25 PROCEDURE — 99214 OFFICE O/P EST MOD 30 MIN: CPT | Mod: S$PBB,,, | Performed by: FAMILY MEDICINE

## 2021-02-25 RX ORDER — FLUTICASONE PROPIONATE 50 MCG
SPRAY, SUSPENSION (ML) NASAL
Qty: 16 G | Refills: 12 | Status: ON HOLD | OUTPATIENT
Start: 2021-02-25 | End: 2021-09-24 | Stop reason: HOSPADM

## 2021-02-25 RX ORDER — DEXTROMETHORPHAN HYDROBROMIDE, GUAIFENESIN 5; 100 MG/5ML; MG/5ML
1300 LIQUID ORAL 2 TIMES DAILY
COMMUNITY

## 2021-02-26 LAB
25(OH)D3+25(OH)D2 SERPL-MCNC: 33 NG/ML (ref 30–96)
ANION GAP SERPL CALC-SCNC: 10 MMOL/L (ref 8–16)
BUN SERPL-MCNC: 22 MG/DL (ref 8–23)
CALCIUM SERPL-MCNC: 10.8 MG/DL (ref 8.7–10.5)
CHLORIDE SERPL-SCNC: 100 MMOL/L (ref 95–110)
CO2 SERPL-SCNC: 29 MMOL/L (ref 23–29)
CREAT SERPL-MCNC: 1.3 MG/DL (ref 0.5–1.4)
EST. GFR  (AFRICAN AMERICAN): 48 ML/MIN/1.73 M^2
EST. GFR  (NON AFRICAN AMERICAN): 41.7 ML/MIN/1.73 M^2
GLUCOSE SERPL-MCNC: 89 MG/DL (ref 70–110)
POTASSIUM SERPL-SCNC: 4.1 MMOL/L (ref 3.5–5.1)
PTH-INTACT SERPL-MCNC: 129 PG/ML (ref 9–77)
SODIUM SERPL-SCNC: 139 MMOL/L (ref 136–145)

## 2021-03-01 ENCOUNTER — OFFICE VISIT (OUTPATIENT)
Dept: OPHTHALMOLOGY | Facility: CLINIC | Age: 71
End: 2021-03-01
Payer: MEDICARE

## 2021-03-01 DIAGNOSIS — E11.9 TYPE 2 DIABETES MELLITUS WITHOUT COMPLICATION, WITHOUT LONG-TERM CURRENT USE OF INSULIN: ICD-10-CM

## 2021-03-01 DIAGNOSIS — E11.36 DIABETIC CATARACT OF LEFT EYE: ICD-10-CM

## 2021-03-01 DIAGNOSIS — Z91.148 NONCOMPLIANCE WITH MEDICATION REGIMEN: ICD-10-CM

## 2021-03-01 DIAGNOSIS — H40.1133 PRIMARY OPEN ANGLE GLAUCOMA OF BOTH EYES, SEVERE STAGE: Primary | ICD-10-CM

## 2021-03-01 DIAGNOSIS — E11.36 DIABETIC CATARACT OF RIGHT EYE: ICD-10-CM

## 2021-03-01 PROCEDURE — 99213 OFFICE O/P EST LOW 20 MIN: CPT | Mod: PBBFAC | Performed by: OPHTHALMOLOGY

## 2021-03-01 PROCEDURE — 99999 PR PBB SHADOW E&M-EST. PATIENT-LVL III: CPT | Mod: PBBFAC,,, | Performed by: OPHTHALMOLOGY

## 2021-03-01 PROCEDURE — 99215 OFFICE O/P EST HI 40 MIN: CPT | Mod: S$PBB,,, | Performed by: OPHTHALMOLOGY

## 2021-03-01 PROCEDURE — 99215 PR OFFICE/OUTPT VISIT, EST, LEVL V, 40-54 MIN: ICD-10-PCS | Mod: S$PBB,,, | Performed by: OPHTHALMOLOGY

## 2021-03-01 PROCEDURE — 99999 PR PBB SHADOW E&M-EST. PATIENT-LVL III: ICD-10-PCS | Mod: PBBFAC,,, | Performed by: OPHTHALMOLOGY

## 2021-03-01 RX ORDER — KETOROLAC TROMETHAMINE 5 MG/ML
1 SOLUTION OPHTHALMIC 4 TIMES DAILY
Qty: 1 BOTTLE | Refills: 3 | Status: SHIPPED | OUTPATIENT
Start: 2021-03-01 | End: 2021-03-06

## 2021-03-01 RX ORDER — POLYMYXIN B SULFATE AND TRIMETHOPRIM 1; 10000 MG/ML; [USP'U]/ML
1 SOLUTION OPHTHALMIC EVERY 4 HOURS
Qty: 1 BOTTLE | Refills: 1 | Status: SHIPPED | OUTPATIENT
Start: 2021-03-01 | End: 2021-03-06

## 2021-03-01 RX ORDER — DUREZOL 0.5 MG/ML
1 EMULSION OPHTHALMIC 4 TIMES DAILY
Qty: 1 BOTTLE | Refills: 1 | Status: CANCELLED | OUTPATIENT
Start: 2021-03-01 | End: 2021-03-31

## 2021-03-01 RX ORDER — PREDNISOLONE ACETATE 10 MG/ML
1 SUSPENSION/ DROPS OPHTHALMIC 4 TIMES DAILY
Qty: 10 ML | Refills: 1 | Status: SHIPPED | OUTPATIENT
Start: 2021-03-01 | End: 2022-05-25

## 2021-03-15 RX ORDER — HYDROCHLOROTHIAZIDE 25 MG/1
TABLET ORAL
Qty: 90 TABLET | Refills: 3 | Status: SHIPPED | OUTPATIENT
Start: 2021-03-15 | End: 2021-07-02 | Stop reason: ALTCHOICE

## 2021-04-05 ENCOUNTER — OUTSIDE PLACE OF SERVICE (OUTPATIENT)
Dept: ADMINISTRATIVE | Facility: OTHER | Age: 71
End: 2021-04-05
Payer: MEDICARE

## 2021-04-05 PROCEDURE — 66175 PR TRANSLUMINAL DILATION AQUEOUS CANAL, W RETENTION DEVICE/STENT: ICD-10-PCS | Mod: LT,,, | Performed by: OPHTHALMOLOGY

## 2021-04-05 PROCEDURE — 66175 TRLUML DIL AQ O/F CAN W/ST: CPT | Mod: LT,,, | Performed by: OPHTHALMOLOGY

## 2021-04-06 ENCOUNTER — OFFICE VISIT (OUTPATIENT)
Dept: OPHTHALMOLOGY | Facility: CLINIC | Age: 71
End: 2021-04-06
Payer: MEDICARE

## 2021-04-06 DIAGNOSIS — H40.1123 PRIMARY OPEN ANGLE GLAUCOMA (POAG) OF LEFT EYE, SEVERE STAGE: ICD-10-CM

## 2021-04-06 DIAGNOSIS — Z98.890 POST-OPERATIVE STATE: Primary | ICD-10-CM

## 2021-04-06 PROCEDURE — 99024 POSTOP FOLLOW-UP VISIT: CPT | Mod: POP,,, | Performed by: OPHTHALMOLOGY

## 2021-04-06 PROCEDURE — 99999 PR PBB SHADOW E&M-EST. PATIENT-LVL I: ICD-10-PCS | Mod: PBBFAC,,, | Performed by: OPHTHALMOLOGY

## 2021-04-06 PROCEDURE — 99999 PR PBB SHADOW E&M-EST. PATIENT-LVL I: CPT | Mod: PBBFAC,,, | Performed by: OPHTHALMOLOGY

## 2021-04-06 PROCEDURE — 99024 PR POST-OP FOLLOW-UP VISIT: ICD-10-PCS | Mod: POP,,, | Performed by: OPHTHALMOLOGY

## 2021-04-06 PROCEDURE — 99211 OFF/OP EST MAY X REQ PHY/QHP: CPT | Mod: PBBFAC | Performed by: OPHTHALMOLOGY

## 2021-04-13 ENCOUNTER — OFFICE VISIT (OUTPATIENT)
Dept: OPHTHALMOLOGY | Facility: CLINIC | Age: 71
End: 2021-04-13
Payer: MEDICARE

## 2021-04-13 DIAGNOSIS — Z98.890 POST-OPERATIVE STATE: Primary | ICD-10-CM

## 2021-04-13 DIAGNOSIS — H40.1133 PRIMARY OPEN ANGLE GLAUCOMA OF BOTH EYES, SEVERE STAGE: ICD-10-CM

## 2021-04-13 PROCEDURE — 99024 POSTOP FOLLOW-UP VISIT: CPT | Mod: POP,,, | Performed by: OPHTHALMOLOGY

## 2021-04-13 PROCEDURE — 99024 PR POST-OP FOLLOW-UP VISIT: ICD-10-PCS | Mod: POP,,, | Performed by: OPHTHALMOLOGY

## 2021-04-13 PROCEDURE — 99213 OFFICE O/P EST LOW 20 MIN: CPT | Mod: PBBFAC | Performed by: OPHTHALMOLOGY

## 2021-04-13 PROCEDURE — 99999 PR PBB SHADOW E&M-EST. PATIENT-LVL III: ICD-10-PCS | Mod: PBBFAC,,, | Performed by: OPHTHALMOLOGY

## 2021-04-13 PROCEDURE — 99999 PR PBB SHADOW E&M-EST. PATIENT-LVL III: CPT | Mod: PBBFAC,,, | Performed by: OPHTHALMOLOGY

## 2021-04-26 ENCOUNTER — OFFICE VISIT (OUTPATIENT)
Dept: OPHTHALMOLOGY | Facility: CLINIC | Age: 71
End: 2021-04-26
Payer: MEDICARE

## 2021-04-26 DIAGNOSIS — H40.1123 PRIMARY OPEN ANGLE GLAUCOMA (POAG) OF LEFT EYE, SEVERE STAGE: ICD-10-CM

## 2021-04-26 DIAGNOSIS — Z98.890 POST-OPERATIVE STATE: Primary | ICD-10-CM

## 2021-04-26 PROCEDURE — 99024 POSTOP FOLLOW-UP VISIT: CPT | Mod: POP,,, | Performed by: OPHTHALMOLOGY

## 2021-04-26 PROCEDURE — 99999 PR PBB SHADOW E&M-EST. PATIENT-LVL III: ICD-10-PCS | Mod: PBBFAC,,, | Performed by: OPHTHALMOLOGY

## 2021-04-26 PROCEDURE — 99024 PR POST-OP FOLLOW-UP VISIT: ICD-10-PCS | Mod: POP,,, | Performed by: OPHTHALMOLOGY

## 2021-04-26 PROCEDURE — 99213 OFFICE O/P EST LOW 20 MIN: CPT | Mod: PBBFAC | Performed by: OPHTHALMOLOGY

## 2021-04-26 PROCEDURE — 99999 PR PBB SHADOW E&M-EST. PATIENT-LVL III: CPT | Mod: PBBFAC,,, | Performed by: OPHTHALMOLOGY

## 2021-05-05 RX ORDER — FLUOXETINE HYDROCHLORIDE 20 MG/1
60 CAPSULE ORAL DAILY
Qty: 90 CAPSULE | Refills: 5 | Status: SHIPPED | OUTPATIENT
Start: 2021-05-05 | End: 2021-07-02 | Stop reason: ALTCHOICE

## 2021-05-17 ENCOUNTER — OFFICE VISIT (OUTPATIENT)
Dept: OPHTHALMOLOGY | Facility: CLINIC | Age: 71
End: 2021-05-17
Payer: MEDICARE

## 2021-05-17 DIAGNOSIS — E11.9 TYPE 2 DIABETES MELLITUS WITHOUT COMPLICATION, WITHOUT LONG-TERM CURRENT USE OF INSULIN: ICD-10-CM

## 2021-05-17 DIAGNOSIS — H40.1123 PRIMARY OPEN ANGLE GLAUCOMA (POAG) OF LEFT EYE, SEVERE STAGE: ICD-10-CM

## 2021-05-17 DIAGNOSIS — Z98.890 POST-OPERATIVE STATE: Primary | ICD-10-CM

## 2021-05-17 PROCEDURE — 99024 PR POST-OP FOLLOW-UP VISIT: ICD-10-PCS | Mod: POP,,, | Performed by: OPHTHALMOLOGY

## 2021-05-17 PROCEDURE — 99213 OFFICE O/P EST LOW 20 MIN: CPT | Mod: PBBFAC | Performed by: OPHTHALMOLOGY

## 2021-05-17 PROCEDURE — 99999 PR PBB SHADOW E&M-EST. PATIENT-LVL III: CPT | Mod: PBBFAC,,, | Performed by: OPHTHALMOLOGY

## 2021-05-17 PROCEDURE — 99024 POSTOP FOLLOW-UP VISIT: CPT | Mod: POP,,, | Performed by: OPHTHALMOLOGY

## 2021-05-17 PROCEDURE — 99999 PR PBB SHADOW E&M-EST. PATIENT-LVL III: ICD-10-PCS | Mod: PBBFAC,,, | Performed by: OPHTHALMOLOGY

## 2021-05-24 RX ORDER — LOSARTAN POTASSIUM 100 MG/1
TABLET ORAL
Qty: 90 TABLET | Refills: 2 | Status: SHIPPED | OUTPATIENT
Start: 2021-05-24 | End: 2022-06-27

## 2021-05-25 ENCOUNTER — PATIENT OUTREACH (OUTPATIENT)
Dept: ADMINISTRATIVE | Facility: OTHER | Age: 71
End: 2021-05-25

## 2021-06-01 ENCOUNTER — OFFICE VISIT (OUTPATIENT)
Dept: ENDOCRINOLOGY | Facility: CLINIC | Age: 71
End: 2021-06-01
Payer: MEDICARE

## 2021-06-01 VITALS
WEIGHT: 190.56 LBS | BODY MASS INDEX: 29.91 KG/M2 | DIASTOLIC BLOOD PRESSURE: 74 MMHG | HEART RATE: 65 BPM | HEIGHT: 67 IN | OXYGEN SATURATION: 98 % | SYSTOLIC BLOOD PRESSURE: 138 MMHG

## 2021-06-01 DIAGNOSIS — E21.3 HYPERPARATHYROIDISM: Primary | ICD-10-CM

## 2021-06-01 DIAGNOSIS — N18.30 STAGE 3 CHRONIC KIDNEY DISEASE, UNSPECIFIED WHETHER STAGE 3A OR 3B CKD: ICD-10-CM

## 2021-06-01 DIAGNOSIS — E55.9 VITAMIN D DEFICIENCY: ICD-10-CM

## 2021-06-01 DIAGNOSIS — R73.03 PREDIABETES: ICD-10-CM

## 2021-06-01 DIAGNOSIS — I10 ESSENTIAL HYPERTENSION: ICD-10-CM

## 2021-06-01 PROCEDURE — 99215 PR OFFICE/OUTPT VISIT, EST, LEVL V, 40-54 MIN: ICD-10-PCS | Mod: S$PBB,,, | Performed by: INTERNAL MEDICINE

## 2021-06-01 PROCEDURE — 99999 PR PBB SHADOW E&M-EST. PATIENT-LVL IV: CPT | Mod: PBBFAC,,, | Performed by: INTERNAL MEDICINE

## 2021-06-01 PROCEDURE — 99999 PR PBB SHADOW E&M-EST. PATIENT-LVL IV: ICD-10-PCS | Mod: PBBFAC,,, | Performed by: INTERNAL MEDICINE

## 2021-06-01 PROCEDURE — 99214 OFFICE O/P EST MOD 30 MIN: CPT | Mod: PBBFAC,PO | Performed by: INTERNAL MEDICINE

## 2021-06-01 PROCEDURE — 99215 OFFICE O/P EST HI 40 MIN: CPT | Mod: S$PBB,,, | Performed by: INTERNAL MEDICINE

## 2021-06-04 ENCOUNTER — PATIENT MESSAGE (OUTPATIENT)
Dept: FAMILY MEDICINE | Facility: CLINIC | Age: 71
End: 2021-06-04

## 2021-06-07 ENCOUNTER — OFFICE VISIT (OUTPATIENT)
Dept: OPHTHALMOLOGY | Facility: CLINIC | Age: 71
End: 2021-06-07
Payer: MEDICARE

## 2021-06-07 DIAGNOSIS — H40.1133 PRIMARY OPEN ANGLE GLAUCOMA OF BOTH EYES, SEVERE STAGE: Primary | ICD-10-CM

## 2021-06-07 DIAGNOSIS — H25.13 NUCLEAR SCLEROSIS, BILATERAL: ICD-10-CM

## 2021-06-07 PROCEDURE — 99999 PR PBB SHADOW E&M-EST. PATIENT-LVL III: CPT | Mod: PBBFAC,,, | Performed by: OPHTHALMOLOGY

## 2021-06-07 PROCEDURE — 99024 POSTOP FOLLOW-UP VISIT: CPT | Mod: S$PBB,,, | Performed by: OPHTHALMOLOGY

## 2021-06-07 PROCEDURE — 99024 PR POST-OP FOLLOW-UP VISIT: ICD-10-PCS | Mod: S$PBB,,, | Performed by: OPHTHALMOLOGY

## 2021-06-07 PROCEDURE — 99213 OFFICE O/P EST LOW 20 MIN: CPT | Mod: PBBFAC | Performed by: OPHTHALMOLOGY

## 2021-06-07 PROCEDURE — 99999 PR PBB SHADOW E&M-EST. PATIENT-LVL III: ICD-10-PCS | Mod: PBBFAC,,, | Performed by: OPHTHALMOLOGY

## 2021-06-09 ENCOUNTER — HOSPITAL ENCOUNTER (OUTPATIENT)
Dept: RADIOLOGY | Facility: HOSPITAL | Age: 71
Discharge: HOME | End: 2021-06-09
Attending: INTERNAL MEDICINE
Payer: MEDICARE

## 2021-06-09 DIAGNOSIS — E21.3 HYPERPARATHYROIDISM: ICD-10-CM

## 2021-06-09 PROCEDURE — 77080 DEXA BONE DENSITY SPINE HIP: ICD-10-PCS | Mod: 26,,, | Performed by: RADIOLOGY

## 2021-06-09 PROCEDURE — 77080 DXA BONE DENSITY AXIAL: CPT | Mod: TC,PO

## 2021-06-09 PROCEDURE — 76770 US EXAM ABDO BACK WALL COMP: CPT | Mod: TC,PO

## 2021-06-09 PROCEDURE — 76770 US EXAM ABDO BACK WALL COMP: CPT | Mod: 26,,, | Performed by: RADIOLOGY

## 2021-06-09 PROCEDURE — 77080 DXA BONE DENSITY AXIAL: CPT | Mod: 26,,, | Performed by: RADIOLOGY

## 2021-06-09 PROCEDURE — 77081 DXA BONE DENSITY APPENDICULR: CPT | Mod: TC,PO

## 2021-06-09 PROCEDURE — 76770 US KIDNEY: ICD-10-PCS | Mod: 26,,, | Performed by: RADIOLOGY

## 2021-06-11 ENCOUNTER — PATIENT MESSAGE (OUTPATIENT)
Dept: ENDOCRINOLOGY | Facility: CLINIC | Age: 71
End: 2021-06-11

## 2021-06-21 RX ORDER — MONTELUKAST SODIUM 10 MG/1
TABLET ORAL
Qty: 90 TABLET | Refills: 2 | Status: SHIPPED | OUTPATIENT
Start: 2021-06-21 | End: 2021-12-30

## 2021-06-21 RX ORDER — ALPRAZOLAM 0.25 MG/1
TABLET ORAL
Qty: 30 TABLET | Refills: 2 | Status: SHIPPED | OUTPATIENT
Start: 2021-06-21 | End: 2021-12-17

## 2021-07-02 ENCOUNTER — LAB VISIT (OUTPATIENT)
Dept: LAB | Facility: HOSPITAL | Age: 71
End: 2021-07-02
Attending: FAMILY MEDICINE
Payer: MEDICARE

## 2021-07-02 ENCOUNTER — OFFICE VISIT (OUTPATIENT)
Dept: FAMILY MEDICINE | Facility: CLINIC | Age: 71
End: 2021-07-02
Payer: MEDICARE

## 2021-07-02 VITALS
BODY MASS INDEX: 29.07 KG/M2 | TEMPERATURE: 98 F | WEIGHT: 191.81 LBS | HEIGHT: 68 IN | DIASTOLIC BLOOD PRESSURE: 82 MMHG | HEART RATE: 58 BPM | SYSTOLIC BLOOD PRESSURE: 134 MMHG

## 2021-07-02 DIAGNOSIS — I10 ESSENTIAL HYPERTENSION: Primary | ICD-10-CM

## 2021-07-02 DIAGNOSIS — G47.54: ICD-10-CM

## 2021-07-02 DIAGNOSIS — F32.0 MILD MAJOR DEPRESSION: ICD-10-CM

## 2021-07-02 DIAGNOSIS — R73.03 PREDIABETES: ICD-10-CM

## 2021-07-02 DIAGNOSIS — R53.83 FATIGUE, UNSPECIFIED TYPE: ICD-10-CM

## 2021-07-02 DIAGNOSIS — E21.3 HYPERPARATHYROIDISM: ICD-10-CM

## 2021-07-02 DIAGNOSIS — F41.9 ANXIETY: ICD-10-CM

## 2021-07-02 LAB
ALBUMIN SERPL BCP-MCNC: 3.9 G/DL (ref 3.5–5.2)
ALP SERPL-CCNC: 107 U/L (ref 55–135)
ALT SERPL W/O P-5'-P-CCNC: 15 U/L (ref 10–44)
ANION GAP SERPL CALC-SCNC: 11 MMOL/L (ref 8–16)
AST SERPL-CCNC: 23 U/L (ref 10–40)
BASOPHILS # BLD AUTO: 0.05 K/UL (ref 0–0.2)
BASOPHILS NFR BLD: 0.7 % (ref 0–1.9)
BILIRUB SERPL-MCNC: 0.7 MG/DL (ref 0.1–1)
BUN SERPL-MCNC: 19 MG/DL (ref 8–23)
CALCIUM SERPL-MCNC: 10.9 MG/DL (ref 8.7–10.5)
CHLORIDE SERPL-SCNC: 102 MMOL/L (ref 95–110)
CO2 SERPL-SCNC: 28 MMOL/L (ref 23–29)
CREAT SERPL-MCNC: 1.2 MG/DL (ref 0.5–1.4)
DIFFERENTIAL METHOD: ABNORMAL
EOSINOPHIL # BLD AUTO: 0.1 K/UL (ref 0–0.5)
EOSINOPHIL NFR BLD: 1.9 % (ref 0–8)
ERYTHROCYTE [DISTWIDTH] IN BLOOD BY AUTOMATED COUNT: 12.9 % (ref 11.5–14.5)
EST. GFR  (AFRICAN AMERICAN): 52.9 ML/MIN/1.73 M^2
EST. GFR  (NON AFRICAN AMERICAN): 45.9 ML/MIN/1.73 M^2
ESTIMATED AVG GLUCOSE: 114 MG/DL (ref 68–131)
GLUCOSE SERPL-MCNC: 96 MG/DL (ref 70–110)
HBA1C MFR BLD: 5.6 % (ref 4–5.6)
HCT VFR BLD AUTO: 40.4 % (ref 37–48.5)
HGB BLD-MCNC: 12.3 G/DL (ref 12–16)
IMM GRANULOCYTES # BLD AUTO: 0.02 K/UL (ref 0–0.04)
IMM GRANULOCYTES NFR BLD AUTO: 0.3 % (ref 0–0.5)
LYMPHOCYTES # BLD AUTO: 2.6 K/UL (ref 1–4.8)
LYMPHOCYTES NFR BLD: 37.7 % (ref 18–48)
MCH RBC QN AUTO: 29 PG (ref 27–31)
MCHC RBC AUTO-ENTMCNC: 30.4 G/DL (ref 32–36)
MCV RBC AUTO: 95 FL (ref 82–98)
MONOCYTES # BLD AUTO: 0.6 K/UL (ref 0.3–1)
MONOCYTES NFR BLD: 8.5 % (ref 4–15)
NEUTROPHILS # BLD AUTO: 3.5 K/UL (ref 1.8–7.7)
NEUTROPHILS NFR BLD: 50.9 % (ref 38–73)
NRBC BLD-RTO: 0 /100 WBC
PLATELET # BLD AUTO: 343 K/UL (ref 150–450)
PMV BLD AUTO: 11.5 FL (ref 9.2–12.9)
POTASSIUM SERPL-SCNC: 4.3 MMOL/L (ref 3.5–5.1)
PROT SERPL-MCNC: 7.5 G/DL (ref 6–8.4)
RBC # BLD AUTO: 4.24 M/UL (ref 4–5.4)
SODIUM SERPL-SCNC: 141 MMOL/L (ref 136–145)
TSH SERPL DL<=0.005 MIU/L-ACNC: 2.52 UIU/ML (ref 0.4–4)
WBC # BLD AUTO: 6.95 K/UL (ref 3.9–12.7)

## 2021-07-02 PROCEDURE — 80053 COMPREHEN METABOLIC PANEL: CPT | Performed by: FAMILY MEDICINE

## 2021-07-02 PROCEDURE — 85025 COMPLETE CBC W/AUTO DIFF WBC: CPT | Performed by: FAMILY MEDICINE

## 2021-07-02 PROCEDURE — 99999 PR PBB SHADOW E&M-EST. PATIENT-LVL IV: ICD-10-PCS | Mod: PBBFAC,,, | Performed by: FAMILY MEDICINE

## 2021-07-02 PROCEDURE — 36415 COLL VENOUS BLD VENIPUNCTURE: CPT | Mod: PO | Performed by: FAMILY MEDICINE

## 2021-07-02 PROCEDURE — 99214 PR OFFICE/OUTPT VISIT, EST, LEVL IV, 30-39 MIN: ICD-10-PCS | Mod: S$PBB,,, | Performed by: FAMILY MEDICINE

## 2021-07-02 PROCEDURE — 83036 HEMOGLOBIN GLYCOSYLATED A1C: CPT | Performed by: FAMILY MEDICINE

## 2021-07-02 PROCEDURE — 99214 OFFICE O/P EST MOD 30 MIN: CPT | Mod: S$PBB,,, | Performed by: FAMILY MEDICINE

## 2021-07-02 PROCEDURE — 99214 OFFICE O/P EST MOD 30 MIN: CPT | Mod: PBBFAC,PO | Performed by: FAMILY MEDICINE

## 2021-07-02 PROCEDURE — 84443 ASSAY THYROID STIM HORMONE: CPT | Performed by: FAMILY MEDICINE

## 2021-07-02 PROCEDURE — 99999 PR PBB SHADOW E&M-EST. PATIENT-LVL IV: CPT | Mod: PBBFAC,,, | Performed by: FAMILY MEDICINE

## 2021-07-02 RX ORDER — AMLODIPINE BESYLATE 2.5 MG/1
2.5 TABLET ORAL DAILY
Qty: 30 TABLET | Refills: 1 | Status: SHIPPED | OUTPATIENT
Start: 2021-07-02 | End: 2021-07-30 | Stop reason: SDUPTHER

## 2021-07-02 RX ORDER — DULOXETIN HYDROCHLORIDE 30 MG/1
30 CAPSULE, DELAYED RELEASE ORAL DAILY
Qty: 30 CAPSULE | Refills: 1 | Status: SHIPPED | OUTPATIENT
Start: 2021-07-02 | End: 2021-08-27

## 2021-07-30 ENCOUNTER — OFFICE VISIT (OUTPATIENT)
Dept: FAMILY MEDICINE | Facility: CLINIC | Age: 71
End: 2021-07-30
Payer: MEDICARE

## 2021-07-30 VITALS
TEMPERATURE: 97 F | HEART RATE: 66 BPM | HEIGHT: 68 IN | DIASTOLIC BLOOD PRESSURE: 78 MMHG | SYSTOLIC BLOOD PRESSURE: 138 MMHG | WEIGHT: 192 LBS | BODY MASS INDEX: 29.1 KG/M2

## 2021-07-30 DIAGNOSIS — I10 ESSENTIAL HYPERTENSION: Primary | ICD-10-CM

## 2021-07-30 DIAGNOSIS — R73.03 PREDIABETES: ICD-10-CM

## 2021-07-30 DIAGNOSIS — E55.9 VITAMIN D DEFICIENCY: ICD-10-CM

## 2021-07-30 DIAGNOSIS — Z12.11 SCREENING FOR COLON CANCER: ICD-10-CM

## 2021-07-30 DIAGNOSIS — N18.30 STAGE 3 CHRONIC KIDNEY DISEASE, UNSPECIFIED WHETHER STAGE 3A OR 3B CKD: ICD-10-CM

## 2021-07-30 DIAGNOSIS — Z86.010 HISTORY OF COLON POLYPS: ICD-10-CM

## 2021-07-30 DIAGNOSIS — E21.3 HYPERPARATHYROIDISM: ICD-10-CM

## 2021-07-30 PROCEDURE — 99999 PR PBB SHADOW E&M-EST. PATIENT-LVL V: ICD-10-PCS | Mod: PBBFAC,,, | Performed by: FAMILY MEDICINE

## 2021-07-30 PROCEDURE — 99215 OFFICE O/P EST HI 40 MIN: CPT | Mod: PBBFAC,PO | Performed by: FAMILY MEDICINE

## 2021-07-30 PROCEDURE — 99999 PR PBB SHADOW E&M-EST. PATIENT-LVL V: CPT | Mod: PBBFAC,,, | Performed by: FAMILY MEDICINE

## 2021-07-30 PROCEDURE — 99214 PR OFFICE/OUTPT VISIT, EST, LEVL IV, 30-39 MIN: ICD-10-PCS | Mod: S$PBB,,, | Performed by: FAMILY MEDICINE

## 2021-07-30 PROCEDURE — 99214 OFFICE O/P EST MOD 30 MIN: CPT | Mod: S$PBB,,, | Performed by: FAMILY MEDICINE

## 2021-07-30 RX ORDER — AMLODIPINE BESYLATE 2.5 MG/1
2.5 TABLET ORAL DAILY
Qty: 90 TABLET | Refills: 3 | Status: SHIPPED | OUTPATIENT
Start: 2021-07-30 | End: 2022-08-16 | Stop reason: DRUGHIGH

## 2021-07-30 RX ORDER — TRIAMCINOLONE ACETONIDE 1 MG/G
CREAM TOPICAL
COMMUNITY
Start: 2021-07-27 | End: 2021-09-23

## 2021-07-30 RX ORDER — AMOXICILLIN 500 MG/1
500 CAPSULE ORAL 2 TIMES DAILY
COMMUNITY
Start: 2021-07-27 | End: 2021-09-23

## 2021-08-16 ENCOUNTER — OFFICE VISIT (OUTPATIENT)
Dept: OPHTHALMOLOGY | Facility: CLINIC | Age: 71
End: 2021-08-16
Payer: MEDICARE

## 2021-08-16 DIAGNOSIS — H25.13 NUCLEAR SCLEROSIS, BILATERAL: ICD-10-CM

## 2021-08-16 DIAGNOSIS — H40.1133 PRIMARY OPEN ANGLE GLAUCOMA OF BOTH EYES, SEVERE STAGE: Primary | ICD-10-CM

## 2021-08-16 PROCEDURE — 92133 CPTRZD OPH DX IMG PST SGM ON: CPT | Mod: PBBFAC | Performed by: OPHTHALMOLOGY

## 2021-08-16 PROCEDURE — 99999 PR PBB SHADOW E&M-EST. PATIENT-LVL III: ICD-10-PCS | Mod: PBBFAC,,, | Performed by: OPHTHALMOLOGY

## 2021-08-16 PROCEDURE — 99214 OFFICE O/P EST MOD 30 MIN: CPT | Mod: S$PBB,,, | Performed by: OPHTHALMOLOGY

## 2021-08-16 PROCEDURE — 99999 PR PBB SHADOW E&M-EST. PATIENT-LVL III: CPT | Mod: PBBFAC,,, | Performed by: OPHTHALMOLOGY

## 2021-08-16 PROCEDURE — 99214 PR OFFICE/OUTPT VISIT, EST, LEVL IV, 30-39 MIN: ICD-10-PCS | Mod: S$PBB,,, | Performed by: OPHTHALMOLOGY

## 2021-08-16 PROCEDURE — 92133 POSTERIOR SEGMENT OCT OPTIC NERVE(OCULAR COHERENCE TOMOGRAPHY) - OU - BOTH EYES: ICD-10-PCS | Mod: 26,S$PBB,, | Performed by: OPHTHALMOLOGY

## 2021-08-16 PROCEDURE — 99213 OFFICE O/P EST LOW 20 MIN: CPT | Mod: PBBFAC | Performed by: OPHTHALMOLOGY

## 2021-08-16 RX ORDER — FLUOXETINE HYDROCHLORIDE 20 MG/1
60 CAPSULE ORAL EVERY MORNING
COMMUNITY
Start: 2021-07-16 | End: 2021-08-27 | Stop reason: ALTCHOICE

## 2021-08-16 RX ORDER — DORZOLAMIDE HCL 20 MG/ML
1 SOLUTION/ DROPS OPHTHALMIC 2 TIMES DAILY
Qty: 5 ML | Refills: 6 | Status: SHIPPED | OUTPATIENT
Start: 2021-08-16 | End: 2022-08-30

## 2021-08-24 ENCOUNTER — TELEPHONE (OUTPATIENT)
Dept: ENDOCRINOLOGY | Facility: CLINIC | Age: 71
End: 2021-08-24

## 2021-09-20 ENCOUNTER — OFFICE VISIT (OUTPATIENT)
Dept: FAMILY MEDICINE | Facility: CLINIC | Age: 71
End: 2021-09-20
Payer: MEDICARE

## 2021-09-20 ENCOUNTER — LAB VISIT (OUTPATIENT)
Dept: LAB | Facility: HOSPITAL | Age: 71
End: 2021-09-20
Attending: NURSE PRACTITIONER
Payer: MEDICARE

## 2021-09-20 VITALS
TEMPERATURE: 99 F | HEIGHT: 68 IN | SYSTOLIC BLOOD PRESSURE: 138 MMHG | HEART RATE: 66 BPM | DIASTOLIC BLOOD PRESSURE: 86 MMHG | OXYGEN SATURATION: 99 % | WEIGHT: 196 LBS | BODY MASS INDEX: 29.7 KG/M2

## 2021-09-20 DIAGNOSIS — M79.10 MYALGIA: ICD-10-CM

## 2021-09-20 DIAGNOSIS — M79.18 MYALGIA, OTHER SITE: ICD-10-CM

## 2021-09-20 DIAGNOSIS — M25.50 ARTHRALGIA, UNSPECIFIED JOINT: Primary | ICD-10-CM

## 2021-09-20 DIAGNOSIS — M25.50 ARTHRALGIA, UNSPECIFIED JOINT: ICD-10-CM

## 2021-09-20 LAB
25(OH)D3+25(OH)D2 SERPL-MCNC: 31 NG/ML (ref 30–96)
ERYTHROCYTE [SEDIMENTATION RATE] IN BLOOD BY WESTERGREN METHOD: 7 MM/HR (ref 0–20)
RHEUMATOID FACT SERPL-ACNC: <13 IU/ML (ref 0–15)
T4 FREE SERPL-MCNC: 0.72 NG/DL (ref 0.71–1.51)
TSH SERPL DL<=0.005 MIU/L-ACNC: 2.32 UIU/ML (ref 0.4–4)

## 2021-09-20 PROCEDURE — 86431 RHEUMATOID FACTOR QUANT: CPT | Performed by: NURSE PRACTITIONER

## 2021-09-20 PROCEDURE — 84439 ASSAY OF FREE THYROXINE: CPT | Performed by: NURSE PRACTITIONER

## 2021-09-20 PROCEDURE — 85651 RBC SED RATE NONAUTOMATED: CPT | Mod: PO | Performed by: NURSE PRACTITIONER

## 2021-09-20 PROCEDURE — 82306 VITAMIN D 25 HYDROXY: CPT | Performed by: NURSE PRACTITIONER

## 2021-09-20 PROCEDURE — 86038 ANTINUCLEAR ANTIBODIES: CPT | Performed by: NURSE PRACTITIONER

## 2021-09-20 PROCEDURE — 99213 OFFICE O/P EST LOW 20 MIN: CPT | Mod: S$PBB,,, | Performed by: NURSE PRACTITIONER

## 2021-09-20 PROCEDURE — 86039 ANTINUCLEAR ANTIBODIES (ANA): CPT | Performed by: NURSE PRACTITIONER

## 2021-09-20 PROCEDURE — 36415 COLL VENOUS BLD VENIPUNCTURE: CPT | Mod: PO | Performed by: NURSE PRACTITIONER

## 2021-09-20 PROCEDURE — 99999 PR PBB SHADOW E&M-EST. PATIENT-LVL V: ICD-10-PCS | Mod: PBBFAC,,, | Performed by: NURSE PRACTITIONER

## 2021-09-20 PROCEDURE — 84443 ASSAY THYROID STIM HORMONE: CPT | Performed by: NURSE PRACTITIONER

## 2021-09-20 PROCEDURE — 86235 NUCLEAR ANTIGEN ANTIBODY: CPT | Mod: 59 | Performed by: NURSE PRACTITIONER

## 2021-09-20 PROCEDURE — 99999 PR PBB SHADOW E&M-EST. PATIENT-LVL V: CPT | Mod: PBBFAC,,, | Performed by: NURSE PRACTITIONER

## 2021-09-20 PROCEDURE — 99213 PR OFFICE/OUTPT VISIT, EST, LEVL III, 20-29 MIN: ICD-10-PCS | Mod: S$PBB,,, | Performed by: NURSE PRACTITIONER

## 2021-09-20 PROCEDURE — 99215 OFFICE O/P EST HI 40 MIN: CPT | Mod: PBBFAC,PO | Performed by: NURSE PRACTITIONER

## 2021-09-20 RX ORDER — BACLOFEN 20 MG/1
20 TABLET ORAL 2 TIMES DAILY PRN
Qty: 14 TABLET | Refills: 0 | Status: SHIPPED | OUTPATIENT
Start: 2021-09-20 | End: 2021-09-23

## 2021-09-21 ENCOUNTER — TELEPHONE (OUTPATIENT)
Dept: FAMILY MEDICINE | Facility: CLINIC | Age: 71
End: 2021-09-21

## 2021-09-21 DIAGNOSIS — R76.8 POSITIVE ANA (ANTINUCLEAR ANTIBODY): Primary | ICD-10-CM

## 2021-09-21 LAB
ANA PATTERN 1: NORMAL
ANA SER QL IF: POSITIVE
ANA TITR SER IF: NORMAL {TITER}

## 2021-09-23 PROBLEM — I10 HTN (HYPERTENSION): Status: ACTIVE | Noted: 2021-09-23

## 2021-09-23 PROBLEM — M32.9 LUPUS: Status: ACTIVE | Noted: 2021-09-23

## 2021-09-23 PROBLEM — U07.1 COVID-19: Status: ACTIVE | Noted: 2021-09-23

## 2021-09-23 PROBLEM — Z71.89 ACP (ADVANCE CARE PLANNING): Status: ACTIVE | Noted: 2021-09-23

## 2021-09-23 LAB
ANTI SM ANTIBODY: 0.09 RATIO (ref 0–0.99)
ANTI SM/RNP ANTIBODY: 0.06 RATIO (ref 0–0.99)
ANTI-SM INTERPRETATION: NEGATIVE
ANTI-SM/RNP INTERPRETATION: NEGATIVE
ANTI-SSA ANTIBODY: 0.07 RATIO (ref 0–0.99)
ANTI-SSA INTERPRETATION: NEGATIVE
ANTI-SSB ANTIBODY: 0.08 RATIO (ref 0–0.99)
ANTI-SSB INTERPRETATION: NEGATIVE
DSDNA AB SER-ACNC: NORMAL [IU]/ML

## 2021-09-24 ENCOUNTER — TELEPHONE (OUTPATIENT)
Dept: CARDIOLOGY | Facility: CLINIC | Age: 71
End: 2021-09-24

## 2021-09-24 PROBLEM — E78.5 HYPERLIPIDEMIA: Status: ACTIVE | Noted: 2021-09-24

## 2021-10-01 ENCOUNTER — OFFICE VISIT (OUTPATIENT)
Dept: FAMILY MEDICINE | Facility: CLINIC | Age: 71
End: 2021-10-01
Payer: MEDICARE

## 2021-10-01 VITALS
HEIGHT: 68 IN | TEMPERATURE: 97 F | BODY MASS INDEX: 29.86 KG/M2 | SYSTOLIC BLOOD PRESSURE: 133 MMHG | DIASTOLIC BLOOD PRESSURE: 80 MMHG | WEIGHT: 197 LBS | HEART RATE: 61 BPM

## 2021-10-01 DIAGNOSIS — I10 PRIMARY HYPERTENSION: ICD-10-CM

## 2021-10-01 DIAGNOSIS — M54.50 BILATERAL LOW BACK PAIN, UNSPECIFIED CHRONICITY, UNSPECIFIED WHETHER SCIATICA PRESENT: ICD-10-CM

## 2021-10-01 DIAGNOSIS — I34.0 MILD MITRAL REGURGITATION: ICD-10-CM

## 2021-10-01 DIAGNOSIS — E21.3 HYPERPARATHYROIDISM: ICD-10-CM

## 2021-10-01 DIAGNOSIS — E78.5 HYPERLIPIDEMIA, UNSPECIFIED HYPERLIPIDEMIA TYPE: ICD-10-CM

## 2021-10-01 DIAGNOSIS — I25.10 NONOBSTRUCTIVE ATHEROSCLEROSIS OF CORONARY ARTERY: Primary | ICD-10-CM

## 2021-10-01 DIAGNOSIS — R76.8 POSITIVE ANA (ANTINUCLEAR ANTIBODY): ICD-10-CM

## 2021-10-01 PROBLEM — M32.9 LUPUS: Status: RESOLVED | Noted: 2021-09-23 | Resolved: 2021-10-01

## 2021-10-01 PROCEDURE — 99214 OFFICE O/P EST MOD 30 MIN: CPT | Mod: S$PBB,,, | Performed by: FAMILY MEDICINE

## 2021-10-01 PROCEDURE — 99214 OFFICE O/P EST MOD 30 MIN: CPT | Mod: PBBFAC,PO | Performed by: FAMILY MEDICINE

## 2021-10-01 PROCEDURE — 99214 PR OFFICE/OUTPT VISIT, EST, LEVL IV, 30-39 MIN: ICD-10-PCS | Mod: S$PBB,,, | Performed by: FAMILY MEDICINE

## 2021-10-01 PROCEDURE — 99999 PR PBB SHADOW E&M-EST. PATIENT-LVL IV: ICD-10-PCS | Mod: PBBFAC,,, | Performed by: FAMILY MEDICINE

## 2021-10-01 PROCEDURE — 99999 PR PBB SHADOW E&M-EST. PATIENT-LVL IV: CPT | Mod: PBBFAC,,, | Performed by: FAMILY MEDICINE

## 2021-10-01 RX ORDER — ROSUVASTATIN CALCIUM 10 MG/1
10 TABLET, COATED ORAL NIGHTLY
Qty: 90 TABLET | Refills: 3 | Status: SHIPPED | OUTPATIENT
Start: 2021-10-01 | End: 2021-10-31

## 2021-10-01 RX ORDER — EZETIMIBE 10 MG/1
10 TABLET ORAL NIGHTLY
Qty: 90 TABLET | Refills: 3 | Status: SHIPPED | OUTPATIENT
Start: 2021-10-01 | End: 2023-03-07 | Stop reason: SDDI

## 2021-10-04 ENCOUNTER — OFFICE VISIT (OUTPATIENT)
Dept: ENDOCRINOLOGY | Facility: CLINIC | Age: 71
End: 2021-10-04
Payer: MEDICARE

## 2021-10-04 VITALS
SYSTOLIC BLOOD PRESSURE: 134 MMHG | HEIGHT: 68 IN | HEART RATE: 83 BPM | OXYGEN SATURATION: 100 % | DIASTOLIC BLOOD PRESSURE: 86 MMHG | WEIGHT: 196.63 LBS | BODY MASS INDEX: 29.8 KG/M2

## 2021-10-04 DIAGNOSIS — E55.9 VITAMIN D DEFICIENCY: ICD-10-CM

## 2021-10-04 DIAGNOSIS — E21.3 HYPERPARATHYROIDISM: Primary | ICD-10-CM

## 2021-10-04 DIAGNOSIS — E78.2 MIXED HYPERLIPIDEMIA: ICD-10-CM

## 2021-10-04 DIAGNOSIS — I10 PRIMARY HYPERTENSION: ICD-10-CM

## 2021-10-04 PROCEDURE — 99215 OFFICE O/P EST HI 40 MIN: CPT | Mod: S$PBB,,, | Performed by: INTERNAL MEDICINE

## 2021-10-04 PROCEDURE — 99999 PR PBB SHADOW E&M-EST. PATIENT-LVL V: CPT | Mod: PBBFAC,,, | Performed by: INTERNAL MEDICINE

## 2021-10-04 PROCEDURE — 99999 PR PBB SHADOW E&M-EST. PATIENT-LVL V: ICD-10-PCS | Mod: PBBFAC,,, | Performed by: INTERNAL MEDICINE

## 2021-10-04 PROCEDURE — 99215 OFFICE O/P EST HI 40 MIN: CPT | Mod: PBBFAC,PO | Performed by: INTERNAL MEDICINE

## 2021-10-04 PROCEDURE — 99215 PR OFFICE/OUTPT VISIT, EST, LEVL V, 40-54 MIN: ICD-10-PCS | Mod: S$PBB,,, | Performed by: INTERNAL MEDICINE

## 2021-10-06 ENCOUNTER — LAB VISIT (OUTPATIENT)
Dept: LAB | Facility: HOSPITAL | Age: 71
End: 2021-10-06
Attending: INTERNAL MEDICINE
Payer: MEDICARE

## 2021-10-06 DIAGNOSIS — E21.3 HYPERPARATHYROIDISM: ICD-10-CM

## 2021-10-06 PROCEDURE — 82340 ASSAY OF CALCIUM IN URINE: CPT | Performed by: INTERNAL MEDICINE

## 2021-10-06 PROCEDURE — 82570 ASSAY OF URINE CREATININE: CPT | Performed by: INTERNAL MEDICINE

## 2021-10-07 LAB
CALCIUM 24H UR-MRATE: 3 MG/HR (ref 4–12)
CALCIUM UR-MCNC: 3 MG/DL (ref 0–15)
CALCIUM URINE (MG/SPEC): 70 MG/SPEC
CREAT 24H UR-MRATE: 51.5 MG/HR (ref 40–75)
CREAT UR-MCNC: 53 MG/DL (ref 15–325)
CREATININE, URINE (MG/SPEC): 1234.9 MG/SPEC
URINE COLLECTION DURATION: 24 HR
URINE COLLECTION DURATION: 24 HR
URINE VOLUME: 2330 ML
URINE VOLUME: 2330 ML

## 2021-10-12 ENCOUNTER — HOSPITAL ENCOUNTER (OUTPATIENT)
Dept: RADIOLOGY | Facility: HOSPITAL | Age: 71
Discharge: HOME | End: 2021-10-12
Attending: INTERNAL MEDICINE
Payer: MEDICARE

## 2021-10-12 ENCOUNTER — TELEPHONE (OUTPATIENT)
Dept: ENDOCRINOLOGY | Facility: CLINIC | Age: 71
End: 2021-10-12

## 2021-10-12 DIAGNOSIS — E21.3 HYPERPARATHYROIDISM: ICD-10-CM

## 2021-10-12 PROCEDURE — 78070 PARATHYROID PLANAR IMAGING: CPT | Mod: 26,,, | Performed by: RADIOLOGY

## 2021-10-12 PROCEDURE — A9500 TC99M SESTAMIBI: HCPCS | Mod: PO

## 2021-10-12 PROCEDURE — 78070 PARATHYROID PLANAR IMAGING: CPT | Mod: TC,PO

## 2021-10-12 PROCEDURE — 78070 NM PARATHYROID SCAN PLANAR: ICD-10-PCS | Mod: 26,,, | Performed by: RADIOLOGY

## 2021-10-12 RX ORDER — ERGOCALCIFEROL 1.25 MG/1
CAPSULE ORAL
Qty: 12 CAPSULE | Refills: 3 | Status: SHIPPED | OUTPATIENT
Start: 2021-10-12 | End: 2023-03-07

## 2021-10-22 ENCOUNTER — TELEPHONE (OUTPATIENT)
Dept: ENDOCRINOLOGY | Facility: CLINIC | Age: 71
End: 2021-10-22

## 2021-12-20 RX ORDER — PANTOPRAZOLE SODIUM 40 MG/1
TABLET, DELAYED RELEASE ORAL
Qty: 90 TABLET | Refills: 3 | Status: SHIPPED | OUTPATIENT
Start: 2021-12-20 | End: 2023-03-27

## 2021-12-21 ENCOUNTER — OFFICE VISIT (OUTPATIENT)
Dept: FAMILY MEDICINE | Facility: CLINIC | Age: 71
End: 2021-12-21
Payer: MEDICARE

## 2021-12-21 VITALS
SYSTOLIC BLOOD PRESSURE: 126 MMHG | HEIGHT: 68 IN | BODY MASS INDEX: 30.16 KG/M2 | DIASTOLIC BLOOD PRESSURE: 78 MMHG | TEMPERATURE: 97 F | HEART RATE: 68 BPM | WEIGHT: 199 LBS

## 2021-12-21 DIAGNOSIS — Z90.89 S/P PARATHYROIDECTOMY: ICD-10-CM

## 2021-12-21 DIAGNOSIS — Z98.890 S/P PARATHYROIDECTOMY: ICD-10-CM

## 2021-12-21 DIAGNOSIS — E55.9 VITAMIN D DEFICIENCY: ICD-10-CM

## 2021-12-21 DIAGNOSIS — I10 HYPERTENSION, UNSPECIFIED TYPE: ICD-10-CM

## 2021-12-21 DIAGNOSIS — E21.0 PRIMARY HYPERPARATHYROIDISM: Primary | ICD-10-CM

## 2021-12-21 PROBLEM — E89.2 S/P PARATHYROIDECTOMY: Status: ACTIVE | Noted: 2021-12-21

## 2021-12-21 PROCEDURE — 99214 OFFICE O/P EST MOD 30 MIN: CPT | Mod: PBBFAC,PO | Performed by: FAMILY MEDICINE

## 2021-12-21 PROCEDURE — 99214 PR OFFICE/OUTPT VISIT, EST, LEVL IV, 30-39 MIN: ICD-10-PCS | Mod: S$PBB,,, | Performed by: FAMILY MEDICINE

## 2021-12-21 PROCEDURE — 99999 PR PBB SHADOW E&M-EST. PATIENT-LVL IV: ICD-10-PCS | Mod: PBBFAC,,, | Performed by: FAMILY MEDICINE

## 2021-12-21 PROCEDURE — 99999 PR PBB SHADOW E&M-EST. PATIENT-LVL IV: CPT | Mod: PBBFAC,,, | Performed by: FAMILY MEDICINE

## 2021-12-21 PROCEDURE — 99214 OFFICE O/P EST MOD 30 MIN: CPT | Mod: S$PBB,,, | Performed by: FAMILY MEDICINE

## 2021-12-21 RX ORDER — ROSUVASTATIN CALCIUM 10 MG/1
10 TABLET, COATED ORAL DAILY
COMMUNITY
End: 2023-03-07 | Stop reason: SINTOL

## 2021-12-28 ENCOUNTER — PATIENT OUTREACH (OUTPATIENT)
Dept: ADMINISTRATIVE | Facility: OTHER | Age: 71
End: 2021-12-28
Payer: MEDICARE

## 2021-12-29 ENCOUNTER — LAB VISIT (OUTPATIENT)
Dept: LAB | Facility: HOSPITAL | Age: 71
End: 2021-12-29
Attending: FAMILY MEDICINE
Payer: MEDICARE

## 2021-12-29 DIAGNOSIS — E78.5 HYPERLIPIDEMIA, UNSPECIFIED HYPERLIPIDEMIA TYPE: ICD-10-CM

## 2021-12-29 DIAGNOSIS — E55.9 VITAMIN D DEFICIENCY: ICD-10-CM

## 2021-12-29 DIAGNOSIS — E21.0 PRIMARY HYPERPARATHYROIDISM: ICD-10-CM

## 2021-12-29 DIAGNOSIS — I25.10 NONOBSTRUCTIVE ATHEROSCLEROSIS OF CORONARY ARTERY: ICD-10-CM

## 2021-12-29 DIAGNOSIS — Z98.890 S/P PARATHYROIDECTOMY: ICD-10-CM

## 2021-12-29 DIAGNOSIS — Z90.89 S/P PARATHYROIDECTOMY: ICD-10-CM

## 2021-12-29 LAB
25(OH)D3+25(OH)D2 SERPL-MCNC: 37 NG/ML (ref 30–96)
ALBUMIN SERPL BCP-MCNC: 4.1 G/DL (ref 3.5–5.2)
ALP SERPL-CCNC: 129 U/L (ref 55–135)
ALT SERPL W/O P-5'-P-CCNC: 17 U/L (ref 10–44)
ANION GAP SERPL CALC-SCNC: 11 MMOL/L (ref 8–16)
AST SERPL-CCNC: 25 U/L (ref 10–40)
BILIRUB SERPL-MCNC: 0.8 MG/DL (ref 0.1–1)
BUN SERPL-MCNC: 24 MG/DL (ref 8–23)
CALCIUM SERPL-MCNC: 9.6 MG/DL (ref 8.7–10.5)
CHLORIDE SERPL-SCNC: 104 MMOL/L (ref 95–110)
CHOLEST SERPL-MCNC: 143 MG/DL (ref 120–199)
CHOLEST/HDLC SERPL: 2.4 {RATIO} (ref 2–5)
CO2 SERPL-SCNC: 25 MMOL/L (ref 23–29)
CREAT SERPL-MCNC: 1.2 MG/DL (ref 0.5–1.4)
EST. GFR  (AFRICAN AMERICAN): 52.5 ML/MIN/1.73 M^2
EST. GFR  (NON AFRICAN AMERICAN): 45.6 ML/MIN/1.73 M^2
GLUCOSE SERPL-MCNC: 110 MG/DL (ref 70–110)
HDLC SERPL-MCNC: 59 MG/DL (ref 40–75)
HDLC SERPL: 41.3 % (ref 20–50)
LDLC SERPL CALC-MCNC: 73 MG/DL (ref 63–159)
NONHDLC SERPL-MCNC: 84 MG/DL
POTASSIUM SERPL-SCNC: 4.6 MMOL/L (ref 3.5–5.1)
PROT SERPL-MCNC: 7.1 G/DL (ref 6–8.4)
PTH-INTACT SERPL-MCNC: 85.4 PG/ML (ref 9–77)
SODIUM SERPL-SCNC: 140 MMOL/L (ref 136–145)
TRIGL SERPL-MCNC: 55 MG/DL (ref 30–150)

## 2021-12-29 PROCEDURE — 83970 ASSAY OF PARATHORMONE: CPT | Performed by: FAMILY MEDICINE

## 2021-12-29 PROCEDURE — 80053 COMPREHEN METABOLIC PANEL: CPT | Performed by: FAMILY MEDICINE

## 2021-12-29 PROCEDURE — 36415 COLL VENOUS BLD VENIPUNCTURE: CPT | Mod: PO | Performed by: FAMILY MEDICINE

## 2021-12-29 PROCEDURE — 80061 LIPID PANEL: CPT | Performed by: FAMILY MEDICINE

## 2021-12-29 PROCEDURE — 82306 VITAMIN D 25 HYDROXY: CPT | Performed by: FAMILY MEDICINE

## 2021-12-30 RX ORDER — MONTELUKAST SODIUM 10 MG/1
10 TABLET ORAL NIGHTLY
Qty: 90 TABLET | Refills: 3 | Status: SHIPPED | OUTPATIENT
Start: 2021-12-30 | End: 2022-06-24 | Stop reason: SDUPTHER

## 2022-01-03 ENCOUNTER — OFFICE VISIT (OUTPATIENT)
Dept: RHEUMATOLOGY | Facility: CLINIC | Age: 72
End: 2022-01-03
Payer: MEDICARE

## 2022-01-03 VITALS
DIASTOLIC BLOOD PRESSURE: 74 MMHG | BODY MASS INDEX: 31.74 KG/M2 | SYSTOLIC BLOOD PRESSURE: 154 MMHG | HEART RATE: 75 BPM | WEIGHT: 208.75 LBS

## 2022-01-03 DIAGNOSIS — M25.50 POLYARTHRALGIA: Primary | ICD-10-CM

## 2022-01-03 DIAGNOSIS — R76.8 POSITIVE ANA (ANTINUCLEAR ANTIBODY): ICD-10-CM

## 2022-01-03 PROCEDURE — 99999 PR PBB SHADOW E&M-EST. PATIENT-LVL IV: ICD-10-PCS | Mod: PBBFAC,,, | Performed by: INTERNAL MEDICINE

## 2022-01-03 PROCEDURE — 99204 PR OFFICE/OUTPT VISIT, NEW, LEVL IV, 45-59 MIN: ICD-10-PCS | Mod: S$PBB,,, | Performed by: INTERNAL MEDICINE

## 2022-01-03 PROCEDURE — 99214 OFFICE O/P EST MOD 30 MIN: CPT | Mod: PBBFAC | Performed by: INTERNAL MEDICINE

## 2022-01-03 PROCEDURE — 99204 OFFICE O/P NEW MOD 45 MIN: CPT | Mod: S$PBB,,, | Performed by: INTERNAL MEDICINE

## 2022-01-03 PROCEDURE — 99999 PR PBB SHADOW E&M-EST. PATIENT-LVL IV: CPT | Mod: PBBFAC,,, | Performed by: INTERNAL MEDICINE

## 2022-01-03 NOTE — PROGRESS NOTES
Subjective:       Patient ID: Suze Chino is a 71 y.o. female.    Chief Complaint: Disease Management    HPI 71 year old F with PMH of anxiety, CKD, GERD, MVP, CAD, ARMANDO, severe glaucoma, HTN, hyperparathyroidism,  here for evaluation of joint pain. She had parathyroid adenoma removed by surgeon in [place] in December 3 2021. Before her parathyroid surgery, she was having significant deep bone pain all over.  Right after her surgery, pain went away. Denies joint pain swelling.  Reports pain has returned over the last 4 days.  She is having pain now in the left leg.  Denies any stiffness. Today, her pain level is 3/10. Denies trouble walking. Denies any rashes, oral ulcer,s, fevers, raynauds, photosensitivity, or pleurisy.        Family hx: negative for lupus    Past Medical History:   Diagnosis Date    Anxiety     Cataract     Chronic kidney disease, stage 3 11/20/2020    Depression     GERD (gastroesophageal reflux disease)     Glaucoma     History of colon polyps 2009    history as per patient    History of colonoscopy 2009    ok per pt    Hyperplastic colon polyp 05/12/2015    Hypertension     Hypothyroid     ?    Mild mitral regurgitation     Mitral valve prolapse     Nonobstructive atherosclerosis of coronary artery 09/2021    Moderate LAD stenosis, mild RCA stenosis.    Peptic ulcer disease     with gi bleed    Prediabetes     Primary hyperparathyroidism     Right knee DJD     Right knee DJD     Sleep apnea     Sleep disorder     TMJ (temporomandibular joint disorder)     Vitamin D deficiency        Review of Systems   Constitutional: Negative for activity change, appetite change, chills, diaphoresis, fatigue and fever.   HENT: Negative for congestion, ear discharge, ear pain, facial swelling, mouth sores, sinus pressure, sneezing, sore throat, tinnitus and trouble swallowing.    Eyes: Negative for photophobia, pain, discharge, redness, itching and visual disturbance.   Respiratory:  Negative for apnea, chest tightness, shortness of breath, wheezing and stridor.    Cardiovascular: Negative for leg swelling.   Gastrointestinal: Negative for abdominal distention, abdominal pain, anal bleeding, blood in stool, constipation, diarrhea and nausea.   Endocrine: Negative for cold intolerance and heat intolerance.   Genitourinary: Negative for difficulty urinating and dysuria.   Musculoskeletal: Positive for arthralgias and myalgias. Negative for back pain, gait problem, joint swelling, neck pain and neck stiffness.   Skin: Negative for color change, pallor, rash and wound.   Neurological: Negative for dizziness, seizures, light-headedness and numbness.   Hematological: Negative for adenopathy. Does not bruise/bleed easily.   Psychiatric/Behavioral: Negative for sleep disturbance. The patient is not nervous/anxious.          Objective:   There were no vitals taken for this visit.     Physical Exam   Constitutional: She is oriented to person, place, and time.   HENT:   Head: Normocephalic and atraumatic.   Right Ear: External ear normal.   Left Ear: External ear normal.   Nose: Nose normal.   Mouth/Throat: Oropharynx is clear and moist. No oropharyngeal exudate.   Eyes: Conjunctivae and EOM are normal. Pupils are equal, round, and reactive to light. Right eye exhibits no discharge. Left eye exhibits no discharge. No scleral icterus.   Neck: No JVD present. No thyromegaly present.   Cardiovascular: Normal rate, regular rhythm, normal heart sounds and intact distal pulses.  Exam reveals no gallop and no friction rub.    No murmur heard.  Pulmonary/Chest: Effort normal and breath sounds normal. No respiratory distress. She has no wheezes. She has no rales. She exhibits no tenderness.   Abdominal: Soft. Bowel sounds are normal. She exhibits no distension and no mass. There is no abdominal tenderness. There is no rebound and no guarding.   Lymphadenopathy:     She has no cervical adenopathy.   Neurological:  She is alert and oriented to person, place, and time. No cranial nerve deficit. Gait normal. Coordination normal.   Skin: Skin is dry. No rash noted. No erythema. No pallor.     Psychiatric: Affect and judgment normal.   Musculoskeletal: No tenderness, deformity or edema.          No data to display     Assessment:      71 year old F with PMH of anxiety, CKD, GERD, MVP, CAD, ARMANDO, severe glaucoma, HTN, hyperparathyroidism,  here for evaluation of joint pain.   Patient reports she had diffuse arthralgias before having parathyroid adenoma removed. Reports her pain went away after her surgery and now has returned over the last 3 days.  On exam, I do not detect any synovitis.  Her QUETA is positive but this is non specific. Subserologies were negative.  Low suspicion for SLE.  1. Positive QUETA (antinuclear antibody)            Plan:       Problem List Items Addressed This Visit        Immunology/Multi System    Positive QUETA (antinuclear antibody)      labs  xrays    45 minutes of face to face time spent with patient and another 15 minutes was used to review old notes, labs, imaging.    **

## 2022-01-11 ENCOUNTER — HOSPITAL ENCOUNTER (OUTPATIENT)
Dept: RADIOLOGY | Facility: HOSPITAL | Age: 72
Discharge: HOME | End: 2022-01-11
Attending: INTERNAL MEDICINE
Payer: MEDICARE

## 2022-01-11 DIAGNOSIS — M25.50 POLYARTHRALGIA: ICD-10-CM

## 2022-01-11 PROCEDURE — 77077 JOINT SURVEY SINGLE VIEW: CPT | Mod: 26,,, | Performed by: RADIOLOGY

## 2022-01-11 PROCEDURE — 77077 JOINT SURVEY SINGLE VIEW: CPT | Mod: TC

## 2022-01-11 PROCEDURE — 77077 XR ARTHRITIS SURVEY: ICD-10-PCS | Mod: 26,,, | Performed by: RADIOLOGY

## 2022-01-12 ENCOUNTER — PATIENT MESSAGE (OUTPATIENT)
Dept: ENDOSCOPY | Facility: HOSPITAL | Age: 72
End: 2022-01-12
Payer: MEDICARE

## 2022-01-19 ENCOUNTER — TELEPHONE (OUTPATIENT)
Dept: FAMILY MEDICINE | Facility: CLINIC | Age: 72
End: 2022-01-19
Payer: MEDICARE

## 2022-01-19 DIAGNOSIS — E21.0 PRIMARY HYPERPARATHYROIDISM: Primary | ICD-10-CM

## 2022-01-23 ENCOUNTER — PATIENT MESSAGE (OUTPATIENT)
Dept: RHEUMATOLOGY | Facility: CLINIC | Age: 72
End: 2022-01-23
Payer: MEDICARE

## 2022-02-11 RX ORDER — ALBUTEROL SULFATE 90 UG/1
AEROSOL, METERED RESPIRATORY (INHALATION)
Qty: 18 G | Refills: 0 | Status: SHIPPED | OUTPATIENT
Start: 2022-02-11 | End: 2022-02-15 | Stop reason: SDUPTHER

## 2022-02-11 NOTE — TELEPHONE ENCOUNTER
No new care gaps identified.  Powered by [employer]. Reference number: [id number].   2/11/2022 9:43:19 AM CST

## 2022-02-15 RX ORDER — ALBUTEROL SULFATE 90 UG/1
2 AEROSOL, METERED RESPIRATORY (INHALATION) EVERY 6 HOURS PRN
Qty: 18 G | Refills: 5 | Status: SHIPPED | OUTPATIENT
Start: 2022-02-15 | End: 2024-01-12 | Stop reason: SDUPTHER

## 2022-02-15 NOTE — TELEPHONE ENCOUNTER
No new care gaps identified.  Powered by [employer]. Reference number: [id number].   2/15/2022 4:54:31 PM CST

## 2022-02-23 DIAGNOSIS — D84.9 IMMUNOSUPPRESSED STATUS: ICD-10-CM

## 2022-03-14 RX ORDER — HYDROCHLOROTHIAZIDE 25 MG/1
TABLET ORAL
Qty: 90 TABLET | Refills: 3 | OUTPATIENT
Start: 2022-03-14

## 2022-03-14 NOTE — TELEPHONE ENCOUNTER

## 2022-03-14 NOTE — TELEPHONE ENCOUNTER
No new care gaps identified.  Powered by [employer]. Reference number: [id number].   3/14/2022 12:07:33 AM CDT

## 2022-03-15 NOTE — TELEPHONE ENCOUNTER
Provider Staff:     Action required for this patient.    Please note Refusal of medication.            Requested Prescriptions     Refused Prescriptions Disp Refills    hydroCHLOROthiazide (HYDRODIURIL) 25 MG tablet [Pharmacy Med Name: HYDROCHLOROTHIAZIDE 25 MG TAB] 90 tablet 3     Sig: TAKE 1 TABLET BY MOUTH EVERY DAY     Refused By: RADHA ABREU     Reason for Refusal: Refill not appropriate      Thanks!  [facility]   Note composed: 03/15/2022 2:02 PM

## 2022-03-29 NOTE — TELEPHONE ENCOUNTER
No new care gaps identified.  Powered by [employer]. Reference number: [id number].   3/29/2022 12:07:54 AM CDT

## 2022-03-30 RX ORDER — DULOXETIN HYDROCHLORIDE 30 MG/1
30 CAPSULE, DELAYED RELEASE ORAL DAILY
Qty: 30 CAPSULE | Refills: 5 | Status: SHIPPED | OUTPATIENT
Start: 2022-03-30 | End: 2022-08-16

## 2022-03-30 NOTE — TELEPHONE ENCOUNTER
This Rx Request does not qualify for assessment with the [facility]   Please review protocol details and the Care Due Message for extra clinical information    Reasons Rx Request may be deferred:  Labs/Vitals abnormal    Note composed:9:22 PM 03/29/2022

## 2022-04-12 RX ORDER — ALPRAZOLAM 0.25 MG/1
TABLET ORAL
Qty: 30 TABLET | Refills: 0 | Status: SHIPPED | OUTPATIENT
Start: 2022-04-12 | End: 2022-06-02

## 2022-04-12 NOTE — TELEPHONE ENCOUNTER
No new care gaps identified.  Powered by [employer]. Reference number: [id number].   4/12/2022 11:43:40 AM CDT

## 2022-04-26 ENCOUNTER — PATIENT MESSAGE (OUTPATIENT)
Dept: ADMINISTRATIVE | Facility: HOSPITAL | Age: 72
End: 2022-04-26
Payer: MEDICARE

## 2022-05-10 ENCOUNTER — HOSPITAL ENCOUNTER (OUTPATIENT)
Dept: RADIOLOGY | Facility: HOSPITAL | Age: 72
Discharge: HOME | End: 2022-05-10
Attending: OBSTETRICS & GYNECOLOGY
Payer: MEDICARE

## 2022-05-10 VITALS — BODY MASS INDEX: 31.64 KG/M2 | WEIGHT: 208.75 LBS | HEIGHT: 68 IN

## 2022-05-10 DIAGNOSIS — Z12.31 ENCOUNTER FOR SCREENING MAMMOGRAM FOR MALIGNANT NEOPLASM OF BREAST: ICD-10-CM

## 2022-05-10 PROCEDURE — 77063 BREAST TOMOSYNTHESIS BI: CPT | Mod: TC,PO

## 2022-05-10 PROCEDURE — 77063 MAMMO DIGITAL SCREENING BILAT WITH TOMO: ICD-10-PCS | Mod: 26,,, | Performed by: RADIOLOGY

## 2022-05-10 PROCEDURE — 77067 SCR MAMMO BI INCL CAD: CPT | Mod: 26,,, | Performed by: RADIOLOGY

## 2022-05-10 PROCEDURE — 77067 MAMMO DIGITAL SCREENING BILAT WITH TOMO: ICD-10-PCS | Mod: 26,,, | Performed by: RADIOLOGY

## 2022-05-10 PROCEDURE — 77063 BREAST TOMOSYNTHESIS BI: CPT | Mod: 26,,, | Performed by: RADIOLOGY

## 2022-05-23 ENCOUNTER — OFFICE VISIT (OUTPATIENT)
Dept: CARDIOLOGY | Facility: CLINIC | Age: 72
End: 2022-05-23
Payer: MEDICARE

## 2022-05-23 VITALS
WEIGHT: 203.25 LBS | HEIGHT: 68 IN | OXYGEN SATURATION: 97 % | DIASTOLIC BLOOD PRESSURE: 88 MMHG | HEART RATE: 62 BPM | BODY MASS INDEX: 30.8 KG/M2 | SYSTOLIC BLOOD PRESSURE: 155 MMHG

## 2022-05-23 DIAGNOSIS — I25.10 CORONARY ARTERY DISEASE, UNSPECIFIED VESSEL OR LESION TYPE, UNSPECIFIED WHETHER ANGINA PRESENT, UNSPECIFIED WHETHER NATIVE OR TRANSPLANTED HEART: ICD-10-CM

## 2022-05-23 DIAGNOSIS — I10 PRIMARY HYPERTENSION: ICD-10-CM

## 2022-05-23 DIAGNOSIS — R07.9 CHEST PAIN, UNSPECIFIED TYPE: Primary | ICD-10-CM

## 2022-05-23 DIAGNOSIS — E78.5 HYPERLIPIDEMIA, UNSPECIFIED HYPERLIPIDEMIA TYPE: ICD-10-CM

## 2022-05-23 PROCEDURE — 93010 EKG 12-LEAD: ICD-10-PCS | Mod: S$PBB,,, | Performed by: INTERNAL MEDICINE

## 2022-05-23 PROCEDURE — 99214 PR OFFICE/OUTPT VISIT, EST, LEVL IV, 30-39 MIN: ICD-10-PCS | Mod: S$PBB,25,GC, | Performed by: STUDENT IN AN ORGANIZED HEALTH CARE EDUCATION/TRAINING PROGRAM

## 2022-05-23 PROCEDURE — 93010 ELECTROCARDIOGRAM REPORT: CPT | Mod: S$PBB,,, | Performed by: INTERNAL MEDICINE

## 2022-05-23 PROCEDURE — 99215 OFFICE O/P EST HI 40 MIN: CPT | Mod: PBBFAC | Performed by: STUDENT IN AN ORGANIZED HEALTH CARE EDUCATION/TRAINING PROGRAM

## 2022-05-23 PROCEDURE — 99214 OFFICE O/P EST MOD 30 MIN: CPT | Mod: S$PBB,25,GC, | Performed by: STUDENT IN AN ORGANIZED HEALTH CARE EDUCATION/TRAINING PROGRAM

## 2022-05-23 PROCEDURE — 93005 ELECTROCARDIOGRAM TRACING: CPT | Mod: PBBFAC | Performed by: INTERNAL MEDICINE

## 2022-05-23 PROCEDURE — 99999 PR PBB SHADOW E&M-EST. PATIENT-LVL V: CPT | Mod: PBBFAC,GC,, | Performed by: STUDENT IN AN ORGANIZED HEALTH CARE EDUCATION/TRAINING PROGRAM

## 2022-05-23 PROCEDURE — 99999 PR PBB SHADOW E&M-EST. PATIENT-LVL V: ICD-10-PCS | Mod: PBBFAC,GC,, | Performed by: STUDENT IN AN ORGANIZED HEALTH CARE EDUCATION/TRAINING PROGRAM

## 2022-05-23 RX ORDER — MINERAL OIL
ENEMA (ML) RECTAL
COMMUNITY
Start: 2022-03-18 | End: 2022-06-24 | Stop reason: SDUPTHER

## 2022-05-23 RX ORDER — ISOSORBIDE MONONITRATE 30 MG/1
30 TABLET, EXTENDED RELEASE ORAL DAILY
Qty: 30 TABLET | Refills: 11 | Status: SHIPPED | OUTPATIENT
Start: 2022-05-23 | End: 2022-08-16

## 2022-05-23 NOTE — PROGRESS NOTES
I have reviewed the notes, assessments, and/or procedures performed by Dr. Moura, I concur with her/his documentation of Suze Chino.

## 2022-05-23 NOTE — PATIENT INSTRUCTIONS
- Sign up digital medicine on [facility] for closer monitoring of blood pressure  - Ordered for Imdur 30mg daily to your pharmacy  - If your chest discomfort is not improving over next month or so (or gets worse), we will plan to proceed with a stress test  - if having ongoing chest pain that is not improving with relaxation, come to ER for further evaluation.  - follow up in clinic in 1 month

## 2022-05-23 NOTE — PROGRESS NOTES
"Subjective:    Patient ID:  Suze Chino is a 71 y.o. female who presents for evaluation of Chest Pain (Ed f/u 22), Abnormal ECG, Dizziness, and Shortness of Breath      72 y/o F with anxiety, CKD3, GERD presenting with complaints of shortness of breath and chest pain. Had COVID-19 last year. Reports intermittent issues with shortness and chest pain, generally exertional but not consistently. Has persisted for several years. Had similar chest pressure (substernal, last 1-2 minutes) recently. Was at [facility] ED in  after experienced this while walking around mall. ACS ruled out at that time. ECG not available here  But was "abnormal" per patient. Has had similar complaints of chest pain for some time, had LHC with Dr. Anders 2021 as below that shows diffuse mild to moderate stenosis of LAD, deemed not large enough for intervention. Since that time symptoms have been about the same. Never smoker, prediabetic. Does has significant family history of CAD (1 brother, 2 sisters, father  of MI when 49). HLD, well-controlled, LDL 73 in 2021. Hypertensive today 155/88, very anxious during interview. Denies weight gain, swelling, PND, orthopnea.      TTE 21:  · The left ventricle is normal in size with normal systolic function.  · Grade I left ventricular diastolic dysfunction.  · The estimated PA systolic pressure is 11 mmHg.  · Normal right ventricular size with normal right ventricular systolic function.  · Normal central venous pressure (3 mmHg).  · The estimated ejection fraction is 55%.  · Mild mitral regurgitation.  · Mild tricuspid regurgitation.       LHC 21:  Angiographic findings:  Left main coronary artery-normal size vessel normal appearance.  Left anterior descending normal size proximal to mid small mid to distal gives rise to 1 diagonal.  Diagonal is normal appearance patient's age.  The mid to distal LAD has diffuse disease all less than 50%.  Ramus intermedius-medium " size vessel trivial disease less than 15%.  Circumflex-medium size vessel gives rise to 2 obtuse marginal.  Circumflex and its branches are normal appearance for patient's age.  Right coronary-large dominant vessel diffuse disease seen throughout entire course all less than 35%.      Review of Systems   Constitutional: Negative. Negative for chills and fever.   HENT: Negative.    Eyes: Negative.    Cardiovascular: Positive for chest pain and dyspnea on exertion. Negative for claudication and paroxysmal nocturnal dyspnea.   Respiratory: Negative for cough, shortness of breath and wheezing.    Endocrine: Negative.    Hematologic/Lymphatic: Does not bruise/bleed easily.   Skin: Negative for nail changes and rash.   Musculoskeletal: Negative.  Negative for back pain.   Gastrointestinal: Negative for abdominal pain, melena, nausea and vomiting.   Neurological: Negative for dizziness and headaches.   Psychiatric/Behavioral: Negative for altered mental status, depression and substance abuse.   Allergic/Immunologic: Negative.         Objective:    Physical Exam  Constitutional:       Appearance: She is well-developed.   HENT:      Head: Normocephalic and atraumatic.   Eyes:      Conjunctiva/sclera: Conjunctivae normal.      Pupils: Pupils are equal, round, and reactive to light.   Neck:      Vascular: No JVD.   Cardiovascular:      Rate and Rhythm: Normal rate and regular rhythm.      Pulses: Intact distal pulses.      Heart sounds: Normal heart sounds. No murmur heard.    No friction rub. No gallop.   Pulmonary:      Effort: Pulmonary effort is normal.      Breath sounds: No stridor. No wheezing or rales.   Chest:      Chest wall: No tenderness.   Abdominal:      General: Bowel sounds are normal. There is no distension.      Palpations: Abdomen is soft. There is no mass.      Tenderness: There is no abdominal tenderness. There is no guarding.   Musculoskeletal:         General: No tenderness or deformity.   Skin:      General: Skin is warm and dry.      Findings: No erythema or rash.   Neurological:      Mental Status: She is alert and oriented to person, place, and time.       Lab Results   Component Value Date    WBC 7.45 09/23/2021    HGB 12.1 09/23/2021    HCT 39.3 09/23/2021    MCV 93 09/23/2021     09/23/2021       CMP  Sodium   Date Value Ref Range Status   12/29/2021 140 136 - 145 mmol/L Final     Potassium   Date Value Ref Range Status   12/29/2021 4.6 3.5 - 5.1 mmol/L Final     Chloride   Date Value Ref Range Status   12/29/2021 104 95 - 110 mmol/L Final     CO2   Date Value Ref Range Status   12/29/2021 25 23 - 29 mmol/L Final     Glucose   Date Value Ref Range Status   12/29/2021 110 70 - 110 mg/dL Final     BUN   Date Value Ref Range Status   12/29/2021 24 (H) 8 - 23 mg/dL Final     Creatinine   Date Value Ref Range Status   12/29/2021 1.2 0.5 - 1.4 mg/dL Final     Calcium   Date Value Ref Range Status   12/29/2021 9.6 8.7 - 10.5 mg/dL Final     Total Protein   Date Value Ref Range Status   12/29/2021 7.1 6.0 - 8.4 g/dL Final     Albumin   Date Value Ref Range Status   12/29/2021 4.1 3.5 - 5.2 g/dL Final     Total Bilirubin   Date Value Ref Range Status   12/29/2021 0.8 0.1 - 1.0 mg/dL Final     Comment:     For infants and newborns, interpretation of results should be based  on gestational age, weight and in agreement with clinical  observations.    Premature Infant recommended reference ranges:  Up to 24 hours.............<8.0 mg/dL  Up to 48 hours............<12.0 mg/dL  3-5 days..................<15.0 mg/dL  6-29 days.................<15.0 mg/dL       Alkaline Phosphatase   Date Value Ref Range Status   12/29/2021 129 55 - 135 U/L Final     AST   Date Value Ref Range Status   12/29/2021 25 10 - 40 U/L Final     ALT   Date Value Ref Range Status   12/29/2021 17 10 - 44 U/L Final     Anion Gap   Date Value Ref Range Status   12/29/2021 11 8 - 16 mmol/L Final     eGFR if    Date Value Ref  Range Status   12/29/2021 52.5 (A) >60 mL/min/1.73 m^2 Final     eGFR if non    Date Value Ref Range Status   12/29/2021 45.6 (A) >60 mL/min/1.73 m^2 Final     Comment:     Calculation used to obtain the estimated glomerular filtration  rate (eGFR) is the CKD-EPI equation.              Assessment:       70 y/o F with anxiety, CKD3, GERD presenting with complaints of shortness of breath and chest pain. Other potential causes of chest pain including prior COVID-19 infection, GERD, longstanding anxiety especially given frequently atypical/noncardiac descritpion of chest pain. That being said, she has intermittently typical chest discomfort and known nonobstructive LAD disease. Will augment antianginal regimen, if without improvement low threshold for PET to eval for progression of LAD disease.     Plan:       - start imdur 30mg daily  - continue other medications  - digital medicine for HTN mgmt  - follow up in 1 month, consider PET at that time  - LDL well controlled on current therapy          Patient seen and case discussed with attending, Dr. Beckman. RTC 1 month in cardiology clinic.      Moo Moura MD  PGY-6, Cardiology  Pager [phone]

## 2022-05-25 ENCOUNTER — OFFICE VISIT (OUTPATIENT)
Dept: OPHTHALMOLOGY | Facility: CLINIC | Age: 72
End: 2022-05-25
Payer: MEDICARE

## 2022-05-25 DIAGNOSIS — Z91.148 NONCOMPLIANCE WITH MEDICATION REGIMEN: ICD-10-CM

## 2022-05-25 DIAGNOSIS — E11.36 DIABETIC CATARACT OF LEFT EYE: ICD-10-CM

## 2022-05-25 DIAGNOSIS — H40.1133 PRIMARY OPEN ANGLE GLAUCOMA OF BOTH EYES, SEVERE STAGE: Primary | ICD-10-CM

## 2022-05-25 DIAGNOSIS — E11.36 DIABETIC CATARACT OF RIGHT EYE: ICD-10-CM

## 2022-05-25 DIAGNOSIS — E11.9 TYPE 2 DIABETES MELLITUS WITHOUT COMPLICATION, WITHOUT LONG-TERM CURRENT USE OF INSULIN: ICD-10-CM

## 2022-05-25 PROCEDURE — 99214 OFFICE O/P EST MOD 30 MIN: CPT | Mod: S$PBB,,, | Performed by: OPHTHALMOLOGY

## 2022-05-25 PROCEDURE — 99999 PR PBB SHADOW E&M-EST. PATIENT-LVL III: ICD-10-PCS | Mod: PBBFAC,,, | Performed by: OPHTHALMOLOGY

## 2022-05-25 PROCEDURE — 92136 OPHTHALMIC BIOMETRY: CPT | Mod: PBBFAC | Performed by: OPHTHALMOLOGY

## 2022-05-25 PROCEDURE — 99999 PR PBB SHADOW E&M-EST. PATIENT-LVL III: CPT | Mod: PBBFAC,,, | Performed by: OPHTHALMOLOGY

## 2022-05-25 PROCEDURE — 99214 PR OFFICE/OUTPT VISIT, EST, LEVL IV, 30-39 MIN: ICD-10-PCS | Mod: S$PBB,,, | Performed by: OPHTHALMOLOGY

## 2022-05-25 PROCEDURE — 92136 IOL MASTER - OD - RIGHT EYE: ICD-10-PCS | Mod: 26,S$PBB,RT, | Performed by: OPHTHALMOLOGY

## 2022-05-25 PROCEDURE — 99213 OFFICE O/P EST LOW 20 MIN: CPT | Mod: PBBFAC | Performed by: OPHTHALMOLOGY

## 2022-05-25 RX ORDER — PREDNISOLONE ACETATE 10 MG/ML
1 SUSPENSION/ DROPS OPHTHALMIC 4 TIMES DAILY
Qty: 5 ML | Refills: 1 | Status: SHIPPED | OUTPATIENT
Start: 2022-05-25 | End: 2022-06-17 | Stop reason: ALTCHOICE

## 2022-05-25 NOTE — Clinical Note
Moo,   I had the pleasure of seeing Mrs. Arciniega today to assess her for cataract surgery. We plan to proceed with the surgery in the upcoming weeks, and would like for this her to discontinue her use of Aspirin 14 days prior to the surgery. I noticed you saw her recently on 5/23/22 after an ED visit.   Could you advised if she would be cleared to discontinue her Aspirin?  Thanks so much, Armando Shankar

## 2022-05-25 NOTE — PROGRESS NOTES
HPI     Cataract     Comments: Pt here for cataract eval. No pain or discomfort. Pt says she   has a lot of trouble driving at night. She says she avoids it due to her   vision. Pt says she has been completely noncompliant with her gtts.               Comments     1. COAG/ LTG   -h/o non compliance   -intolerant of coag meds   -TRAB OD   -repeat SLT od done 8/9/2012 - good initial response IOP 18 to 13, SLT OD   08/17/17   -Primary SLT done os 9/13/2012 - minimal response 18 to 16   -Bleb Needling with [facility] Inj. OD 9/17/15   -SLT OS 9/29/2016 AND REPEAT SLT OS 12/10/2020     Xen Gel OD (09-26-19)   Xen Gel OS (04-) abExt LOT #[id number]     *Stopped Alphagan OS TID because of dermatitis   *Stopped Betoptic bid ou due to slow pulse rate and feeling tired.   *latanoprost slows pulse rate   *DMX S50 BID (stopped after 2 weeks due to extreme fatigue)   *OD Rhopressa Qhs ( patient ran out in March never returned for followup )     *lotemax cost over $300       OD: Lumigan QHS   OS: Prednisolone OD   OD: Dorozolamide BID OD          Last edited by Erasmo Powell MA on 5/25/2022  8:37 AM. (History)            Assessment /Plan     For exam results, see Encounter Report.      ICD-10-CM ICD-9-CM    1. Primary open angle glaucoma of both eyes, severe stage  H40.1133 365.11 Doing well - intraocular pressure is within acceptable range relative to target pressure with no evidence of progression.   Continue current treatment.  Reviewed importance of continued compliance with treatment and follow up.     365.73    2. Type 2 diabetes mellitus without complication, without long-term current use of insulin  E11.9 250.00 Diabetes with no diabetic retinopathy on dilated exam.   Reviewed diabetic eye precautions including excellent blood sugar control, and importance of regular follow up.    3. Diabetic cataract of right eye  E11.36 250.50 Visually Significant Cataract OD > OS  Patient reports decreased vision consistent with the  clinical amount of lenticular opacity, which reaches the level of visual significance and affects activities of daily living including reading and glare. Risks, benefits, and alternatives to cataract surgery were discussed.   The pt expressed a desire to proceed with surgery with the potential for some reasonable degree of visual improvement.    Discussed IOL options and refractive outcomes for this patient.    Topography reviewed: yes  History of Refractive surgery: no     Phaco right eye, +12.0 WF  Topical  monofocal IOL.  Will aim for distance  Prescriptions sent for preoperative medications  Pred Forte QID OD   Anticoagulant status: will reach out to cardiologist to confirm pt can d/c asa use prior to surgery, was seen 5/18/22 at ED for chest pain (found to have had abnornal EKG and multiple blockages)    Explained that patient may need glasses after surgery.  Discussed that vision may be limited by near/diabetes.    Referral to [facility] for Ophthalmic surgery    Schedule post op visit #2 with Dr Shankar     366.41    4. Diabetic cataract of left eye  E11.36 250.50 Will likely need surgery in the future, monitor

## 2022-05-30 ENCOUNTER — DOCUMENTATION ONLY (OUTPATIENT)
Dept: OPHTHALMOLOGY | Facility: CLINIC | Age: 72
End: 2022-05-30
Payer: MEDICARE

## 2022-05-30 NOTE — PROGRESS NOTES
Short Stay Record    CC: Blurry Vision     Impression: Visually significant cataract with reasonable expectation for visual improvement Right Eye       Right Left   External 2+ Ptosis Normal      Right Left   Lids/Lashes Normal Normal   Conjunctiva/Sclera Large  Bleb spreading diffusely across sup scleral surface, sutures, Madisyn Negative, ab external xen gel  Nice Bleb with avascularity   Cornea Clear Clear   Anterior Chamber Deep and quiet Deep and quiet   Iris Peripheral iridectomy Round and reactive   Lens 1+ Nuclear sclerosis 1+ Nuclear sclerosis   Vitreous Posterior vitreous detachment Posterior vitreous detachment        Right Left   Disc loss inf rim, inf/temp slope inf/temp thinning, slope   C/D Ratio 0.85 0.85   Macula No Diabetic Retinopathy No Diabetic Retinopathy   Vessels Normal Normal   Periphery No Diabetic Retinopathy No Diabetic Retinopathy   Edited by: Macario Shankar MD        REFRACTION     Sphere Cylinder Axis Dist VA   Right -4.75 +0.75 135 20/30-2   Left -4.75 +1.00 090 20/25-2         Planned Procedure:       Topography reviewed: yes  History of Refractive surgery: no      Phaco right eye, +12.0 WF  Topical  monofocal IOL.  Will aim for distance  Prescriptions sent for preoperative medications  Pred Forte QID OD   Anticoagulant status: will reach out to cardiologist to confirm pt can d/c asa use prior to surgery, was seen 5/18/22 at ED for chest pain (found to have had abnornal EKG and multiple blockages)        Lens Selection OD verified _____           Previous IOL OS _____    Patient cleared for ophthalmic surgery.     Discharge Summary:    Admitting Diagnosis: Nuclear sclerotic cataract /   OD    Discharge Diagnosis: Pseudophakia OD    Procedure: Phacoemulsification of cataract with intraocular lens implant OD    Complications: None  Discharge Condition: Stable    Discharge instructions: Follow post-operative discharge instruction sheet given by provider.    Follow-up: Return to  clinic next day or as scheduled.       HPI:  Suze Chino is a 71 y.o. female who presents for evaluation prior to ophthalmic surgery, left eye. Patient reports of blurred vision at distance and near with and without correction. Patient reports of glare at night reducing function and safety, and complains of needed additional light to read.     Past Medical History:   Diagnosis Date    Anxiety     Cataract     Chronic kidney disease, stage 3 11/20/2020    COVID-19 ruled out by laboratory testing 03/18/2022    [facility]    Depression     GERD (gastroesophageal reflux disease)     Glaucoma     History of colon polyps 2009    history as per patient    History of colonoscopy 2009    ok per pt    Hyperplastic colon polyp 05/12/2015    Hypertension     Hypothyroid     ?    Mild mitral regurgitation     Mitral valve prolapse     Nonobstructive atherosclerosis of coronary artery 09/2021    Moderate LAD stenosis, mild RCA stenosis.    Peptic ulcer disease     with gi bleed    Prediabetes     Primary hyperparathyroidism     Right knee DJD     Right knee DJD     Sleep apnea     Sleep disorder     TMJ (temporomandibular joint disorder)     Vitamin D deficiency      Past Surgical History:   Procedure Laterality Date    BILATERAL SALPINGOOPHORECTOMY      COLONOSCOPY  2015    CORONARY ANGIOGRAPHY N/A 9/24/2021    Procedure: ANGIOGRAM, CORONARY ARTERY;  Surgeon: Chago Gregory MD;  [facility]: [facility];  Service: Cardiology;  Laterality: N/A;    EYE SURGERY      laser sx    GLAUCOMA SURGERY Right 09/26/2019    Xen Gel    GLAUCOMA SURGERY Left 04/05/2021    abext XenGel OS    HYSTERECTOMY      OOPHORECTOMY      PARATHYROIDECTOMY  12/01/2021    SLT - OU - BOTH EYES      TRABECULECTOMY Right 4/2/14    OD (dr. grossman)    UPPER GASTROINTESTINAL ENDOSCOPY  2003         Review of patient's allergies indicates:   Allergen Reactions    Betoptic [betaxolol] Other (See Comments)      Fatigue, slowed heart rate.    Alphagan [brimonidine] Dermatitis    Pravastatin Other (See Comments)     aches  aches         Current Outpatient Medications:     acetaminophen (TYLENOL) 650 MG TbSR, Take 1,300 mg by mouth 2 (two) times a day., Disp: , Rfl:     albuterol (PROAIR HFA) 90 mcg/actuation inhaler, Inhale 2 puffs into the lungs every 6 (six) hours as needed for Wheezing. Rescue, Disp: 18 g, Rfl: 5    ALPRAZolam (XANAX) 0.25 MG tablet, TAKE 1 TABLET BY MOUTH THREE TIMES A DAY AS NEEDED FOR ANXIETY, Disp: 30 tablet, Rfl: 0    amLODIPine (NORVASC) 2.5 MG tablet, Take 1 tablet (2.5 mg total) by mouth once daily., Disp: 90 tablet, Rfl: 3    aspirin (ECOTRIN) 81 MG EC tablet, Take 1 tablet (81 mg total) by mouth once daily., Disp: 30 tablet, Rfl: 11    calcium carb/vit D3/minerals (CALTRATE PLUS ORAL), Take 1 tablet by mouth 2 (two) times a day., Disp: , Rfl:     dorzolamide (TRUSOPT) 2 % ophthalmic solution, Place 1 drop into the right eye 2 (two) times a day. (Patient not taking: Reported on 5/25/2022), Disp: 5 mL, Rfl: 6    DULoxetine (CYMBALTA) 30 MG capsule, Take 1 capsule (30 mg total) by mouth once daily., Disp: 30 capsule, Rfl: 5    ergocalciferol (ERGOCALCIFEROL) 50,000 unit Cap, TAKE 1 CAPSULE BY MOUTH ONE TIME PER WEEK, Disp: 12 capsule, Rfl: 3    ezetimibe (ZETIA) 10 mg tablet, Take 1 tablet (10 mg total) by mouth every evening., Disp: 90 tablet, Rfl: 3    fexofenadine (ALLEGRA) 180 MG tablet, Allegra Allergy Take 1 Tablet (oral) 1 time per day for 14 days 20220318 tablet 1 time per day oral 14 days active 180 mg, Disp: , Rfl:     isosorbide mononitrate (IMDUR) 30 MG 24 hr tablet, Take 1 tablet (30 mg total) by mouth once daily., Disp: 30 tablet, Rfl: 11    losartan (COZAAR) 100 MG tablet, TAKE 1 TABLET BY MOUTH EVERY DAY (Patient taking differently: Take 100 mg by mouth once daily.), Disp: 90 tablet, Rfl: 2    LUMIGAN 0.01 % Drop, PLACE 1 DROP INTO BOTH EYES EVERY EVENING. (Patient  not taking: Reported on 5/25/2022), Disp: 2.5 mL, Rfl: 6    metFORMIN (GLUCOPHAGE-XR) 500 MG ER 24hr tablet, Take 1 tablet (500 mg total) by mouth once daily. Do not break, chew, or crush, Disp: , Rfl:     montelukast (SINGULAIR) 10 mg tablet, Take 1 tablet (10 mg total) by mouth every evening., Disp: 90 tablet, Rfl: 3    nitroGLYCERIN (NITROSTAT) 0.4 MG SL tablet, Place 1 tablet (0.4 mg total) under the tongue every 5 (five) minutes as needed for Chest pain. (Patient not taking: Reported on 10/1/2021), Disp: 30 tablet, Rfl: 0    pantoprazole (PROTONIX) 40 MG tablet, TAKE 1 TABLET BY MOUTH EVERY DAY, Disp: 90 tablet, Rfl: 3    prednisoLONE acetate (PRED FORTE) 1 % DrpS, PLACE 1 DROP INTO THE RIGHT EYE ONCE DAILY. (Patient not taking: Reported on 5/25/2022), Disp: 5 mL, Rfl: 1    prednisoLONE acetate (PRED FORTE) 1 % DrpS, Place 1 drop into the right eye 4 (four) times daily., Disp: 5 mL, Rfl: 1    prednisoLONE acetate (PRED FORTE) 1 % DrpS, INSTILL 1 DROP INTO LEFT EYE 4 TIMES A DAY, Disp: 10 mL, Rfl: 1    rosuvastatin (CRESTOR) 10 MG tablet, Take 10 mg by mouth once daily., Disp: , Rfl:     Review of Systems:  A comprehensive review of systems was negative.    Physical Exam:  General Appearance:    A&Ox3, no distress, appears stated age   Head:    Normocephalic, without obvious abnormality, atraumatic   Eyes:    PERRL, EOM's intact   Back:     Symmetric, no curvature   Lungs:     Respirations unlabored   Chest Wall:    No tenderness or deformity    Heart:  Abdomen:  Extremities:  Skin:    S1 and S2 present    Soft, non-tender    Extremities normal, atraumatic    Skin color, texture, turgor normal

## 2022-06-09 ENCOUNTER — OUTSIDE PLACE OF SERVICE (OUTPATIENT)
Dept: OPHTHALMOLOGY | Facility: CLINIC | Age: 72
End: 2022-06-09
Payer: MEDICARE

## 2022-06-09 PROCEDURE — 66984 XCAPSL CTRC RMVL W/O ECP: CPT | Mod: RT,,, | Performed by: OPHTHALMOLOGY

## 2022-06-09 PROCEDURE — 66984 PR REMOVAL, CATARACT, W/INSRT INTRAOC LENS, W/O ENDO CYCLO: ICD-10-PCS | Mod: RT,,, | Performed by: OPHTHALMOLOGY

## 2022-06-10 ENCOUNTER — OFFICE VISIT (OUTPATIENT)
Dept: OPHTHALMOLOGY | Facility: CLINIC | Age: 72
End: 2022-06-10
Payer: MEDICARE

## 2022-06-10 DIAGNOSIS — Z98.890 POST-OPERATIVE STATE: Primary | ICD-10-CM

## 2022-06-10 DIAGNOSIS — Z98.41 CATARACT EXTRACTION STATUS, RIGHT EYE: ICD-10-CM

## 2022-06-10 PROCEDURE — 99024 PR POST-OP FOLLOW-UP VISIT: ICD-10-PCS | Mod: POP,,, | Performed by: OPHTHALMOLOGY

## 2022-06-10 PROCEDURE — 99999 PR PBB SHADOW E&M-EST. PATIENT-LVL III: ICD-10-PCS | Mod: PBBFAC,,, | Performed by: OPHTHALMOLOGY

## 2022-06-10 PROCEDURE — 99024 POSTOP FOLLOW-UP VISIT: CPT | Mod: POP,,, | Performed by: OPHTHALMOLOGY

## 2022-06-10 PROCEDURE — 99213 OFFICE O/P EST LOW 20 MIN: CPT | Mod: PBBFAC | Performed by: OPHTHALMOLOGY

## 2022-06-10 PROCEDURE — 99999 PR PBB SHADOW E&M-EST. PATIENT-LVL III: CPT | Mod: PBBFAC,,, | Performed by: OPHTHALMOLOGY

## 2022-06-10 NOTE — PROGRESS NOTES
HPI     Post-op Evaluation     Comments: Pt reports for PCIOL OD. Denies any pain or irritation. Va   stable. 100% compliant with gtts.               Comments       1. COAG/ LTG   -h/o non compliance   -intolerant of coag meds   -TRAB OD   -repeat SLT od done 8/9/2012 - good initial response IOP 18 to 13, SLT OD   08/17/17   -Primary SLT done os 9/13/2012 - minimal response 18 to 16   -Bleb Needling with [facility] Inj. OD 9/17/15   -SLT OS 9/29/2016 AND REPEAT SLT OS 12/10/2020   Xen Gel OD (09-26-19)   Xen Gel OS (04-) abExt LOT #[id number]   2. PCIOL OD 6/9/22    *Stopped Alphagan OS TID because of dermatitis   *Stopped Betoptic bid ou due to slow pulse rate and feeling tired.   *latanoprost slows pulse rate   *DMX S50 BID (stopped after 2 weeks due to extreme fatigue)   *OD Rhopressa Qhs ( patient ran out in March never returned for followup )     *lotemax cost over $300       OD: Lumigan QHS   OS: Prednisolone OD   OD: Dorozolamide BID OD    OD: Pred QID            Last edited by Macario Soriano on 6/10/2022  8:10 AM. (History)            Assessment /Plan     For exam results, see Encounter Report.      ICD-10-CM ICD-9-CM    1. Post-operative state  Z98.890 V45.89    2. Cataract extraction status, right eye  Z98.41 V45.61      PO Day 1 S/P Phaco/IOL  right eye  Doing well.    Use Prednisolone Acetate QID    Reinstructed in importance of absolute compliance with Post-OP instructions including medications, shield at bedtime, and limitation of activities. Follow up appointments in approximately one and six weeks or call immediately for increased pain, redness or vision loss.           OD: Lumigan QHS   OS: Prednisolone OD   OD: Dorozolamide BID OD    OD: Pred QID     Return to clinic 1 week

## 2022-06-14 ENCOUNTER — TELEPHONE (OUTPATIENT)
Dept: ORTHOPEDICS | Facility: CLINIC | Age: 72
End: 2022-06-14
Payer: MEDICARE

## 2022-06-14 DIAGNOSIS — M25.569 KNEE PAIN, UNSPECIFIED CHRONICITY, UNSPECIFIED LATERALITY: Primary | ICD-10-CM

## 2022-06-15 ENCOUNTER — HOSPITAL ENCOUNTER (OUTPATIENT)
Dept: RADIOLOGY | Facility: HOSPITAL | Age: 72
Discharge: HOME | End: 2022-06-15
Attending: ORTHOPAEDIC SURGERY
Payer: MEDICARE

## 2022-06-15 ENCOUNTER — OFFICE VISIT (OUTPATIENT)
Dept: ORTHOPEDICS | Facility: CLINIC | Age: 72
End: 2022-06-15
Payer: MEDICARE

## 2022-06-15 VITALS
BODY MASS INDEX: 30.94 KG/M2 | HEIGHT: 68 IN | SYSTOLIC BLOOD PRESSURE: 168 MMHG | HEART RATE: 62 BPM | WEIGHT: 204.13 LBS | DIASTOLIC BLOOD PRESSURE: 93 MMHG

## 2022-06-15 DIAGNOSIS — M17.0 PRIMARY OSTEOARTHRITIS OF BOTH KNEES: Primary | ICD-10-CM

## 2022-06-15 DIAGNOSIS — M25.569 KNEE PAIN, UNSPECIFIED CHRONICITY, UNSPECIFIED LATERALITY: ICD-10-CM

## 2022-06-15 PROCEDURE — 73564 PR  X-RAY KNEE 4+ VIEW: ICD-10-PCS | Mod: 26,LT,, | Performed by: INTERNAL MEDICINE

## 2022-06-15 PROCEDURE — 73564 X-RAY EXAM KNEE 4 OR MORE: CPT | Mod: 26,LT,, | Performed by: INTERNAL MEDICINE

## 2022-06-15 PROCEDURE — 99204 PR OFFICE/OUTPT VISIT, NEW, LEVL IV, 45-59 MIN: ICD-10-PCS | Mod: 25,S$PBB,, | Performed by: ORTHOPAEDIC SURGERY

## 2022-06-15 PROCEDURE — 99214 OFFICE O/P EST MOD 30 MIN: CPT | Mod: PBBFAC,PN | Performed by: ORTHOPAEDIC SURGERY

## 2022-06-15 PROCEDURE — 20610 LARGE JOINT ASPIRATION/INJECTION: R KNEE: ICD-10-PCS | Mod: S$PBB,RT,, | Performed by: ORTHOPAEDIC SURGERY

## 2022-06-15 PROCEDURE — 20610 DRAIN/INJ JOINT/BURSA W/O US: CPT | Mod: PBBFAC,PN,RT | Performed by: ORTHOPAEDIC SURGERY

## 2022-06-15 PROCEDURE — 73564 X-RAY EXAM KNEE 4 OR MORE: CPT | Mod: 26,RT,, | Performed by: INTERNAL MEDICINE

## 2022-06-15 PROCEDURE — 99999 PR PBB SHADOW E&M-EST. PATIENT-LVL IV: ICD-10-PCS | Mod: PBBFAC,,, | Performed by: ORTHOPAEDIC SURGERY

## 2022-06-15 PROCEDURE — 99204 OFFICE O/P NEW MOD 45 MIN: CPT | Mod: 25,S$PBB,, | Performed by: ORTHOPAEDIC SURGERY

## 2022-06-15 PROCEDURE — 99999 PR PBB SHADOW E&M-EST. PATIENT-LVL IV: CPT | Mod: PBBFAC,,, | Performed by: ORTHOPAEDIC SURGERY

## 2022-06-15 PROCEDURE — 73564 X-RAY EXAM KNEE 4 OR MORE: CPT | Mod: TC,50,FY

## 2022-06-15 RX ORDER — TRIAMCINOLONE ACETONIDE 40 MG/ML
40 INJECTION, SUSPENSION INTRA-ARTICULAR; INTRAMUSCULAR
Status: DISCONTINUED | OUTPATIENT
Start: 2022-06-15 | End: 2022-06-15 | Stop reason: HOSPADM

## 2022-06-15 RX ADMIN — TRIAMCINOLONE ACETONIDE 40 MG: 40 INJECTION, SUSPENSION INTRA-ARTICULAR; INTRAMUSCULAR at 10:06

## 2022-06-15 NOTE — PROGRESS NOTES
[facility], Orthopedics and Sports Medicine  [facility]    New Patient Knee Office Visit  06/15/2022       Subjective:      Suze Chino is a 71 y.o. female referred by Lilo Neves for evaluation and treatment of bilateral knee pain, right worse. This is evaluated as a personal injury.  The patient has the following symptoms: pain located globally about the bilateral knee.  The symptoms began 3 years ago and are worsening.      The patient had therapy for 6 months several years ago for the bilateral knees.  No CSI.  Prior taking celebrex but d/c'd due to CKD.     The patient is retired.     Outside reports reviewed: historical medical records and office notes.    Past Medical History:   Diagnosis Date    Anxiety     Cataract     Chronic kidney disease, stage 3 11/20/2020    COVID-19 ruled out by laboratory testing 03/18/2022    [facility]    Depression     GERD (gastroesophageal reflux disease)     Glaucoma     History of colon polyps 2009    history as per patient    History of colonoscopy 2009    ok per pt    Hyperplastic colon polyp 05/12/2015    Hypertension     Hypothyroid     ?    Mild mitral regurgitation     Mitral valve prolapse     Nonobstructive atherosclerosis of coronary artery 09/2021    Moderate LAD stenosis, mild RCA stenosis.    Peptic ulcer disease     with gi bleed    Prediabetes     Primary hyperparathyroidism     Right knee DJD     Right knee DJD     Sleep apnea     Sleep disorder     TMJ (temporomandibular joint disorder)     Vitamin D deficiency        Patient Active Problem List   Diagnosis    Hypertension    History of hypothyroidism    Anxiety    Mild major depression    GERD (gastroesophageal reflux disease)    Mitral valve prolapse    Myopia of both eyes - Both Eyes    Visual field defect    Optic pit - Left Eye    Primary open-angle glaucoma, severe stage - Both Eyes    Nuclear sclerosis    History of colon  polyps    Colon polyps    Bradycardia, drug induced    Right knee DJD    Severe stage chronic open angle glaucoma    Low-tension glaucoma of both eyes, severe stage    Lateral cystocele    Acquired genu valgum of right knee    Right knee pain    Dream enactment behavior    Secondary parasomnia    Primary snoring    Allergic sinusitis    Breast pain    Chest pain    Slow transit constipation    Hyperparathyroidism    Vitamin D deficiency    Precordial pain    Chronic kidney disease, stage 3    Primary osteoarthritis of both knees    HTN (hypertension)    ACP (advance care planning)    Dyspnea    Family history of early CAD    Hyperlipidemia    Nonobstructive atherosclerosis of coronary artery    Mild mitral regurgitation    Positive QUETA (antinuclear antibody)    S/P parathyroidectomy       Past Surgical History:   Procedure Laterality Date    BILATERAL SALPINGOOPHORECTOMY      COLONOSCOPY  2015    CORONARY ANGIOGRAPHY N/A 9/24/2021    Procedure: ANGIOGRAM, CORONARY ARTERY;  Surgeon: Chago Gregory MD;  Location: [facility];  Service: Cardiology;  Laterality: N/A;    EYE SURGERY      laser sx    GLAUCOMA SURGERY Right 09/26/2019    Xen Gel    GLAUCOMA SURGERY Left 04/05/2021    abext XenGel OS    HYSTERECTOMY      OOPHORECTOMY      PARATHYROIDECTOMY  12/01/2021    SLT - OU - BOTH EYES      TRABECULECTOMY Right 4/2/14    OD (dr. grossman)    UPPER GASTROINTESTINAL ENDOSCOPY  2003        Current Outpatient Medications   Medication Instructions    acetaminophen (TYLENOL) 1,300 mg, Oral, 2 times daily    albuterol (PROAIR HFA) 90 mcg/actuation inhaler 2 puffs, Inhalation, Every 6 hours PRN, Rescue    ALPRAZolam (XANAX) 0.25 MG tablet TAKE 1 TABLET BY MOUTH THREE TIMES A DAY AS NEEDED FOR ANXIETY    amLODIPine (NORVASC) 2.5 mg, Oral, Daily    aspirin (ECOTRIN) 81 mg, Oral, Daily    calcium carb/vit D3/minerals (CALTRATE PLUS ORAL) 1 tablet, Oral, 2 times daily    dorzolamide  (TRUSOPT) 2 % ophthalmic solution 1 drop, Right Eye, 2 times daily    DULoxetine (CYMBALTA) 30 mg, Oral, Daily    ergocalciferol (ERGOCALCIFEROL) 50,000 unit Cap TAKE 1 CAPSULE BY MOUTH ONE TIME PER WEEK    ezetimibe (ZETIA) 10 mg, Oral, Nightly    fexofenadine (ALLEGRA) 180 MG tablet Allegra Allergy Take 1 Tablet (oral) 1 time per day for 14 days 20220318 tablet 1 time per day oral 14 days active 180 mg    isosorbide mononitrate (IMDUR) 30 mg, Oral, Daily    losartan (COZAAR) 100 MG tablet TAKE 1 TABLET BY MOUTH EVERY DAY    LUMIGAN 0.01 % Drop PLACE 1 DROP INTO BOTH EYES EVERY EVENING.    metFORMIN (GLUCOPHAGE-XR) 500 mg, Oral, Daily, Do not break, chew, or crush    montelukast (SINGULAIR) 10 mg, Oral, Nightly    nitroGLYCERIN (NITROSTAT) 0.4 mg, Sublingual, Every 5 min PRN    pantoprazole (PROTONIX) 40 MG tablet TAKE 1 TABLET BY MOUTH EVERY DAY    prednisoLONE acetate (PRED FORTE) 1 % DrpS 1 drop, Right Eye, Daily    prednisoLONE acetate (PRED FORTE) 1 % DrpS 1 drop, Right Eye, 4 times daily    prednisoLONE acetate (PRED FORTE) 1 % DrpS INSTILL 1 DROP INTO LEFT EYE 4 TIMES A DAY    rosuvastatin (CRESTOR) 10 mg, Oral, Daily        Review of patient's allergies indicates:   Allergen Reactions    Betoptic [betaxolol] Other (See Comments)     Fatigue, slowed heart rate.    Alphagan [brimonidine] Dermatitis    Pravastatin Other (See Comments)     aches  aches       Social History     Socioeconomic History    Marital status:    Tobacco Use    Smoking status: Never Smoker    Smokeless tobacco: Never Used   Substance and Sexual Activity    Alcohol use: Yes     Alcohol/week: 2.0 standard drinks     Types: 2 Standard drinks or equivalent per week     Comment: occasional    Drug use: No    Sexual activity: Not Currently       Family History   Problem Relation Age of Onset    Heart disease Father         before age 50    Diabetes Mother     Glaucoma Mother     Leukemia Mother      Cataracts Mother     Breast cancer Mother 60    Diabetes Sister     Glaucoma Sister     Diabetes Brother     Glaucoma Brother     Lung cancer Brother     Breast cancer Other 40        genetics -    Colon cancer Maternal Aunt          Review of Systems   Constitutional: Negative for chills and fever.   HENT: Negative for hearing loss.    Eyes: Negative for blurred vision.   Cardiovascular: Negative for chest pain.   Respiratory: Negative for shortness of breath.    Gastrointestinal: Negative for abdominal pain.   Neurological: Negative for light-headedness.            Objective:      General    Constitutional: She is oriented to person, place, and time. She appears well-developed and well-nourished.   HENT:   Head: Normocephalic and atraumatic.   Eyes: EOM are normal.   Cardiovascular: Normal rate.    Pulmonary/Chest: Effort normal.   Neurological: She is alert and oriented to person, place, and time.   Psychiatric: She has a normal mood and affect.           Right Knee Exam     Inspection   Erythema: absent  Swelling: absent  Effusion: absent    Tenderness   The patient is tender to palpation of the medial joint line.    Range of Motion   Extension: 10   Flexion: 120     Tests   Ligament Examination Lachman: normal (-1 to 2mm) PCL-Posterior Drawer: normal (0 to 2mm)     MCL - Valgus: normal (0 to 2mm)  LCL - Varus: normal    Other   Sensation: normal    Left Knee Exam     Inspection   Erythema: absent  Swelling: absent  Effusion: absent    Tenderness   The patient tender to palpation of the medial joint line.    Range of Motion   Extension: 0   Flexion: 120     Tests   Stability Lachman: normal (-1 to 2mm) PCL-Posterior Drawer: normal (0 to 2mm)  MCL - Valgus: normal (0 to 2mm)  LCL - Varus: normal (0 to 2mm)    Other   Sensation: normal    Muscle Strength   Right Lower Extremity   Quadriceps:  5/5   Hamstrin/5   Left Lower Extremity   Quadriceps:  5/5   Hamstrin/5     Vascular Exam     Right  Pulses    Posterior Tibial:      2+        Left Pulses    Posterior Tibial:      2+            Imaging:  Radiographs of the bilateral knee taken taken 06/15/2022 were personally reviewed from the [facility] EMR.  Multiple views of the knee are available today for review, including a standing AP, a standing notch view, lateral view, and a merchant view.  The tibiofemoral compartment demonstrates severe degenerative changes.  The patellofemoral compartment demonstrates severe degenerative changes.  No acute fractures or dislocations are noted in these images.       Large Joint Aspiration/Injection: R knee    Date/Time: 6/15/2022 10:45 AM  Performed by: Shaji Garcia IV, MD  Authorized by: Shaji Garcia IV, MD     Consent Done?:  Yes (Verbal)  Indications:  Pain  Site marked: the procedure site was marked    Timeout: prior to procedure the correct patient, procedure, and site was verified    Prep: patient was prepped and draped in usual sterile fashion      Local anesthesia used?: Yes    Local anesthetic:  Lidocaine 1% without epinephrine    Details:  Needle Size:  22 G  Ultrasonic Guidance for needle placement?: No    Approach:  Anterolateral  Location:  Knee  Site:  R knee  Medications:  40 mg triamcinolone acetonide 40 mg/mL  Patient tolerance:  Patient tolerated the procedure well with no immediate complications              Assessment:       Suze Chino is a 71 y.o. female seen in the office today. The encounter diagnosis was Primary osteoarthritis of both knees.  Non-operative treatment is recommended at this time. The patient was ordered PT and offered CSI which was given today. The natural history and expected course discussed with patient. Various treatment options were discussed, including their risks and benefits. All of the patient's questions were answered.     Plan:      1. Physical therapy and rehabilitation treatment.  2. Tylenol 650mg TID, PRN pain.  3. Follow up in 3 months.  4. Corticosteroid  injection given today (right knee).         Shaji Garcia IV, MD   of Clinical Orthopedics  Department of Orthopedic Surgery  [facility]  Office: [phone]  Website: [email or web address]      Orders Placed This Encounter    Large Joint Aspiration/Injection    Ambulatory referral/consult to Physical/Occupational Therapy

## 2022-06-17 ENCOUNTER — OFFICE VISIT (OUTPATIENT)
Dept: OPHTHALMOLOGY | Facility: CLINIC | Age: 72
End: 2022-06-17
Payer: MEDICARE

## 2022-06-17 DIAGNOSIS — H40.1133 PRIMARY OPEN ANGLE GLAUCOMA OF BOTH EYES, SEVERE STAGE: ICD-10-CM

## 2022-06-17 DIAGNOSIS — Z98.41 CATARACT EXTRACTION STATUS, RIGHT EYE: ICD-10-CM

## 2022-06-17 DIAGNOSIS — E11.36 DIABETIC CATARACT OF LEFT EYE: ICD-10-CM

## 2022-06-17 DIAGNOSIS — Z98.890 POST-OPERATIVE STATE: Primary | ICD-10-CM

## 2022-06-17 PROCEDURE — 99213 OFFICE O/P EST LOW 20 MIN: CPT | Mod: PBBFAC | Performed by: OPHTHALMOLOGY

## 2022-06-17 PROCEDURE — 99999 PR PBB SHADOW E&M-EST. PATIENT-LVL III: ICD-10-PCS | Mod: PBBFAC,,, | Performed by: OPHTHALMOLOGY

## 2022-06-17 PROCEDURE — 99024 POSTOP FOLLOW-UP VISIT: CPT | Mod: POP,,, | Performed by: OPHTHALMOLOGY

## 2022-06-17 PROCEDURE — 99999 PR PBB SHADOW E&M-EST. PATIENT-LVL III: CPT | Mod: PBBFAC,,, | Performed by: OPHTHALMOLOGY

## 2022-06-17 PROCEDURE — 99024 PR POST-OP FOLLOW-UP VISIT: ICD-10-PCS | Mod: POP,,, | Performed by: OPHTHALMOLOGY

## 2022-06-17 RX ORDER — LOTEPREDNOL ETABONATE 5 MG/ML
1 SUSPENSION/ DROPS OPHTHALMIC 2 TIMES DAILY
Qty: 5 ML | Refills: 1 | Status: SHIPPED | OUTPATIENT
Start: 2022-06-17 | End: 2022-07-26 | Stop reason: SDUPTHER

## 2022-06-17 NOTE — PROGRESS NOTES
HPI     Post-op Evaluation     Comments:  PCIOL OD 6/9/22/ SN60WF 12.0/ CDE: 11.04    Patient states VA is not as sharp as it was a distance last week for her   one day follow up.    Patient states VA in OS appears dull and cloudy compared to OD along with   glare sensitivity.              Comments     1. COAG/ LTG   -h/o non compliance   -intolerant of coag meds   -TRAB OD   -repeat SLT od done 8/9/2012 - good initial response IOP 18 to 13, SLT OD   08/17/17   -Primary SLT done os 9/13/2012 - minimal response 18 to 16   -Bleb Needling with [facility] Inj. OD 9/17/15   -SLT OS 9/29/2016 AND REPEAT SLT OS 12/10/2020   Xen Gel OD (09-26-19)   Xen Gel OS (04-) abExt LOT #[id number]   2. PCIOL OD 6/9/22/ SN60WF 12.0/ CDE: 11.04    *Stopped Alphagan OS TID because of dermatitis   *Stopped Betoptic bid ou due to slow pulse rate and feeling tired.   *latanoprost slows pulse rate   *DMX S50 BID (stopped after 2 weeks due to extreme fatigue)   *OD Rhopressa Qhs ( patient ran out in March never returned for followup )     *lotemax cost over $300       OD: Lumigan QHS   OD: Dorozolamide BID OD  OD: Pred QID  OS: Prednisolone OD QD          Last edited by Iris Adams, Patient Care Assistant on 6/17/2022  8:57   AM. (History)            Assessment /Plan     For exam results, see Encounter Report.      ICD-10-CM ICD-9-CM    1. Post-operative state  Z98.890 V45.89 loteprednol (LOTEMAX) 0.5 % ophthalmic suspension   2. Cataract extraction status, right eye  Z98.41 V45.61 loteprednol (LOTEMAX) 0.5 % ophthalmic suspension   3. Primary open angle glaucoma of both eyes, severe stage  H40.1133 365.11      365.73    4. Diabetic cataract of left eye  E11.36 250.50 Follow OD at this time due to increased IOP OD      366.41        Increased IOP today OD, pt stopped coag meds in error   Add Lotemax BID OD   Stop Pred OD , can continue Pred OS   RETURN TO CLINIC 1 week     OD: Lumigan QHS   OD: Dorozolamide BID OD  OD: Lotemax BID, Stop Pred OD    OS: Prednisolone OD QD

## 2022-06-20 ENCOUNTER — CLINICAL SUPPORT (OUTPATIENT)
Dept: REHABILITATION | Facility: HOSPITAL | Age: 72
End: 2022-06-20
Payer: MEDICARE

## 2022-06-20 DIAGNOSIS — R29.898 WEAKNESS OF BOTH LOWER EXTREMITIES: ICD-10-CM

## 2022-06-20 DIAGNOSIS — M25.662 DECREASED RANGE OF MOTION (ROM) OF BOTH KNEES: ICD-10-CM

## 2022-06-20 DIAGNOSIS — M25.661 DECREASED RANGE OF MOTION (ROM) OF BOTH KNEES: ICD-10-CM

## 2022-06-20 DIAGNOSIS — M17.0 PRIMARY OSTEOARTHRITIS OF BOTH KNEES: ICD-10-CM

## 2022-06-20 DIAGNOSIS — R26.89 DECREASED FUNCTIONAL MOBILITY: ICD-10-CM

## 2022-06-20 PROCEDURE — 97161 PT EVAL LOW COMPLEX 20 MIN: CPT | Mod: PN

## 2022-06-20 PROCEDURE — 97110 THERAPEUTIC EXERCISES: CPT | Mod: PN

## 2022-06-20 NOTE — PLAN OF CARE
[facility]   Physical Therapy Initial Evaluation     Date: 6/20/2022   Name: Suze Chino  Clinic Number: [id number]    Therapy Diagnosis:   Encounter Diagnoses   Name Primary?    Primary osteoarthritis of both knees     Decreased range of motion (ROM) of both knees     Weakness of both lower extremities     Decreased functional mobility      Physician: Shjai Garcia IV, MD    Physician Orders: PT Eval and Treat  Medical Diagnosis from Referral: Primary osteoarthritis of both knees [M17.0]  Evaluation Date: 6/20/2022  Authorization Period Expiration: 6/15/2023  Plan of Care Expiration: 8/15/2022  Progress Note Due: 7/20/2022  Visit # / Visits authorized: 1/ 1   FOTO: 1/3    Precautions: Standard, anxiety, depression, HTN, CKD III    Time In: 1045  Time Out: 1130  Total Appointment Time (timed & untimed codes): 45 minutes    SUBJECTIVE     Date of onset: chronic, LAITH 6/15/2022    History of current condition - Suze reports: history of OA in both knees, she spent many years in pain and usually managed it with OTC medicine. Recently her symptoms became worse and that is why she decided to see the doctor. She has had PT before in the past with some success. She struggles with pain levels globally, not just in knees, due to other medical conditions. Knees symptoms were getting worse but the steroid injection this past Wednesday in the right knee has greatly helped. No previous surgeries to either knee.     Falls: None in past 6 months    Imaging, Xray   Impression:     Stable degenerative changes     Electronically signed by: Emilie Prieto MD  Date:                                            06/15/2022  Time:                                           10:37    Prior Therapy: yes  Social History: lives alone  Occupation: retired  Prior Level of Function: ind  Current Level of Function: ind    Pain:  Current 2/10, worst 8/10, best 2/10   Location: both knees, right worse than  left  Description: Aching, Dull and Sharp  Aggravating Factors: Sitting and Night Time  Easing Factors: tylenol    Patients goals: more mobility and less pain     Medical History:   Past Medical History:   Diagnosis Date    Anxiety     Cataract     Chronic kidney disease, stage 3 11/20/2020    COVID-19 ruled out by laboratory testing 03/18/2022    [facility]    Depression     GERD (gastroesophageal reflux disease)     Glaucoma     History of colon polyps 2009    history as per patient    History of colonoscopy 2009    ok per pt    Hyperplastic colon polyp 05/12/2015    Hypertension     Hypothyroid     ?    Mild mitral regurgitation     Mitral valve prolapse     Nonobstructive atherosclerosis of coronary artery 09/2021    Moderate LAD stenosis, mild RCA stenosis.    Peptic ulcer disease     with gi bleed    Prediabetes     Primary hyperparathyroidism     Right knee DJD     Right knee DJD     Sleep apnea     Sleep disorder     TMJ (temporomandibular joint disorder)     Vitamin D deficiency        Surgical History:   Suze Chino  has a past surgical history that includes Hysterectomy; Bilateral salpingoophorectomy; Trabeculectomy (Right, 4/2/14); Argon Trabeculoplasty - OU - Both Eyes; Eye surgery; Colonoscopy (2015); Upper gastrointestinal endoscopy (2003); Glaucoma surgery (Right, 09/26/2019); Oophorectomy; Glaucoma surgery (Left, 04/05/2021); Coronary angiography (N/A, 9/24/2021); and Parathyroidectomy (12/01/2021).    Medications:   Suze has a current medication list which includes the following prescription(s): acetaminophen, albuterol, alprazolam, amlodipine, aspirin, calcium carb/vit d3/minerals, dorzolamide, duloxetine, ergocalciferol, ezetimibe, fexofenadine, isosorbide mononitrate, losartan, loteprednol, lumigan, metformin, montelukast, nitroglycerin, pantoprazole, prednisolone acetate, prednisolone acetate, and rosuvastatin.    Allergies:   Review of patient's  allergies indicates:   Allergen Reactions    Betoptic [betaxolol] Other (See Comments)     Fatigue, slowed heart rate.    Alphagan [brimonidine] Dermatitis    Pravastatin Other (See Comments)     aches  aches        OBJECTIVE     CMS Impairment/Limitation/Restriction for FOTO Knee Survey    Therapist reviewed FOTO scores for Suze Chino on 6/20/2022.   FOTO documents entered into EPIC - see Media section.    Limitation Score: 78%       Gait: slight antalgic    Balance: R LE = moderate sway <3 seconds, L LE = minimal sway 5-10 seconds, HHA    Reflex/Sensation: Sensation WNL to light touch B LE's . Reflexes at Achilles and patella tendon WNL.    Edema: WNL    Knee AROM:  (R) (L)  Supine Flexion  132 136  Prone flexion  115 121  Extension  (10) (5)      Lower Extremity Strength  Right LE   Left LE     Hip flexion: 4/5 Hip flexion: 4+/5   Hip extension:  3+/5 Hip extension: 4/5   Hip abduction: 4/5 Hip abduction: 5/5   Hip adduction:  4/5 Hip adduction:  4/5   Knee Flexion 4+/5 Knee Flexion 5/5   Knee Extension 5-/5 Knee Extension 5/5   Ankle dorsiflexion: 5/5 Ankle dorsiflexion: 5/5   Ankle plantarflexion: 5/5 Ankle plantarflexion: 5/5      Joint Mobility: patella WNL    Muscle Length:  Hamstring 90/90 Test RLE (30) deg lacking, LLE (20) deg lacking    Special Test:    Anterior Drawer - Posterior Drawer -  Lachman  - Post Lachman  -  Valgus stress  - Varus stress  -  Malinda  - Thessaly  -  Apley Compress - Apley Distract  -  Sandoval   - Clarkes  -  Brush   - Wilsons  -  ALYSIA  -    Palpation: Moderate TTP to medial and lateral joint line, bilateral knee      TREATMENT     Total Treatment time (time-based codes) separate from Evaluation: 10 minutes      Suze received the treatments listed below:      therapeutic exercises to develop strength, endurance, ROM, flexibility, posture and core stabilization for 10 minutes including:    HEP review and patient education  HEP includes: Seated hamstring stretch 10x10  sec  Single leg stance with UE support as needed 10x10 sec  Supine or seated hip adduction squeeze 10x10 sec    Possible next visit: Recumbent bike, shuttle activities, hip and glute strengthening, LE flexibility, balance activities, modalities PRN      PATIENT EDUCATION AND HOME EXERCISES     Education provided:   - HEP provided and reviewed  - Anatomy and Physiology pertaining to current condition  - Possible response to exercise  - Importance of PT compliance    Written Home Exercises Provided: yes. Exercises were reviewed and Suze was able to demonstrate them prior to the end of the session.  Suze demonstrated good  understanding of the education provided. See EMR under Patient Instructions for exercises provided during therapy sessions.      ASSESSMENT     Suze is a 71 y.o. female referred to outpatient Physical Therapy with a medical diagnosis of Primary osteoarthritis of both knees [M17.0]. Patient presents with weakness in both knees and bilateral LE, decreased ROM in both knees, poor LE flexibility, antalgic gait, and decreased functional mobility due to pain. Based on these symptoms presented and functional limitations, she would benefit from continued skilled PT at this time.     Patient prognosis is Good.   Patient will benefit from skilled outpatient Physical Therapy to address the deficits stated above and in the chart below, provide patient /family education, and to maximize patientt's level of independence.     Plan of care discussed with patient: Yes  Patient's spiritual, cultural and educational needs considered and patient is agreeable to the plan of care and goals as stated below:     Anticipated Barriers for therapy: none    Medical Necessity is demonstrated by the following  History  Co-morbidities and personal factors that may impact the plan of care Co-morbidities:   anxiety, depression, HTN, CKD III    Personal Factors:   no deficits     moderate   Examination  Body Structures and  Functions, activity limitations and participation restrictions that may impact the plan of care Body Regions:   lower extremities    Body Systems:    gross symmetry  ROM  strength  gross coordinated movement  balance  gait  transfers  transitions    Participation Restrictions:   ADLs, recreational    Activity limitations:   Learning and applying knowledge  no deficits    General Tasks and Commands  no deficits    Communication  no deficits    Mobility  lifting and carrying objects  walking  driving (bike, car, motorcycle)    Self care  looking after one's health    Domestic Life  shopping  cooking  doing house work (cleaning house, washing dishes, laundry)  assisting others    Interactions/Relationships  no deficits    Life Areas  basic economic transactions    Community and Social Life  community life  recreation and leisure         moderate   Clinical Presentation stable and uncomplicated low   Decision Making/ Complexity Score: low     Goals:  Short Term Goals: In 4 weeks:  1.Pt to be educated on HEP.  2.Patient to demo increased extension AROM by 5 degrees to improve functional mobility.  3.Patient to increase strength RLE by 1/2 grade to improve functional tasks.  4.Patient to have decreased pain to 5/10 at worst to improve quality of movement.  5.Patient to increase LE balance to 10 seconds or better bilaterally.  6.Patient to improve score on the FOTO by 10%.    Long Term Goals: In 8 weeks  1. Patient to perform daily activities including community ambulation without limitation.  2. Patient to demonstrate full knee AROM/PROM to WNL to improve functional mobility..  3. Patient to demonstrate increased LE strength to 5/5 to improve functional tasks..  4. Patient to have decreased pain to 4/10 with activities to improve quality of movement.  5. Patient to increase hamstring flexibility of LE to WNL to improve mobility and reduce symptoms.      PLAN     Plan of care Certification: 6/20/2022 to  8/15/2022.    Outpatient Physical Therapy 2 times weekly for 8 weeks to include the following interventions: Electrical Stimulation IFC/TENS, Gait Training, Manual Therapy, Moist Heat/ Ice, Neuromuscular Re-ed, Patient Education, Therapeutic Activities, Therapeutic Exercise and Dry needling.     Neo Zapata, PT DPT      I CERTIFY THE NEED FOR THESE SERVICES FURNISHED UNDER THIS PLAN OF TREATMENT AND WHILE UNDER MY CARE   Physician's comments:     Physician's Signature: ___________________________________________________

## 2022-06-22 ENCOUNTER — OFFICE VISIT (OUTPATIENT)
Dept: OPHTHALMOLOGY | Facility: CLINIC | Age: 72
End: 2022-06-22
Payer: MEDICARE

## 2022-06-22 DIAGNOSIS — Z98.890 POST-OPERATIVE STATE: Primary | ICD-10-CM

## 2022-06-22 DIAGNOSIS — H40.1133 PRIMARY OPEN ANGLE GLAUCOMA OF BOTH EYES, SEVERE STAGE: ICD-10-CM

## 2022-06-22 DIAGNOSIS — E11.9 TYPE 2 DIABETES MELLITUS WITHOUT COMPLICATION, WITHOUT LONG-TERM CURRENT USE OF INSULIN: ICD-10-CM

## 2022-06-22 DIAGNOSIS — Z98.41 CATARACT EXTRACTION STATUS, RIGHT EYE: ICD-10-CM

## 2022-06-22 DIAGNOSIS — E11.36 DIABETIC CATARACT OF LEFT EYE: ICD-10-CM

## 2022-06-22 PROCEDURE — 99213 OFFICE O/P EST LOW 20 MIN: CPT | Mod: PBBFAC | Performed by: OPHTHALMOLOGY

## 2022-06-22 PROCEDURE — 99999 PR PBB SHADOW E&M-EST. PATIENT-LVL III: ICD-10-PCS | Mod: PBBFAC,,, | Performed by: OPHTHALMOLOGY

## 2022-06-22 PROCEDURE — 99024 PR POST-OP FOLLOW-UP VISIT: ICD-10-PCS | Mod: POP,,, | Performed by: OPHTHALMOLOGY

## 2022-06-22 PROCEDURE — 99024 POSTOP FOLLOW-UP VISIT: CPT | Mod: POP,,, | Performed by: OPHTHALMOLOGY

## 2022-06-22 PROCEDURE — 99999 PR PBB SHADOW E&M-EST. PATIENT-LVL III: CPT | Mod: PBBFAC,,, | Performed by: OPHTHALMOLOGY

## 2022-06-22 NOTE — PROGRESS NOTES
HPI     Post-op Evaluation     Comments: Patient reports for 1 week PO. Reports of significant eye pain   OD, burning and redness over the last week. [facility] stable. Used Systane for   relief, states it didn't help. Reports of light sensitivity over the   weekend. Using gtts as advised.               Comments     Referred by Dr. Lorenzana     1. COAG/ LTG   -h/o non compliance   -intolerant of coag meds   -TRAB OD   -repeat SLT od done 8/9/2012 - good initial response IOP 18 to 13, SLT OD   08/17/17   -Primary SLT done os 9/13/2012 - minimal response 18 to 16   -Bleb Needling with [facility] Inj. OD 9/17/15   -SLT OS 9/29/2016 AND REPEAT SLT OS 12/10/2020   Xen Gel OD (09-26-19)   Xen Gel OS (04-) abExt LOT #[id number]   2. PCIOL OD 6/9/22/ SN60WF 12.0/ CDE: 11.04    *Stopped Alphagan OS TID because of dermatitis   *Stopped Betoptic bid ou due to slow pulse rate and feeling tired.   *latanoprost slows pulse rate   *DMX S50 BID (stopped after 2 weeks due to extreme fatigue)   *OD Rhopressa Qhs ( patient ran out in March never returned for followup )     *lotemax cost over $300       OD: Lumigan QHS   OD: Dorozolamide BID OD  OD: Lotemax BID   OS: Prednisolone OD QD            Last edited by Macario Shankar MD on 6/22/2022  1:58 PM. (History)            Assessment /Plan     For exam results, see Encounter Report.      ICD-10-CM ICD-9-CM    1. Post-operative state  Z98.890 V45.89    2. Primary open angle glaucoma of both eyes, severe stage  H40.1133 365.11      365.73    3. Cataract extraction status, right eye  Z98.41 V45.61    4. Type 2 diabetes mellitus without complication, without long-term current use of insulin  E11.9 250.00    5. Diabetic cataract of left eye  E11.36 250.50      366.41        Increased iritis symptoms over the weekend  Will increase to Lotemax QID         Return to clinic 2 weeks with refraction         OD: Lumigan QHS   OD: Dorozolamide BID OD  OD: Lotemax QID   OS: Prednisolone OD QD

## 2022-06-24 ENCOUNTER — OFFICE VISIT (OUTPATIENT)
Dept: FAMILY MEDICINE | Facility: CLINIC | Age: 72
End: 2022-06-24
Payer: MEDICARE

## 2022-06-24 VITALS
RESPIRATION RATE: 18 BRPM | BODY MASS INDEX: 30.16 KG/M2 | DIASTOLIC BLOOD PRESSURE: 88 MMHG | SYSTOLIC BLOOD PRESSURE: 138 MMHG | HEIGHT: 68 IN | TEMPERATURE: 98 F | WEIGHT: 199 LBS | HEART RATE: 69 BPM

## 2022-06-24 DIAGNOSIS — J06.9 VIRAL URI WITH COUGH: Primary | ICD-10-CM

## 2022-06-24 PROCEDURE — 99999 PR PBB SHADOW E&M-EST. PATIENT-LVL IV: CPT | Mod: PBBFAC,,, | Performed by: FAMILY MEDICINE

## 2022-06-24 PROCEDURE — 99999 PR PBB SHADOW E&M-EST. PATIENT-LVL IV: ICD-10-PCS | Mod: PBBFAC,,, | Performed by: FAMILY MEDICINE

## 2022-06-24 PROCEDURE — 99214 OFFICE O/P EST MOD 30 MIN: CPT | Mod: S$PBB,,, | Performed by: FAMILY MEDICINE

## 2022-06-24 PROCEDURE — 99214 OFFICE O/P EST MOD 30 MIN: CPT | Mod: PBBFAC,PO | Performed by: FAMILY MEDICINE

## 2022-06-24 PROCEDURE — 99214 PR OFFICE/OUTPT VISIT, EST, LEVL IV, 30-39 MIN: ICD-10-PCS | Mod: S$PBB,,, | Performed by: FAMILY MEDICINE

## 2022-06-24 RX ORDER — LIDOCAINE HYDROCHLORIDE 20 MG/ML
SOLUTION OROPHARYNGEAL
Qty: 120 ML | Refills: 1 | Status: SHIPPED | OUTPATIENT
Start: 2022-06-24 | End: 2022-08-16

## 2022-06-24 RX ORDER — BENZONATATE 200 MG/1
200 CAPSULE ORAL 3 TIMES DAILY PRN
Qty: 30 CAPSULE | Refills: 0 | Status: SHIPPED | OUTPATIENT
Start: 2022-06-24 | End: 2022-08-16

## 2022-06-24 RX ORDER — MINERAL OIL
ENEMA (ML) RECTAL
Qty: 30 TABLET | Refills: 0 | Status: SHIPPED | OUTPATIENT
Start: 2022-06-24

## 2022-06-24 RX ORDER — MONTELUKAST SODIUM 10 MG/1
10 TABLET ORAL NIGHTLY
Qty: 90 TABLET | Refills: 3 | Status: SHIPPED | OUTPATIENT
Start: 2022-06-24 | End: 2023-11-13

## 2022-06-24 NOTE — PROGRESS NOTES
Subjective:      Patient ID: Suze Chino is a 71 y.o. female.    Chief Complaint: Cough and Sore Throat      She has had a cough and ST for several days.  No fever.  No body aches.  She took eye drops for a recent surgery. She has not had other meds.  She has ulcers but they have not flared.  No diarrhea or abd pain .   She is not coughing up sputum.  She has not had covid exposure that she knows of.        The patient's Health Maintenance was reviewed and the following appears to be due at this time:   Health Maintenance Due   Topic Date Due    TETANUS VACCINE  Never done    Shingles Vaccine (1 of 2) Never done    Pneumococcal Vaccines (Age 65+) (1 - PCV) Never done    Colorectal Cancer Screening  05/12/2020    COVID-19 Vaccine (4 - Booster for Moderna series) 05/18/2022        Past Medical History:  Past Medical History:   Diagnosis Date    Anxiety     Cataract     Chronic kidney disease, stage 3 11/20/2020    COVID-19 ruled out by laboratory testing 03/18/2022    [facility]    Depression     GERD (gastroesophageal reflux disease)     Glaucoma     History of colon polyps 2009    history as per patient    History of colonoscopy 2009    ok per pt    Hyperplastic colon polyp 05/12/2015    Hypertension     Hypothyroid     ?    Mild mitral regurgitation     Mitral valve prolapse     Nonobstructive atherosclerosis of coronary artery 09/2021    Moderate LAD stenosis, mild RCA stenosis.    Peptic ulcer disease     with gi bleed    Prediabetes     Primary hyperparathyroidism     Right knee DJD     Right knee DJD     Sleep apnea     Sleep disorder     TMJ (temporomandibular joint disorder)     Vitamin D deficiency      Past Surgical History:   Procedure Laterality Date    BILATERAL SALPINGOOPHORECTOMY      COLONOSCOPY  2015    CORONARY ANGIOGRAPHY N/A 9/24/2021    Procedure: ANGIOGRAM, CORONARY ARTERY;  Surgeon: Chago Gregory MD;  Location: [facility];  Service: Cardiology;   Laterality: N/A;    EYE SURGERY      laser sx    GLAUCOMA SURGERY Right 09/26/2019    Xen Gel    GLAUCOMA SURGERY Left 04/05/2021    abext XenGel OS    HYSTERECTOMY      OOPHORECTOMY      PARATHYROIDECTOMY  12/01/2021    SLT - OU - BOTH EYES      TRABECULECTOMY Right 4/2/14    OD (dr. grossman)    UPPER GASTROINTESTINAL ENDOSCOPY  2003     Review of patient's allergies indicates:   Allergen Reactions    Betoptic [betaxolol] Other (See Comments)     Fatigue, slowed heart rate.    Alphagan [brimonidine] Dermatitis    Pravastatin Other (See Comments)     aches  aches     Current Outpatient Medications on File Prior to Visit   Medication Sig Dispense Refill    acetaminophen (TYLENOL) 650 MG TbSR Take 1,300 mg by mouth 2 (two) times a day.      albuterol (PROAIR HFA) 90 mcg/actuation inhaler Inhale 2 puffs into the lungs every 6 (six) hours as needed for Wheezing. Rescue 18 g 5    ALPRAZolam (XANAX) 0.25 MG tablet TAKE 1 TABLET BY MOUTH THREE TIMES A DAY AS NEEDED FOR ANXIETY 30 tablet 0    amLODIPine (NORVASC) 2.5 MG tablet Take 1 tablet (2.5 mg total) by mouth once daily. 90 tablet 3    aspirin (ECOTRIN) 81 MG EC tablet Take 1 tablet (81 mg total) by mouth once daily. 30 tablet 11    calcium carb/vit D3/minerals (CALTRATE PLUS ORAL) Take 1 tablet by mouth 2 (two) times a day.      dorzolamide (TRUSOPT) 2 % ophthalmic solution Place 1 drop into the right eye 2 (two) times a day. 5 mL 6    DULoxetine (CYMBALTA) 30 MG capsule Take 1 capsule (30 mg total) by mouth once daily. 30 capsule 5    ergocalciferol (ERGOCALCIFEROL) 50,000 unit Cap TAKE 1 CAPSULE BY MOUTH ONE TIME PER WEEK 12 capsule 3    ezetimibe (ZETIA) 10 mg tablet Take 1 tablet (10 mg total) by mouth every evening. 90 tablet 3    isosorbide mononitrate (IMDUR) 30 MG 24 hr tablet Take 1 tablet (30 mg total) by mouth once daily. 30 tablet 11    losartan (COZAAR) 100 MG tablet TAKE 1 TABLET BY MOUTH EVERY DAY (Patient taking differently:  Take 100 mg by mouth once daily.) 90 tablet 2    loteprednol (LOTEMAX) 0.5 % ophthalmic suspension Place 1 drop into the right eye 2 (two) times a day. 5 mL 1    LUMIGAN 0.01 % Drop PLACE 1 DROP INTO BOTH EYES EVERY EVENING. 2.5 mL 6    metFORMIN (GLUCOPHAGE-XR) 500 MG ER 24hr tablet Take 1 tablet (500 mg total) by mouth once daily. Do not break, chew, or crush      pantoprazole (PROTONIX) 40 MG tablet TAKE 1 TABLET BY MOUTH EVERY DAY 90 tablet 3    prednisoLONE acetate (PRED FORTE) 1 % DrpS PLACE 1 DROP INTO THE RIGHT EYE ONCE DAILY. 5 mL 1    prednisoLONE acetate (PRED FORTE) 1 % DrpS INSTILL 1 DROP INTO LEFT EYE 4 TIMES A DAY 10 mL 1    rosuvastatin (CRESTOR) 10 MG tablet Take 10 mg by mouth once daily.      [DISCONTINUED] fexofenadine (ALLEGRA) 180 MG tablet Allegra Allergy Take 1 Tablet (oral) 1 time per day for 14 days 20220318 tablet 1 time per day oral 14 days active 180 mg      [DISCONTINUED] montelukast (SINGULAIR) 10 mg tablet Take 1 tablet (10 mg total) by mouth every evening. 90 tablet 3    nitroGLYCERIN (NITROSTAT) 0.4 MG SL tablet Place 1 tablet (0.4 mg total) under the tongue every 5 (five) minutes as needed for Chest pain. (Patient not taking: No sig reported) 30 tablet 0     No current facility-administered medications on file prior to visit.     Social History     Socioeconomic History    Marital status:    Tobacco Use    Smoking status: Never Smoker    Smokeless tobacco: Never Used   Substance and Sexual Activity    Alcohol use: Yes     Alcohol/week: 2.0 standard drinks     Types: 2 Standard drinks or equivalent per week     Comment: occasional    Drug use: No    Sexual activity: Not Currently     Family History   Problem Relation Age of Onset    Heart disease Father         before age 50    Diabetes Mother     Glaucoma Mother     Leukemia Mother     Cataracts Mother     Breast cancer Mother 60    Diabetes Sister     Glaucoma Sister     Diabetes Brother      "Glaucoma Brother     Lung cancer Brother     Breast cancer Other 40        genetics -    Colon cancer Maternal Aunt        Review of Systems   HENT: Positive for postnasal drip, sinus pressure and sore throat.    Respiratory: Positive for cough. Negative for shortness of breath and wheezing.    Cardiovascular: Negative for chest pain.       Objective:   /88   Pulse 69   Temp 98.1 °F (36.7 °C)   Resp 18   Ht 5' 8" (1.727 m)   Wt 90.3 kg (199 lb)   LMP  (LMP Unknown)   BMI 30.26 kg/m²     Physical Exam  Constitutional:       General: She is not in acute distress.     Appearance: She is well-developed. She is not diaphoretic.   HENT:      Mouth/Throat:      Pharynx: Posterior oropharyngeal erythema present. No oropharyngeal exudate.      Tonsils: No tonsillar abscesses.   Eyes:      General: No scleral icterus.     Conjunctiva/sclera: Conjunctivae normal.   Pulmonary:      Effort: Pulmonary effort is normal. No respiratory distress.   Musculoskeletal:      Cervical back: Normal range of motion.   Lymphadenopathy:      Head:      Right side of head: No submental, submandibular or preauricular adenopathy.      Left side of head: No submental, submandibular or preauricular adenopathy.   Neurological:      Mental Status: She is alert and oriented to person, place, and time.   Psychiatric:         Behavior: Behavior normal.         Thought Content: Thought content normal.         Judgment: Judgment normal.       Assessment:     1. Viral URI with cough      Plan:   I am having Suze RAYMOND Chino start on benzonatate and LIDOcaine HCl 2%. I am also having her maintain her acetaminophen, LUMIGAN, losartan, amLODIPine, prednisoLONE acetate, dorzolamide, metFORMIN, aspirin, nitroGLYCERIN, ezetimibe, ergocalciferol, pantoprazole, rosuvastatin, calcium carb/vit D3/minerals (CALTRATE PLUS ORAL), albuterol, DULoxetine, isosorbide mononitrate, prednisoLONE acetate, ALPRAZolam, loteprednol, montelukast, and " fexofenadine.  Problem List Items Addressed This Visit    None     Visit Diagnoses     Viral URI with cough    -  Primary    Relevant Orders    POCT Influenza A/B    POCT Strep A, Molecular    POCT COVID-19 Rapid Screening        No follow-ups on file.    Suze was seen today for cough and sore throat.    Diagnoses and all orders for this visit:    Viral URI with cough  -     POCT Influenza A/B  -     POCT Strep A, Molecular  -     POCT COVID-19 Rapid Screening    Other orders  -     benzonatate (TESSALON) 200 MG capsule; Take 1 capsule (200 mg total) by mouth 3 (three) times daily as needed for Cough.  -     montelukast (SINGULAIR) 10 mg tablet; Take 1 tablet (10 mg total) by mouth every evening.  -     LIDOcaine HCl 2% (XYLOCAINE) 2 % Soln; by Mucous Membrane route every 3 (three) hours as needed (throat pain). Use 5 cc per dose.  -     fexofenadine (ALLEGRA) 180 MG tablet; Allegra Allergy Take 1 Tablet (oral) 1 time per day for 14 days 20220318 tablet 1 time per day oral 14 days active 180 mg      Medications Ordered This Encounter   Medications    benzonatate (TESSALON) 200 MG capsule     Sig: Take 1 capsule (200 mg total) by mouth 3 (three) times daily as needed for Cough.     Dispense:  30 capsule     Refill:  0    fexofenadine (ALLEGRA) 180 MG tablet     Sig: Allegra Allergy Take 1 Tablet (oral) 1 time per day for 14 days 20220318 tablet 1 time per day oral 14 days active 180 mg     Dispense:  30 tablet     Refill:  0    LIDOcaine HCl 2% (XYLOCAINE) 2 % Soln     Sig: by Mucous Membrane route every 3 (three) hours as needed (throat pain). Use 5 cc per dose.     Dispense:  120 mL     Refill:  1    montelukast (SINGULAIR) 10 mg tablet     Sig: Take 1 tablet (10 mg total) by mouth every evening.     Dispense:  90 tablet     Refill:  3     The patient was instructed to stop the following meds:  Medications Discontinued During This Encounter   Medication Reason    montelukast (SINGULAIR) 10 mg tablet Reorder     fexofenadine (ALLEGRA) 180 MG tablet Reorder     Orders Placed This Encounter   Procedures    POCT Influenza A/B    POCT Strep A, Molecular    POCT COVID-19 Rapid Screening     Order Specific Question:   Is the patient symptomatic?     Answer:   Yes       Medication List with Changes/Refills   New Medications    BENZONATATE (TESSALON) 200 MG CAPSULE    Take 1 capsule (200 mg total) by mouth 3 (three) times daily as needed for Cough.    LIDOCAINE HCL 2% (XYLOCAINE) 2 % SOLN    by Mucous Membrane route every 3 (three) hours as needed (throat pain). Use 5 cc per dose.   Current Medications    ACETAMINOPHEN (TYLENOL) 650 MG TBSR    Take 1,300 mg by mouth 2 (two) times a day.    ALBUTEROL (PROAIR HFA) 90 MCG/ACTUATION INHALER    Inhale 2 puffs into the lungs every 6 (six) hours as needed for Wheezing. Rescue    ALPRAZOLAM (XANAX) 0.25 MG TABLET    TAKE 1 TABLET BY MOUTH THREE TIMES A DAY AS NEEDED FOR ANXIETY    AMLODIPINE (NORVASC) 2.5 MG TABLET    Take 1 tablet (2.5 mg total) by mouth once daily.    ASPIRIN (ECOTRIN) 81 MG EC TABLET    Take 1 tablet (81 mg total) by mouth once daily.    CALCIUM CARB/VIT D3/MINERALS (CALTRATE PLUS ORAL)    Take 1 tablet by mouth 2 (two) times a day.    DORZOLAMIDE (TRUSOPT) 2 % OPHTHALMIC SOLUTION    Place 1 drop into the right eye 2 (two) times a day.    DULOXETINE (CYMBALTA) 30 MG CAPSULE    Take 1 capsule (30 mg total) by mouth once daily.    ERGOCALCIFEROL (ERGOCALCIFEROL) 50,000 UNIT CAP    TAKE 1 CAPSULE BY MOUTH ONE TIME PER WEEK    EZETIMIBE (ZETIA) 10 MG TABLET    Take 1 tablet (10 mg total) by mouth every evening.    ISOSORBIDE MONONITRATE (IMDUR) 30 MG 24 HR TABLET    Take 1 tablet (30 mg total) by mouth once daily.    LOSARTAN (COZAAR) 100 MG TABLET    TAKE 1 TABLET BY MOUTH EVERY DAY    LOTEPREDNOL (LOTEMAX) 0.5 % OPHTHALMIC SUSPENSION    Place 1 drop into the right eye 2 (two) times a day.    LUMIGAN 0.01 % DROP    PLACE 1 DROP INTO BOTH EYES EVERY EVENING.     METFORMIN (GLUCOPHAGE-XR) 500 MG ER 24HR TABLET    Take 1 tablet (500 mg total) by mouth once daily. Do not break, chew, or crush    NITROGLYCERIN (NITROSTAT) 0.4 MG SL TABLET    Place 1 tablet (0.4 mg total) under the tongue every 5 (five) minutes as needed for Chest pain.    PANTOPRAZOLE (PROTONIX) 40 MG TABLET    TAKE 1 TABLET BY MOUTH EVERY DAY    PREDNISOLONE ACETATE (PRED FORTE) 1 % DRPS    PLACE 1 DROP INTO THE RIGHT EYE ONCE DAILY.    PREDNISOLONE ACETATE (PRED FORTE) 1 % DRPS    INSTILL 1 DROP INTO LEFT EYE 4 TIMES A DAY    ROSUVASTATIN (CRESTOR) 10 MG TABLET    Take 10 mg by mouth once daily.   Changed and/or Refilled Medications    Modified Medication Previous Medication    FEXOFENADINE (ALLEGRA) 180 MG TABLET fexofenadine (ALLEGRA) 180 MG tablet       Allegra Allergy Take 1 Tablet (oral) 1 time per day for 14 days 20220318 tablet 1 time per day oral 14 days active 180 mg    Allegra Allergy Take 1 Tablet (oral) 1 time per day for 14 days 20220318 tablet 1 time per day oral 14 days active 180 mg    MONTELUKAST (SINGULAIR) 10 MG TABLET montelukast (SINGULAIR) 10 mg tablet       Take 1 tablet (10 mg total) by mouth every evening.    Take 1 tablet (10 mg total) by mouth every evening.      Medication List with Changes/Refills   New Medications    BENZONATATE (TESSALON) 200 MG CAPSULE    Take 1 capsule (200 mg total) by mouth 3 (three) times daily as needed for Cough.       Start Date: 6/24/2022 End Date: --    LIDOCAINE HCL 2% (XYLOCAINE) 2 % SOLN    by Mucous Membrane route every 3 (three) hours as needed (throat pain). Use 5 cc per dose.       Start Date: 6/24/2022 End Date: --   Current Medications    ACETAMINOPHEN (TYLENOL) 650 MG TBSR    Take 1,300 mg by mouth 2 (two) times a day.       Start Date: --        End Date: --    ALBUTEROL (PROAIR HFA) 90 MCG/ACTUATION INHALER    Inhale 2 puffs into the lungs every 6 (six) hours as needed for Wheezing. Rescue       Start Date: 2/15/2022 End Date: --     ALPRAZOLAM (XANAX) 0.25 MG TABLET    TAKE 1 TABLET BY MOUTH THREE TIMES A DAY AS NEEDED FOR ANXIETY       Start Date: 6/2/2022  End Date: --    AMLODIPINE (NORVASC) 2.5 MG TABLET    Take 1 tablet (2.5 mg total) by mouth once daily.       Start Date: 7/30/2021 End Date: 7/30/2022    ASPIRIN (ECOTRIN) 81 MG EC TABLET    Take 1 tablet (81 mg total) by mouth once daily.       Start Date: 9/25/2021 End Date: 9/25/2022    CALCIUM CARB/VIT D3/MINERALS (CALTRATE PLUS ORAL)    Take 1 tablet by mouth 2 (two) times a day.       Start Date: --        End Date: --    DORZOLAMIDE (TRUSOPT) 2 % OPHTHALMIC SOLUTION    Place 1 drop into the right eye 2 (two) times a day.       Start Date: 8/16/2021 End Date: --    DULOXETINE (CYMBALTA) 30 MG CAPSULE    Take 1 capsule (30 mg total) by mouth once daily.       Start Date: 3/30/2022 End Date: --    ERGOCALCIFEROL (ERGOCALCIFEROL) 50,000 UNIT CAP    TAKE 1 CAPSULE BY MOUTH ONE TIME PER WEEK       Start Date: 10/12/2021End Date: --    EZETIMIBE (ZETIA) 10 MG TABLET    Take 1 tablet (10 mg total) by mouth every evening.       Start Date: 10/1/2021 End Date: 6/24/2022    ISOSORBIDE MONONITRATE (IMDUR) 30 MG 24 HR TABLET    Take 1 tablet (30 mg total) by mouth once daily.       Start Date: 5/23/2022 End Date: 5/23/2023    LOSARTAN (COZAAR) 100 MG TABLET    TAKE 1 TABLET BY MOUTH EVERY DAY       Start Date: 5/24/2021 End Date: --    LOTEPREDNOL (LOTEMAX) 0.5 % OPHTHALMIC SUSPENSION    Place 1 drop into the right eye 2 (two) times a day.       Start Date: 6/17/2022 End Date: 6/17/2023    LUMIGAN 0.01 % DROP    PLACE 1 DROP INTO BOTH EYES EVERY EVENING.       Start Date: 3/5/2021  End Date: --    METFORMIN (GLUCOPHAGE-XR) 500 MG ER 24HR TABLET    Take 1 tablet (500 mg total) by mouth once daily. Do not break, chew, or crush       Start Date: 9/27/2021 End Date: --    NITROGLYCERIN (NITROSTAT) 0.4 MG SL TABLET    Place 1 tablet (0.4 mg total) under the tongue every 5 (five) minutes as needed for  Chest pain.       Start Date: 9/24/2021 End Date: 10/24/2021    PANTOPRAZOLE (PROTONIX) 40 MG TABLET    TAKE 1 TABLET BY MOUTH EVERY DAY       Start Date: 12/20/2021End Date: --    PREDNISOLONE ACETATE (PRED FORTE) 1 % DRPS    PLACE 1 DROP INTO THE RIGHT EYE ONCE DAILY.       Start Date: 8/10/2021 End Date: --    PREDNISOLONE ACETATE (PRED FORTE) 1 % DRPS    INSTILL 1 DROP INTO LEFT EYE 4 TIMES A DAY       Start Date: 5/25/2022 End Date: --    ROSUVASTATIN (CRESTOR) 10 MG TABLET    Take 10 mg by mouth once daily.       Start Date: --        End Date: --   Changed and/or Refilled Medications    Modified Medication Previous Medication    FEXOFENADINE (ALLEGRA) 180 MG TABLET fexofenadine (ALLEGRA) 180 MG tablet       Allegra Allergy Take 1 Tablet (oral) 1 time per day for 14 days 20220318 tablet 1 time per day oral 14 days active 180 mg    Allegra Allergy Take 1 Tablet (oral) 1 time per day for 14 days 20220318 tablet 1 time per day oral 14 days active 180 mg       Start Date: 6/24/2022 End Date: --    Start Date: 3/18/2022 End Date: 6/24/2022    MONTELUKAST (SINGULAIR) 10 MG TABLET montelukast (SINGULAIR) 10 mg tablet       Take 1 tablet (10 mg total) by mouth every evening.    Take 1 tablet (10 mg total) by mouth every evening.       Start Date: 6/24/2022 End Date: --    Start Date: 12/30/2021End Date: 6/24/2022

## 2022-06-27 RX ORDER — LOSARTAN POTASSIUM 100 MG/1
TABLET ORAL
Qty: 90 TABLET | Refills: 1 | Status: SHIPPED | OUTPATIENT
Start: 2022-06-27 | End: 2023-03-12

## 2022-06-27 NOTE — TELEPHONE ENCOUNTER
Refill Authorization Note   Suze Chino  is requesting a refill authorization.  Brief Assessment and Rationale for Refill:  Approve     Medication Therapy Plan:       Medication Reconciliation Completed: No   Comments:     No Care Gaps recommended.     Note composed:3:49 PM 06/27/2022

## 2022-06-27 NOTE — TELEPHONE ENCOUNTER
No new care gaps identified.  [facility] Embedded Care Gaps. Reference number: [id number]. 6/27/2022   12:04:13 AM OBIET

## 2022-06-28 ENCOUNTER — CLINICAL SUPPORT (OUTPATIENT)
Dept: REHABILITATION | Facility: HOSPITAL | Age: 72
End: 2022-06-28
Payer: MEDICARE

## 2022-06-28 DIAGNOSIS — M25.661 DECREASED RANGE OF MOTION (ROM) OF BOTH KNEES: Primary | ICD-10-CM

## 2022-06-28 DIAGNOSIS — R29.898 WEAKNESS OF BOTH LOWER EXTREMITIES: ICD-10-CM

## 2022-06-28 DIAGNOSIS — M25.662 DECREASED RANGE OF MOTION (ROM) OF BOTH KNEES: Primary | ICD-10-CM

## 2022-06-28 DIAGNOSIS — R26.89 DECREASED FUNCTIONAL MOBILITY: ICD-10-CM

## 2022-06-28 PROCEDURE — 97110 THERAPEUTIC EXERCISES: CPT | Mod: PN

## 2022-06-28 NOTE — PROGRESS NOTES
[facility]   Physical Therapy Treatment Note     Name: Suze Chino  Clinic Number: [id number]    Therapy Diagnosis:   Encounter Diagnoses   Name Primary?    Decreased range of motion (ROM) of both knees Yes    Weakness of both lower extremities     Decreased functional mobility      Physician: Shaji Garcia IV, MD    Visit Date: 6/28/2022    Physician Orders: PT Eval and Treat  Medical Diagnosis from Referral: Primary osteoarthritis of both knees [M17.0]  Evaluation Date: 6/20/2022  Authorization Period Expiration: 12/31/2022  Plan of Care Expiration: 8/15/2022  Progress Note Due: 7/20/2022  Visit # / Visits authorized: 1/20 auth (1/1) eval   FOTO: 1/3     Precautions: Standard, anxiety, depression, HTN, CKD III    PTA Visit #: 0/5     Time In: 0915  Time Out: 0953  Total Billable Time: 38 minutes    SUBJECTIVE     Pt reports: she has been sick for the past week and has not done much exercise at home.     She was compliant with home exercise program.  Response to previous treatment: initial evluation  Functional change: TBD    Pain: 4/10  Location: global and bilateral knees    OBJECTIVE     Objective Measures updated at progress report unless specified.     Treatment     Suze received the treatments listed below:      therapeutic exercises to develop strength, endurance, ROM, flexibility, posture and core stabilization for 38 minutes including:     Recumbent bike 5 min lvl 2  Bilateral shuttle 2 min 1.5 bands  Single leg shuttle x20 1 band  Long sitting hamstring stretch 2 min each  Sit<>stands, chair, hands on thigh x10 reps  Standing calf raises x20 reps  Toe taps on 6 inch step 2x20 reps  Bridging x20 reps bilateral  Heel prop knee extension stretch 2 min 5#  Prone quad stretch with strap 2 min RLE       Possible next visit: Recumbent bike, shuttle activities, hip and glute strengthening, LE flexibility, balance activities, modalities PRN    Patient Education and Home Exercises      Home Exercises Provided and Patient Education Provided     Education provided:   - HEP provided and reviewed  - Anatomy and Physiology pertaining to current condition  - Possible response to exercise  - Importance of PT compliance    Written Home Exercises Provided: Patient instructed to cont prior HEP. Exercises were reviewed and Suze was able to demonstrate them prior to the end of the session.  Suze demonstrated good  understanding of the education provided. See EMR under Patient Instructions for exercises provided during therapy sessions    ASSESSMENT     Pt tolerates exercises for LE strengthen, mobility training, and ROM/flexibility. She expresses slight fatigue but this is improved with rest periods. We will assess any changes in symptoms and conintue with PT POC.     Suze Is progressing well towards her goals.   Pt prognosis is Good.     Pt will continue to benefit from skilled outpatient physical therapy to address the deficits listed in the problem list box on initial evaluation, provide pt/family education and to maximize pt's level of independence in the home and community environment.     Pt's spiritual, cultural and educational needs considered and pt agreeable to plan of care and goals.     Anticipated barriers to physical therapy: none    Goals:  Short Term Goals: In 4 weeks:  1.Pt to be educated on HEP. (progressing, not met)  2.Patient to demo increased extension AROM by 5 degrees to improve functional mobility. (progressing, not met)  3.Patient to increase strength RLE by 1/2 grade to improve functional tasks. (progressing, not met)  4.Patient to have decreased pain to 5/10 at worst to improve quality of movement. (progressing, not met)  5.Patient to increase LE balance to 10 seconds or better bilaterally. (progressing, not met)  6.Patient to improve score on the FOTO by 10%. (progressing, not met)     Long Term Goals: In 8 weeks  1. Patient to perform daily activities including community  ambulation without limitation. (progressing, not met)  2. Patient to demonstrate full knee AROM/PROM to WNL to improve functional mobility. (progressing, not met)  3. Patient to demonstrate increased LE strength to 5/5 to improve functional tasks. (progressing, not met)  4. Patient to have decreased pain to 4/10 with activities to improve quality of movement. (progressing, not met)  5. Patient to increase hamstring flexibility of LE to WNL to improve mobility and reduce symptoms. (progressing, not met)    PLAN     Continue with PT to address functional mobility limitations and improve QoL.    Neo Zapata, PT DPT

## 2022-06-29 ENCOUNTER — LAB VISIT (OUTPATIENT)
Dept: LAB | Facility: HOSPITAL | Age: 72
End: 2022-06-29
Attending: FAMILY MEDICINE
Payer: MEDICARE

## 2022-06-29 ENCOUNTER — TELEPHONE (OUTPATIENT)
Dept: FAMILY MEDICINE | Facility: CLINIC | Age: 72
End: 2022-06-29
Payer: MEDICARE

## 2022-06-29 DIAGNOSIS — E21.0 PRIMARY HYPERPARATHYROIDISM: ICD-10-CM

## 2022-06-29 LAB
ANION GAP SERPL CALC-SCNC: 9 MMOL/L (ref 8–16)
BUN SERPL-MCNC: 19 MG/DL (ref 8–23)
CALCIUM SERPL-MCNC: 10.6 MG/DL (ref 8.7–10.5)
CHLORIDE SERPL-SCNC: 103 MMOL/L (ref 95–110)
CO2 SERPL-SCNC: 28 MMOL/L (ref 23–29)
CREAT SERPL-MCNC: 1.2 MG/DL (ref 0.5–1.4)
EST. GFR  (AFRICAN AMERICAN): 52.5 ML/MIN/1.73 M^2
EST. GFR  (NON AFRICAN AMERICAN): 45.6 ML/MIN/1.73 M^2
GLUCOSE SERPL-MCNC: 107 MG/DL (ref 70–110)
POTASSIUM SERPL-SCNC: 4.2 MMOL/L (ref 3.5–5.1)
PTH-INTACT SERPL-MCNC: 34.3 PG/ML (ref 9–77)
SODIUM SERPL-SCNC: 140 MMOL/L (ref 136–145)

## 2022-06-29 PROCEDURE — 80048 BASIC METABOLIC PNL TOTAL CA: CPT | Performed by: FAMILY MEDICINE

## 2022-06-29 PROCEDURE — 83970 ASSAY OF PARATHORMONE: CPT | Performed by: FAMILY MEDICINE

## 2022-06-29 PROCEDURE — 36415 COLL VENOUS BLD VENIPUNCTURE: CPT | Mod: PO | Performed by: FAMILY MEDICINE

## 2022-06-29 NOTE — TELEPHONE ENCOUNTER
Patient asking for permanently impaired handicap form, due to hyperparathyroidism, leg weakness and pain and SOB on exertion since having Covid, please advise, for in MD box

## 2022-06-30 NOTE — PROGRESS NOTES
[facility]   Physical Therapy Treatment Note     Name: Suze Chino  Clinic Number: [id number]    Therapy Diagnosis:   Encounter Diagnoses   Name Primary?    Decreased range of motion (ROM) of both knees Yes    Weakness of both lower extremities     Decreased functional mobility      Physician: Shaji Garcia IV, MD    Visit Date: 7/1/2022    Physician Orders: PT Eval and Treat  Medical Diagnosis from Referral: Primary osteoarthritis of both knees [M17.0]  Evaluation Date: 6/20/2022  Authorization Period Expiration: 12/31/2022  Plan of Care Expiration: 8/15/2022  Progress Note Due: 7/20/2022  Visit # / Visits authorized: 2/20 auth (1/1) eval   FOTO: 1/3  PTA Visit #: 1/5      Precautions: Standard, anxiety, depression, HTN, CKD III    Time In: 802 am  Time Out: 840 am  Total Billable Time: 38 minutes    SUBJECTIVE     Pt reports: her whole body doesn't feel good.    She was compliant with home exercise program.  Response to previous treatment: initial evluation  Functional change: TBD    Pain: 4/10  Location: global and bilateral knees    OBJECTIVE     Objective Measures updated at progress report unless specified.     Treatment     Suze received the treatments listed below:      Therapeutic exercises to develop strength, endurance, ROM, flexibility, posture and core stabilization for 38 minutes including:     Recumbent bike 5 min lvl 2  Bilateral shuttle 2 min 1.5 bands  Single leg shuttle x20 1 band  Standing calf raises x20 reps  Standing hip abduction x10 ea  Standing hip extension x10 ea  Toe taps on 6 inch step 2x20 reps  Sit<>stands, chair, hands on thigh x10 reps  Long sitting hamstring stretch 2 min each  Bridging x20 reps  Heel prop knee extension stretch 5# 2 min ea  Prone quad stretch with strap 2 min ea    Possible next visit: hip and glute strengthening, LE flexibility, balance activities, modalities PRN    Patient Education and Home Exercises     Education provided:   -  HEP reviewed  - Anatomy and Physiology pertaining to current condition  - Possible response to exercise  - Importance of PT compliance    Written Home Exercises Provided: Patient instructed to cont prior HEP. Exercises were reviewed and Suze was able to demonstrate them prior to the end of the session.  Suze demonstrated good  understanding of the education provided. See EMR under Patient Instructions for exercises provided during therapy sessions    ASSESSMENT     Patient with minimal fatigue noted throughout session. Standing hip exercises were added, patient required verbal and visual cuing for prevention of trunk compensation.    Suze Is progressing well towards her goals.   Pt prognosis is Good.     Pt will continue to benefit from skilled outpatient physical therapy to address the deficits listed in the problem list box on initial evaluation, provide pt/family education and to maximize pt's level of independence in the home and community environment.     Pt's spiritual, cultural and educational needs considered and pt agreeable to plan of care and goals.     Anticipated barriers to physical therapy: none    Goals:  Short Term Goals: In 4 weeks:  1.Pt to be educated on HEP. (progressing, not met)  2.Patient to demo increased extension AROM by 5 degrees to improve functional mobility. (progressing, not met)  3.Patient to increase strength RLE by 1/2 grade to improve functional tasks. (progressing, not met)  4.Patient to have decreased pain to 5/10 at worst to improve quality of movement. (progressing, not met)  5.Patient to increase LE balance to 10 seconds or better bilaterally. (progressing, not met)  6.Patient to improve score on the FOTO by 10%. (progressing, not met)     Long Term Goals: In 8 weeks  1. Patient to perform daily activities including community ambulation without limitation. (progressing, not met)  2. Patient to demonstrate full knee AROM/PROM to WNL to improve functional mobility.  (progressing, not met)  3. Patient to demonstrate increased LE strength to 5/5 to improve functional tasks. (progressing, not met)  4. Patient to have decreased pain to 4/10 with activities to improve quality of movement. (progressing, not met)  5. Patient to increase hamstring flexibility of LE to WNL to improve mobility and reduce symptoms. (progressing, not met)    PLAN     Continue with PT to address functional mobility limitations and improve QoL.    Robert Perry, PTA

## 2022-07-01 ENCOUNTER — CLINICAL SUPPORT (OUTPATIENT)
Dept: REHABILITATION | Facility: HOSPITAL | Age: 72
End: 2022-07-01
Payer: MEDICARE

## 2022-07-01 DIAGNOSIS — R29.898 WEAKNESS OF BOTH LOWER EXTREMITIES: ICD-10-CM

## 2022-07-01 DIAGNOSIS — M25.661 DECREASED RANGE OF MOTION (ROM) OF BOTH KNEES: Primary | ICD-10-CM

## 2022-07-01 DIAGNOSIS — R26.89 DECREASED FUNCTIONAL MOBILITY: ICD-10-CM

## 2022-07-01 DIAGNOSIS — M25.662 DECREASED RANGE OF MOTION (ROM) OF BOTH KNEES: Primary | ICD-10-CM

## 2022-07-01 PROCEDURE — 97110 THERAPEUTIC EXERCISES: CPT | Mod: PN,CQ

## 2022-07-05 ENCOUNTER — CLINICAL SUPPORT (OUTPATIENT)
Dept: REHABILITATION | Facility: HOSPITAL | Age: 72
End: 2022-07-05
Payer: MEDICARE

## 2022-07-05 DIAGNOSIS — R29.898 WEAKNESS OF BOTH LOWER EXTREMITIES: ICD-10-CM

## 2022-07-05 DIAGNOSIS — M25.662 DECREASED RANGE OF MOTION (ROM) OF BOTH KNEES: Primary | ICD-10-CM

## 2022-07-05 DIAGNOSIS — R26.89 DECREASED FUNCTIONAL MOBILITY: ICD-10-CM

## 2022-07-05 DIAGNOSIS — M25.661 DECREASED RANGE OF MOTION (ROM) OF BOTH KNEES: Primary | ICD-10-CM

## 2022-07-05 PROCEDURE — 97110 THERAPEUTIC EXERCISES: CPT | Mod: PN

## 2022-07-05 NOTE — PROGRESS NOTES
[facility]   Physical Therapy Treatment Note     Name: Suze Chino  Clinic Number: [id number]    Therapy Diagnosis:   Encounter Diagnoses   Name Primary?    Decreased range of motion (ROM) of both knees Yes    Weakness of both lower extremities     Decreased functional mobility      Physician: Shaji Garcia IV, MD    Visit Date: 7/5/2022    Physician Orders: PT Eval and Treat  Medical Diagnosis from Referral: Primary osteoarthritis of both knees [M17.0]  Evaluation Date: 6/20/2022  Authorization Period Expiration: 12/31/2022  Plan of Care Expiration: 8/15/2022  Progress Note Due: 7/20/2022  Visit # / Visits authorized: 3/20 auth (1/1) eval   FOTO: 1/3  PTA Visit #: 0/5      Precautions: Standard, anxiety, depression, HTN, CKD III    Time In: 0920 am  Time Out: 0958 am  Total Billable Time: 38 minutes    SUBJECTIVE     Pt reports: she is not in too much pain this morning. Just usual aches and soreness.     She was compliant with home exercise program.  Response to previous treatment: no adverse response   Functional change: improved ROM    Pain: 3/10  Location: global and bilateral knees    OBJECTIVE     Objective Measures updated at progress report unless specified.     Treatment     Suze received the treatments listed below:      Therapeutic exercises to develop strength, endurance, ROM, flexibility, posture and core stabilization for 38 minutes including:     Recumbent bike 5 min lvl 2  Bilateral shuttle 2 min 1.5 bands  Single leg shuttle x20 1 band  Standing calf raises x20 reps  Standing hip abduction x10 ea  Standing hip extension x10 ea  Toe taps on 6 inch step 2x20 reps, no UE support  Sit<>stands, chair, hands on thigh x10 reps   Long sitting hamstring stretch 2 min each  Standing calf stretch on wedge 2 min   Bridging x20 reps  Heel prop knee extension stretch 5# 2 min ea  Prone quad stretch with strap 2 min ea    Possible next visit: hip and glute strengthening, LE  flexibility, balance activities, modalities PRN    Patient Education and Home Exercises     Education provided:   - HEP reviewed  - Anatomy and Physiology pertaining to current condition  - Possible response to exercise  - Importance of PT compliance    Written Home Exercises Provided: Patient instructed to cont prior HEP. Exercises were reviewed and Suze was able to demonstrate them prior to the end of the session.  Suze demonstrated good  understanding of the education provided. See EMR under Patient Instructions for exercises provided during therapy sessions    ASSESSMENT     Patient is able to perform exercises with intermittent rest periods as needed. Calf stretch was added to improve knee ROM and reduce LE discomfort.     Suze Is progressing well towards her goals.   Pt prognosis is Good.     Pt will continue to benefit from skilled outpatient physical therapy to address the deficits listed in the problem list box on initial evaluation, provide pt/family education and to maximize pt's level of independence in the home and community environment.     Pt's spiritual, cultural and educational needs considered and pt agreeable to plan of care and goals.     Anticipated barriers to physical therapy: none    Goals:  Short Term Goals: In 4 weeks:  1.Pt to be educated on HEP. (met)  2.Patient to demo increased extension AROM by 5 degrees to improve functional mobility. (progressing, not met)  3.Patient to increase strength RLE by 1/2 grade to improve functional tasks. (progressing, not met)  4.Patient to have decreased pain to 5/10 at worst to improve quality of movement. (progressing, not met)  5.Patient to increase LE balance to 10 seconds or better bilaterally. (progressing, not met)  6.Patient to improve score on the FOTO by 10%. (progressing, not met)     Long Term Goals: In 8 weeks  1. Patient to perform daily activities including community ambulation without limitation. (progressing, not met)  2. Patient to  demonstrate full knee AROM/PROM to WNL to improve functional mobility. (progressing, not met)  3. Patient to demonstrate increased LE strength to 5/5 to improve functional tasks. (progressing, not met)  4. Patient to have decreased pain to 4/10 with activities to improve quality of movement. (progressing, not met)  5. Patient to increase hamstring flexibility of LE to WNL to improve mobility and reduce symptoms. (progressing, not met)    PLAN     Continue with PT to address functional mobility limitations and improve QoL.    Neo Zapata, PT DPT

## 2022-07-08 ENCOUNTER — OFFICE VISIT (OUTPATIENT)
Dept: OPHTHALMOLOGY | Facility: CLINIC | Age: 72
End: 2022-07-08
Payer: MEDICARE

## 2022-07-08 DIAGNOSIS — Z98.890 POST-OPERATIVE STATE: Primary | ICD-10-CM

## 2022-07-08 DIAGNOSIS — H40.1133 PRIMARY OPEN ANGLE GLAUCOMA OF BOTH EYES, SEVERE STAGE: ICD-10-CM

## 2022-07-08 PROCEDURE — 99999 PR PBB SHADOW E&M-EST. PATIENT-LVL III: ICD-10-PCS | Mod: PBBFAC,,, | Performed by: OPHTHALMOLOGY

## 2022-07-08 PROCEDURE — 99024 PR POST-OP FOLLOW-UP VISIT: ICD-10-PCS | Mod: POP,,, | Performed by: OPHTHALMOLOGY

## 2022-07-08 PROCEDURE — 99999 PR PBB SHADOW E&M-EST. PATIENT-LVL III: CPT | Mod: PBBFAC,,, | Performed by: OPHTHALMOLOGY

## 2022-07-08 PROCEDURE — 99024 POSTOP FOLLOW-UP VISIT: CPT | Mod: POP,,, | Performed by: OPHTHALMOLOGY

## 2022-07-08 PROCEDURE — 99213 OFFICE O/P EST LOW 20 MIN: CPT | Mod: PBBFAC | Performed by: OPHTHALMOLOGY

## 2022-07-08 RX ORDER — ACETAZOLAMIDE 250 MG/1
250 TABLET ORAL 4 TIMES DAILY
Qty: 60 TABLET | Refills: 1 | Status: SHIPPED | OUTPATIENT
Start: 2022-07-08 | End: 2022-07-15

## 2022-07-08 NOTE — PROGRESS NOTES
HPI     Post-op Evaluation     Comments: S/p PCIOL OD 6/9/22              Comments     Patient states she has noticed floaters more since last visit - no pain/   discomfort OU - no va changes OU - using Lumigan/ dorz OU - using Lotemax   QID OD and Pred OS QD as directed       Referred by Dr. Lorenzana     1. COAG/ LTG   -h/o non compliance   -intolerant of coag meds   -TRAB OD   -repeat SLT od done 8/9/2012 - good initial response IOP 18 to 13, SLT OD   08/17/17   -Primary SLT done os 9/13/2012 - minimal response 18 to 16   -Bleb Needling with [facility] Inj. OD 9/17/15   -SLT OS 9/29/2016 AND REPEAT SLT OS 12/10/2020   Xen Gel OD (09-26-19)   Xen Gel OS (04-) abExt LOT #[id number]   2. PCIOL OD 6/9/22/ SN60WF 12.0/ CDE: 11.04    *Stopped Alphagan OS TID because of dermatitis   *Stopped Betoptic bid ou due to slow pulse rate and feeling tired.   *latanoprost slows pulse rate   *DMX S50 BID (stopped after 2 weeks due to extreme fatigue)   *OD Rhopressa Qhs ( patient ran out in March never returned for followup )     *lotemax cost over $300       OD: Lumigan QHS   OD: Dorozolamide BID OD  OD: Lotemax QID   OS: Prednisolone OD QD            Last edited by Kenisha Yap MA on 7/8/2022  9:27 AM. (History)            Assessment /Plan     For exam results, see Encounter Report.      ICD-10-CM ICD-9-CM    1. Post-operative state  Z98.890 V45.89    2. Primary open angle glaucoma of both eyes, severe stage  H40.1133 365.11      365.73        S/p PCIOL OD  IOP remains above target  Will begin DMX 250mg, begin at 1/2 dose BID (continue to increase to maximum dose QID if well tolerated)   Likely plan to COAG surgery at next visit       Return to clinic 3 weeks       OD: Lumigan QHS   OD: Dorozolamide BID OD  OD: Lotemax QID   OS: Prednisolone OD QD  DMX 250mg

## 2022-07-26 ENCOUNTER — DOCUMENTATION ONLY (OUTPATIENT)
Dept: OPHTHALMOLOGY | Facility: CLINIC | Age: 72
End: 2022-07-26

## 2022-07-26 ENCOUNTER — OFFICE VISIT (OUTPATIENT)
Dept: OPHTHALMOLOGY | Facility: CLINIC | Age: 72
End: 2022-07-26
Payer: MEDICARE

## 2022-07-26 DIAGNOSIS — E11.9 TYPE 2 DIABETES MELLITUS WITHOUT COMPLICATION, WITHOUT LONG-TERM CURRENT USE OF INSULIN: ICD-10-CM

## 2022-07-26 DIAGNOSIS — Z98.41 CATARACT EXTRACTION STATUS, RIGHT EYE: ICD-10-CM

## 2022-07-26 DIAGNOSIS — H25.12 SENILE NUCLEAR CATARACT, LEFT: ICD-10-CM

## 2022-07-26 DIAGNOSIS — H40.1133 PRIMARY OPEN ANGLE GLAUCOMA OF BOTH EYES, SEVERE STAGE: ICD-10-CM

## 2022-07-26 DIAGNOSIS — Z98.890 POST-OPERATIVE STATE: Primary | ICD-10-CM

## 2022-07-26 DIAGNOSIS — Z91.148 NONCOMPLIANCE WITH MEDICATION REGIMEN: ICD-10-CM

## 2022-07-26 PROCEDURE — 92136 IOL MASTER - OS - LEFT EYE: ICD-10-PCS | Mod: 26,S$PBB,LT, | Performed by: OPHTHALMOLOGY

## 2022-07-26 PROCEDURE — 99024 POSTOP FOLLOW-UP VISIT: CPT | Mod: POP,,, | Performed by: OPHTHALMOLOGY

## 2022-07-26 PROCEDURE — 99213 OFFICE O/P EST LOW 20 MIN: CPT | Mod: PBBFAC | Performed by: OPHTHALMOLOGY

## 2022-07-26 PROCEDURE — 99999 PR PBB SHADOW E&M-EST. PATIENT-LVL III: CPT | Mod: PBBFAC,,, | Performed by: OPHTHALMOLOGY

## 2022-07-26 PROCEDURE — 99999 PR PBB SHADOW E&M-EST. PATIENT-LVL III: ICD-10-PCS | Mod: PBBFAC,,, | Performed by: OPHTHALMOLOGY

## 2022-07-26 PROCEDURE — 99024 PR POST-OP FOLLOW-UP VISIT: ICD-10-PCS | Mod: POP,,, | Performed by: OPHTHALMOLOGY

## 2022-07-26 PROCEDURE — 92136 OPHTHALMIC BIOMETRY: CPT | Mod: PBBFAC,LT | Performed by: OPHTHALMOLOGY

## 2022-07-26 RX ORDER — LOTEPREDNOL ETABONATE 5 MG/ML
1 SUSPENSION/ DROPS OPHTHALMIC 4 TIMES DAILY
Qty: 5 ML | Refills: 1 | Status: SHIPPED | OUTPATIENT
Start: 2022-07-26 | End: 2023-07-26

## 2022-07-26 NOTE — PROGRESS NOTES
Short Stay Record    CC: Blurry Vision     Impression: Visually significant cataract with reasonable expectation for visual improvement Left Eye  Right Left    External 2+ Ptosis Normal      Right Left   Lids/Lashes Normal Normal   Conjunctiva/Sclera Large  Bleb spreading diffusely across sup scleral surface, sutures, ab external xen gel (not under flap) Nice Bleb with avascularity, under flap OS   Cornea Clear Clear   Anterior Chamber Deep and quiet Deep and quiet   Iris Peripheral iridectomy Round and reactive   Lens Posterior chamber intraocular lens 1+ Nuclear sclerosis   Vitreous Posterior vitreous detachment Posterior vitreous detachment     Disc loss inf rim, inf/temp slope inf/temp thinning, slope   C/D Ratio 0.85 0.85   Macula No Diabetic Retinopathy No Diabetic Retinopathy   Vessels Normal Normal   Periphery No Diabetic Retinopathy No Diabetic Retinopathy     Manifest Refraction     Sphere Cylinder Axis Dist VA   Right -0.25 +0.25 115 20/25   Left -4.75 +1.00 090 20/25-2         Planned Procedure:     Topography reviewed yes   History of Refractive surgery yes      Phaco left +12.5 SY60WF  Topical   Requests a monofocal   IOL.  Will aim for distance  Patient given prescriptions for Pred  Anticoagulant status yes, pt will not stop ASA due to h/o blockages           Lens Selection OS verified _____             Previous IOL OD  +12.0 SN60WF    Patient cleared for ophthalmic surgery.     Discharge Summary:    Admitting Diagnosis: Nuclear sclerotic cataract /   OS    Discharge Diagnosis: Pseudophakia OS    Procedure: Phacoemulsification of cataract with intraocular lens implant OS    Complications: None  Discharge Condition: Stable    Discharge instructions: Follow post-operative discharge instruction sheet given by provider.    Follow-up: Return to clinic next day or as scheduled.       HPI:  Suze Chino is a 71 y.o. female who presents for evaluation prior to ophthalmic surgery, left eye. Patient reports  of blurred vision at distance and near with and without correction. Patient reports of glare at night reducing function and safety, and complains of needed additional light to read.     Past Medical History:   Diagnosis Date    Anxiety     Cataract     Chronic kidney disease, stage 3 11/20/2020    COVID-19 ruled out by laboratory testing 03/18/2022    [facility]    Depression     GERD (gastroesophageal reflux disease)     Glaucoma     History of colon polyps 2009    history as per patient    History of colonoscopy 2009    ok per pt    Hyperplastic colon polyp 05/12/2015    Hypertension     Hypothyroid     ?    Mild mitral regurgitation     Mitral valve prolapse     Nonobstructive atherosclerosis of coronary artery 09/2021    Moderate LAD stenosis, mild RCA stenosis.    Peptic ulcer disease     with gi bleed    Prediabetes     Primary hyperparathyroidism     Right knee DJD     Right knee DJD     Sleep apnea     Sleep disorder     TMJ (temporomandibular joint disorder)     Vitamin D deficiency      Past Surgical History:   Procedure Laterality Date    BILATERAL SALPINGOOPHORECTOMY      CATARACT EXTRACTION W/  INTRAOCULAR LENS IMPLANT Right 06/09/2022    COLONOSCOPY  2015    CORONARY ANGIOGRAPHY N/A 9/24/2021    Procedure: ANGIOGRAM, CORONARY ARTERY;  Surgeon: Chago Gregory MD;  Location: [facility];  Service: Cardiology;  Laterality: N/A;    EYE SURGERY      laser sx    GLAUCOMA SURGERY Right 09/26/2019    Xen Gel    GLAUCOMA SURGERY Left 04/05/2021    abext XenGel OS    HYSTERECTOMY      OOPHORECTOMY      PARATHYROIDECTOMY  12/01/2021    SLT - OU - BOTH EYES      TRABECULECTOMY Right 4/2/14    OD (dr. grossman)    UPPER GASTROINTESTINAL ENDOSCOPY  2003       Review of patient's allergies indicates:   Allergen Reactions    Betoptic [betaxolol] Other (See Comments)     Fatigue, slowed heart rate.    Alphagan [brimonidine] Dermatitis    Pravastatin Other (See  Comments)     aches  aches         Current Outpatient Medications:     acetaminophen (TYLENOL) 650 MG TbSR, Take 1,300 mg by mouth 2 (two) times a day., Disp: , Rfl:     acetaZOLAMIDE (DIAMOX) 250 MG tablet, TAKE 1 TABLET BY MOUTH 4 TIMES DAILY., Disp: 60 tablet, Rfl: 1    albuterol (PROAIR HFA) 90 mcg/actuation inhaler, Inhale 2 puffs into the lungs every 6 (six) hours as needed for Wheezing. Rescue, Disp: 18 g, Rfl: 5    ALPRAZolam (XANAX) 0.25 MG tablet, TAKE 1 TABLET BY MOUTH THREE TIMES A DAY AS NEEDED FOR ANXIETY, Disp: 30 tablet, Rfl: 0    amLODIPine (NORVASC) 2.5 MG tablet, Take 1 tablet (2.5 mg total) by mouth once daily., Disp: 90 tablet, Rfl: 3    aspirin (ECOTRIN) 81 MG EC tablet, Take 1 tablet (81 mg total) by mouth once daily., Disp: 30 tablet, Rfl: 11    benzonatate (TESSALON) 200 MG capsule, Take 1 capsule (200 mg total) by mouth 3 (three) times daily as needed for Cough., Disp: 30 capsule, Rfl: 0    calcium carb/vit D3/minerals (CALTRATE PLUS ORAL), Take 1 tablet by mouth 2 (two) times a day., Disp: , Rfl:     dorzolamide (TRUSOPT) 2 % ophthalmic solution, Place 1 drop into the right eye 2 (two) times a day., Disp: 5 mL, Rfl: 6    DULoxetine (CYMBALTA) 30 MG capsule, Take 1 capsule (30 mg total) by mouth once daily., Disp: 30 capsule, Rfl: 5    ergocalciferol (ERGOCALCIFEROL) 50,000 unit Cap, TAKE 1 CAPSULE BY MOUTH ONE TIME PER WEEK, Disp: 12 capsule, Rfl: 3    ezetimibe (ZETIA) 10 mg tablet, Take 1 tablet (10 mg total) by mouth every evening., Disp: 90 tablet, Rfl: 3    fexofenadine (ALLEGRA) 180 MG tablet, Allegra Allergy Take 1 Tablet (oral) 1 time per day for 14 days 20220318 tablet 1 time per day oral 14 days active 180 mg, Disp: 30 tablet, Rfl: 0    isosorbide mononitrate (IMDUR) 30 MG 24 hr tablet, Take 1 tablet (30 mg total) by mouth once daily., Disp: 30 tablet, Rfl: 11    LIDOcaine HCl 2% (XYLOCAINE) 2 % Soln, by Mucous Membrane route every 3 (three) hours as needed (throat  pain). Use 5 cc per dose., Disp: 120 mL, Rfl: 1    losartan (COZAAR) 100 MG tablet, TAKE 1 TABLET BY MOUTH EVERY DAY, Disp: 90 tablet, Rfl: 1    loteprednol (LOTEMAX) 0.5 % ophthalmic suspension, Place 1 drop into the right eye 4 (four) times daily., Disp: 5 mL, Rfl: 1    LUMIGAN 0.01 % Drop, PLACE 1 DROP INTO BOTH EYES EVERY EVENING., Disp: 2.5 mL, Rfl: 6    metFORMIN (GLUCOPHAGE-XR) 500 MG ER 24hr tablet, Take 1 tablet (500 mg total) by mouth once daily. Do not break, chew, or crush, Disp: , Rfl:     montelukast (SINGULAIR) 10 mg tablet, Take 1 tablet (10 mg total) by mouth every evening., Disp: 90 tablet, Rfl: 3    nitroGLYCERIN (NITROSTAT) 0.4 MG SL tablet, Place 1 tablet (0.4 mg total) under the tongue every 5 (five) minutes as needed for Chest pain. (Patient not taking: No sig reported), Disp: 30 tablet, Rfl: 0    pantoprazole (PROTONIX) 40 MG tablet, TAKE 1 TABLET BY MOUTH EVERY DAY, Disp: 90 tablet, Rfl: 3    prednisoLONE acetate (PRED FORTE) 1 % DrpS, PLACE 1 DROP INTO THE RIGHT EYE ONCE DAILY., Disp: 5 mL, Rfl: 1    prednisoLONE acetate (PRED FORTE) 1 % DrpS, INSTILL 1 DROP INTO LEFT EYE 4 TIMES A DAY, Disp: 10 mL, Rfl: 1    rosuvastatin (CRESTOR) 10 MG tablet, Take 10 mg by mouth once daily., Disp: , Rfl:     Review of Systems:  A comprehensive review of systems was negative.    Physical Exam:  General Appearance:    A&Ox3, no distress, appears stated age   Head:    Normocephalic, without obvious abnormality, atraumatic   Eyes:    PERRL, EOM's intact   Back:     Symmetric, no curvature   Lungs:     Respirations unlabored   Chest Wall:    No tenderness or deformity    Heart:  Abdomen:  Extremities:  Skin:    S1 and S2 present    Soft, non-tender    Extremities normal, atraumatic    Skin color, texture, turgor normal

## 2022-07-26 NOTE — PROGRESS NOTES
HPI     Glaucoma     Comments: Patient reports for IOP check. Using gtts as advised, states   she has not used DMX since 7/24/22. Denies pain or irritation. Va stable.   Likely to book surgery at this visit. Pt off DMX tablets - she states   she's under a great deal of stress lately and lost the bottle of pills               Comments     Referred by Dr. Lorenzana     1. COAG/ LTG   -h/o non compliance   -intolerant of coag meds   -TRAB OD   -repeat SLT od done 8/9/2012 - good initial response IOP 18 to 13, SLT OD   08/17/17   -Primary SLT done os 9/13/2012 - minimal response 18 to 16   -Bleb Needling with [facility] Inj. OD 9/17/15   -SLT OS 9/29/2016 AND REPEAT SLT OS 12/10/2020   Xen Gel OD (09-26-19)   Xen Gel OS (04-) abExt LOT #[id number]   2. PCIOL OD 6/9/22/ SN60WF 12.0/ CDE: 11.04    *Stopped Alphagan OS TID because of dermatitis   *Stopped Betoptic bid ou due to slow pulse rate and feeling tired.   *latanoprost slows pulse rate   * BID (stopped after 2 weeks due to extreme fatigue)   *OD Rhopressa Qhs ( patient ran out in March never returned for followup )     *lotemax cost over $300        OD: Lumigan QHS   OD: Dorozolamide BID OD  OD: Lotemax QID   OS: Prednisolone OD QD  DMX 250mg (using at QID, stopped using 7/24/22-- states she has misplaced   them)          Last edited by Macario Shankar MD on 7/26/2022  9:31 AM. (History)            Assessment /Plan     For exam results, see Encounter Report.      ICD-10-CM ICD-9-CM    1. Post-operative state  Z98.890 V45.89 S/P PHACO OD   TAPER LOTEMAX   IRITIS RESOLVED ON EXAM TODAY      2. Primary open angle glaucoma of both eyes, severe stage  H40.1133 365.11 IOP BETTER TODAY OU OFF DMX      365.73    3. Noncompliance with medication regimen  Z91.14 V15.81 Follow    4. Type 2 diabetes mellitus without complication, without long-term current use of insulin  E11.9 250.00 Not due for dilation at this time    5. Cataract extraction status, right eye  Z98.41  V45.61 Doing well    6. Senile nuclear cataract, left  H25.12 366.16 Patient reports decreased vision in the fellow eye consistent with the clinical amount of lenticular opacity, which reaches the level of visual significance and affects activities of daily living including reading and glare. Risks, benefits, and alternatives to cataract surgery were discussed and pt desired to schedule cataract surgery. Pt was consented and the biometry and lens options were reviewed.    Topography reviewed yes   History of Refractive surgery yes     Phaco left +12.5 SY60WF  Topical   Requests a monofocal   IOL.  Will aim for distance  Patient given prescriptions for Pred  Anticoagulant status yes, pt will not stop ASA due to h/o blockages     Explained that patient may need glasses after surgery.  Discussed that vision may be limited by ASTIGMATISM, GLAUCOMA, DIABETES       Schedule post op visit #2 with [facility]          OD: Lumigan QHS   Ou: Dorozolamide BID OD  OD: Lotemax tid X next visit   OS: Prednisolone OD QD

## 2022-08-04 ENCOUNTER — OUTSIDE PLACE OF SERVICE (OUTPATIENT)
Dept: OPHTHALMOLOGY | Facility: CLINIC | Age: 72
End: 2022-08-04
Payer: MEDICARE

## 2022-08-04 PROCEDURE — 66984 PR REMOVAL, CATARACT, W/INSRT INTRAOC LENS, W/O ENDO CYCLO: ICD-10-PCS | Mod: 79,LT,, | Performed by: OPHTHALMOLOGY

## 2022-08-04 PROCEDURE — 66984 XCAPSL CTRC RMVL W/O ECP: CPT | Mod: 79,LT,, | Performed by: OPHTHALMOLOGY

## 2022-08-05 ENCOUNTER — OFFICE VISIT (OUTPATIENT)
Dept: OPHTHALMOLOGY | Facility: CLINIC | Age: 72
End: 2022-08-05
Payer: MEDICARE

## 2022-08-05 DIAGNOSIS — H40.1133 PRIMARY OPEN ANGLE GLAUCOMA OF BOTH EYES, SEVERE STAGE: ICD-10-CM

## 2022-08-05 DIAGNOSIS — Z98.42 CATARACT EXTRACTION STATUS, LEFT: Primary | ICD-10-CM

## 2022-08-05 PROCEDURE — 99024 POSTOP FOLLOW-UP VISIT: CPT | Mod: POP,,, | Performed by: OPHTHALMOLOGY

## 2022-08-05 PROCEDURE — 99213 OFFICE O/P EST LOW 20 MIN: CPT | Mod: PBBFAC | Performed by: OPHTHALMOLOGY

## 2022-08-05 PROCEDURE — 99024 PR POST-OP FOLLOW-UP VISIT: ICD-10-PCS | Mod: POP,,, | Performed by: OPHTHALMOLOGY

## 2022-08-05 PROCEDURE — 99999 PR PBB SHADOW E&M-EST. PATIENT-LVL III: ICD-10-PCS | Mod: PBBFAC,,, | Performed by: OPHTHALMOLOGY

## 2022-08-05 PROCEDURE — 99999 PR PBB SHADOW E&M-EST. PATIENT-LVL III: CPT | Mod: PBBFAC,,, | Performed by: OPHTHALMOLOGY

## 2022-08-05 NOTE — PROGRESS NOTES
HPI     Post-op Evaluation     Comments: 1 day s/p PCIOL OS              Comments     Patient states that she slept well - no pain/ discomfort OS - using andn   tolerating Pred QID OS / Dorz OU BID / Lumigan OD/ Lotemax OD      Referred by Dr. Lorenzana     1. COAG/ LTG   -h/o non compliance   -intolerant of coag meds   -TRAB OD   -repeat SLT od done 8/9/2012 - good initial response IOP 18 to 13, SLT OD   08/17/17   -Primary SLT done os 9/13/2012 - minimal response 18 to 16   -Bleb Needling with [facility] Inj. OD 9/17/15   -SLT OS 9/29/2016 AND REPEAT SLT OS 12/10/2020   Xen Gel OD (09-26-19) Not under flap   Xen Gel OS (04-) abExt LOT #[id number] (under flap)  2. PCIOL OD 6/9/22/ SN60WF 12.0/ CDE: 11.04    *Stopped Alphagan OS TID because of dermatitis   *Stopped Betoptic bid ou due to slow pulse rate and feeling tired.   *latanoprost slows pulse rate   * BID (stopped after 2 weeks due to extreme fatigue)   *OD Rhopressa Qhs ( patient ran out in March never returned for followup )     *lotemax cost over $300        OD: Lumigan QHS   OD: Dorozolamide BID OD  OD: Lotemax QID   OS: Prednisolone OD QD  DMX 250mg (using at QID, stopped using 7/24/22-- states she has misplaced   them)            Last edited by Kenisha Yap MA on 8/5/2022  8:45 AM. (History)            Assessment /Plan     For exam results, see Encounter Report.      ICD-10-CM ICD-9-CM    1. Cataract extraction status, left  Z98.42 V45.61 Doing well   2. Primary open angle glaucoma of both eyes, severe stage  H40.1133 365.11      365.73        OD: Lumigan QHS   OU: Dorozolamide BID OD  OD: Lotemax TID   OS: Prednisolone QID    Return to clinic 1 week

## 2022-08-12 ENCOUNTER — OFFICE VISIT (OUTPATIENT)
Dept: OPHTHALMOLOGY | Facility: CLINIC | Age: 72
End: 2022-08-12
Payer: MEDICARE

## 2022-08-12 DIAGNOSIS — Z98.42 CATARACT EXTRACTION STATUS, LEFT: ICD-10-CM

## 2022-08-12 DIAGNOSIS — Z98.890 POST-OPERATIVE STATE: Primary | ICD-10-CM

## 2022-08-12 DIAGNOSIS — H40.1133 PRIMARY OPEN ANGLE GLAUCOMA OF BOTH EYES, SEVERE STAGE: ICD-10-CM

## 2022-08-12 PROCEDURE — 99213 OFFICE O/P EST LOW 20 MIN: CPT | Mod: PBBFAC | Performed by: OPHTHALMOLOGY

## 2022-08-12 PROCEDURE — 99024 POSTOP FOLLOW-UP VISIT: CPT | Mod: POP,,, | Performed by: OPHTHALMOLOGY

## 2022-08-12 PROCEDURE — 99999 PR PBB SHADOW E&M-EST. PATIENT-LVL III: ICD-10-PCS | Mod: PBBFAC,,, | Performed by: OPHTHALMOLOGY

## 2022-08-12 PROCEDURE — 99999 PR PBB SHADOW E&M-EST. PATIENT-LVL III: CPT | Mod: PBBFAC,,, | Performed by: OPHTHALMOLOGY

## 2022-08-12 PROCEDURE — 99024 PR POST-OP FOLLOW-UP VISIT: ICD-10-PCS | Mod: POP,,, | Performed by: OPHTHALMOLOGY

## 2022-08-12 NOTE — PROGRESS NOTES
HPI     One week post phaco OS    Mild pain in her left eye which started after surgery. Relieved with   Tylenol    1. COAG/ LTG   -h/o non compliance   -intolerant of coag meds   -TRAB OD   -repeat SLT od done 8/9/2012 - good initial response IOP 18 to 13, SLT OD   08/17/17   -Primary SLT done os 9/13/2012 - minimal response 18 to 16   -Bleb Needling with [facility] Inj. OD 9/17/15   -SLT OS 9/29/2016 AND REPEAT SLT OS 12/10/2020   Xen Gel OD (09-26-19) Not under flap   Xen Gel OS (04-) abExt LOT #[id number] (under flap)  2. PCIOL OD 6/9/22/ SN60WF 12.0/ CDE: 11.04  PCIOL OS 8/4/22 Sy60WF 12.5 / CDE: 8.51    *Stopped Alphagan OS TID because of dermatitis   *Stopped Betoptic bid ou due to slow pulse rate and feeling tired.   *latanoprost slows pulse rate   * BID (stopped after 2 weeks due to extreme fatigue)   *OD Rhopressa Qhs ( patient ran out in March never returned for followup )     *lotemax cost over $300           Last edited by Ewelina Ferguson MA on 8/12/2022 11:03 AM. (History)            Assessment /Plan     For exam results, see Encounter Report.      ICD-10-CM ICD-9-CM    1. Post-operative state  Z98.890 V45.89    2. Cataract extraction status, left  Z98.42 V45.61    3. Primary open angle glaucoma of both eyes, severe stage  H40.1133 365.11      365.73        S/p Phaco OS   OU  Lumigan QHS   OU: Dorozolamide BID   OD: Lotemax TID   OS: Prednisolone TID     RETURN TO CLINIC 2 weeks

## 2022-08-16 ENCOUNTER — OFFICE VISIT (OUTPATIENT)
Dept: FAMILY MEDICINE | Facility: CLINIC | Age: 72
End: 2022-08-16
Payer: MEDICARE

## 2022-08-16 VITALS
OXYGEN SATURATION: 96 % | HEIGHT: 68 IN | SYSTOLIC BLOOD PRESSURE: 162 MMHG | BODY MASS INDEX: 31.32 KG/M2 | DIASTOLIC BLOOD PRESSURE: 92 MMHG | WEIGHT: 206.69 LBS | HEART RATE: 62 BPM | TEMPERATURE: 98 F

## 2022-08-16 DIAGNOSIS — F33.9 EPISODE OF RECURRENT MAJOR DEPRESSIVE DISORDER, UNSPECIFIED DEPRESSION EPISODE SEVERITY: ICD-10-CM

## 2022-08-16 DIAGNOSIS — M79.602 LEFT ARM PAIN: ICD-10-CM

## 2022-08-16 DIAGNOSIS — R73.03 PREDIABETES: ICD-10-CM

## 2022-08-16 DIAGNOSIS — Z91.148 NONCOMPLIANCE WITH MEDICATION TREATMENT DUE TO UNDERUSE OF MEDICATION: ICD-10-CM

## 2022-08-16 DIAGNOSIS — F41.9 ANXIETY: ICD-10-CM

## 2022-08-16 DIAGNOSIS — Z98.890 S/P PARATHYROIDECTOMY: ICD-10-CM

## 2022-08-16 DIAGNOSIS — Z90.89 S/P PARATHYROIDECTOMY: ICD-10-CM

## 2022-08-16 DIAGNOSIS — I10 PRIMARY HYPERTENSION: Primary | ICD-10-CM

## 2022-08-16 DIAGNOSIS — N18.30 STAGE 3 CHRONIC KIDNEY DISEASE, UNSPECIFIED WHETHER STAGE 3A OR 3B CKD: ICD-10-CM

## 2022-08-16 DIAGNOSIS — M17.0 PRIMARY OSTEOARTHRITIS OF BOTH KNEES: ICD-10-CM

## 2022-08-16 PROCEDURE — 99214 PR OFFICE/OUTPT VISIT, EST, LEVL IV, 30-39 MIN: ICD-10-PCS | Mod: S$PBB,,, | Performed by: FAMILY MEDICINE

## 2022-08-16 PROCEDURE — 99999 PR PBB SHADOW E&M-EST. PATIENT-LVL IV: CPT | Mod: PBBFAC,,, | Performed by: FAMILY MEDICINE

## 2022-08-16 PROCEDURE — 99214 OFFICE O/P EST MOD 30 MIN: CPT | Mod: S$PBB,,, | Performed by: FAMILY MEDICINE

## 2022-08-16 PROCEDURE — 99214 OFFICE O/P EST MOD 30 MIN: CPT | Mod: PBBFAC,PO | Performed by: FAMILY MEDICINE

## 2022-08-16 PROCEDURE — 99999 PR PBB SHADOW E&M-EST. PATIENT-LVL IV: ICD-10-PCS | Mod: PBBFAC,,, | Performed by: FAMILY MEDICINE

## 2022-08-16 RX ORDER — AMLODIPINE BESYLATE 5 MG/1
5 TABLET ORAL DAILY
Qty: 30 TABLET | Refills: 1 | Status: SHIPPED | OUTPATIENT
Start: 2022-08-16 | End: 2022-09-08

## 2022-08-16 RX ORDER — ALPRAZOLAM 0.25 MG/1
TABLET ORAL
Qty: 30 TABLET | Refills: 5 | Status: SHIPPED | OUTPATIENT
Start: 2022-08-16 | End: 2023-03-07 | Stop reason: SDUPTHER

## 2022-08-16 RX ORDER — AMLODIPINE BESYLATE 2.5 MG/1
2.5 TABLET ORAL DAILY
COMMUNITY
End: 2022-08-16

## 2022-08-16 RX ORDER — DULOXETIN HYDROCHLORIDE 60 MG/1
60 CAPSULE, DELAYED RELEASE ORAL DAILY
Qty: 30 CAPSULE | Refills: 1 | Status: SHIPPED | OUTPATIENT
Start: 2022-08-16 | End: 2022-09-08
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
## 2022-08-22 ENCOUNTER — LAB VISIT (OUTPATIENT)
Dept: LAB | Facility: OTHER | Age: 72
End: 2022-08-22
Attending: NURSE PRACTITIONER
Payer: MEDICARE

## 2022-08-22 ENCOUNTER — OFFICE VISIT (OUTPATIENT)
Dept: OBSTETRICS AND GYNECOLOGY | Facility: CLINIC | Age: 72
End: 2022-08-22
Payer: MEDICARE

## 2022-08-22 VITALS
BODY MASS INDEX: 31.27 KG/M2 | DIASTOLIC BLOOD PRESSURE: 92 MMHG | WEIGHT: 205.69 LBS | SYSTOLIC BLOOD PRESSURE: 144 MMHG

## 2022-08-22 DIAGNOSIS — Z11.3 SCREEN FOR STD (SEXUALLY TRANSMITTED DISEASE): Primary | ICD-10-CM

## 2022-08-22 DIAGNOSIS — Z11.3 SCREEN FOR STD (SEXUALLY TRANSMITTED DISEASE): ICD-10-CM

## 2022-08-22 LAB
CANDIDA RRNA VAG QL PROBE: NEGATIVE
G VAGINALIS RRNA GENITAL QL PROBE: POSITIVE
T VAGINALIS RRNA GENITAL QL PROBE: NEGATIVE

## 2022-08-22 PROCEDURE — 99999 PR PBB SHADOW E&M-EST. PATIENT-LVL III: CPT | Mod: PBBFAC,,, | Performed by: NURSE PRACTITIONER

## 2022-08-22 PROCEDURE — 36415 COLL VENOUS BLD VENIPUNCTURE: CPT | Performed by: NURSE PRACTITIONER

## 2022-08-22 PROCEDURE — 87389 HIV-1 AG W/HIV-1&-2 AB AG IA: CPT | Performed by: NURSE PRACTITIONER

## 2022-08-22 PROCEDURE — 87340 HEPATITIS B SURFACE AG IA: CPT | Performed by: NURSE PRACTITIONER

## 2022-08-22 PROCEDURE — 99213 PR OFFICE/OUTPT VISIT, EST, LEVL III, 20-29 MIN: ICD-10-PCS | Mod: S$PBB,,, | Performed by: NURSE PRACTITIONER

## 2022-08-22 PROCEDURE — 99213 OFFICE O/P EST LOW 20 MIN: CPT | Mod: S$PBB,,, | Performed by: NURSE PRACTITIONER

## 2022-08-22 PROCEDURE — 99999 PR PBB SHADOW E&M-EST. PATIENT-LVL III: ICD-10-PCS | Mod: PBBFAC,,, | Performed by: NURSE PRACTITIONER

## 2022-08-22 PROCEDURE — 86592 SYPHILIS TEST NON-TREP QUAL: CPT | Performed by: NURSE PRACTITIONER

## 2022-08-22 PROCEDURE — 99213 OFFICE O/P EST LOW 20 MIN: CPT | Mod: PBBFAC | Performed by: NURSE PRACTITIONER

## 2022-08-22 PROCEDURE — 87591 N.GONORRHOEAE DNA AMP PROB: CPT | Performed by: NURSE PRACTITIONER

## 2022-08-22 PROCEDURE — 87510 GARDNER VAG DNA DIR PROBE: CPT | Performed by: NURSE PRACTITIONER

## 2022-08-22 PROCEDURE — 87491 CHLMYD TRACH DNA AMP PROBE: CPT | Performed by: NURSE PRACTITIONER

## 2022-08-22 NOTE — PROGRESS NOTES
CC: Screening for sexually transmitted infection    Suze Chino is a 71 y.o. female  presents for STD screening  No LMP recorded (lmp unknown). Patient has had a hysterectomy..  Denies any new rashes or lesions.  Denies pelvic or abdominal pain.  Denies vulvovaginal itching or irritation.  Denies abnormal or foul vaginal discharge.    Reports recent sexual partner with possible positive Syphilis result, awaiting further testing.       ROS:  GENERAL: Denies weight gain or weight loss. Feeling well overall.   SKIN: Denies rash or lesions.   HEAD: Denies head injury or headache.   NODES: Denies enlarged lymph nodes.   CHEST: Denies chest pain or shortness of breath.   CARDIOVASCULAR: Denies palpitations or left sided chest pain.   ABDOMEN: No abdominal pain, constipation, diarrhea, nausea, vomiting or rectal bleeding.   URINARY: No frequency, dysuria, hematuria, or burning on urination.  REPRODUCTIVE: See HPI.   BREASTS: The patient performs breast self-examination and denies pain, lumps, or nipple discharge.   HEMATOLOGIC: No easy bruisability or excessive bleeding.   MUSCULOSKELETAL: Denies joint pain or swelling.   NEUROLOGIC: Denies syncope or weakness.   PSYCHIATRIC: Denies depression, anxiety or mood swings.      PHYSICAL EXAM:    APPEARANCE: Well nourished, well developed, in no acute distress.  AFFECT: Alert and oriented x 3  SKIN: Warm, dry, & intact. No acne or hirsutism.  NECK: Neck symmetric  NODES: No inguinal or femoral lymph node enlargement  CHEST:  Easy, even breaths.  ABDOMEN: Soft.  Nontender, nondistended.  PELVIC: Normal external genitalia without lesions.  Normal hair distribution.  Adequate perineal body, normal urethral meatus.    Vagina moist and well rugated without lesions or discharge.  Cervix pink, without lesions, discharge or tenderness.  No significant cystocele or rectocele.    Bimanual exam shows uterus to be normal size, regular, mobile and nontender.  Adnexa without masses  or tenderness.    EXTREMITIES: No edema.    Screen for STD (sexually transmitted disease)  -     Vaginosis Screen by DNA Probe  -     C. trachomatis/N. gonorrhoeae by AMP DNA [facility]; Cervicovaginal  -     HIV 1/2 Ag/Ab (4th Gen); Future; Expected date: 08/22/2022  -     RPR; Future; Expected date: 08/22/2022  -     HEPATITIS B SURFACE ANTIGEN; Future; Expected date: 08/22/2022    If partner has positive FU blood testing, recommended that patient have repeat RPR performed in 2-3 months.    Patient was counseled today on prevention of STDs with use of condoms.  We also reviewed A.C.S. Pap guidelines and recommendations for yearly pelvic exams, mammograms and monthly self breast exams; to see her PCP for other health maintenance.     Followup pending lab results.      Melita Mccloud, DEVAUGHN-C

## 2022-08-23 LAB
HBV SURFACE AG SERPL QL IA: NEGATIVE
HIV 1+2 AB+HIV1 P24 AG SERPL QL IA: NEGATIVE
RPR SER QL: NORMAL

## 2022-08-24 ENCOUNTER — OFFICE VISIT (OUTPATIENT)
Dept: OPHTHALMOLOGY | Facility: CLINIC | Age: 72
End: 2022-08-24
Payer: MEDICARE

## 2022-08-24 ENCOUNTER — PATIENT MESSAGE (OUTPATIENT)
Dept: OBSTETRICS AND GYNECOLOGY | Facility: CLINIC | Age: 72
End: 2022-08-24
Payer: MEDICARE

## 2022-08-24 DIAGNOSIS — Z98.890 POST-OPERATIVE STATE: Primary | ICD-10-CM

## 2022-08-24 DIAGNOSIS — B96.89 BACTERIAL VAGINOSIS: Primary | ICD-10-CM

## 2022-08-24 DIAGNOSIS — N76.0 BACTERIAL VAGINOSIS: Primary | ICD-10-CM

## 2022-08-24 DIAGNOSIS — H40.1133 PRIMARY OPEN ANGLE GLAUCOMA OF BOTH EYES, SEVERE STAGE: ICD-10-CM

## 2022-08-24 LAB
C TRACH DNA SPEC QL NAA+PROBE: NOT DETECTED
N GONORRHOEA DNA SPEC QL NAA+PROBE: NOT DETECTED

## 2022-08-24 PROCEDURE — 99213 OFFICE O/P EST LOW 20 MIN: CPT | Mod: PBBFAC | Performed by: OPHTHALMOLOGY

## 2022-08-24 PROCEDURE — 99999 PR PBB SHADOW E&M-EST. PATIENT-LVL III: ICD-10-PCS | Mod: PBBFAC,,, | Performed by: OPHTHALMOLOGY

## 2022-08-24 PROCEDURE — 99024 PR POST-OP FOLLOW-UP VISIT: ICD-10-PCS | Mod: POP,,, | Performed by: OPHTHALMOLOGY

## 2022-08-24 PROCEDURE — 99999 PR PBB SHADOW E&M-EST. PATIENT-LVL III: CPT | Mod: PBBFAC,,, | Performed by: OPHTHALMOLOGY

## 2022-08-24 PROCEDURE — 99024 POSTOP FOLLOW-UP VISIT: CPT | Mod: POP,,, | Performed by: OPHTHALMOLOGY

## 2022-08-24 RX ORDER — METRONIDAZOLE 7.5 MG/G
1 GEL VAGINAL NIGHTLY
Qty: 70 G | Refills: 0 | Status: SHIPPED | OUTPATIENT
Start: 2022-08-24 | End: 2022-08-29

## 2022-08-24 NOTE — PROGRESS NOTES
HPI     Post-op Evaluation     Comments: S/p PCIOL 8/4/22 OS              Comments     Patient states that she is using Lumigan/ Dorz OU / Lotemax TID OD/ Pred   TID OS - denies pain/ discomfort OU - denies decreased va OU      Referred by Dr. Lorenzana     1. COAG/ LTG   -h/o non compliance   -intolerant of coag meds   -TRAB OD   -repeat SLT od done 8/9/2012 - good initial response IOP 18 to 13, SLT OD   08/17/17   -Primary SLT done os 9/13/2012 - minimal response 18 to 16   -Bleb Needling with [facility] Inj. OD 9/17/15   -SLT OS 9/29/2016 AND REPEAT SLT OS 12/10/2020   Xen Gel OD (09-26-19) Not under flap   Xen Gel OS (04-) abExt LOT #[id number] (under flap)  2. PCIOL OD 6/9/22/ SN60WF 12.0/ CDE: 11.04  PCIOL OS 8/4/22 Sy60WF 12.5 / CDE: 8.51    *Stopped Alphagan OS TID because of dermatitis   *Stopped Betoptic bid ou due to slow pulse rate and feeling tired.   *latanoprost slows pulse rate   * BID (stopped after 2 weeks due to extreme fatigue)   *OD Rhopressa Qhs ( patient ran out in March never returned for followup )     *lotemax cost over $300        OU: Lumigan QHS   OU: Dorozolamide BID OD  OD: Lotemax TID   OS: Prednisolone QID            Last edited by Kenisha Yap MA on 8/24/2022  2:20 PM. (History)            Assessment /Plan     For exam results, see Encounter Report.      ICD-10-CM ICD-9-CM    1. Post-operative state  Z98.890 V45.89    2. Primary open angle glaucoma of both eyes, severe stage  H40.1133 365.11      365.73      S/p phaco os- doing well     OU: Lumigan QHS   OU: Dorozolamide BID OD  OD: Lotemax BID   OS: Prednisolone BID         RETURN TO CLINIC 3 weeks

## 2022-08-25 ENCOUNTER — PATIENT MESSAGE (OUTPATIENT)
Dept: OBSTETRICS AND GYNECOLOGY | Facility: CLINIC | Age: 72
End: 2022-08-25
Payer: MEDICARE

## 2022-09-03 ENCOUNTER — OFFICE VISIT (OUTPATIENT)
Dept: OBSTETRICS AND GYNECOLOGY | Facility: CLINIC | Age: 72
End: 2022-09-03
Payer: MEDICARE

## 2022-09-03 VITALS — BODY MASS INDEX: 30.74 KG/M2 | WEIGHT: 202.81 LBS | HEIGHT: 68 IN

## 2022-09-03 DIAGNOSIS — S30.814A ABRASION OF VAGINA, INITIAL ENCOUNTER: Primary | ICD-10-CM

## 2022-09-03 DIAGNOSIS — N95.1 MENOPAUSAL SYMPTOMS: ICD-10-CM

## 2022-09-03 DIAGNOSIS — R82.90 URINE MALODOR: ICD-10-CM

## 2022-09-03 PROCEDURE — 87186 SC STD MICRODIL/AGAR DIL: CPT | Mod: 59 | Performed by: NURSE PRACTITIONER

## 2022-09-03 PROCEDURE — 99213 OFFICE O/P EST LOW 20 MIN: CPT | Mod: S$PBB,,, | Performed by: NURSE PRACTITIONER

## 2022-09-03 PROCEDURE — 99999 PR PBB SHADOW E&M-EST. PATIENT-LVL IV: ICD-10-PCS | Mod: PBBFAC,,, | Performed by: NURSE PRACTITIONER

## 2022-09-03 PROCEDURE — 87086 URINE CULTURE/COLONY COUNT: CPT | Performed by: NURSE PRACTITIONER

## 2022-09-03 PROCEDURE — 99214 OFFICE O/P EST MOD 30 MIN: CPT | Mod: PBBFAC | Performed by: NURSE PRACTITIONER

## 2022-09-03 PROCEDURE — 87077 CULTURE AEROBIC IDENTIFY: CPT | Mod: 59 | Performed by: NURSE PRACTITIONER

## 2022-09-03 PROCEDURE — 99213 PR OFFICE/OUTPT VISIT, EST, LEVL III, 20-29 MIN: ICD-10-PCS | Mod: S$PBB,,, | Performed by: NURSE PRACTITIONER

## 2022-09-03 PROCEDURE — 87088 URINE BACTERIA CULTURE: CPT | Performed by: NURSE PRACTITIONER

## 2022-09-03 PROCEDURE — 99999 PR PBB SHADOW E&M-EST. PATIENT-LVL IV: CPT | Mod: PBBFAC,,, | Performed by: NURSE PRACTITIONER

## 2022-09-03 NOTE — PROGRESS NOTES
History & Physical  Gynecology      SUBJECTIVE:     Chief Complaint: Vaginal Discharge (Vaginal bleeding / discharge )       History of Present Illness: Patient presents to clinic for assessment of vaginal bleeding. Reports noting small amount of bleed with wiping last night. Current use of Metrogel with plastic applicator, has stopped use since VB. Desires referral to hormone specialist to discuss HRT.         Review of patient's allergies indicates:   Allergen Reactions    Betoptic [betaxolol] Other (See Comments)     Fatigue, slowed heart rate.    Alphagan [brimonidine] Dermatitis    Pravastatin Other (See Comments)     aches  aches       Past Medical History:   Diagnosis Date    Anxiety     Cataract     Chronic kidney disease, stage 3 11/20/2020    COVID-19 ruled out by laboratory testing 03/18/2022    [facility]    Depression     GERD (gastroesophageal reflux disease)     Glaucoma     History of colon polyps 2009    history as per patient    History of colonoscopy 2009    ok per pt    Hyperplastic colon polyp 05/12/2015    Hypertension     Hypothyroid     ?    Mild mitral regurgitation     Mitral valve prolapse     Nonobstructive atherosclerosis of coronary artery 09/2021    Moderate LAD stenosis, mild RCA stenosis.    Peptic ulcer disease     with gi bleed    Prediabetes     Primary hyperparathyroidism     Right knee DJD     Right knee DJD     Sleep apnea     Sleep disorder     TMJ (temporomandibular joint disorder)     Vitamin D deficiency      Past Surgical History:   Procedure Laterality Date    BILATERAL SALPINGOOPHORECTOMY      CATARACT EXTRACTION W/  INTRAOCULAR LENS IMPLANT Right 06/09/2022    CATARACT EXTRACTION W/  INTRAOCULAR LENS IMPLANT Left 08/05/2022    COLONOSCOPY  2015    CORONARY ANGIOGRAPHY N/A 9/24/2021    Procedure: ANGIOGRAM, CORONARY ARTERY;  Surgeon: Chago Gregory MD;  Location: [facility];  Service: Cardiology;  Laterality: N/A;    EYE SURGERY      laser sx    GLAUCOMA  SURGERY Right 2019    Xen Gel    GLAUCOMA SURGERY Left 2021    abext XenGel OS    HYSTERECTOMY      OOPHORECTOMY      PARATHYROIDECTOMY  2021    SLT - OU - BOTH EYES      TRABECULECTOMY Right 14    OD (dr. grossman)    UPPER GASTROINTESTINAL ENDOSCOPY       OB History          2    Para   2    Term   2            AB        Living             SAB        IAB        Ectopic        Multiple        Live Births                   Family History   Problem Relation Age of Onset    Heart disease Father         before age 50    Diabetes Mother     Glaucoma Mother     Leukemia Mother     Cataracts Mother     Breast cancer Mother 60    Diabetes Sister     Glaucoma Sister     Diabetes Brother     Glaucoma Brother     Lung cancer Brother     Breast cancer Other 40        genetics -    Colon cancer Maternal Aunt      Social History     Tobacco Use    Smoking status: Never    Smokeless tobacco: Never   Substance Use Topics    Alcohol use: Yes     Alcohol/week: 2.0 standard drinks     Types: 2 Standard drinks or equivalent per week     Comment: occasional    Drug use: No       Current Outpatient Medications   Medication Sig    acetaminophen (TYLENOL) 650 MG TbSR Take 1,300 mg by mouth 2 (two) times a day.    albuterol (PROAIR HFA) 90 mcg/actuation inhaler Inhale 2 puffs into the lungs every 6 (six) hours as needed for Wheezing. Rescue    ALPRAZolam (XANAX) 0.25 MG tablet TAKE 1 TABLET BY MOUTH THREE TIMES A DAY AS NEEDED FOR ANXIETY    amLODIPine (NORVASC) 5 MG tablet Take 1 tablet (5 mg total) by mouth once daily.    aspirin (ECOTRIN) 81 MG EC tablet Take 1 tablet (81 mg total) by mouth once daily.    calcium carb/vit D3/minerals (CALTRATE PLUS ORAL) Take 1 tablet by mouth 2 (two) times a day.    dorzolamide (TRUSOPT) 2 % ophthalmic solution PLACE 1 DROP INTO THE RIGHT EYE 2 (TWO) TIMES A DAY.    DULoxetine (CYMBALTA) 60 MG capsule Take 1 capsule (60 mg total) by mouth once daily.     ergocalciferol (ERGOCALCIFEROL) 50,000 unit Cap TAKE 1 CAPSULE BY MOUTH ONE TIME PER WEEK    fexofenadine (ALLEGRA) 180 MG tablet Allegra Allergy Take 1 Tablet (oral) 1 time per day for 14 days 20220318 tablet 1 time per day oral 14 days active 180 mg    losartan (COZAAR) 100 MG tablet TAKE 1 TABLET BY MOUTH EVERY DAY    loteprednol (LOTEMAX) 0.5 % ophthalmic suspension Place 1 drop into the right eye 4 (four) times daily. (Patient taking differently: Place 1 drop into the right eye 3 (three) times daily.)    LUMIGAN 0.01 % Drop PLACE ONE DROP INTO BOTH EYES EVERY MORNING    metFORMIN (GLUCOPHAGE-XR) 500 MG ER 24hr tablet Take 1 tablet (500 mg total) by mouth once daily. Do not break, chew, or crush (Patient taking differently: Take 500 mg by mouth once daily. Do not break, chew, or crush  Patient states that she takes this medication only as needed)    montelukast (SINGULAIR) 10 mg tablet Take 1 tablet (10 mg total) by mouth every evening.    pantoprazole (PROTONIX) 40 MG tablet TAKE 1 TABLET BY MOUTH EVERY DAY    prednisoLONE acetate (PRED FORTE) 1 % DrpS PLACE 1 DROP INTO THE RIGHT EYE ONCE DAILY.    prednisoLONE acetate (PRED FORTE) 1 % DrpS INSTILL 1 DROP INTO LEFT EYE 4 TIMES A DAY    rosuvastatin (CRESTOR) 10 MG tablet Take 10 mg by mouth once daily.    ezetimibe (ZETIA) 10 mg tablet Take 1 tablet (10 mg total) by mouth every evening.     No current facility-administered medications for this visit.         Review of Systems:  Review of Systems   Constitutional:  Negative for activity change, appetite change, chills, fatigue and fever.   HENT:  Negative for mouth sores.    Eyes:  Negative for visual disturbance.   Respiratory:  Negative for cough and shortness of breath.    Cardiovascular:  Negative for chest pain and leg swelling.   Gastrointestinal:  Negative for abdominal pain, constipation, diarrhea, nausea, vomiting and reflux.   Endocrine: Negative for hyperthyroidism and hypothyroidism.   Genitourinary:   Positive for vaginal bleeding. Negative for dysuria, flank pain, frequency, genital sores, hematuria, menstrual problem, pelvic pain, vaginal discharge, vaginal pain, vaginal dryness and vaginal odor.   Musculoskeletal:  Negative for arthralgias, back pain and myalgias.   Integumentary:  Negative for rash, breast mass and breast tenderness.   Neurological:  Negative for headaches.   Hematological:  Negative for adenopathy. Does not bruise/bleed easily.   Psychiatric/Behavioral:  Negative for depression. The patient is not nervous/anxious.    Breast: Negative for asymmetry, mass and tenderness     OBJECTIVE:     Physical Exam:  Physical Exam  Vitals and nursing note reviewed.   Constitutional:       Appearance: Normal appearance.   HENT:      Head: Normocephalic and atraumatic.      Nose: Nose normal.      Mouth/Throat:      Mouth: Mucous membranes are moist.   Eyes:      Conjunctiva/sclera: Conjunctivae normal.   Cardiovascular:      Rate and Rhythm: Normal rate.   Pulmonary:      Effort: Pulmonary effort is normal.      Breath sounds: Normal breath sounds.   Abdominal:      Palpations: Abdomen is soft.   Genitourinary:     Vagina: Normal.      Adnexa: Right adnexa normal and left adnexa normal.          Comments: Small abrasion at inferior introitus.     Cervix and uterus are surgically absent.   Musculoskeletal:         General: Normal range of motion.      Cervical back: Normal range of motion.   Skin:     General: Skin is warm and dry.   Neurological:      General: No focal deficit present.      Mental Status: She is alert.   Psychiatric:         Mood and Affect: Mood normal.         Behavior: Behavior normal.         Thought Content: Thought content normal.         Judgment: Judgment normal.       ASSESSMENT:       ICD-10-CM ICD-9-CM    1. Abrasion of vagina, initial encounter  S30.814A 911.0       2. Menopausal symptoms  N95.1 627.2 Ambulatory referral/consult to [facility]      3.  Urine malodor  R82.90 791.9 Urine culture             Plan:      Small abrasion at introitus, likely trauma with applicator use. Will hold off Metrogel for 2-3 days to allow healing. Lubricant recommended for future use.     Urine culture with urine malodor.     [facility].    FU with NP as needed.    Counseling time: 20 minutes       MICHELLE GarciaC

## 2022-09-07 ENCOUNTER — TELEPHONE (OUTPATIENT)
Dept: OBSTETRICS AND GYNECOLOGY | Facility: CLINIC | Age: 72
End: 2022-09-07
Payer: MEDICARE

## 2022-09-07 ENCOUNTER — PATIENT MESSAGE (OUTPATIENT)
Dept: OBSTETRICS AND GYNECOLOGY | Facility: CLINIC | Age: 72
End: 2022-09-07
Payer: MEDICARE

## 2022-09-07 DIAGNOSIS — N30.00 ACUTE CYSTITIS WITHOUT HEMATURIA: Primary | ICD-10-CM

## 2022-09-07 LAB
BACTERIA UR CULT: ABNORMAL
BACTERIA UR CULT: ABNORMAL

## 2022-09-07 RX ORDER — AMOXICILLIN AND CLAVULANATE POTASSIUM 875; 125 MG/1; MG/1
1 TABLET, FILM COATED ORAL 2 TIMES DAILY
Qty: 14 TABLET | Refills: 0 | Status: SHIPPED | OUTPATIENT
Start: 2022-09-07 | End: 2022-09-14

## 2022-09-07 NOTE — TELEPHONE ENCOUNTER
----- Message from Melita Mccloud NP sent at 9/7/2022  9:41 AM CDT -----  Please call and notify that her urine culture showed a UTI and needs treatment. I sent Augmentin twice daily for the next week for treatment.    Melita

## 2022-09-08 ENCOUNTER — TELEPHONE (OUTPATIENT)
Dept: OBSTETRICS AND GYNECOLOGY | Facility: CLINIC | Age: 72
End: 2022-09-08
Payer: MEDICARE

## 2022-09-08 NOTE — TELEPHONE ENCOUNTER
----- Message from Melita Mccloud NP sent at 9/8/2022  8:46 AM CDT -----    ----- Message -----  From: Anali Hawkins  Sent: 9/8/2022   8:16 AM CDT  To: [facility] Staff     Type:  Patient Returning Call    Who Called:THEODORA THOMPSON     Who Left Message for Patient:Kacie Dominguez MA     Does the patient know what this is regarding?:yes    Best Call Back Number:[phone]    Additional Information:

## 2022-09-13 ENCOUNTER — OFFICE VISIT (OUTPATIENT)
Dept: FAMILY MEDICINE | Facility: CLINIC | Age: 72
End: 2022-09-13
Payer: MEDICARE

## 2022-09-13 VITALS
WEIGHT: 204.25 LBS | OXYGEN SATURATION: 100 % | BODY MASS INDEX: 30.96 KG/M2 | HEART RATE: 66 BPM | HEIGHT: 68 IN | DIASTOLIC BLOOD PRESSURE: 81 MMHG | SYSTOLIC BLOOD PRESSURE: 138 MMHG | TEMPERATURE: 98 F

## 2022-09-13 DIAGNOSIS — E66.9 CLASS 1 OBESITY WITH SERIOUS COMORBIDITY AND BODY MASS INDEX (BMI) OF 31.0 TO 31.9 IN ADULT, UNSPECIFIED OBESITY TYPE: ICD-10-CM

## 2022-09-13 DIAGNOSIS — F41.9 ANXIETY: ICD-10-CM

## 2022-09-13 DIAGNOSIS — Z86.010 HISTORY OF COLON POLYPS: ICD-10-CM

## 2022-09-13 DIAGNOSIS — I10 PRIMARY HYPERTENSION: Primary | ICD-10-CM

## 2022-09-13 DIAGNOSIS — E88.810 METABOLIC SYNDROME: ICD-10-CM

## 2022-09-13 DIAGNOSIS — Z12.11 SCREENING FOR COLON CANCER: ICD-10-CM

## 2022-09-13 DIAGNOSIS — F33.9 EPISODE OF RECURRENT MAJOR DEPRESSIVE DISORDER, UNSPECIFIED DEPRESSION EPISODE SEVERITY: ICD-10-CM

## 2022-09-13 DIAGNOSIS — R73.03 PREDIABETES: ICD-10-CM

## 2022-09-13 DIAGNOSIS — E78.2 MIXED HYPERLIPIDEMIA: ICD-10-CM

## 2022-09-13 PROCEDURE — 99999 PR PBB SHADOW E&M-EST. PATIENT-LVL IV: CPT | Mod: PBBFAC,,, | Performed by: FAMILY MEDICINE

## 2022-09-13 PROCEDURE — 99214 PR OFFICE/OUTPT VISIT, EST, LEVL IV, 30-39 MIN: ICD-10-PCS | Mod: S$PBB,,, | Performed by: FAMILY MEDICINE

## 2022-09-13 PROCEDURE — 99214 OFFICE O/P EST MOD 30 MIN: CPT | Mod: PBBFAC,PO | Performed by: FAMILY MEDICINE

## 2022-09-13 PROCEDURE — 99214 OFFICE O/P EST MOD 30 MIN: CPT | Mod: S$PBB,,, | Performed by: FAMILY MEDICINE

## 2022-09-13 PROCEDURE — 99999 PR PBB SHADOW E&M-EST. PATIENT-LVL IV: ICD-10-PCS | Mod: PBBFAC,,, | Performed by: FAMILY MEDICINE
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
## 2022-09-14 ENCOUNTER — OFFICE VISIT (OUTPATIENT)
Dept: OPHTHALMOLOGY | Facility: CLINIC | Age: 72
End: 2022-09-14
Payer: MEDICARE

## 2022-09-14 DIAGNOSIS — H40.1133 PRIMARY OPEN ANGLE GLAUCOMA OF BOTH EYES, SEVERE STAGE: ICD-10-CM

## 2022-09-14 DIAGNOSIS — Z98.890 POST-OPERATIVE STATE: Primary | ICD-10-CM

## 2022-09-14 PROCEDURE — 99024 PR POST-OP FOLLOW-UP VISIT: ICD-10-PCS | Mod: POP,,, | Performed by: OPHTHALMOLOGY

## 2022-09-14 PROCEDURE — 99999 PR PBB SHADOW E&M-EST. PATIENT-LVL III: ICD-10-PCS | Mod: PBBFAC,,, | Performed by: OPHTHALMOLOGY

## 2022-09-14 PROCEDURE — 99024 POSTOP FOLLOW-UP VISIT: CPT | Mod: POP,,, | Performed by: OPHTHALMOLOGY

## 2022-09-14 PROCEDURE — 99213 OFFICE O/P EST LOW 20 MIN: CPT | Mod: PBBFAC | Performed by: OPHTHALMOLOGY

## 2022-09-14 PROCEDURE — 99999 PR PBB SHADOW E&M-EST. PATIENT-LVL III: CPT | Mod: PBBFAC,,, | Performed by: OPHTHALMOLOGY

## 2022-09-14 NOTE — PROGRESS NOTES
HPI     Post-op Evaluation            Comments: PCIOL OS 8/4/22 Sy60WF 12.5 / CDE: 8.51    Patient states OU is doing well, no pain and happy with surgical results,   needs refills sent tot he pharmacy of Prednisolone.          Comments    Referred by Dr. Lorenzana     1. COAG/ LTG   -h/o non compliance   -intolerant of coag meds   -TRAB OD   -repeat SLT od done 8/9/2012 - good initial response IOP 18 to 13, SLT OD   08/17/17   -Primary SLT done os 9/13/2012 - minimal response 18 to 16   -Bleb Needling with [facility] Inj. OD 9/17/15   -SLT OS 9/29/2016 AND REPEAT SLT OS 12/10/2020   Xen Gel OD (09-26-19) Not under flap   Xen Gel OS (04-) abExt LOT #[id number] (under flap)  2. PCIOL OD 6/9/22/ SN60WF 12.0/ CDE: 11.04  PCIOL OS 8/4/22 Sy60WF 12.5 / CDE: 8.51    *Stopped Alphagan OS TID because of dermatitis   *Stopped Betoptic bid ou due to slow pulse rate and feeling tired.   *latanoprost slows pulse rate   * BID (stopped after 2 weeks due to extreme fatigue)   *OD Rhopressa Qhs ( patient ran out in March never returned for followup )     *lotemax cost over $300        OU: Lumigan QHS   OU: Dorozolamide BID OD  OD: Lotemax TID   OS: Prednisolone QID          Last edited by Iris Adams, Patient Care Assistant on 9/14/2022 11:28   AM.            Assessment /Plan     For exam results, see Encounter Report.      ICD-10-CM ICD-9-CM    1. Post-operative state  Z98.890 V45.89       2. Primary open angle glaucoma of both eyes, severe stage  H40.1133 365.11      365.73           Taper Pred QD OS x 2 weeks then stop Pred only OD   Lotemax OD BID x next visit       OU: Lumigan QHS   OU: Dorozolamide BID     RETURN TO CLINIC 3- 4 weeks

## 2022-09-27 ENCOUNTER — TELEPHONE (OUTPATIENT)
Dept: GASTROENTEROLOGY | Facility: CLINIC | Age: 72
End: 2022-09-27
Payer: MEDICARE

## 2022-09-27 NOTE — TELEPHONE ENCOUNTER
Offered patient next available screening colonoscopy in 1/2023. Patient wanting to have procedure performed sooner than available. Patient requesting scheduling at [facility]. Informed patient that I will forward this request to the [facility] Surgery Scheduling department.

## 2022-10-09 NOTE — TELEPHONE ENCOUNTER
Care Due:                  Date            Visit Type   Department     Provider  --------------------------------------------------------------------------------                                EP -                              PRIMARY      [facility]  Last Visit: 09-      CARE ([facility])   MEDICINE       Cody Lewis  Next Visit: None Scheduled  None         None Found                                                            Last  Test          Frequency    Reason                     Performed    Due Date  --------------------------------------------------------------------------------    CMP.........  12 months..  ezetimibe................  12- 12-    Lipid Panel.  12 months..  ezetimibe................  12- 12-    [facility] Embedded Care Gaps. Reference number: [id number]. 10/09/2022   9:31:43 AM CDT

## 2022-10-10 RX ORDER — DULOXETIN HYDROCHLORIDE 60 MG/1
CAPSULE, DELAYED RELEASE ORAL
Qty: 30 CAPSULE | Refills: 0 | Status: SHIPPED | OUTPATIENT
Start: 2022-10-10 | End: 2022-10-25

## 2022-10-10 NOTE — TELEPHONE ENCOUNTER
Refill Routing Note   Medication(s) are not appropriate for processing by [facility] for the following reason(s):      - Medication requested has undergone a recent dosage adjustment (<3 months)    [facility] action(s):  Defer Medication-related problems identified: Requires labs     Medication Therapy Plan: Labs due: CMP, Lipid panel  Medication reconciliation completed: No     Appointments  past 12m or future 3m with PCP    Date Provider   Last Visit   9/13/2022 Cody Lewis MD   Next Visit   Visit date not found Cody Lewis MD   ED visits in past 90 days: 0        Note composed:10:25 PM 10/09/2022

## 2022-10-10 NOTE — TELEPHONE ENCOUNTER
A refill of a medication was requested by this patient when the patient's provider was out of the clinic.  I was on-call for this provider and refilled the medication once and recommend that the patient follow-up in the future with the PCP.

## 2022-10-15 DIAGNOSIS — Z86.010 HISTORY OF COLONIC POLYPS: Primary | ICD-10-CM

## 2022-10-17 ENCOUNTER — OFFICE VISIT (OUTPATIENT)
Dept: OPHTHALMOLOGY | Facility: CLINIC | Age: 72
End: 2022-10-17
Payer: MEDICARE

## 2022-10-17 DIAGNOSIS — Z98.42 CATARACT EXTRACTION STATUS, LEFT: ICD-10-CM

## 2022-10-17 DIAGNOSIS — H40.1133 PRIMARY OPEN ANGLE GLAUCOMA OF BOTH EYES, SEVERE STAGE: ICD-10-CM

## 2022-10-17 DIAGNOSIS — Z98.890 POST-OPERATIVE STATE: Primary | ICD-10-CM

## 2022-10-17 PROCEDURE — 99024 PR POST-OP FOLLOW-UP VISIT: ICD-10-PCS | Mod: POP,,, | Performed by: OPHTHALMOLOGY

## 2022-10-17 PROCEDURE — 99213 OFFICE O/P EST LOW 20 MIN: CPT | Mod: PBBFAC | Performed by: OPHTHALMOLOGY

## 2022-10-17 PROCEDURE — 99024 POSTOP FOLLOW-UP VISIT: CPT | Mod: POP,,, | Performed by: OPHTHALMOLOGY

## 2022-10-17 PROCEDURE — 99999 PR PBB SHADOW E&M-EST. PATIENT-LVL III: ICD-10-PCS | Mod: PBBFAC,,, | Performed by: OPHTHALMOLOGY

## 2022-10-17 PROCEDURE — 99999 PR PBB SHADOW E&M-EST. PATIENT-LVL III: CPT | Mod: PBBFAC,,, | Performed by: OPHTHALMOLOGY

## 2022-10-17 NOTE — PROGRESS NOTES
"HPI     Post-op Evaluation            Comments: S/p IOL OS 8/4/22          Comments    Patient states compliance with OU: Lumigan and Dorz BID - OD: Lotemax BID   - states that she is having some eye pain OD today - va is doing "ok"       Referred by Dr. Lorenzana     1. COAG/ LTG   -h/o non compliance   -intolerant of coag meds   -TRAB OD   -repeat SLT od done 8/9/2012 - good initial response IOP 18 to 13, SLT OD   08/17/17   -Primary SLT done os 9/13/2012 - minimal response 18 to 16   -Bleb Needling with [facility] Inj. OD 9/17/15   -SLT OS 9/29/2016 AND REPEAT SLT OS 12/10/2020   Xen Gel OD (09-26-19) Not under flap   Xen Gel OS (04-) abExt LOT #[id number] (under flap)  2. PCIOL OD 6/9/22/ SN60WF 12.0/ CDE: 11.04  PCIOL OS 8/4/22 Sy60WF 12.5 / CDE: 8.51    *Stopped Alphagan OS TID because of dermatitis   *Stopped Betoptic bid ou due to slow pulse rate and feeling tired.   *latanoprost slows pulse rate   * BID (stopped after 2 weeks due to extreme fatigue)   *OD Rhopressa Qhs ( patient ran out in March never returned for followup )     *lotemax cost over $300        OU: Lumigan QHS   OU: Dorozolamide BID  OD: Lotemax BID            Last edited by Kenisha Yap MA on 10/17/2022  9:23 AM.            Assessment /Plan     For exam results, see Encounter Report.      ICD-10-CM ICD-9-CM    1. Post-operative state  Z98.890 V45.89 PCIOL OS 8/4/22 - doing well       2. Primary open angle glaucoma of both eyes, severe stage  H40.1133 365.11      365.73       3. Cataract extraction status, left  Z98.42 V45.61         Taper lotemax to QD OD for 2-3 weeks then stop     Use Pataday/ Patanolol for allergy eye sxs     OU: Lumigan QHS   OU: Dorozolamide BID  RETURN TO CLINIC 4-6 weeks IOP, HVF and GOCT    consider re-trial of betoptic in the future or SLT   Pt is intolerant to numerous eye medications               "

## 2022-10-18 ENCOUNTER — TELEPHONE (OUTPATIENT)
Dept: ENDOCRINOLOGY | Facility: CLINIC | Age: 72
End: 2022-10-18
Payer: MEDICARE

## 2022-10-18 NOTE — TELEPHONE ENCOUNTER
----- Message from Satya Webster sent at 10/18/2022  4:21 PM CDT -----  Contact: Pt [phone]  Please call the patient to [facility] annual exam    Thank you

## 2022-10-18 NOTE — TELEPHONE ENCOUNTER
Pt advised Dr. Bravo is completely booked thru April 2023.  Dr. Sandifer is no longer with [facility], moved to [place], [place].  Offered to look for appts w/ [facility], [facility] or  but pt states she will research and decide which doctor she'd like to see and call back.

## 2022-11-07 ENCOUNTER — HOSPITAL ENCOUNTER (OUTPATIENT)
Dept: PREADMISSION TESTING | Facility: HOSPITAL | Age: 72
Discharge: HOME | End: 2022-11-07
Attending: FAMILY MEDICINE
Payer: MEDICARE

## 2022-11-07 DIAGNOSIS — Z86.010 HISTORY OF COLONIC POLYPS: ICD-10-CM

## 2022-11-07 RX ORDER — POLYETHYLENE GLYCOL 3350, SODIUM SULFATE ANHYDROUS, SODIUM BICARBONATE, SODIUM CHLORIDE, POTASSIUM CHLORIDE 236; 22.74; 6.74; 5.86; 2.97 G/4L; G/4L; G/4L; G/4L; G/4L
4 POWDER, FOR SOLUTION ORAL ONCE
Qty: 4000 ML | Refills: 0 | Status: SHIPPED | OUTPATIENT
Start: 2022-11-07 | End: 2022-11-07

## 2022-11-10 NOTE — TELEPHONE ENCOUNTER
No new care gaps identified.  [facility] Embedded Care Gaps. Reference number: [id number]. 11/10/2022   3:45:25 PM CST

## 2022-11-10 NOTE — TELEPHONE ENCOUNTER
----- Message from Kavon Orlando sent at 11/10/2022  2:47 PM CST -----  Contact: Suze Arciniega  is calling in regards to getting her allergy medication . Please call her back at [phone]       Thanks  CF

## 2022-12-07 ENCOUNTER — PATIENT MESSAGE (OUTPATIENT)
Dept: ADMINISTRATIVE | Facility: HOSPITAL | Age: 72
End: 2022-12-07
Payer: MEDICARE

## 2022-12-13 RX ORDER — AMLODIPINE BESYLATE 5 MG/1
TABLET ORAL
Qty: 90 TABLET | Refills: 2 | Status: SHIPPED | OUTPATIENT
Start: 2022-12-13 | End: 2024-01-12 | Stop reason: SDUPTHER

## 2022-12-13 NOTE — TELEPHONE ENCOUNTER
Care Due:                  Date            Visit Type   Department     Provider  --------------------------------------------------------------------------------                                EP -                              PRIMARY      [facility]  Last Visit: 09-      CARE ([facility])   MEDICINE       Cody Lewis  Next Visit: None Scheduled  None         None Found                                                            Last  Test          Frequency    Reason                     Performed    Due Date  --------------------------------------------------------------------------------    CMP.........  12 months..  ezetimibe................  12- 12-    HBA1C.......  6 months...  semaglutide..............  07- 12-    Lipid Panel.  12 months..  ezetimibe................  12- 12-    [facility] Embedded Care Gaps. Reference number: [id number]. 12/13/2022   12:03:30 AM CST

## 2022-12-14 ENCOUNTER — TELEPHONE (OUTPATIENT)
Dept: PREADMISSION TESTING | Facility: HOSPITAL | Age: 72
End: 2022-12-14
Payer: MEDICARE

## 2022-12-14 ENCOUNTER — E-CONSULT (OUTPATIENT)
Dept: CARDIOLOGY | Facility: CLINIC | Age: 72
End: 2022-12-14
Payer: MEDICARE

## 2022-12-14 DIAGNOSIS — R07.2 PRECORDIAL PAIN: ICD-10-CM

## 2022-12-14 DIAGNOSIS — I10 PRIMARY HYPERTENSION: Primary | ICD-10-CM

## 2022-12-14 DIAGNOSIS — E11.9 TYPE 2 DIABETES MELLITUS WITHOUT COMPLICATION: ICD-10-CM

## 2022-12-14 DIAGNOSIS — E78.2 MIXED HYPERLIPIDEMIA: ICD-10-CM

## 2022-12-14 DIAGNOSIS — I25.10 NONOBSTRUCTIVE ATHEROSCLEROSIS OF CORONARY ARTERY: ICD-10-CM

## 2022-12-14 DIAGNOSIS — I34.1 MITRAL VALVE PROLAPSE: Primary | ICD-10-CM

## 2022-12-14 PROCEDURE — 99499 NO LOS: ICD-10-PCS | Mod: ,,, | Performed by: FAMILY MEDICINE

## 2022-12-14 PROCEDURE — 99446 PR INTERPROF, PHONE/INTERNET/EHR, CONSULT, 5-10 MINS: ICD-10-PCS | Mod: ,,, | Performed by: INTERNAL MEDICINE

## 2022-12-14 PROCEDURE — 99499 UNLISTED E&M SERVICE: CPT | Mod: ,,, | Performed by: FAMILY MEDICINE

## 2022-12-14 PROCEDURE — 99446 NTRPROF PH1/NTRNET/EHR 5-10: CPT | Mod: ,,, | Performed by: INTERNAL MEDICINE

## 2022-12-14 NOTE — TELEPHONE ENCOUNTER
Refill Decision Note   Suze Chino  is requesting a refill authorization.  Brief Assessment and Rationale for Refill:  Approve    -Medication-Related Problems Identified: Requires labs  Medication Therapy Plan:       Medication Reconciliation Completed: No   Comments:     Provider Staff:     Action is required for this patient.   Please see care gap opportunities below in Care Due Message.     Thanks!  [facility]     Appointments      Date Provider   Last Visit   9/13/2022 Cody Lewis MD   Next Visit   Visit date not found Cody Lewis MD     Note composed:7:07 PM 12/13/2022           Note composed:7:07 PM 12/13/2022

## 2022-12-15 ENCOUNTER — TELEPHONE (OUTPATIENT)
Dept: PREADMISSION TESTING | Facility: HOSPITAL | Age: 72
End: 2022-12-15
Payer: MEDICARE

## 2022-12-15 NOTE — PROGRESS NOTES
[facility] - Cardiology  Response for E-Consult     Patient Name: Suze Chino  MRN: [id number]  Primary Care Provider: Cody Lewis MD   Requesting Provider: Rebekah Cooper RN      Findings: 71 y/o F with anxiety, CKD3, GERD , CAD nonobstructive is referred for CV clearance for c scope. Pt last seen in CV clinic 5-22 and antianginal meds adjusted. If no symptoms pt cleared for peocedure at moderate Cv risk    I did not speak to the requesting provider verbally about this.     Total time of Consultation: 5 minute    Percentage of time spent on verbal/written discussion: 100%     *This eConsult is based on the clinical data available to me and is furnished without benefit of a physical examination. The eConsult will need to be interpreted in light of any clinical issues or changes in patient status not available to me at the time of filing this eConsults. Significant changes in patient condition or level of acuity should result in immediate formal consultation and reevaluation. Please alert me if you have further questions.    Thank you for your consult.     MD Drea Navas - Cardiology

## 2022-12-15 NOTE — TELEPHONE ENCOUNTER
Informed patient of cardiac clearance given by Dr Bess if asymptomatic. Pt. Denies any cardiac symptoms /CP at this time.

## 2022-12-20 ENCOUNTER — HOSPITAL ENCOUNTER (OUTPATIENT)
Facility: HOSPITAL | Age: 72
Discharge: HOME | End: 2022-12-20
Attending: FAMILY MEDICINE | Admitting: FAMILY MEDICINE
Payer: MEDICARE

## 2022-12-20 ENCOUNTER — ANESTHESIA EVENT (OUTPATIENT)
Dept: ENDOSCOPY | Facility: HOSPITAL | Age: 72
End: 2022-12-20
Payer: MEDICARE

## 2022-12-20 ENCOUNTER — ANESTHESIA (OUTPATIENT)
Dept: ENDOSCOPY | Facility: HOSPITAL | Age: 72
End: 2022-12-20
Payer: MEDICARE

## 2022-12-20 VITALS
OXYGEN SATURATION: 100 % | BODY MASS INDEX: 30.92 KG/M2 | SYSTOLIC BLOOD PRESSURE: 115 MMHG | HEART RATE: 58 BPM | RESPIRATION RATE: 18 BRPM | DIASTOLIC BLOOD PRESSURE: 59 MMHG | TEMPERATURE: 98 F | HEIGHT: 68 IN | WEIGHT: 204 LBS

## 2022-12-20 DIAGNOSIS — Z12.11 COLON CANCER SCREENING: Primary | ICD-10-CM

## 2022-12-20 DIAGNOSIS — Z86.010 HISTORY OF COLON POLYPS: ICD-10-CM

## 2022-12-20 PROCEDURE — 25000003 PHARM REV CODE 250: Performed by: NURSE ANESTHETIST, CERTIFIED REGISTERED

## 2022-12-20 PROCEDURE — 63600175 PHARM REV CODE 636 W HCPCS: Performed by: NURSE ANESTHETIST, CERTIFIED REGISTERED

## 2022-12-20 PROCEDURE — 37000008 HC ANESTHESIA 1ST 15 MINUTES: Performed by: FAMILY MEDICINE

## 2022-12-20 PROCEDURE — G0105 COLORECTAL SCRN; HI RISK IND: ICD-10-PCS | Mod: ,,, | Performed by: FAMILY MEDICINE

## 2022-12-20 PROCEDURE — 37000009 HC ANESTHESIA EA ADD 15 MINS: Performed by: FAMILY MEDICINE

## 2022-12-20 PROCEDURE — G0105 COLORECTAL SCRN; HI RISK IND: HCPCS | Performed by: FAMILY MEDICINE

## 2022-12-20 PROCEDURE — G0105 COLORECTAL SCRN; HI RISK IND: HCPCS | Mod: ,,, | Performed by: FAMILY MEDICINE

## 2022-12-20 RX ORDER — PROPOFOL 10 MG/ML
VIAL (ML) INTRAVENOUS
Status: DISCONTINUED | OUTPATIENT
Start: 2022-12-20 | End: 2022-12-20

## 2022-12-20 RX ORDER — LIDOCAINE HYDROCHLORIDE 20 MG/ML
INJECTION, SOLUTION EPIDURAL; INFILTRATION; INTRACAUDAL; PERINEURAL
Status: DISCONTINUED | OUTPATIENT
Start: 2022-12-20 | End: 2022-12-20

## 2022-12-20 RX ORDER — SODIUM CHLORIDE, SODIUM LACTATE, POTASSIUM CHLORIDE, CALCIUM CHLORIDE 600; 310; 30; 20 MG/100ML; MG/100ML; MG/100ML; MG/100ML
INJECTION, SOLUTION INTRAVENOUS CONTINUOUS
Status: DISCONTINUED | OUTPATIENT
Start: 2022-12-20 | End: 2022-12-20 | Stop reason: HOSPADM

## 2022-12-20 RX ADMIN — SODIUM CHLORIDE, POTASSIUM CHLORIDE, SODIUM LACTATE AND CALCIUM CHLORIDE: 600; 310; 30; 20 INJECTION, SOLUTION INTRAVENOUS at 02:12

## 2022-12-20 RX ADMIN — PROPOFOL 180 MG: 10 INJECTION, EMULSION INTRAVENOUS at 02:12

## 2022-12-20 RX ADMIN — LIDOCAINE HYDROCHLORIDE 40 MG: 20 INJECTION, SOLUTION EPIDURAL; INFILTRATION; INTRACAUDAL; PERINEURAL at 02:12

## 2022-12-20 NOTE — PLAN OF CARE
Dr Calloway at  to discuss findings. VSS. No  Pain, no GI bleeding. Pt to be discharged from unit

## 2022-12-20 NOTE — TRANSFER OF CARE
"Anesthesia Transfer of Care Note    Patient: Suze Chino    Procedure(s) Performed: Procedure(s) (LRB):  COLONOSCOPY E consult sent for cc (N/A)    Patient location: GI    Anesthesia Type: MAC    Transport from OR: Transported from OR on room air with adequate spontaneous ventilation    Post pain: adequate analgesia    Post assessment: no apparent anesthetic complications    Post vital signs: stable    Level of consciousness: awake    Nausea/Vomiting: no nausea/vomiting    Complications: none    Transfer of care protocol was followed      Last vitals:   Visit Vitals  BP (!) 153/68 (BP Location: Left arm, Patient Position: Lying)   Pulse (!) 54   Temp 36.5 °C (97.7 °F) (Temporal)   Resp 18   Ht 5' 8" (1.727 m)   Wt 92.5 kg (204 lb)   LMP  (LMP Unknown)   SpO2 98%   Breastfeeding No   BMI 31.02 kg/m²     "

## 2022-12-20 NOTE — ANESTHESIA PREPROCEDURE EVALUATION
12/20/2022  Suze Chino is a 72 y.o., female.      Pre-op Assessment    I have reviewed the Patient Summary Reports.     I have reviewed the Nursing Notes. I have reviewed the NPO Status.   I have reviewed the Medications.     Review of Systems  Anesthesia Hx:  No problems with previous Anesthesia  Family Hx of Anesthesia complications: Family Anesthesia Complications are Slow to arouse (possibly oversedation) Personal Hx of Anesthesia complications  Temporomandibular Joint Symptoms Slow To Awaken/Delayed Emergence   Social:  Social Alcohol Use, Non-Smoker    Hematology/Oncology:     Oncology Normal    -- Anemia:   EENT/Dental:   chronic allergic rhinitis   Cardiovascular:   Exercise tolerance: poor Hypertension, well controlled Mitral valve prolapse  RBBB   Pulmonary:   Sleep Apnea    Renal/:  Renal/ Normal     Hepatic/GI:   PUD, GERD, well controlled 0000 last drink of fluid.   Musculoskeletal:   Arthritis     Neurological:  Neurology Normal    Endocrine:   Diabetes, type 2 Hypothyroidism    Dermatological:  Skin Normal    Psych:   Psychiatric History          Physical Exam  General: Well nourished    Airway:  Mallampati: IV / IV  Mouth Opening: < 3 cm  TM Distance: < 4 cm  Tongue: Normal  Neck ROM: Normal ROM    Dental:  Loose teeth  States all teeth are loose      Anesthesia Plan  Type of Anesthesia, risks & benefits discussed:    Anesthesia Type: MAC  Intra-op Monitoring Plan: Standard ASA Monitors  Induction:  IV  Informed Consent: Informed consent signed with the Patient and all parties understand the risks and agree with anesthesia plan.  All questions answered.   ASA Score: 3    Ready For Surgery From Anesthesia Perspective.     .

## 2022-12-20 NOTE — PROVATION PATIENT INSTRUCTIONS
Discharge Summary/Instructions after an Endoscopic Procedure  Patient Name: Suze Chino  Patient MRN: [id number]  Patient YOB: 1950 Tuesday, December 20, 2022 Mulugeta Calloway MD  Dear patient,  As a result of recent federal legislation (The Federal Cures Act), you may   receive lab or pathology results from your procedure in your [facility]   account before your physician is able to contact you. Your physician or   their representative will relay the results to you with their   recommendations at their soonest availability.  Thank you,  RESTRICTIONS:  During your procedure today, you received medications for sedation.  These   medications may affect your judgment, balance and coordination.  Therefore,   for 24 hours, you have the following restrictions:   - DO NOT drive a car, operate machinery, make legal/financial decisions,   sign important papers or drink alcohol.    ACTIVITY:  Today: no heavy lifting, straining or running due to procedural   sedation/anesthesia.  The following day: return to full activity including work.  DIET:  Eat and drink normally unless instructed otherwise.     TREATMENT FOR COMMON SIDE EFFECTS:  - Mild abdominal pain, nausea, belching, bloating or excessive gas:  rest,   eat lightly and use a heating pad.  - Sore Throat: treat with throat lozenges and/or gargle with warm salt   water.  - Because air was used during the procedure, expelling large amounts of air   from your rectum or belching is normal.  - If a bowel prep was taken, you may not have a bowel movement for 1-3 days.    This is normal.  SYMPTOMS TO WATCH FOR AND REPORT TO YOUR PHYSICIAN:  1. Abdominal pain or bloating, other than gas cramps.  2. Chest pain.  3. Back pain.  4. Signs of infection such as: chills or fever occurring within 24 hours   after the procedure.  5. Rectal bleeding, which would show as bright red, maroon, or black stools.   (A tablespoon of blood from the rectum is not serious, especially if    hemorrhoids are present.)  6. Vomiting.  7. Weakness or dizziness.  GO DIRECTLY TO THE NEAREST EMERGENCY ROOM IF YOU HAVE ANY OF THE FOLLOWING:      Difficulty breathing              Chills and/or fever over 101 F   Persistent vomiting and/or vomiting blood   Severe abdominal pain   Severe chest pain   Black, tarry stools   Bleeding- more than one tablespoon   Any other symptom or condition that you feel may need urgent attention  Your doctor recommends these additional instructions:  If any biopsies were taken, your doctors clinic will contact you in 1 to 2   weeks with any results.  - Patient has a contact number available for emergencies.  The signs and   symptoms of potential delayed complications were discussed with the   patient.  Return to normal activities tomorrow.  Written discharge   instructions were provided to the patient.   - Resume previous diet.   - Continue present medications.   - Return to primary care physician PRN.   - No repeat colonoscopy due to current age (66 years or older) and the   absence of colonic polyps.   - Discharge patient to home (via wheelchair).  For questions, problems or results please call your physician Mulugeta Calloway MD at Work:  [phone]  If you have any questions about the above instructions, call the GI   department at [phone] or call the endoscopy unit at [phone]   from 7am until 3 pm.  [facility], EMERGENCY ROOM PHONE NUMBER:   [phone]  IF A COMPLICATION OR EMERGENCY SITUATION ARISES AND YOU ARE UNABLE TO REACH   YOUR PHYSICIAN - GO DIRECTLY TO THE EMERGENCY ROOM.  I have read or have had read to me these discharge instructions for my   procedure and have received a written copy.  I understand these   instructions and will follow-up with my physician if I have any questions.     __________________________________       _____________________________________  Nurse Signature                                           Patient/Designated   Responsible Party Signature  Mulugeta Calloway MD  12/20/2022 2:46:26 PM  This report has been verified and signed electronically.  Dear patient,  As a result of recent federal legislation (The Federal Cures Act), you may   receive lab or pathology results from your procedure in your [facility]   account before your physician is able to contact you. Your physician or   their representative will relay the results to you with their   recommendations at their soonest availability.  Thank you,  PROVATION

## 2022-12-20 NOTE — H&P
Short Stay Endoscopy History and Physical    PCP - Cody Lewis MD    Procedure - Colonoscopy  ASA - 2  Mallampati - per anesthesia  History of Anesthesia problems - no  Family history Anesthesia problems -  no     HPI:  This is a 72 y.o. female here for evaluation of :   Active Hospital Problems    Diagnosis  POA    *Colon cancer screening [Z12.11]  Not Applicable    History of colon polyps [Z86.010]  Not Applicable     history as per patient        Resolved Hospital Problems   No resolved problems to display.         Health Maintenance         Date Due Completion Date    Pneumococcal Vaccines (Age 65+) (1 - PCV) Never done ---    Foot Exam Never done ---    TETANUS VACCINE Never done ---    Aspirin/Antiplatelet Therapy Never done ---    Shingles Vaccine (1 of 2) Never done ---    Diabetes Urine Screening 04/16/2016 4/16/2015    Colorectal Cancer Screening 05/12/2020 4/26/2018    Hemoglobin A1c 01/02/2022 7/2/2021    COVID-19 Vaccine (4 - Booster for Moderna series) 03/15/2022 1/18/2022    Influenza Vaccine (1) Never done ---    Lipid Panel 12/29/2022 12/29/2021    Mammogram 05/10/2023 5/10/2022    Eye Exam 05/25/2023 5/25/2022    High Dose Statin 09/14/2023 9/14/2022    DEXA Scan 06/09/2031 6/9/2021            Screening - Yes  History of polyps - Yes     Diarrhea - no  Anemia - no  Blood in stools - no  Abdominal pain - no  Other - no    ROS:  CONSTITUTIONAL: Denies weight change,  fatigue, fevers, chills, night sweats.  CARDIOVASCULAR: Denies chest pain, shortness of breath, orthopnea and edema.  RESPIRATORY: Denies cough, hemoptysis, dyspnea, and wheezing.  GI: See HPI.    Medical History:   Past Medical History:   Diagnosis Date    Anxiety     Cataract     Chronic kidney disease, stage 3 11/20/2020    COVID-19 ruled out by laboratory testing 03/18/2022    [facility]    Depression     GERD (gastroesophageal reflux disease)     Glaucoma     History of colon polyps 2009    history as per patient     History of colonoscopy 2009    ok per pt    Hyperplastic colon polyp 05/12/2015    Hypertension     Hypothyroid     ?    Mild mitral regurgitation     Mitral valve prolapse     Nonobstructive atherosclerosis of coronary artery 09/2021    Moderate LAD stenosis, mild RCA stenosis.    Peptic ulcer disease     with gi bleed    Prediabetes     Primary hyperparathyroidism     Right knee DJD     Right knee DJD     Sleep apnea     Sleep disorder     TMJ (temporomandibular joint disorder)     Vitamin D deficiency        Surgical History:   Past Surgical History:   Procedure Laterality Date    BILATERAL SALPINGOOPHORECTOMY      CATARACT EXTRACTION W/  INTRAOCULAR LENS IMPLANT Right 06/09/2022    CATARACT EXTRACTION W/  INTRAOCULAR LENS IMPLANT Left 08/05/2022    COLONOSCOPY  2015    CORONARY ANGIOGRAPHY N/A 9/24/2021    Procedure: ANGIOGRAM, CORONARY ARTERY;  Surgeon: Chago Gregory MD;  Location: [facility];  Service: Cardiology;  Laterality: N/A;    EYE SURGERY      laser sx    GLAUCOMA SURGERY Right 09/26/2019    Xen Gel    GLAUCOMA SURGERY Left 04/05/2021    abext XenGel OS    HYSTERECTOMY      OOPHORECTOMY      PARATHYROIDECTOMY  12/01/2021    SLT - OU - BOTH EYES      TRABECULECTOMY Right 4/2/14    OD (dr. grossman)    UPPER GASTROINTESTINAL ENDOSCOPY  2003       Family History:   Family History   Problem Relation Age of Onset    Heart disease Father         before age 50    Diabetes Mother     Glaucoma Mother     Leukemia Mother     Cataracts Mother     Breast cancer Mother 60    Diabetes Sister     Glaucoma Sister     Diabetes Brother     Glaucoma Brother     Lung cancer Brother     Breast cancer Other 40        genetics -    Colon cancer Maternal Aunt        Social History:   Social History     Tobacco Use    Smoking status: Never    Smokeless tobacco: Never   Substance Use Topics    Alcohol use: Yes     Alcohol/week: 2.0 standard drinks     Types: 2 Standard drinks or equivalent per week     Comment: occasional     Drug use: No       Allergies:   Review of patient's allergies indicates:   Allergen Reactions    Betoptic [betaxolol] Other (See Comments)     Fatigue, slowed heart rate.    Alphagan [brimonidine] Dermatitis    Pravastatin Other (See Comments)     aches  aches       Medications:   No current facility-administered medications on file prior to encounter.     Current Outpatient Medications on File Prior to Encounter   Medication Sig Dispense Refill    acetaminophen (TYLENOL) 650 MG TbSR Take 1,300 mg by mouth 2 (two) times a day.      albuterol (PROAIR HFA) 90 mcg/actuation inhaler Inhale 2 puffs into the lungs every 6 (six) hours as needed for Wheezing. Rescue 18 g 5    ALPRAZolam (XANAX) 0.25 MG tablet TAKE 1 TABLET BY MOUTH THREE TIMES A DAY AS NEEDED FOR ANXIETY 30 tablet 5    aspirin (ECOTRIN) 81 MG EC tablet Take 1 tablet (81 mg total) by mouth once daily. 30 tablet 11    calcium carb/vit D3/minerals (CALTRATE PLUS ORAL) Take 1 tablet by mouth 2 (two) times a day.      dorzolamide (TRUSOPT) 2 % ophthalmic solution PLACE 1 DROP INTO THE RIGHT EYE 2 (TWO) TIMES A DAY. 10 mL 6    ergocalciferol (ERGOCALCIFEROL) 50,000 unit Cap TAKE 1 CAPSULE BY MOUTH ONE TIME PER WEEK 12 capsule 3    fexofenadine (ALLEGRA) 180 MG tablet Allegra Allergy Take 1 Tablet (oral) 1 time per day for 14 days 20220318 tablet 1 time per day oral 14 days active 180 mg 30 tablet 0    losartan (COZAAR) 100 MG tablet TAKE 1 TABLET BY MOUTH EVERY DAY 90 tablet 1    loteprednol (LOTEMAX) 0.5 % ophthalmic suspension Place 1 drop into the right eye 4 (four) times daily. (Patient taking differently: Place 1 drop into the right eye 3 (three) times daily.) 5 mL 1    LUMIGAN 0.01 % Drop PLACE ONE DROP INTO BOTH EYES EVERY MORNING 2.5 mL 7    montelukast (SINGULAIR) 10 mg tablet Take 1 tablet (10 mg total) by mouth every evening. 90 tablet 3    pantoprazole (PROTONIX) 40 MG tablet TAKE 1 TABLET BY MOUTH EVERY DAY 90 tablet 3    rosuvastatin (CRESTOR) 10  MG tablet Take 10 mg by mouth once daily.      ezetimibe (ZETIA) 10 mg tablet Take 1 tablet (10 mg total) by mouth every evening. 90 tablet 3    prednisoLONE acetate (PRED FORTE) 1 % DrpS PLACE 1 DROP INTO THE RIGHT EYE ONCE DAILY. 5 mL 1    prednisoLONE acetate (PRED FORTE) 1 % DrpS INSTILL 1 DROP INTO LEFT EYE 4 TIMES A DAY 10 mL 1    semaglutide (OZEMPIC) 0.25 mg or 0.5 mg(2 mg/1.5 mL) pen injector Inject 0.25 mg into the skin every 7 days for 28 days, THEN 0.5 mg every 7 days. For metabolic syndrome E88.81. 1 pen 1       Physical Exam:  Vital Signs:   Vitals:    12/20/22 1332   BP: (!) 153/68   Pulse: (!) 54   Resp: 18   Temp: 97.7 °F (36.5 °C)     General Appearance: Well appearing in no acute distress  ENT: OP clear  Chest: CTA B  CV: RRR, no m/r/g  Abd: s/nt/nd/nabs  Ext: no edema    Labs:Reviewed    IMP:  Active Hospital Problems    Diagnosis  POA    *Colon cancer screening [Z12.11]  Not Applicable    History of colon polyps [Z86.010]  Not Applicable     history as per patient        Resolved Hospital Problems   No resolved problems to display.         Plan:   I have explained the risks and benefits of colonoscopy to the patient including but not limited to bleeding, perforation, infection, and death. The patient wishes to proceed.

## 2022-12-22 NOTE — ANESTHESIA POSTPROCEDURE EVALUATION
Anesthesia Post Evaluation    Patient: Suze Chino    Procedure(s) Performed: Procedure(s) (LRB):  COLONOSCOPY E consult sent for cc (N/A)    Final Anesthesia Type: MAC      Patient location during evaluation: GI PACU  Patient participation: Yes- Able to Participate  Level of consciousness: awake and alert  Post-procedure vital signs: reviewed and stable  Pain management: adequate  Airway patency: patent  ARMANDO mitigation strategies: Multimodal analgesia  PONV status at discharge: No PONV  Anesthetic complications: no      Cardiovascular status: blood pressure returned to baseline  Respiratory status: unassisted and spontaneous ventilation  Hydration status: euvolemic  Follow-up not needed.          Vitals Value Taken Time   /59 12/20/22 1459   Temp 36.6 °C (97.9 °F) 12/20/22 1449   Pulse 58 12/20/22 1459   Resp 18 12/20/22 1459   SpO2 100 % 12/20/22 1459         Event Time   Out of Recovery 15:16:21         Pain/Ravi Score: No data recorded

## 2023-01-04 ENCOUNTER — OFFICE VISIT (OUTPATIENT)
Dept: ORTHOPEDICS | Facility: CLINIC | Age: 73
End: 2023-01-04
Payer: MEDICARE

## 2023-01-04 VITALS
SYSTOLIC BLOOD PRESSURE: 134 MMHG | HEIGHT: 68 IN | HEART RATE: 60 BPM | BODY MASS INDEX: 30.91 KG/M2 | WEIGHT: 203.94 LBS | DIASTOLIC BLOOD PRESSURE: 81 MMHG

## 2023-01-04 DIAGNOSIS — M17.0 PRIMARY OSTEOARTHRITIS OF BOTH KNEES: Primary | ICD-10-CM

## 2023-01-04 PROCEDURE — 99214 PR OFFICE/OUTPT VISIT, EST, LEVL IV, 30-39 MIN: ICD-10-PCS | Mod: 25,S$PBB,, | Performed by: ORTHOPAEDIC SURGERY

## 2023-01-04 PROCEDURE — 99214 OFFICE O/P EST MOD 30 MIN: CPT | Mod: 25,S$PBB,, | Performed by: ORTHOPAEDIC SURGERY

## 2023-01-04 PROCEDURE — 20610 DRAIN/INJ JOINT/BURSA W/O US: CPT | Mod: PBBFAC,50,PN | Performed by: ORTHOPAEDIC SURGERY

## 2023-01-04 PROCEDURE — 99213 OFFICE O/P EST LOW 20 MIN: CPT | Mod: PBBFAC,PN | Performed by: ORTHOPAEDIC SURGERY

## 2023-01-04 PROCEDURE — 20610 LARGE JOINT ASPIRATION/INJECTION: R KNEE: ICD-10-PCS | Mod: S$PBB,RT,, | Performed by: ORTHOPAEDIC SURGERY

## 2023-01-04 PROCEDURE — 99999 PR PBB SHADOW E&M-EST. PATIENT-LVL III: CPT | Mod: PBBFAC,,, | Performed by: ORTHOPAEDIC SURGERY

## 2023-01-04 PROCEDURE — 99999 PR PBB SHADOW E&M-EST. PATIENT-LVL III: ICD-10-PCS | Mod: PBBFAC,,, | Performed by: ORTHOPAEDIC SURGERY

## 2023-01-04 RX ORDER — TRIAMCINOLONE ACETONIDE 40 MG/ML
40 INJECTION, SUSPENSION INTRA-ARTICULAR; INTRAMUSCULAR
Status: DISCONTINUED | OUTPATIENT
Start: 2023-01-04 | End: 2023-01-04 | Stop reason: HOSPADM

## 2023-01-04 RX ADMIN — TRIAMCINOLONE ACETONIDE 40 MG: 40 INJECTION, SUSPENSION INTRA-ARTICULAR; INTRAMUSCULAR at 01:01

## 2023-01-04 NOTE — PROGRESS NOTES
[facility], Orthopedics and Sports Medicine  [facility]    Established Patient Office Visit  01/04/2023     Diagnosis:  Bilateral knee osteoarthritis     Procedure:   (6/15/2022) right knee CSI    Subjective:      Suze Chino is a 72 y.o. female who presents for follow up treatment of the above mentioned diagnosis.  The patient had good relief from the prior CSI right knee for about 4 months or longer.  She now has return of her knee pain.  She has no recent injury or trauma.  Pain located globally about the right knee.      Left knee hurts occasionally but not a problem recently.      Outside reports reviewed: historical medical records and office notes.    Past Medical History:   Diagnosis Date    Anxiety     Cataract     Chronic kidney disease, stage 3 11/20/2020    COVID-19 ruled out by laboratory testing 03/18/2022    [facility]    Depression     GERD (gastroesophageal reflux disease)     Glaucoma     History of colon polyps 2009    history as per patient    History of colonoscopy 2009    ok per pt    Hyperplastic colon polyp 05/12/2015    Hypertension     Hypothyroid     ?    Mild mitral regurgitation     Mitral valve prolapse     Nonobstructive atherosclerosis of coronary artery 09/2021    Moderate LAD stenosis, mild RCA stenosis.    Peptic ulcer disease     with gi bleed    Prediabetes     Primary hyperparathyroidism     Right knee DJD     Right knee DJD     Sleep apnea     Sleep disorder     TMJ (temporomandibular joint disorder)     Vitamin D deficiency        Patient Active Problem List   Diagnosis    Hypertension    History of hypothyroidism    Anxiety    GERD (gastroesophageal reflux disease)    Mitral valve prolapse    Myopia of both eyes - Both Eyes    Visual field defect    Optic pit - Left Eye    Primary open-angle glaucoma, severe stage - Both Eyes    Nuclear sclerosis    History of colon polyps    Bradycardia, drug induced    Right knee DJD    Severe stage  chronic open angle glaucoma    Low-tension glaucoma of both eyes, severe stage    Lateral cystocele    Acquired genu valgum of right knee    Right knee pain    Dream enactment behavior    Secondary parasomnia    Primary snoring    Allergic sinusitis    Breast pain    Chest pain    Slow transit constipation    Vitamin D deficiency    Precordial pain    Chronic kidney disease, stage 3    Primary osteoarthritis of both knees    HTN (hypertension)    ACP (advance care planning)    Dyspnea    Family history of early CAD    Hyperlipidemia    Nonobstructive atherosclerosis of coronary artery    Mild mitral regurgitation    Positive QUETA (antinuclear antibody)    S/P parathyroidectomy    Decreased range of motion (ROM) of both knees    Weakness of both lower extremities    Decreased functional mobility    Prediabetes    Episode of recurrent major depressive disorder    Colon cancer screening       Past Surgical History:   Procedure Laterality Date    BILATERAL SALPINGOOPHORECTOMY      CATARACT EXTRACTION W/  INTRAOCULAR LENS IMPLANT Right 06/09/2022    CATARACT EXTRACTION W/  INTRAOCULAR LENS IMPLANT Left 08/05/2022    COLONOSCOPY  2015    COLONOSCOPY N/A 12/20/2022    Procedure: COLONOSCOPY E consult sent for cc;  Surgeon: Mulugeta Calloway MD;  [facility]: [facility] ENDO;  Service: Endoscopy;  Laterality: N/A;    CORONARY ANGIOGRAPHY N/A 9/24/2021    Procedure: ANGIOGRAM, CORONARY ARTERY;  Surgeon: Chago Gregory MD;  Location: [facility] CATH;  Service: Cardiology;  Laterality: N/A;    EYE SURGERY      laser sx    GLAUCOMA SURGERY Right 09/26/2019    Xen Gel    GLAUCOMA SURGERY Left 04/05/2021    abext XenGel OS    HYSTERECTOMY      OOPHORECTOMY      PARATHYROIDECTOMY  12/01/2021    SLT - OU - BOTH EYES      TRABECULECTOMY Right 4/2/14    OD (dr. grossman)    UPPER GASTROINTESTINAL ENDOSCOPY  2003        Current Outpatient Medications   Medication Instructions    acetaminophen (TYLENOL) 1,300 mg, Oral, 2 times daily    albuterol  (PROAIR HFA) 90 mcg/actuation inhaler 2 puffs, Inhalation, Every 6 hours PRN, Rescue    ALPRAZolam (XANAX) 0.25 MG tablet TAKE 1 TABLET BY MOUTH THREE TIMES A DAY AS NEEDED FOR ANXIETY    amLODIPine (NORVASC) 5 MG tablet TAKE 1 TABLET BY MOUTH EVERY DAY    aspirin (ECOTRIN) 81 mg, Oral, Daily    calcium carb/vit D3/minerals (CALTRATE PLUS ORAL) 1 tablet, Oral, 2 times daily    dorzolamide (TRUSOPT) 2 % ophthalmic solution 1 drop, Right Eye, 2 times daily    DULoxetine (CYMBALTA) 60 MG capsule TAKE 1 CAPSULE BY MOUTH EVERY DAY    ergocalciferol (ERGOCALCIFEROL) 50,000 unit Cap TAKE 1 CAPSULE BY MOUTH ONE TIME PER WEEK    ezetimibe (ZETIA) 10 mg, Oral, Nightly    fexofenadine (ALLEGRA) 180 MG tablet Allegra Allergy Take 1 Tablet (oral) 1 time per day for 14 days 20220318 tablet 1 time per day oral 14 days active 180 mg    fluticasone (VERAMYST) 27.5 mcg/actuation nasal spray 2 sprays, Nasal, Daily    losartan (COZAAR) 100 MG tablet TAKE 1 TABLET BY MOUTH EVERY DAY    loteprednol (LOTEMAX) 0.5 % ophthalmic suspension 1 drop, Right Eye, 4 times daily    LUMIGAN 0.01 % Drop PLACE ONE DROP INTO BOTH EYES EVERY MORNING    montelukast (SINGULAIR) 10 mg, Oral, Nightly    pantoprazole (PROTONIX) 40 MG tablet TAKE 1 TABLET BY MOUTH EVERY DAY    rosuvastatin (CRESTOR) 10 mg, Oral, Daily    semaglutide (OZEMPIC) 0.25 mg or 0.5 mg(2 mg/1.5 mL) pen injector Inject 0.25 mg into the skin every 7 days for 28 days, THEN 0.5 mg every 7 days. For metabolic syndrome E88.81.        Review of patient's allergies indicates:   Allergen Reactions    Betoptic [betaxolol] Other (See Comments)     Fatigue, slowed heart rate.    Alphagan [brimonidine] Dermatitis    Pravastatin Other (See Comments)     aches  aches       Social History     Socioeconomic History    Marital status:    Tobacco Use    Smoking status: Never    Smokeless tobacco: Never   Substance and Sexual Activity    Alcohol use: Yes     Alcohol/week: 2.0 standard drinks      Types: 2 Standard drinks or equivalent per week     Comment: occasional    Drug use: No    Sexual activity: Not Currently       Family History   Problem Relation Age of Onset    Heart disease Father         before age 50    Diabetes Mother     Glaucoma Mother     Leukemia Mother     Cataracts Mother     Breast cancer Mother 60    Diabetes Sister     Glaucoma Sister     Diabetes Brother     Glaucoma Brother     Lung cancer Brother     Breast cancer Other 40        genetics -    Colon cancer Maternal Aunt          Review of Systems   Constitutional: Negative for chills and fever.   HENT:  Negative for hearing loss.    Eyes:  Negative for blurred vision.   Cardiovascular:  Negative for chest pain.   Respiratory:  Negative for shortness of breath.    Gastrointestinal:  Negative for abdominal pain.   Neurological:  Negative for light-headedness.        Objective:      General    Constitutional: She is oriented to person, place, and time. She appears well-developed and well-nourished.   HENT:   Head: Normocephalic and atraumatic.   Eyes: EOM are normal.   Cardiovascular:  Normal rate.            Pulmonary/Chest: Effort normal.   Neurological: She is alert and oriented to person, place, and time.   Psychiatric: She has a normal mood and affect.           Right Knee Exam     Inspection   Erythema: absent  Swelling: absent  Effusion: absent    Tenderness   The patient is tender to palpation of the medial joint line.    Range of Motion   Extension:  10   Flexion:  120     Tests   Ligament Examination   Lachman: normal (-1 to 2mm)   PCL-Posterior Drawer: normal (0 to 2mm)     MCL - Valgus: normal (0 to 2mm)  LCL - Varus: normal    Other   Sensation: normal    Left Knee Exam     Inspection   Erythema: absent  Swelling: absent  Effusion: absent    Tenderness   The patient tender to palpation of the medial joint line.    Range of Motion   Extension:  0   Flexion:  120     Tests   Stability   Lachman: normal (-1 to 2mm)    PCL-Posterior Drawer: normal (0 to 2mm)  MCL - Valgus: normal (0 to 2mm)  LCL - Varus: normal (0 to 2mm)    Other   Sensation: normal    Muscle Strength   Right Lower Extremity   Quadriceps:  5/5   Hamstrin/5   Left Lower Extremity   Quadriceps:  5/5   Hamstrin/5     Vascular Exam     Right Pulses    Posterior Tibial:      2+        Left Pulses    Posterior Tibial:      2+        Imaging:  None today     Large Joint Aspiration/Injection: R knee    Date/Time: 2023 1:45 PM  Performed by: Shaji Garcia IV, MD  Authorized by: Shaji Garcia IV, MD     Consent Done?:  Yes (Verbal)  Indications:  Pain  Site marked: the procedure site was marked    Timeout: prior to procedure the correct patient, procedure, and site was verified    Prep: patient was prepped and draped in usual sterile fashion      Local anesthesia used?: Yes    Local anesthetic:  Lidocaine 1% without epinephrine    Details:  Needle Size:  22 G  Ultrasonic Guidance for needle placement?: No    Approach:  Anterolateral  Location:  Knee  Site:  R knee  Medications:  40 mg triamcinolone acetonide 40 mg/mL  Patient tolerance:  Patient tolerated the procedure well with no immediate complications  none      Assessment:       Suze Chino is a 72 y.o. female seen in the office today. The encounter diagnosis was Primary osteoarthritis of both knees.  Non-operative treatment is recommended at this time. The patient was given CSI and will resume activity as tolerated.  Can return for left knee CSI if symptoms develop. The natural history and expected course discussed with patient. Various treatment options were discussed, including their risks and benefits. All of the patient's questions were answered.     Plan:      Tylenol 650mg TID, PRN pain.  Follow up as needed if symptoms worsen.  Corticosteroid injection given today (right knee).         Sahji Garcia IV, MD   of Clinical Orthopedics  Department of Orthopedic Surgery  [facility]  [facility]  Office: [phone]  Website: [email or web address]    ---------------------------------------  Orders Placed This Encounter    Large Joint Aspiration/Injection

## 2023-01-25 ENCOUNTER — PATIENT MESSAGE (OUTPATIENT)
Dept: ADMINISTRATIVE | Facility: HOSPITAL | Age: 73
End: 2023-01-25
Payer: MEDICARE

## 2023-02-08 ENCOUNTER — TELEPHONE (OUTPATIENT)
Dept: FAMILY MEDICINE | Facility: CLINIC | Age: 73
End: 2023-02-08
Payer: MEDICARE

## 2023-02-08 DIAGNOSIS — E66.9 CLASS 1 OBESITY WITH SERIOUS COMORBIDITY AND BODY MASS INDEX (BMI) OF 31.0 TO 31.9 IN ADULT, UNSPECIFIED OBESITY TYPE: ICD-10-CM

## 2023-02-08 DIAGNOSIS — E88.810 METABOLIC SYNDROME: ICD-10-CM

## 2023-02-08 DIAGNOSIS — R73.03 PREDIABETES: Primary | ICD-10-CM

## 2023-02-08 NOTE — TELEPHONE ENCOUNTER
----- Message from Katherine Jarquin sent at 2/8/2023  1:22 PM CST -----  Type:  RX Refill Request    Who Called: pt  Refill or New Rx:refill  RX Name and Strength:ozempic   How is the patient currently taking it? (ex. 1XDay):once a week  Is this a 30 day or 90 day RX:?  Preferred Pharmacy with phone number: She would like it to go to [facility] in [facility]. [facility] doesn't have it.     [facility] [address] [place] [address] [place] [id number]  Phone: [phone] Fax: [phone]     Local or Mail Order:local  Ordering Provider:Dr Lewis  Would the patient rather a call back or a response via [facility]? call  Best Call Back Number:[phone]  Additional Information: .    Thank you

## 2023-02-08 NOTE — TELEPHONE ENCOUNTER
Asking for ozempic 0.5 dose to be sent to [place] [facility].  Patient has only been doing 0.25 due to lack of availability, please advise

## 2023-02-22 ENCOUNTER — OFFICE VISIT (OUTPATIENT)
Dept: OPHTHALMOLOGY | Facility: CLINIC | Age: 73
End: 2023-02-22
Payer: MEDICARE

## 2023-02-22 DIAGNOSIS — Z91.148 NONCOMPLIANCE WITH MEDICATION REGIMEN: ICD-10-CM

## 2023-02-22 DIAGNOSIS — H40.1133 PRIMARY OPEN ANGLE GLAUCOMA OF BOTH EYES, SEVERE STAGE: Primary | ICD-10-CM

## 2023-02-22 DIAGNOSIS — Z98.42 CATARACT EXTRACTION STATUS, LEFT: ICD-10-CM

## 2023-02-22 DIAGNOSIS — E11.9 TYPE 2 DIABETES MELLITUS WITHOUT COMPLICATION, WITHOUT LONG-TERM CURRENT USE OF INSULIN: ICD-10-CM

## 2023-02-22 DIAGNOSIS — Z98.41 CATARACT EXTRACTION STATUS, RIGHT EYE: ICD-10-CM

## 2023-02-22 PROCEDURE — 99999 PR PBB SHADOW E&M-EST. PATIENT-LVL III: ICD-10-PCS | Mod: PBBFAC,,, | Performed by: OPHTHALMOLOGY

## 2023-02-22 PROCEDURE — 92133 POSTERIOR SEGMENT OCT OPTIC NERVE(OCULAR COHERENCE TOMOGRAPHY) - OU - BOTH EYES: ICD-10-PCS | Mod: 26,S$PBB,, | Performed by: OPHTHALMOLOGY

## 2023-02-22 PROCEDURE — 92083 HUMPHREY VISUAL FIELD - OU - BOTH EYES: ICD-10-PCS | Mod: 26,S$PBB,, | Performed by: OPHTHALMOLOGY

## 2023-02-22 PROCEDURE — 92133 CPTRZD OPH DX IMG PST SGM ON: CPT | Mod: PBBFAC | Performed by: OPHTHALMOLOGY

## 2023-02-22 PROCEDURE — 99214 OFFICE O/P EST MOD 30 MIN: CPT | Mod: S$PBB,,, | Performed by: OPHTHALMOLOGY

## 2023-02-22 PROCEDURE — 99213 OFFICE O/P EST LOW 20 MIN: CPT | Mod: PBBFAC | Performed by: OPHTHALMOLOGY

## 2023-02-22 PROCEDURE — 99999 PR PBB SHADOW E&M-EST. PATIENT-LVL III: CPT | Mod: PBBFAC,,, | Performed by: OPHTHALMOLOGY

## 2023-02-22 PROCEDURE — 99214 PR OFFICE/OUTPT VISIT, EST, LEVL IV, 30-39 MIN: ICD-10-PCS | Mod: S$PBB,,, | Performed by: OPHTHALMOLOGY

## 2023-02-22 PROCEDURE — 92083 EXTENDED VISUAL FIELD XM: CPT | Mod: PBBFAC | Performed by: OPHTHALMOLOGY

## 2023-02-22 RX ORDER — NETARSUDIL AND LATANOPROST OPHTHALMIC SOLUTION, 0.02%/0.005% .2; .05 MG/ML; MG/ML
1 SOLUTION/ DROPS OPHTHALMIC; TOPICAL DAILY
Qty: 2.5 ML | Refills: 11 | Status: SHIPPED | OUTPATIENT
Start: 2023-02-22 | End: 2024-03-06 | Stop reason: SDUPTHER

## 2023-02-22 NOTE — PROGRESS NOTES
HPI     Glaucoma            Comments: 6wks wks IOP/HVF/ GOCT          Comments    Patient states that she has only been using Dorz OU once a day  and   Lumigan QD OU - thought she was told to use both drops once daily and has   been using it once daily since last visit - no va changes OU -         Referred by Dr. Lorenzana     1. COAG/ LTG   -h/o non compliance   -intolerant of coag meds   -TRAB OD   -repeat SLT od done 8/9/2012 - good initial response IOP 18 to 13, SLT OD   08/17/17   -Primary SLT done os 9/13/2012 - minimal response 18 to 16   -Bleb Needling with [facility] Inj. OD 9/17/15   -SLT OS 9/29/2016 AND REPEAT SLT OS 12/10/2020   Xen Gel OD (09-26-19) Not under flap   Xen Gel OS (04-) abExt LOT #[id number] (under flap)  2. PCIOL OD 6/9/22/ SN60WF 12.0/ CDE: 11.04  PCIOL OS 8/4/22 Sy60WF 12.5 / CDE: 8.51    *Stopped Alphagan OS TID because of dermatitis   *Stopped Betoptic bid ou due to slow pulse rate and feeling tired.   *latanoprost slows pulse rate   * BID (stopped after 2 weeks due to extreme fatigue)   *OD Rhopressa Qhs ( patient ran out in March never returned for followup )     *lotemax cost over $300      GCL: 17/21--- 08/16/21       OU: Lumigan QHS   OU: Dorozolamide BID            Last edited by Kenisha Yap MA on 2/22/2023  3:00 PM.            Assessment /Plan     For exam results, see Encounter Report.      ICD-10-CM ICD-9-CM    1. Primary open angle glaucoma of both eyes, severe stage  H40.1133 365.11 Arvizu Visual Field - OU - Extended - Both Eyes     365.73 Posterior Segment OCT Optic Nerve- Both eyes      netarsudiL-latanoprost (ROCKLATAN) 0.02-0.005 % Drop  See below      2. Noncompliance with medication regimen  Z91.14 V15.81 Encouraged proper compliance with meds       3. Type 2 diabetes mellitus without complication, without long-term current use of insulin  E11.9 250.00 Not due for dilation at this time       4. Cataract extraction status, right eye  Z98.41 V45.61       5.  Cataract extraction status, left  Z98.42 V45.61           Stop Lumigan and change to Rocklatan trial QHS OU due to increased IOP   Use Dorzolamide TID OU   RETURN TO CLINIC 1 week IOP, may need dilation after ,mgm checks IOP- possible coag sx if IOP not lower   Encouraged proper compliance with meds     S               g

## 2023-03-05 NOTE — TELEPHONE ENCOUNTER
Care Due:                  Date            Visit Type   Department     Provider  --------------------------------------------------------------------------------                                EP -                              PRIMARY      [facility] FAMILY  Last Visit: 09-      CARE ([facility])   MEDICINE       Cody Lewis  Next Visit: None Scheduled  None         None Found                                                            Last  Test          Frequency    Reason                     Performed    Due Date  --------------------------------------------------------------------------------    CMP.........  12 months..  ezetimibe................  12- 12-    HBA1C.......  6 months...  semaglutide..............  07- 12-    Lipid Panel.  12 months..  ezetimibe................  12- 12-    [facility] Embedded Care Gaps. Reference number: [id number]. 3/05/2023   12:35:53 PM CST

## 2023-03-06 ENCOUNTER — TELEPHONE (OUTPATIENT)
Dept: FAMILY MEDICINE | Facility: CLINIC | Age: 73
End: 2023-03-06
Payer: MEDICARE

## 2023-03-06 RX ORDER — ALPRAZOLAM 0.25 MG/1
TABLET ORAL
Qty: 30 TABLET | OUTPATIENT
Start: 2023-03-06

## 2023-03-06 NOTE — TELEPHONE ENCOUNTER
----- Message from Jeanne French sent at 3/6/2023  1:10 PM CST -----  Contact: Suez Arciniega is calling to speak to the nurse regarding her prescription being denied, she stated she need it refilled today and Dr. Lewis don't have any appointments soon and she would like the medication today, she stated nothing is wrong with her so she don't need to see the doctor. Please give her a call back at [phone]    Thanks  LJ

## 2023-03-07 ENCOUNTER — OFFICE VISIT (OUTPATIENT)
Dept: FAMILY MEDICINE | Facility: CLINIC | Age: 73
End: 2023-03-07
Payer: MEDICARE

## 2023-03-07 ENCOUNTER — TELEPHONE (OUTPATIENT)
Dept: PODIATRY | Facility: CLINIC | Age: 73
End: 2023-03-07
Payer: MEDICARE

## 2023-03-07 ENCOUNTER — OFFICE VISIT (OUTPATIENT)
Dept: OPHTHALMOLOGY | Facility: CLINIC | Age: 73
End: 2023-03-07
Payer: MEDICARE

## 2023-03-07 VITALS
DIASTOLIC BLOOD PRESSURE: 73 MMHG | BODY MASS INDEX: 30.25 KG/M2 | SYSTOLIC BLOOD PRESSURE: 119 MMHG | TEMPERATURE: 98 F | HEIGHT: 68 IN | HEART RATE: 86 BPM | WEIGHT: 199.63 LBS

## 2023-03-07 DIAGNOSIS — Z98.890 S/P PARATHYROIDECTOMY: ICD-10-CM

## 2023-03-07 DIAGNOSIS — I25.10 NONOBSTRUCTIVE ATHEROSCLEROSIS OF CORONARY ARTERY: ICD-10-CM

## 2023-03-07 DIAGNOSIS — Z91.148 NONCOMPLIANCE WITH MEDICATION REGIMEN: ICD-10-CM

## 2023-03-07 DIAGNOSIS — E78.2 MIXED HYPERLIPIDEMIA: ICD-10-CM

## 2023-03-07 DIAGNOSIS — R55 SYNCOPE, UNSPECIFIED SYNCOPE TYPE: ICD-10-CM

## 2023-03-07 DIAGNOSIS — N18.30 STAGE 3 CHRONIC KIDNEY DISEASE, UNSPECIFIED WHETHER STAGE 3A OR 3B CKD: ICD-10-CM

## 2023-03-07 DIAGNOSIS — E11.9 TYPE 2 DIABETES MELLITUS WITHOUT COMPLICATION, WITHOUT LONG-TERM CURRENT USE OF INSULIN: ICD-10-CM

## 2023-03-07 DIAGNOSIS — Z96.1 PSEUDOPHAKIA OF BOTH EYES: ICD-10-CM

## 2023-03-07 DIAGNOSIS — F33.9 EPISODE OF RECURRENT MAJOR DEPRESSIVE DISORDER, UNSPECIFIED DEPRESSION EPISODE SEVERITY: ICD-10-CM

## 2023-03-07 DIAGNOSIS — I10 PRIMARY HYPERTENSION: ICD-10-CM

## 2023-03-07 DIAGNOSIS — Z90.89 S/P PARATHYROIDECTOMY: ICD-10-CM

## 2023-03-07 DIAGNOSIS — R73.03 PREDIABETES: Primary | ICD-10-CM

## 2023-03-07 DIAGNOSIS — H40.1133 PRIMARY OPEN ANGLE GLAUCOMA OF BOTH EYES, SEVERE STAGE: Primary | ICD-10-CM

## 2023-03-07 PROBLEM — Z12.11 COLON CANCER SCREENING: Status: RESOLVED | Noted: 2022-12-20 | Resolved: 2023-03-07

## 2023-03-07 PROCEDURE — 99214 OFFICE O/P EST MOD 30 MIN: CPT | Mod: S$PBB,,, | Performed by: FAMILY MEDICINE

## 2023-03-07 PROCEDURE — 99214 PR OFFICE/OUTPT VISIT, EST, LEVL IV, 30-39 MIN: ICD-10-PCS | Mod: S$PBB,,, | Performed by: FAMILY MEDICINE

## 2023-03-07 PROCEDURE — 99215 OFFICE O/P EST HI 40 MIN: CPT | Mod: PBBFAC,PO | Performed by: FAMILY MEDICINE

## 2023-03-07 PROCEDURE — 99999 PR PBB SHADOW E&M-EST. PATIENT-LVL III: ICD-10-PCS | Mod: PBBFAC,,, | Performed by: OPHTHALMOLOGY

## 2023-03-07 PROCEDURE — 99999 PR PBB SHADOW E&M-EST. PATIENT-LVL V: CPT | Mod: PBBFAC,,, | Performed by: FAMILY MEDICINE

## 2023-03-07 PROCEDURE — 99999 PR PBB SHADOW E&M-EST. PATIENT-LVL V: ICD-10-PCS | Mod: PBBFAC,,, | Performed by: FAMILY MEDICINE

## 2023-03-07 PROCEDURE — 99214 PR OFFICE/OUTPT VISIT, EST, LEVL IV, 30-39 MIN: ICD-10-PCS | Mod: S$PBB,,, | Performed by: OPHTHALMOLOGY

## 2023-03-07 PROCEDURE — 99214 OFFICE O/P EST MOD 30 MIN: CPT | Mod: S$PBB,,, | Performed by: OPHTHALMOLOGY

## 2023-03-07 PROCEDURE — 99999 PR PBB SHADOW E&M-EST. PATIENT-LVL III: CPT | Mod: PBBFAC,,, | Performed by: OPHTHALMOLOGY

## 2023-03-07 PROCEDURE — 99213 OFFICE O/P EST LOW 20 MIN: CPT | Mod: PBBFAC,27 | Performed by: OPHTHALMOLOGY

## 2023-03-07 RX ORDER — ALPRAZOLAM 0.25 MG/1
0.25 TABLET ORAL 2 TIMES DAILY PRN
Qty: 60 TABLET | Refills: 5 | Status: SHIPPED | OUTPATIENT
Start: 2023-03-07 | End: 2023-09-11

## 2023-03-07 RX ORDER — ALPRAZOLAM 0.25 MG/1
0.25 TABLET ORAL 3 TIMES DAILY PRN
Qty: 30 TABLET | Refills: 5 | Status: SHIPPED | OUTPATIENT
Start: 2023-03-07 | End: 2023-03-07

## 2023-03-07 RX ORDER — AMOXICILLIN 500 MG/1
500 CAPSULE ORAL 3 TIMES DAILY
COMMUNITY
Start: 2023-03-02 | End: 2023-03-15

## 2023-03-07 RX ORDER — KETOROLAC TROMETHAMINE 5 MG/ML
1 SOLUTION OPHTHALMIC 4 TIMES DAILY
Qty: 5 ML | Refills: 1 | Status: SHIPPED | OUTPATIENT
Start: 2023-03-07 | End: 2024-03-06

## 2023-03-07 RX ORDER — PREDNISOLONE ACETATE 10 MG/ML
1 SUSPENSION/ DROPS OPHTHALMIC 4 TIMES DAILY
Qty: 5 ML | Refills: 1 | Status: SHIPPED | OUTPATIENT
Start: 2023-03-07 | End: 2023-05-15

## 2023-03-07 NOTE — PROGRESS NOTES
HPI     Glaucoma            Comments: Patient reports for 1 month IOP check. Using gtts as advised.   Denies pain or irritation at this time. Patient reports of visual   stability since previous visit.             Comments    Referred by Dr. Lorenzana     1. COAG/ LTG   -h/o non compliance   -intolerant of coag meds   -TRAB OD   -repeat SLT od done 8/9/2012 - good initial response IOP 18 to 13, SLT OD   08/17/17   -Primary SLT done os 9/13/2012 - minimal response 18 to 16   -Bleb Needling with [facility] Inj. OD 9/17/15   -SLT OS 9/29/2016 AND REPEAT SLT OS 12/10/2020   Xen Gel OD (09-26-19) Not under flap   Xen Gel OS (04-) abExt LOT #[id number] (under flap)  2. PCIOL OD 6/9/22/ SN60WF 12.0/ CDE: 11.04  PCIOL OS 8/4/22 Sy60WF 12.5 / CDE: 8.51    *Stopped Alphagan OS TID because of dermatitis   *Stopped Betoptic bid ou due to slow pulse rate and feeling tired.   *latanoprost slows pulse rate   * BID (stopped after 2 weeks due to extreme fatigue)   *OD Rhopressa Qhs ( patient ran out in March never returned for followup )     *lotemax cost over $300      GCL: 17/21--- 08/16/21   GCL 19/22--2/22/23      OU: Rocklatan QHS   OU: Dorozolamide BID            Last edited by Macario Shankar MD on 3/7/2023  9:25 AM.            Assessment /Plan     For exam results, see Encounter Report.      ICD-10-CM ICD-9-CM    1. Primary open angle glaucoma of both eyes, severe stage  H40.1133 365.11 Iop above target OD>OS  Discussed options and will proceed with G6 OD.       Book G-6 LASER RIGHT  EYE   BLOCK   STOP ASA X 2 WEEKS PRIOR   USE PRED AND KETO FOR laser   STOP ROCKLATAN OD 3 DAYS PRIOR THE LASER TO THE OD ONLY      365.73       2. Noncompliance with medication regimen  Z91.14 V15.81 History of, pt back on meds at this time       3. Type 2 diabetes mellitus without complication, without long-term current use of insulin  E11.9 250.00 Diabetes with no diabetic retinopathy on dilated exam.   Reviewed diabetic eye precautions  including excellent blood sugar control, and importance of regular follow up.           4. Pseudophakia of both eyes  Z96.1 V43.1             OU: Rocklatan QHS   OU: Dorozolamide BID

## 2023-03-07 NOTE — TELEPHONE ENCOUNTER
MARTHAM for the patient to call Podiatry to schedule    ----- Message from Miranda Tatum LPN sent at 3/7/2023  3:27 PM CST -----  Pt has non healing foot wound, please contact for appt.  Thank you

## 2023-03-07 NOTE — PATIENT INSTRUCTIONS
Psychiatry, Psychotherapy, Licensed Counselors    [facility]  [place]: [phone]  [place]: [phone]  [place]: [phone]  [place]: [phone]    [facility]   [address], [place] [address]  [place] [phone]    [facility]  [address]  [place] [id number] [phone]  Alma Vazquez, Ph.D., M.P  Varun Feliz, Ph.D., M.P      [email or web address]  [address] [place] [address]  Jaelyn Larson [facility]  [phone]  Salina Adair LPC, [place]  [phone]        Stephanie Navarrete, [facility], LMFT  [address]  [address]  [place] [address] [phone]        Shell Perry MA, LPC  Phone: [phone]  Fax: [phone]  6yrs- Adult  Areas of Service: Depression, anxiety, medication management, substance abuse, anger management, coping with grief, communication skills, parenting skills, and addiction.  Accepts private insurance and cash payments        Dr. Cristino Alan- Caodaism Gretta  [facility]  [address]   [address]  [place], [place] [address] [phone]  ACCEPTED INSURANCE:  [facility]  Aetna  [facility]  [facility]  [facility] [employer]  Humana  [place]  [facility]  Value Options        Moe Cruz LPC  Orthodoxy counselor  [facility]  [address], [place] [id number] [phone]  AVG Cost per Session: $80 - $130  ACCEPTED INSURANCE:  BC  Value Options  (License to [facility])          [facility]  [address] [place] [address]  Accept Medicaid  Other [facility]  Catherine Vargas C.H.ELIZA ALEXH.C  [facility] C.H.C  [place] C.[facility].  [place]  C.H.C  [place] C.[facility].C  [place] C.[facility].C    Our skilled providers specialize in treating:  PTSD, Anxiety and Depression, Bipolar Disorder, ADHD, Obesity, Marital Distress, Chronic Tension, Panic Attacks, as well as Phobias          [facility]  [address], [facility] [place] [address]  Hours: Monday-Friday, 8 a.m.-5 p.m  Phone: [phone]  [facility]  [address] [place] [address]  Tel. [phone]  Fax [phone]  [facility]  [address] [place] [address]  Tel. [phone]  Fax [phone]  [facility]  [address] [place] [address]  Tel. [phone]  Fax [phone]  [facility]  [address] [place], [place] [address] [place] [address]  Tel. [phone]  Fax [phone]  Walk -In till 4pm all insurances accepted      [facility]  Our skilled providers specialize in treating:  Attention Deficit Hyperactivity Disorder  Attention Deficit Disorder  Obsessive Compulsive Disorder   Autism  Bipolar Disorder  Depression   Anxiety  A variety of other psychological disorders  [place]  Phone: [phone]  [place]  [address] [place], [place], [address]  [phone]  [place]  [address] [place], [address]  [phone]  [place]  [address], [place], [address]  [phone]  [facility]  [address] [place] [address]  [phone]  ACCEPTED INSURANCE:  AETNA  [facility]  CIGNA  [place]  HUMANA  [place]  [place] MEDICAID    MULTIPLAN  PHCS UNITED HEALTHCARE UNITED BEH[facility]IORAL HE[facility] [place]  MD Charles Rogers NP Elisa Himel, NP  The providers of [facility] [place] help patients age 16 and over with the treatment of a variety of  mental disorders, including:  Stress  Depression  Anxiety  Schizophrenia  Dementia  Bipolar  Developmental disabilities  Other psychiatric mental health issues  [facility] [place]   [address]   [place] [address]   Hours: Monday-Friday, 8 a.m.-5 p.m.   Phone: [phone]   Fax: [phone]  ACCEPTED INSURANCE  [facility] (PPO, OGB-PPO)*  Humana  Medicare  *PPO: Preferred Provider Organization        Medicaid [facility]: [phone]  [facility]: [phone]  [facility]: [phone]  [facility]: [phone]  [facility]: [phone]      [facility]   Family and Marriage Counseling   Addiction   [address] [facility] [place] [address] [phone]

## 2023-03-08 NOTE — PROGRESS NOTES
Presents follow-up.   Depression still some symptoms.  No SI/HI .  Uses Xanax as well for anxiety once or twice daily.    Prediabetes stage 3 chronic kidney disease asymptomatic.   Hyperlipidemia not on statin, apparently had some muscle aches.  She did have significant hyperlipidemia previously as well as nonobstructive coronary disease.  She did have been ER visit recently with a syncopal episode after she got up to use the bathroom and had urinated.  This was the day after she had dental procedures and had taken hydrocodone.  She is no longer on hydrocodone..  She is using Ozempic for metabolic syndrome and weight.  Previous parathyroidectomy recent calcium stable    Suze was seen today for medication refill.    Diagnoses and all orders for this visit:    Prediabetes  -     Hemoglobin A1C; Future    Syncope, unspecified syncope type  -     Ambulatory referral/consult to Cardiology; Future    Primary hypertension  -     Hypertension Digital Medicine (HDMP) Enrollment Order    Mixed hyperlipidemia  -     Lipids Digital Medicine (LDMP) Enrollment Order  -     Lipids Digital Medicine (LDMP): Assign Onboarding Questionnaires  -     Ambulatory referral/consult to Cardiology; Future  -     Lipid Panel; Future    Episode of recurrent major depressive disorder, unspecified depression episode severity  -     Ambulatory referral/consult to Psychiatry; Future    Nonobstructive atherosclerosis of coronary artery  -     Lipids Digital Medicine (LDMP) Enrollment Order  -     Lipids Digital Medicine (LDMP): Assign Onboarding Questionnaires  -     Ambulatory referral/consult to Cardiology; Future  -     Lipid Panel; Future    Stage 3 chronic kidney disease, unspecified whether stage 3a or 3b CKD    S/P parathyroidectomy    Other orders  -     Discontinue: ALPRAZolam (XANAX) 0.25 MG tablet; Take 1 tablet (0.25 mg total) by mouth 3 (three) times daily as needed for Anxiety.  -     ALPRAZolam (XANAX) 0.25 MG tablet; Take 1 tablet  (0.25 mg total) by mouth 2 (two) times daily as needed for Anxiety.      Continue current medications.  Refer to cardiology regarding hyperlipidemia.  Suspect recent syncopal episode micturition syncope maybe in combination with pain medication from the dental procedure.  Make sure she is staying hydrated.  Will have her see Psychiatry regarding persistent depression issues.      Past Medical History:  Past Medical History:   Diagnosis Date    Anxiety     Cataract     Chronic kidney disease, stage 3 11/20/2020    COVID-19 ruled out by laboratory testing 03/18/2022    [facility]    Depression     GERD (gastroesophageal reflux disease)     Glaucoma     History of colon polyps 2009    history as per patient    History of colonoscopy 2009    ok per pt    Hyperplastic colon polyp 05/12/2015    Hypertension     Hypothyroid     ?    Mild mitral regurgitation     Mitral valve prolapse     Nonobstructive atherosclerosis of coronary artery 09/2021    Moderate LAD stenosis, mild RCA stenosis.    Peptic ulcer disease     with gi bleed    Prediabetes     Primary hyperparathyroidism     Right knee DJD     Right knee DJD     Sleep apnea     Sleep disorder     TMJ (temporomandibular joint disorder)     Vitamin D deficiency      Past Surgical History:   Procedure Laterality Date    BILATERAL SALPINGOOPHORECTOMY      CATARACT EXTRACTION W/  INTRAOCULAR LENS IMPLANT Right 06/09/2022    CATARACT EXTRACTION W/  INTRAOCULAR LENS IMPLANT Left 08/05/2022    COLONOSCOPY  2015    COLONOSCOPY N/A 12/20/2022    Procedure: COLONOSCOPY E consult sent for cc;  Surgeon: Mulugeta Calloway MD;  Location: [facility] [place] ENDO;  Service: Endoscopy;  Laterality: N/A;    CORONARY ANGIOGRAPHY N/A 9/24/2021    Procedure: ANGIOGRAM, CORONARY ARTERY;  Surgeon: Chago Gregory MD;  [facility] C[facility];  Service: Cardiology;  Laterality: N/A;    EYE SURGERY      laser sx    GLAUCOMA SURGERY Right 09/26/2019    Xen Gel    GLAUCOMA SURGERY Left 04/05/2021     abext XenGel OS    HYSTERECTOMY      OOPHORECTOMY      PARATHYROIDECTOMY  12/01/2021    SLT - OU - BOTH EYES      TRABECULECTOMY Right 4/2/14    OD (dr. grossman)    UPPER GASTROINTESTINAL ENDOSCOPY  2003     Review of patient's allergies indicates:   Allergen Reactions    Betoptic [betaxolol] Other (See Comments)     Fatigue, slowed heart rate.    Alphagan [brimonidine] Dermatitis    Crestor [rosuvastatin] Other (See Comments)     Muscle ache    Pravastatin Other (See Comments)     aches  aches     Current Outpatient Medications on File Prior to Visit   Medication Sig Dispense Refill    acetaminophen (TYLENOL) 650 MG TbSR Take 1,300 mg by mouth 2 (two) times a day.      albuterol (PROAIR HFA) 90 mcg/actuation inhaler Inhale 2 puffs into the lungs every 6 (six) hours as needed for Wheezing. Rescue 18 g 5    amLODIPine (NORVASC) 5 MG tablet TAKE 1 TABLET BY MOUTH EVERY DAY 90 tablet 2    aspirin (ECOTRIN) 81 MG EC tablet Take 1 tablet (81 mg total) by mouth once daily. 30 tablet 11    calcium carb/vit D3/minerals (CALTRATE PLUS ORAL) Take 1 tablet by mouth 2 (two) times a day.      dorzolamide (TRUSOPT) 2 % ophthalmic solution PLACE 1 DROP INTO THE RIGHT EYE 2 (TWO) TIMES A DAY. 10 mL 6    DULoxetine (CYMBALTA) 60 MG capsule TAKE 1 CAPSULE BY MOUTH EVERY DAY 90 capsule 3    fexofenadine (ALLEGRA) 180 MG tablet Allegra Allergy Take 1 Tablet (oral) 1 time per day for 14 days 20220318 tablet 1 time per day oral 14 days active 180 mg 30 tablet 0    fluticasone (VERAMYST) 27.5 mcg/actuation nasal spray 2 sprays by Nasal route once daily. 5.9 mL 11    ketorolac 0.5% (ACULAR) 0.5 % Drop Place 1 drop into the right eye 4 (four) times daily. 5 mL 1    losartan (COZAAR) 100 MG tablet TAKE 1 TABLET BY MOUTH EVERY DAY 90 tablet 1    loteprednol (LOTEMAX) 0.5 % ophthalmic suspension Place 1 drop into the right eye 4 (four) times daily. (Patient taking differently: Place 1 drop into the right eye 3 (three) times daily.) 5 mL 1     montelukast (SINGULAIR) 10 mg tablet Take 1 tablet (10 mg total) by mouth every evening. 90 tablet 3    netarsudiL-latanoprost (ROCKLATAN) 0.02-0.005 % Drop Place 1 drop into both eyes Daily. 2.5 mL 11    pantoprazole (PROTONIX) 40 MG tablet TAKE 1 TABLET BY MOUTH EVERY DAY 90 tablet 3    prednisoLONE acetate (PRED FORTE) 1 % DrpS Place 1 drop into the right eye 4 (four) times daily. 5 mL 1    semaglutide (OZEMPIC) 0.25 mg or 0.5 mg(2 mg/1.5 mL) pen injector Inject 0.5 mg into the skin every 7 days. 1 pen 5    [DISCONTINUED] ALPRAZolam (XANAX) 0.25 MG tablet TAKE 1 TABLET BY MOUTH THREE TIMES A DAY AS NEEDED FOR ANXIETY 30 tablet 5    amoxicillin (AMOXIL) 500 MG capsule Take 500 mg by mouth 3 (three) times daily.      [DISCONTINUED] ergocalciferol (ERGOCALCIFEROL) 50,000 unit Cap TAKE 1 CAPSULE BY MOUTH ONE TIME PER WEEK (Patient not taking: Reported on 3/7/2023) 12 capsule 3    [DISCONTINUED] ezetimibe (ZETIA) 10 mg tablet Take 1 tablet (10 mg total) by mouth every evening. (Patient not taking: Reported on 3/7/2023) 90 tablet 3    [DISCONTINUED] rosuvastatin (CRESTOR) 10 MG tablet Take 10 mg by mouth once daily.       No current facility-administered medications on file prior to visit.     Social History     Socioeconomic History    Marital status:    Tobacco Use    Smoking status: Never    Smokeless tobacco: Never   Substance and Sexual Activity    Alcohol use: Yes     Alcohol/week: 2.0 standard drinks     Types: 2 Standard drinks or equivalent per week     Comment: occasional    Drug use: No    Sexual activity: Not Currently     Family History   Problem Relation Age of Onset    Heart disease Father         before age 50    Diabetes Mother     Glaucoma Mother     Leukemia Mother     Cataracts Mother     Breast cancer Mother 60    Diabetes Sister     Glaucoma Sister     Diabetes Brother     Glaucoma Brother     Lung cancer Brother     Breast cancer Other 40        genetics -    Colon cancer Maternal Aunt   "          ROS:  GENERAL: No fever, chills,  or significant weight changes.   CARDIOVASCULAR: Denies chest pain, PND, orthopnea or reduced exercise tolerance.  ABDOMEN: Appetite fine. Denies diarrhea, abdominal pain, hematemesis or blood in stool.  URINARY: No flank pain, dysuria or hematuria.    Vitals:    03/07/23 1518   BP: 119/73   Pulse: 86   Temp: 97.8 °F (36.6 °C)   TempSrc: Temporal   Weight: 90.5 kg (199 lb 9.6 oz)   Height: 5' 8" (1.727 m)       Wt Readings from Last 3 Encounters:   03/07/23 90.5 kg (199 lb 9.6 oz)   01/04/23 92.5 kg (203 lb 14.8 oz)   12/20/22 92.5 kg (204 lb)       APPEARANCE: Well nourished, well developed, in no acute distress.    HEAD: Normocephalic.  Atraumatic.  EYES:   Right eye: Pupil reactive.  Conjunctiva clear.    Left eye: Pupil reactive.  Conjunctiva clear.    NECK: Supple. CHEST: Breath sounds clear bilaterally.  Normal respiratory effort  CARDIOVASCULAR: Normal rate.  Regular rhythm.  No murmurs.  No rub.  No gallops.   No edema.  MENTAL STATUS: Alert.  Oriented x 3.        "

## 2023-03-12 RX ORDER — LOSARTAN POTASSIUM 100 MG/1
TABLET ORAL
Qty: 90 TABLET | Refills: 1 | Status: SHIPPED | OUTPATIENT
Start: 2023-03-12 | End: 2023-10-26

## 2023-03-12 NOTE — TELEPHONE ENCOUNTER
Refill Decision Note   Suze Chion  is requesting a refill authorization.  Brief Assessment and Rationale for Refill:  Approve     Medication Therapy Plan:       Medication Reconciliation Completed: No   Comments:     No Care Gaps recommended.     Note composed:3:54 PM 03/12/2023

## 2023-03-12 NOTE — TELEPHONE ENCOUNTER
No new care gaps identified.  [facility] Embedded Care Gaps. Reference number: [id number]. 3/12/2023   1:49:35 PM CDT

## 2023-03-13 ENCOUNTER — DOCUMENTATION ONLY (OUTPATIENT)
Dept: OPHTHALMOLOGY | Facility: CLINIC | Age: 73
End: 2023-03-13
Payer: MEDICARE

## 2023-03-13 NOTE — PROGRESS NOTES
Short Stay Record    CC: Uncontrolled glaucoma right eye    HPI:  Suze Chino is a 72 y.o. female who presents for evaluation prior to ophthalmic surgery, right eye. Patient presents with uncontrolled glaucoma with intraocular pressure above target with significant risk of irreversible vision loss which requires surgical intervention.      Past Surgical History:   Procedure Laterality Date    BILATERAL SALPINGOOPHORECTOMY      CATARACT EXTRACTION W/  INTRAOCULAR LENS IMPLANT Right 06/09/2022    CATARACT EXTRACTION W/  INTRAOCULAR LENS IMPLANT Left 08/05/2022    COLONOSCOPY  2015    COLONOSCOPY N/A 12/20/2022    Procedure: COLONOSCOPY E consult sent for cc;  Surgeon: Mulugeta Calloway MD;  Location: [facility] ENDO;  Service: Endoscopy;  Laterality: N/A;    CORONARY ANGIOGRAPHY N/A 9/24/2021    Procedure: ANGIOGRAM, CORONARY ARTERY;  Surgeon: Chago Gregory MD;  Location: [facility] CATH;  Service: Cardiology;  Laterality: N/A;    EYE SURGERY      laser sx    GLAUCOMA SURGERY Right 09/26/2019    Xen Gel    GLAUCOMA SURGERY Left 04/05/2021    abext XenGel OS    HYSTERECTOMY      OOPHORECTOMY      PARATHYROIDECTOMY  12/01/2021    SLT - OU - BOTH EYES      TRABECULECTOMY Right 4/2/14    OD (dr. grossman)    UPPER GASTROINTESTINAL ENDOSCOPY  2003     Social History     Tobacco Use    Smoking status: Never    Smokeless tobacco: Never   Substance Use Topics    Alcohol use: Yes     Alcohol/week: 2.0 standard drinks     Types: 2 Standard drinks or equivalent per week     Comment: occasional     Family History   Problem Relation Age of Onset    Heart disease Father         before age 50    Diabetes Mother     Glaucoma Mother     Leukemia Mother     Cataracts Mother     Breast cancer Mother 60    Diabetes Sister     Glaucoma Sister     Diabetes Brother     Glaucoma Brother     Lung cancer Brother     Breast cancer Other 40        genetics -    Colon cancer Maternal Aunt      Review of patient's allergies indicates:   Allergen  Reactions    Betoptic [betaxolol] Other (See Comments)     Fatigue, slowed heart rate.    Alphagan [brimonidine] Dermatitis    Crestor [rosuvastatin] Other (See Comments)     Muscle ache    Pravastatin Other (See Comments)     aches  aches         Current Outpatient Medications:     acetaminophen (TYLENOL) 650 MG TbSR, Take 1,300 mg by mouth 2 (two) times a day., Disp: , Rfl:     albuterol (PROAIR HFA) 90 mcg/actuation inhaler, Inhale 2 puffs into the lungs every 6 (six) hours as needed for Wheezing. Rescue, Disp: 18 g, Rfl: 5    ALPRAZolam (XANAX) 0.25 MG tablet, Take 1 tablet (0.25 mg total) by mouth 2 (two) times daily as needed for Anxiety., Disp: 60 tablet, Rfl: 5    amLODIPine (NORVASC) 5 MG tablet, TAKE 1 TABLET BY MOUTH EVERY DAY, Disp: 90 tablet, Rfl: 2    amoxicillin (AMOXIL) 500 MG capsule, Take 500 mg by mouth 3 (three) times daily., Disp: , Rfl:     aspirin (ECOTRIN) 81 MG EC tablet, Take 1 tablet (81 mg total) by mouth once daily., Disp: 30 tablet, Rfl: 11    calcium carb/vit D3/minerals (CALTRATE PLUS ORAL), Take 1 tablet by mouth 2 (two) times a day., Disp: , Rfl:     dorzolamide (TRUSOPT) 2 % ophthalmic solution, PLACE 1 DROP INTO THE RIGHT EYE 2 (TWO) TIMES A DAY., Disp: 10 mL, Rfl: 6    DULoxetine (CYMBALTA) 60 MG capsule, TAKE 1 CAPSULE BY MOUTH EVERY DAY, Disp: 90 capsule, Rfl: 3    fexofenadine (ALLEGRA) 180 MG tablet, Allegra Allergy Take 1 Tablet (oral) 1 time per day for 14 days 20220318 tablet 1 time per day oral 14 days active 180 mg, Disp: 30 tablet, Rfl: 0    fluticasone (VERAMYST) 27.5 mcg/actuation nasal spray, 2 sprays by Nasal route once daily., Disp: 5.9 mL, Rfl: 11    ketorolac 0.5% (ACULAR) 0.5 % Drop, Place 1 drop into the right eye 4 (four) times daily., Disp: 5 mL, Rfl: 1    losartan (COZAAR) 100 MG tablet, TAKE 1 TABLET BY MOUTH EVERY DAY, Disp: 90 tablet, Rfl: 1    loteprednol (LOTEMAX) 0.5 % ophthalmic suspension, Place 1 drop into the right eye 4 (four) times daily.  (Patient taking differently: Place 1 drop into the right eye 3 (three) times daily.), Disp: 5 mL, Rfl: 1    montelukast (SINGULAIR) 10 mg tablet, Take 1 tablet (10 mg total) by mouth every evening., Disp: 90 tablet, Rfl: 3    netarsudiL-latanoprost (ROCKLATAN) 0.02-0.005 % Drop, Place 1 drop into both eyes Daily., Disp: 2.5 mL, Rfl: 11    pantoprazole (PROTONIX) 40 MG tablet, TAKE 1 TABLET BY MOUTH EVERY DAY, Disp: 90 tablet, Rfl: 3    prednisoLONE acetate (PRED FORTE) 1 % DrpS, Place 1 drop into the right eye 4 (four) times daily., Disp: 5 mL, Rfl: 1    Review of Systems:  A comprehensive review of systems was negative.  Visual Acuity (Snellen - Linear)     Right Left   Dist sc 20/40 20/20     Tonometry (Applanation, 9:43 AM)     Right Left   Pressure 28 19     Physical Exam:  General Appearance:    A&Ox3, no distress, appears stated age   Head:    Normocephalic, without obvious abnormality, atraumatic   Eyes:    PERRL, EOM's intact   Back:     Symmetric, no curvature   Lungs:     Respirations unlabored   Chest Wall:    No tenderness or deformity    Heart:  Abdomen:  Extremities:  Skin:    S1 and S2 present    Soft, non-tender    Extremities normal, atraumatic    Skin color, texture, turgor normal   Slit Lamp and Fundus Exam    External Exam     Right Left   External 2+ Ptosis Normal     Slit Lamp Exam     Right Left   Lids/Lashes Normal Normal   Conjunctiva/Sclera Large  Bleb spreading diffusely across sup scleral surface, sutures, ab external xen gel (not under flap) Nice Bleb with avascularity, under flap OS   Cornea Clear Clear   Anterior Chamber Deep and quiet Deep and quiet   Iris Peripheral iridectomy Round and reactive   Lens Posterior chamber intraocular lens Posterior chamber intraocular lens   Vitreous Posterior vitreous detachment Posterior vitreous detachment     Fundus Exam     Right Left   Disc loss inf rim, inf/temp slope inf/temp thinning, slope   C/D Ratio 0.9 0.85   Macula No Diabetic Retinopathy  No Diabetic Retinopathy   Vessels Normal Normal   Periphery No Diabetic Retinopathy No Diabetic Retinopathy         Impression: Primary Open Angle Glaucoma severe  stage : Right eye    Planned Procedure:       Book G-6 LASER RIGHT  EYE   BLOCK   STOP ASA X 2 WEEKS PRIOR   USE PRED AND KETO FOR laser   STOP ROCKLATAN OD 3 DAYS PRIOR THE LASER TO THE OD ONLY            Patient cleared for ophthalmic surgery.     Discharge Summary:    Admitting Diagnosis: Primary open angle glaucoma severe  stage OD    Discharge Diagnosis: same    Procedure: s/p glaucoma surgery as per dictation    Complications: None  Discharge Condition: Stable

## 2023-03-14 ENCOUNTER — PATIENT OUTREACH (OUTPATIENT)
Dept: ADMINISTRATIVE | Facility: HOSPITAL | Age: 73
End: 2023-03-14
Payer: MEDICARE

## 2023-03-15 ENCOUNTER — NURSE TRIAGE (OUTPATIENT)
Dept: ADMINISTRATIVE | Facility: CLINIC | Age: 73
End: 2023-03-15
Payer: MEDICARE

## 2023-03-15 NOTE — TELEPHONE ENCOUNTER
Spoke with pt who reports that she has been having diarrhea , and nausea since Monday. Reports noticing blood in stool last night that was bright red. Reports blood to be darker in color now. Reports lower abdominal pain, and dizziness. Advised to be seen in ED now. Verbalized understanding      Reason for Disposition   Bloody, black, or tarry bowel movements  (Exception: Chronic-unchanged black-grey bowel movements and is taking iron pills or Pepto-Bismol.)    Additional Information   Negative: Passed out (i.e., fainted, collapsed and was not responding)   Negative: Shock suspected (e.g., cold/pale/clammy skin, too weak to stand, low BP, rapid pulse)   Negative: Vomiting red blood or black (coffee ground) material   Negative: Sounds like a life-threatening emergency to the triager   Negative: SEVERE rectal bleeding (large blood clots; constant or on and off bleeding)   Negative: SEVERE dizziness (e.g., unable to stand, requires support to walk, feels like passing out now)   Negative: MODERATE rectal bleeding (small blood clots, passing blood without stool, or toilet water turns red) more than once a day    Protocols used: Rectal Bleeding-A-OH

## 2023-03-16 ENCOUNTER — PATIENT MESSAGE (OUTPATIENT)
Dept: ADMINISTRATIVE | Facility: HOSPITAL | Age: 73
End: 2023-03-16
Payer: MEDICARE

## 2023-03-17 ENCOUNTER — LAB VISIT (OUTPATIENT)
Dept: LAB | Facility: HOSPITAL | Age: 73
End: 2023-03-17
Attending: FAMILY MEDICINE
Payer: MEDICARE

## 2023-03-17 DIAGNOSIS — E78.2 MIXED HYPERLIPIDEMIA: ICD-10-CM

## 2023-03-17 DIAGNOSIS — E11.9 TYPE 2 DIABETES MELLITUS WITHOUT COMPLICATION: ICD-10-CM

## 2023-03-17 DIAGNOSIS — R73.03 PREDIABETES: ICD-10-CM

## 2023-03-17 DIAGNOSIS — I25.10 NONOBSTRUCTIVE ATHEROSCLEROSIS OF CORONARY ARTERY: ICD-10-CM

## 2023-03-17 LAB
ALBUMIN/CREAT UR: ABNORMAL UG/MG (ref 0–30)
CHOLEST SERPL-MCNC: 208 MG/DL (ref 120–199)
CHOLEST/HDLC SERPL: 4.2 {RATIO} (ref 2–5)
CREAT UR-MCNC: >450 MG/DL (ref 15–325)
ESTIMATED AVG GLUCOSE: 120 MG/DL (ref 68–131)
ESTIMATED AVG GLUCOSE: 120 MG/DL (ref 68–131)
HBA1C MFR BLD: 5.8 % (ref 4–5.6)
HBA1C MFR BLD: 5.8 % (ref 4–5.6)
HDLC SERPL-MCNC: 50 MG/DL (ref 40–75)
HDLC SERPL: 24 % (ref 20–50)
LDLC SERPL CALC-MCNC: 142.6 MG/DL (ref 63–159)
MICROALBUMIN UR DL<=1MG/L-MCNC: 94 UG/ML
NONHDLC SERPL-MCNC: 158 MG/DL
TRIGL SERPL-MCNC: 77 MG/DL (ref 30–150)

## 2023-03-17 PROCEDURE — 36415 COLL VENOUS BLD VENIPUNCTURE: CPT | Mod: PO | Performed by: FAMILY MEDICINE

## 2023-03-17 PROCEDURE — 80061 LIPID PANEL: CPT | Performed by: FAMILY MEDICINE

## 2023-03-17 PROCEDURE — 83036 HEMOGLOBIN GLYCOSYLATED A1C: CPT | Performed by: FAMILY MEDICINE

## 2023-03-17 PROCEDURE — 82570 ASSAY OF URINE CREATININE: CPT | Performed by: FAMILY MEDICINE

## 2023-03-23 ENCOUNTER — OUTSIDE PLACE OF SERVICE (OUTPATIENT)
Dept: OPHTHALMOLOGY | Facility: CLINIC | Age: 73
End: 2023-03-23
Payer: MEDICARE

## 2023-03-23 PROCEDURE — 66710 PR DESTRUC,CILIARY BODY,CYCLOPHOTOCOAG: ICD-10-PCS | Mod: RT,,, | Performed by: OPHTHALMOLOGY

## 2023-03-23 PROCEDURE — 66710 CILIARY TRANSSLERAL THERAPY: CPT | Mod: RT,,, | Performed by: OPHTHALMOLOGY

## 2023-03-24 ENCOUNTER — OFFICE VISIT (OUTPATIENT)
Dept: OPHTHALMOLOGY | Facility: CLINIC | Age: 73
End: 2023-03-24
Payer: MEDICARE

## 2023-03-24 DIAGNOSIS — Z98.890 POST-OPERATIVE STATE: Primary | ICD-10-CM

## 2023-03-24 DIAGNOSIS — Z98.41 CATARACT EXTRACTION STATUS, RIGHT: ICD-10-CM

## 2023-03-24 PROCEDURE — 99024 PR POST-OP FOLLOW-UP VISIT: ICD-10-PCS | Mod: POP,,, | Performed by: OPHTHALMOLOGY

## 2023-03-24 PROCEDURE — 99999 PR PBB SHADOW E&M-EST. PATIENT-LVL III: ICD-10-PCS | Mod: PBBFAC,,, | Performed by: OPHTHALMOLOGY

## 2023-03-24 PROCEDURE — 99213 OFFICE O/P EST LOW 20 MIN: CPT | Mod: PBBFAC | Performed by: OPHTHALMOLOGY

## 2023-03-24 PROCEDURE — 99024 POSTOP FOLLOW-UP VISIT: CPT | Mod: POP,,, | Performed by: OPHTHALMOLOGY

## 2023-03-24 PROCEDURE — 99999 PR PBB SHADOW E&M-EST. PATIENT-LVL III: CPT | Mod: PBBFAC,,, | Performed by: OPHTHALMOLOGY

## 2023-03-24 NOTE — PROGRESS NOTES
HPI     Post-op Evaluation            Comments: Patient her 1 day post op. Patient states she had some   discomfort last night no pain. Patient denies flashes and floaters. Patch   removed at 8:25A.M          Comments    Referred by Dr. Lorenzana     1. COAG/ LTG   -h/o non compliance   -intolerant of coag meds   -TRAB OD   -repeat SLT od done 8/9/2012 - good initial response IOP 18 to 13, SLT OD   08/17/17   -Primary SLT done os 9/13/2012 - minimal response 18 to 16   -Bleb Needling with [facility] Inj. OD 9/17/15   -SLT OS 9/29/2016 AND REPEAT SLT OS 12/10/2020   Xen Gel OD (09-26-19) Not under flap   Xen Gel OS (04-) abExt LOT #[id number] (under flap)  -G6 OD 3/23/23  2. PCIOL OD 6/9/22/ SN60WF 12.0/ CDE: 11.04  PCIOL OS 8/4/22 Sy60WF 12.5 / CDE: 8.51    *Stopped Alphagan OS TID because of dermatitis   *Stopped Betoptic bid ou due to slow pulse rate and feeling tired.   *latanoprost slows pulse rate   * BID (stopped after 2 weeks due to extreme fatigue)   *OD Rhopressa Qhs ( patient ran out in March never returned for followup )     *lotemax cost over $300      GCL: 17/21--- 08/16/21   GCL 19/22--2/22/23      OS: Rocklatan QHS (holding OD post op)  OU: Dorozolamide BID  OD: Pred/Keto QID          Last edited by Kang Lemus on 3/24/2023  8:26 AM.            Assessment /Plan     For exam results, see Encounter Report.      ICD-10-CM ICD-9-CM    1. Post-operative state  Z98.890 V45.89       2. Cataract extraction status, right  Z98.41 V45.61           S/p 1 day G6 OD   Doing well  Resume Dorzolamide OD  Will begin Rhopressa OD      Return to clinic 2-3 weeks       OS: Rocklatan QHS (holding OD post op)  OD: Rhopressa qd   OU: Dorozolamide BID  OD: Pred/Keto QID

## 2023-03-26 NOTE — TELEPHONE ENCOUNTER
No new care gaps identified.  [facility] Embedded Care Gaps. Reference number: [id number]. 3/26/2023   12:32:23 PM CDT

## 2023-03-27 RX ORDER — PANTOPRAZOLE SODIUM 40 MG/1
TABLET, DELAYED RELEASE ORAL
Qty: 90 TABLET | Refills: 3 | Status: SHIPPED | OUTPATIENT
Start: 2023-03-27 | End: 2024-03-28

## 2023-04-01 ENCOUNTER — TELEPHONE (OUTPATIENT)
Dept: FAMILY MEDICINE | Facility: CLINIC | Age: 73
End: 2023-04-01

## 2023-04-01 DIAGNOSIS — R73.03 PREDIABETES: Primary | ICD-10-CM

## 2023-04-01 NOTE — TELEPHONE ENCOUNTER
----- Message from Cody Lewis MD sent at 3/31/2023 12:26 PM CDT -----  Sugar elevated consistent with prediabetes, but not diabetic level.  Cholesterol could be better, but statin intolerant.  Recommend watch diet..  Recheck hemoglobin A1c 6 months. Thanks,  Dr. Lewis

## 2023-04-04 ENCOUNTER — OFFICE VISIT (OUTPATIENT)
Dept: OPHTHALMOLOGY | Facility: CLINIC | Age: 73
End: 2023-04-04
Payer: MEDICARE

## 2023-04-04 DIAGNOSIS — Z98.41 CATARACT EXTRACTION STATUS, RIGHT: ICD-10-CM

## 2023-04-04 DIAGNOSIS — Z98.890 POST-OPERATIVE STATE: Primary | ICD-10-CM

## 2023-04-04 DIAGNOSIS — H40.1133 PRIMARY OPEN ANGLE GLAUCOMA OF BOTH EYES, SEVERE STAGE: ICD-10-CM

## 2023-04-04 PROCEDURE — 99999 PR PBB SHADOW E&M-EST. PATIENT-LVL III: CPT | Mod: PBBFAC,,, | Performed by: OPHTHALMOLOGY

## 2023-04-04 PROCEDURE — 99024 POSTOP FOLLOW-UP VISIT: CPT | Mod: POP,,, | Performed by: OPHTHALMOLOGY

## 2023-04-04 PROCEDURE — 99213 OFFICE O/P EST LOW 20 MIN: CPT | Mod: PBBFAC | Performed by: OPHTHALMOLOGY

## 2023-04-04 PROCEDURE — 99999 PR PBB SHADOW E&M-EST. PATIENT-LVL III: ICD-10-PCS | Mod: PBBFAC,,, | Performed by: OPHTHALMOLOGY

## 2023-04-04 PROCEDURE — 99024 PR POST-OP FOLLOW-UP VISIT: ICD-10-PCS | Mod: POP,,, | Performed by: OPHTHALMOLOGY

## 2023-04-04 RX ORDER — LOTEPREDNOL ETABONATE 5 MG/ML
1 SUSPENSION/ DROPS OPHTHALMIC 2 TIMES DAILY
Qty: 5 ML | Refills: 3 | Status: SHIPPED | OUTPATIENT
Start: 2023-04-04 | End: 2023-05-15 | Stop reason: ALTCHOICE

## 2023-04-04 NOTE — PROGRESS NOTES
HPI     Post-op Evaluation            Comments: Pt reports for 2-3wk post op G6 OD. Denies any pain or   irritation. Va stable. 100% compliant with gtts .           Comments      1. COAG/ LTG   -h/o non compliance   -intolerant of coag meds   -TRAB OD   -repeat SLT od done 8/9/2012 - good initial response IOP 18 to 13, SLT OD   08/17/17   -Primary SLT done os 9/13/2012 - minimal response 18 to 16   -Bleb Needling with [facility] Inj. OD 9/17/15   -SLT OS 9/29/2016 AND REPEAT SLT OS 12/10/2020   Xen Gel OD (09-26-19) Not under flap   Xen Gel OS (04-) abExt LOT #[id number] (under flap)  -G6 OD 3/23/23  2. PCIOL OD 6/9/22/ SN60WF 12.0/ CDE: 11.04  PCIOL OS 8/4/22 Sy60WF 12.5 / CDE: 8.51    *Stopped Alphagan OS TID because of dermatitis   *Stopped Betoptic bid ou due to slow pulse rate and feeling tired.   *latanoprost slows pulse rate   * BID (stopped after 2 weeks due to extreme fatigue)   *OD Rhopressa Qhs ( patient ran out in March never returned for followup )     *lotemax cost over $300      GCL: 17/21--- 08/16/21   GCL 19/22--2/22/23      OS: Rocklatan QHS (holding OD post op)  OD: Rhopressa qd  OU: Dorozolamide BID  OD: Pred/Keto QID              Last edited by Macario Soriano on 4/4/2023 10:23 AM.            Assessment /Plan     For exam results, see Encounter Report.      ICD-10-CM ICD-9-CM    1. Post-operative state  Z98.890 V45.89       2. Cataract extraction status, right  Z98.41 V45.61       3. Primary open angle glaucoma of both eyes, severe stage  H40.1133 365.11      365.73           S/p 1 week G6 OD   Doing well  Switch from Pred to Lotemax OD      Return to clinic 2-3 weeks for IOP      OS: Rocklatan QHS (holding OD post op)  OD: Rhopressa qd  OU: Dorozolamide BID  OD: Keto QID  OD: Lotemax BID

## 2023-04-06 ENCOUNTER — TELEPHONE (OUTPATIENT)
Dept: OPHTHALMOLOGY | Facility: CLINIC | Age: 73
End: 2023-04-06
Payer: MEDICARE

## 2023-04-06 NOTE — TELEPHONE ENCOUNTER
Patient called and stated that her right eye ( post G6) is hurting and she was placed on Lotemax at her last visit at twice a day. Advised her to use Lotemax 4 times a day and if that does not manage the pain she should use Pred A 2 -3 times a day. We will see her at the next visit.

## 2023-04-25 ENCOUNTER — TELEPHONE (OUTPATIENT)
Dept: FAMILY MEDICINE | Facility: CLINIC | Age: 73
End: 2023-04-25
Payer: MEDICARE

## 2023-04-25 RX ORDER — SEMAGLUTIDE 1.34 MG/ML
1 INJECTION, SOLUTION SUBCUTANEOUS
Qty: 3 ML | Refills: 11 | Status: SHIPPED | OUTPATIENT
Start: 2023-04-25 | End: 2024-04-25

## 2023-04-25 NOTE — TELEPHONE ENCOUNTER
----- Message from Emily Abbasi [facility] sent at 4/25/2023 10:03 AM CDT -----  Regarding: Increase Dose of Ozempic From Patient  Good Morning! Ms. Chino is requesting that you increase her Ozempic to the 1mg dose. Please advise and thank you.    
Patient informed  
1.56

## 2023-04-26 ENCOUNTER — OFFICE VISIT (OUTPATIENT)
Dept: OPHTHALMOLOGY | Facility: CLINIC | Age: 73
End: 2023-04-26
Payer: MEDICARE

## 2023-04-26 DIAGNOSIS — H40.1133 PRIMARY OPEN ANGLE GLAUCOMA OF BOTH EYES, SEVERE STAGE: ICD-10-CM

## 2023-04-26 DIAGNOSIS — Z98.41 CATARACT EXTRACTION STATUS, RIGHT: ICD-10-CM

## 2023-04-26 DIAGNOSIS — Z98.890 POST-OPERATIVE STATE: Primary | ICD-10-CM

## 2023-04-26 PROCEDURE — 99999 PR PBB SHADOW E&M-EST. PATIENT-LVL III: CPT | Mod: PBBFAC,,, | Performed by: OPHTHALMOLOGY

## 2023-04-26 PROCEDURE — 99999 PR PBB SHADOW E&M-EST. PATIENT-LVL III: ICD-10-PCS | Mod: PBBFAC,,, | Performed by: OPHTHALMOLOGY

## 2023-04-26 PROCEDURE — 99213 OFFICE O/P EST LOW 20 MIN: CPT | Mod: PBBFAC | Performed by: OPHTHALMOLOGY

## 2023-04-26 PROCEDURE — 99024 POSTOP FOLLOW-UP VISIT: CPT | Mod: POP,,, | Performed by: OPHTHALMOLOGY

## 2023-04-26 PROCEDURE — 99024 PR POST-OP FOLLOW-UP VISIT: ICD-10-PCS | Mod: POP,,, | Performed by: OPHTHALMOLOGY

## 2023-04-26 NOTE — PROGRESS NOTES
HPI     Post-op Evaluation            Comments: 2-3wk IOP check. Having pain in OD, no pain in OS. Va stable.   100% compliant gtts.           Comments      1. COAG/ LTG   -h/o non compliance   -intolerant of coag meds   -TRAB OD   -repeat SLT od done 8/9/2012 - good initial response IOP 18 to 13, SLT OD   08/17/17   -Primary SLT done os 9/13/2012 - minimal response 18 to 16   -Bleb Needling with [facility] Inj. OD 9/17/15   -SLT OS 9/29/2016 AND REPEAT SLT OS 12/10/2020   Xen Gel OD (09-26-19) Not under flap   Xen Gel OS (04-) abExt LOT #[id number] (under flap)  -G6 OD 3/23/23  2. PCIOL OD 6/9/22/ SN60WF 12.0/ CDE: 11.04  PCIOL OS 8/4/22 Sy60WF 12.5 / CDE: 8.51    *Stopped Alphagan OS TID because of dermatitis   *Stopped Betoptic bid ou due to slow pulse rate and feeling tired.   *latanoprost slows pulse rate   * BID (stopped after 2 weeks due to extreme fatigue)   *OD Rhopressa Qhs ( patient ran out in March never returned for followup )     *lotemax cost over $300      GCL: 17/21--- 08/16/21   GCL 19/22--2/22/23      OS: Rocklatan QHS (holding OD post op)  OD: Rhopressa qd  OU: Dorozolamide BID  OD: Keto QID  OD: Lotemax BID            Last edited by Macario Soriano on 4/26/2023  1:35 PM.            Assessment /Plan     For exam results, see Encounter Report.      ICD-10-CM ICD-9-CM    1. Post-operative state  Z98.890 V45.89 S/p -G6 OD 3/23/23-   Needs IOP Lower OU         2. Cataract extraction status, right  Z98.41 V45.61       3. Primary open angle glaucoma of both eyes, severe stage  H40.1133 365.11      365.73         Add rocklatan ou and stop Rhopressa   OU: Rocklatan QHS  OU: Dorozolamide TID    OD: Keto QID  OD: Lotemax BID      RETURN TO CLINIC 2 weeks

## 2023-05-15 ENCOUNTER — OFFICE VISIT (OUTPATIENT)
Dept: OPHTHALMOLOGY | Facility: CLINIC | Age: 73
End: 2023-05-15
Payer: MEDICARE

## 2023-05-15 DIAGNOSIS — Z98.890 POST-OPERATIVE STATE: Primary | ICD-10-CM

## 2023-05-15 DIAGNOSIS — H40.1133 PRIMARY OPEN ANGLE GLAUCOMA OF BOTH EYES, SEVERE STAGE: ICD-10-CM

## 2023-05-15 DIAGNOSIS — Z98.41 CATARACT EXTRACTION STATUS, RIGHT: ICD-10-CM

## 2023-05-15 PROCEDURE — 99024 PR POST-OP FOLLOW-UP VISIT: ICD-10-PCS | Mod: S$PBB,,, | Performed by: OPHTHALMOLOGY

## 2023-05-15 PROCEDURE — 99999 PR PBB SHADOW E&M-EST. PATIENT-LVL III: CPT | Mod: PBBFAC,,, | Performed by: OPHTHALMOLOGY

## 2023-05-15 PROCEDURE — 99213 OFFICE O/P EST LOW 20 MIN: CPT | Mod: PBBFAC | Performed by: OPHTHALMOLOGY

## 2023-05-15 PROCEDURE — 99024 POSTOP FOLLOW-UP VISIT: CPT | Mod: S$PBB,,, | Performed by: OPHTHALMOLOGY

## 2023-05-15 PROCEDURE — 99999 PR PBB SHADOW E&M-EST. PATIENT-LVL III: ICD-10-PCS | Mod: PBBFAC,,, | Performed by: OPHTHALMOLOGY

## 2023-05-15 NOTE — PROGRESS NOTES
HPI     Post-op Evaluation            Comments: G6 OD 3/23/23    Patient states OU is doing well, no changes in VA and using drops as   directed.          Comments    Referred by Dr. Lorenzana     1. COAG/ LTG   -h/o non compliance   -intolerant of coag meds   -TRAB OD   -repeat SLT od done 8/9/2012 - good initial response IOP 18 to 13, SLT OD   08/17/17   -Primary SLT done os 9/13/2012 - minimal response 18 to 16   -Bleb Needling with [facility] Inj. OD 9/17/15   -SLT OS 9/29/2016 AND REPEAT SLT OS 12/10/2020   Xen Gel OD (09-26-19) Not under flap   Xen Gel OS (04-) abExt LOT #[id number] (under flap)  -G6 OD 3/23/23  2. PCIOL OD 6/9/22/ SN60WF 12.0/ CDE: 11.04  PCIOL OS 8/4/22 Sy60WF 12.5 / CDE: 8.51    *Stopped Alphagan OS TID because of dermatitis   *Stopped Betoptic bid ou due to slow pulse rate and feeling tired.   *latanoprost slows pulse rate   * BID (stopped after 2 weeks due to extreme fatigue)   *OD Rhopressa Qhs ( patient ran out in March never returned for followup )     *lotemax cost over $300      GCL: 17/21--- 08/16/21   GCL 19/22--2/22/23      OU: Rocklatan QHS  OU: Dorozolamide TID     OD: Keto QID  OD: Lotemax BID          Last edited by Iris Adams, Patient Care Assistant on 5/15/2023 10:15   AM.            Assessment /Plan     For exam results, see Encounter Report.      ICD-10-CM ICD-9-CM    1. Post-operative state  Z98.890 V45.89       2. Cataract extraction status, right  Z98.41 V45.61       3. Primary open angle glaucoma of both eyes, severe stage  H40.1133 365.11      365.73           Still elevated IOP OD - try to stop Lotemax - could be steroid responder   Pt defers SLT OD  laser this week as she is going out of town until June     RETURN TO CLINIC 1 month (since pt is going out of town)     OU: Rocklatan QHS  OU: Dorozolamide TID     OD: Keto QID  Stop Lotemax

## 2023-05-23 ENCOUNTER — TELEPHONE (OUTPATIENT)
Dept: OPHTHALMOLOGY | Facility: CLINIC | Age: 73
End: 2023-05-23
Payer: MEDICARE

## 2023-06-15 DIAGNOSIS — M17.0 PRIMARY OSTEOARTHRITIS OF BOTH KNEES: Primary | ICD-10-CM

## 2023-06-19 ENCOUNTER — OFFICE VISIT (OUTPATIENT)
Dept: OPHTHALMOLOGY | Facility: CLINIC | Age: 73
End: 2023-06-19
Payer: MEDICARE

## 2023-06-19 DIAGNOSIS — H40.1133 PRIMARY OPEN ANGLE GLAUCOMA OF BOTH EYES, SEVERE STAGE: ICD-10-CM

## 2023-06-19 DIAGNOSIS — Z98.890 POST-OPERATIVE STATE: Primary | ICD-10-CM

## 2023-06-19 PROCEDURE — 99024 POSTOP FOLLOW-UP VISIT: CPT | Mod: S$PBB,,, | Performed by: OPHTHALMOLOGY

## 2023-06-19 PROCEDURE — 99024 PR POST-OP FOLLOW-UP VISIT: ICD-10-PCS | Mod: S$PBB,,, | Performed by: OPHTHALMOLOGY

## 2023-06-19 PROCEDURE — 99213 OFFICE O/P EST LOW 20 MIN: CPT | Mod: PBBFAC | Performed by: OPHTHALMOLOGY

## 2023-06-19 PROCEDURE — 99999 PR PBB SHADOW E&M-EST. PATIENT-LVL III: CPT | Mod: PBBFAC,,, | Performed by: OPHTHALMOLOGY

## 2023-06-19 PROCEDURE — 99999 PR PBB SHADOW E&M-EST. PATIENT-LVL III: ICD-10-PCS | Mod: PBBFAC,,, | Performed by: OPHTHALMOLOGY

## 2023-06-19 RX ORDER — KETOROLAC TROMETHAMINE 5 MG/ML
1 SOLUTION OPHTHALMIC 4 TIMES DAILY
Qty: 10 ML | Refills: 5 | Status: SHIPPED | OUTPATIENT
Start: 2023-06-19 | End: 2023-08-11

## 2023-06-19 NOTE — PROGRESS NOTES
HPI     Glaucoma            Comments: IOP f/u s/p G6 laser 3/23/23          Comments    Patient states she is now using Lotemax in OD prn (maybe 2x a wk) - using   Keto QID OD/ Rocklatan OU QHS/ Dorz OU BID - denies pain/ discomfort OU -   no va changes OU      Referred by Dr. Lorenzana     1. COAG/ LTG   -h/o non compliance   -intolerant of coag meds   -TRAB OD   -repeat SLT od done 8/9/2012 - good initial response IOP 18 to 13, SLT OD   08/17/17   -Primary SLT done os 9/13/2012 - minimal response 18 to 16   -Bleb Needling with [facility] Inj. OD 9/17/15   -SLT OS 9/29/2016 AND REPEAT SLT OS 12/10/2020   Xen Gel OD (09-26-19) Not under flap   Xen Gel OS (04-) abExt LOT #[id number] (under flap)  -G6 OD 3/23/23  2. PCIOL OD 6/9/22/ SN60WF 12.0/ CDE: 11.04  PCIOL OS 8/4/22 Sy60WF 12.5 / CDE: 8.51    *Stopped Alphagan OS TID because of dermatitis   *Stopped Betoptic bid ou due to slow pulse rate and feeling tired.   *latanoprost slows pulse rate   * BID (stopped after 2 weeks due to extreme fatigue)   *OD Rhopressa Qhs ( patient ran out in March never returned for followup )     *lotemax cost over $300      GCL: 17/21--- 08/16/21   GCL 19/22--2/22/23      OU: Rocklatan QHS  OU: Dorozolamide TID     OD: Keto QID  OD: Lotemax BID            Last edited by Kenisha Yap MA on 6/19/2023  8:46 AM.            Assessment /Plan     For exam results, see Encounter Report.      ICD-10-CM ICD-9-CM    1. Post-operative state  Z98.890 V45.89       2. Primary open angle glaucoma of both eyes, severe stage  H40.1133 365.11 Iop above target poor response to G6 OD     365.73       OU: Rocklatan QHS  OU: Dorozolamide TID  OD: Keto qid    Iop above target OD - discussed the need for additional treatment. Recommend Slt as was recommended at the last visit. Patient desires to not intervene.   Discussed that it is my opinion that she will lose additional vision without further intervention. Declines an additional opinion.   Cannot  tolerate Diamox, allergic to brimonidine, and pulse rate drops on Betoptic.   Pt desires to proceed with laser  SLT OD     IOP not within acceptable range relative to target IOP with risk of irreversible visual loss. Additional treatment required.  Discussed options, risks, and benefits of additional medication, SLT laser, or incisional glaucoma surgery.     Recommend: SLT OD     Patient chooses SLT OD     Reviewed importance of continued compliance with treatment and follow up.

## 2023-06-21 ENCOUNTER — HOSPITAL ENCOUNTER (OUTPATIENT)
Dept: RADIOLOGY | Facility: HOSPITAL | Age: 73
Discharge: HOME | End: 2023-06-21
Attending: ORTHOPAEDIC SURGERY
Payer: MEDICARE

## 2023-06-21 ENCOUNTER — OFFICE VISIT (OUTPATIENT)
Dept: ORTHOPEDICS | Facility: CLINIC | Age: 73
End: 2023-06-21
Payer: MEDICARE

## 2023-06-21 VITALS
DIASTOLIC BLOOD PRESSURE: 82 MMHG | SYSTOLIC BLOOD PRESSURE: 121 MMHG | BODY MASS INDEX: 27.96 KG/M2 | HEART RATE: 82 BPM | WEIGHT: 184.5 LBS | HEIGHT: 68 IN

## 2023-06-21 DIAGNOSIS — M17.11 PRIMARY OSTEOARTHRITIS OF RIGHT KNEE: Primary | ICD-10-CM

## 2023-06-21 DIAGNOSIS — M17.0 PRIMARY OSTEOARTHRITIS OF BOTH KNEES: ICD-10-CM

## 2023-06-21 PROCEDURE — 73562 XR KNEE ORTHO BILAT: ICD-10-PCS | Mod: 26,50,, | Performed by: RADIOLOGY

## 2023-06-21 PROCEDURE — 99213 PR OFFICE/OUTPT VISIT, EST, LEVL III, 20-29 MIN: ICD-10-PCS | Mod: S$PBB,,, | Performed by: ORTHOPAEDIC SURGERY

## 2023-06-21 PROCEDURE — 99213 OFFICE O/P EST LOW 20 MIN: CPT | Mod: PBBFAC,PN | Performed by: ORTHOPAEDIC SURGERY

## 2023-06-21 PROCEDURE — 99213 OFFICE O/P EST LOW 20 MIN: CPT | Mod: S$PBB,,, | Performed by: ORTHOPAEDIC SURGERY

## 2023-06-21 PROCEDURE — 99999 PR PBB SHADOW E&M-EST. PATIENT-LVL III: CPT | Mod: PBBFAC,,, | Performed by: ORTHOPAEDIC SURGERY

## 2023-06-21 PROCEDURE — 73562 X-RAY EXAM OF KNEE 3: CPT | Mod: 26,50,, | Performed by: RADIOLOGY

## 2023-06-21 PROCEDURE — 99999 PR PBB SHADOW E&M-EST. PATIENT-LVL III: ICD-10-PCS | Mod: PBBFAC,,, | Performed by: ORTHOPAEDIC SURGERY

## 2023-06-21 PROCEDURE — 73562 X-RAY EXAM OF KNEE 3: CPT | Mod: TC,50,PN

## 2023-06-21 NOTE — PROGRESS NOTES
[facility], Orthopedics and Sports Medicine  [facility]    Established Patient Office Visit  06/21/2023     Diagnosis:  Bilateral knee osteoarthritis      Procedure:   (6/15/2022) right knee CSI  (1/4/2023) right knee CSI    Subjective:      Suze Chino is a 72 y.o. female who presents for follow up treatment of the above mentioned diagnosis. She was having knee pain about 2 weeks ago, atraumatic, achy, mostly in right knee.  The pain has resolved with tylenol.  Today she has no knee pain.     Outside reports reviewed: historical medical records and office notes.    Past Medical History:   Diagnosis Date    Anxiety     Cataract     Chronic kidney disease, stage 3 11/20/2020    COVID-19 ruled out by laboratory testing 03/18/2022    [facility]    Depression     GERD (gastroesophageal reflux disease)     Glaucoma     History of colon polyps 2009    history as per patient    History of colonoscopy 2009    ok per pt    Hyperplastic colon polyp 05/12/2015    Hypertension     Hypothyroid     ?    Mild mitral regurgitation     Mitral valve prolapse     Nonobstructive atherosclerosis of coronary artery 09/2021    Moderate LAD stenosis, mild RCA stenosis.    Peptic ulcer disease     with gi bleed    Prediabetes     Primary hyperparathyroidism     Right knee DJD     Right knee DJD     Sleep apnea     Sleep disorder     TMJ (temporomandibular joint disorder)     Vitamin D deficiency        Patient Active Problem List   Diagnosis    Hypertension    History of hypothyroidism    Anxiety    GERD (gastroesophageal reflux disease)    Mitral valve prolapse    Myopia of both eyes - Both Eyes    Visual field defect    Optic pit - Left Eye    Primary open-angle glaucoma, severe stage - Both Eyes    Nuclear sclerosis    History of colon polyps    Bradycardia, drug induced    Right knee DJD    Severe stage chronic open angle glaucoma    Low-tension glaucoma of both eyes, severe stage    Lateral  cystocele    Acquired genu valgum of right knee    Right knee pain    Dream enactment behavior    Secondary parasomnia    Primary snoring    Allergic sinusitis    Breast pain    Chest pain    Slow transit constipation    Vitamin D deficiency    Precordial pain    Chronic kidney disease, stage 3    Primary osteoarthritis of both knees    HTN (hypertension)    ACP (advance care planning)    Dyspnea    Family history of early CAD    Hyperlipidemia    Nonobstructive atherosclerosis of coronary artery    Mild mitral regurgitation    Positive QUETA (antinuclear antibody)    S/P parathyroidectomy    Decreased range of motion (ROM) of both knees    Weakness of both lower extremities    Decreased functional mobility    Prediabetes    Episode of recurrent major depressive disorder       Past Surgical History:   Procedure Laterality Date    BILATERAL SALPINGOOPHORECTOMY      CATARACT EXTRACTION W/  INTRAOCULAR LENS IMPLANT Right 06/09/2022    CATARACT EXTRACTION W/  INTRAOCULAR LENS IMPLANT Left 08/05/2022    COLONOSCOPY  2015    COLONOSCOPY N/A 12/20/2022    Procedure: COLONOSCOPY E consult sent for cc;  Surgeon: Mulugeta Calloway MD;  Location: [facility] ENDO;  Service: Endoscopy;  Laterality: N/A;    CORONARY ANGIOGRAPHY N/A 9/24/2021    Procedure: ANGIOGRAM, CORONARY ARTERY;  Surgeon: Chago Gregory MD;  Location: [facility] CATH;  Service: Cardiology;  Laterality: N/A;    EYE SURGERY      laser sx    GLAUCOMA SURGERY Right 09/26/2019    Xen Gel    GLAUCOMA SURGERY Left 04/05/2021    abext XenGel OS    HYSTERECTOMY      OOPHORECTOMY      PARATHYROIDECTOMY  12/01/2021    SLT - OU - BOTH EYES      TRABECULECTOMY Right 4/2/14    OD (dr. grossman)    UPPER GASTROINTESTINAL ENDOSCOPY  2003        Current Outpatient Medications   Medication Instructions    acetaminophen (TYLENOL) 1,300 mg, Oral, 2 times daily    albuterol (PROAIR HFA) 90 mcg/actuation inhaler 2 puffs, Inhalation, Every 6 hours PRN, Rescue    ALPRAZolam (XANAX) 0.25 mg, Oral,  2 times daily PRN    amLODIPine (NORVASC) 5 MG tablet TAKE 1 TABLET BY MOUTH EVERY DAY    aspirin (ECOTRIN) 81 mg, Oral, Daily    calcium carb/vit D3/minerals (CALTRATE PLUS ORAL) 1 tablet, Oral, 2 times daily    dorzolamide (TRUSOPT) 2 % ophthalmic solution 1 drop, Right Eye, 2 times daily    DULoxetine (CYMBALTA) 60 MG capsule TAKE 1 CAPSULE BY MOUTH EVERY DAY    fexofenadine (ALLEGRA) 180 MG tablet Allegra Allergy Take 1 Tablet (oral) 1 time per day for 14 days 20220318 tablet 1 time per day oral 14 days active 180 mg    fluticasone (VERAMYST) 27.5 mcg/actuation nasal spray 2 sprays, Nasal, Daily    ketorolac 0.5% (ACULAR) 0.5 % Drop 1 drop, Right Eye, 4 times daily    ketorolac 0.5% (ACULAR) 0.5 % Drop 1 drop, Right Eye, 4 times daily    losartan (COZAAR) 100 MG tablet TAKE 1 TABLET BY MOUTH EVERY DAY    loteprednol (LOTEMAX) 0.5 % ophthalmic suspension 1 drop, Right Eye, 4 times daily    montelukast (SINGULAIR) 10 mg, Oral, Nightly    netarsudiL-latanoprost (ROCKLATAN) 0.02-0.005 % Drop 1 drop, Both Eyes, Daily    OZEMPIC 1 mg, Subcutaneous, Every 7 days    pantoprazole (PROTONIX) 40 MG tablet TAKE 1 TABLET BY MOUTH EVERY DAY        Review of patient's allergies indicates:   Allergen Reactions    Betoptic [betaxolol] Other (See Comments)     Fatigue, slowed heart rate.    Alphagan [brimonidine] Dermatitis    Crestor [rosuvastatin] Other (See Comments)     Muscle ache    Pravastatin Other (See Comments)     aches  aches       Social History     Socioeconomic History    Marital status:    Tobacco Use    Smoking status: Never    Smokeless tobacco: Never   Substance and Sexual Activity    Alcohol use: Yes     Alcohol/week: 2.0 standard drinks     Types: 2 Standard drinks or equivalent per week     Comment: occasional    Drug use: No    Sexual activity: Not Currently       Family History   Problem Relation Age of Onset    Heart disease Father         before age 50    Diabetes Mother     Glaucoma Mother      Leukemia Mother     Cataracts Mother     Breast cancer Mother 60    Diabetes Sister     Glaucoma Sister     Diabetes Brother     Glaucoma Brother     Lung cancer Brother     Breast cancer Other 40        genetics -    Colon cancer Maternal Aunt          Review of Systems   Constitutional: Negative for chills and fever.   HENT:  Negative for hearing loss.    Eyes:  Negative for blurred vision.   Cardiovascular:  Negative for chest pain.   Respiratory:  Negative for shortness of breath.    Gastrointestinal:  Negative for abdominal pain.   Neurological:  Negative for light-headedness.        Objective:      General    Constitutional: She is oriented to person, place, and time. She appears well-developed and well-nourished.   HENT:   Head: Normocephalic and atraumatic.   Eyes: EOM are normal.   Cardiovascular:  Normal rate.            Pulmonary/Chest: Effort normal.   Neurological: She is alert and oriented to person, place, and time.   Psychiatric: She has a normal mood and affect.           Right Knee Exam     Inspection   Erythema: absent  Swelling: absent  Effusion: absent    Tenderness   The patient is experiencing no tenderness.     Range of Motion   Extension:  0   Flexion:  120     Tests   Ligament Examination   Lachman: normal (-1 to 2mm)   PCL-Posterior Drawer: normal (0 to 2mm)     MCL - Valgus: normal (0 to 2mm)  LCL - Varus: normal  Patella   Patellar apprehension: negative    Other   Sensation: normal    Left Knee Exam     Inspection   Erythema: absent  Swelling: absent  Effusion: absent    Tenderness   The patient is experiencing no tenderness.     Range of Motion   Extension:  0   Flexion:  120     Tests   Stability   Lachman: normal (-1 to 2mm)   PCL-Posterior Drawer: normal (0 to 2mm)  MCL - Valgus: normal (0 to 2mm)  LCL - Varus: normal (0 to 2mm)  Patella   Patellar apprehension: negative    Other   Sensation: normal    Muscle Strength   Right Lower Extremity   Quadriceps:  5/5   Hamstrin/5    Left Lower Extremity   Quadriceps:  5/5   Hamstrin/5     Vascular Exam     Right Pulses    Posterior Tibial:      2+        Left Pulses    Posterior Tibial:      2+        Imaging:  Radiographs of the bilateral knee taken taken 2023 were personally reviewed from the [facility] EMR.  Multiple views of the knee are available today for review, including a standing AP, a standing notch view, lateral view, and a merchant view.  The tibiofemoral compartment demonstrates moderate to severe degenerative changes in the right knee and mild in the left.  The patellofemoral compartment demonstrates moderate to severe degenerative changes right knee and mild in left.  No acute fractures or dislocations are noted in these images.        Procedures  none      Assessment:       Suze Cihno is a 72 y.o. female seen in the office today. The encounter diagnosis was Primary osteoarthritis of right knee.  Non-operative treatment is recommended at this time. Deferred offering injection today because no pain.  Patient can call when in pain and can consider injection.  Continue conservative treatment otherwise. The natural history and expected course discussed with patient. Various treatment options were discussed, including their risks and benefits. All of the patient's questions were answered.     Plan:      Tylenol 650mg TID, PRN pain.  Voltaren 1% topical gel, apply to affected area TID, PRN pain.  Follow up as needed if symptoms worsen.         Shaji Garcia IV, MD   of Clinical Orthopedics  Department of Orthopedic Surgery  [facility]  Office: [phone]  Website: [email or web address]    ---------------------------------------

## 2023-06-29 ENCOUNTER — OUTSIDE PLACE OF SERVICE (OUTPATIENT)
Dept: OPHTHALMOLOGY | Facility: CLINIC | Age: 73
End: 2023-06-29
Payer: MEDICARE

## 2023-06-29 PROCEDURE — 65855 PR TRABECULOPLASTY LASER SURGERY: ICD-10-PCS | Mod: RT,,, | Performed by: OPHTHALMOLOGY

## 2023-06-29 PROCEDURE — 65855 TRABECULOPLASTY LASER SURG: CPT | Mod: RT,,, | Performed by: OPHTHALMOLOGY

## 2023-06-30 ENCOUNTER — OFFICE VISIT (OUTPATIENT)
Dept: OPHTHALMOLOGY | Facility: CLINIC | Age: 73
End: 2023-06-30
Payer: MEDICARE

## 2023-06-30 DIAGNOSIS — Z98.890 POST-OPERATIVE STATE: Primary | ICD-10-CM

## 2023-06-30 DIAGNOSIS — H40.1133 PRIMARY OPEN ANGLE GLAUCOMA OF BOTH EYES, SEVERE STAGE: ICD-10-CM

## 2023-06-30 PROCEDURE — 99024 POSTOP FOLLOW-UP VISIT: CPT | Mod: POP,,, | Performed by: OPHTHALMOLOGY

## 2023-06-30 PROCEDURE — 99999 PR PBB SHADOW E&M-EST. PATIENT-LVL III: ICD-10-PCS | Mod: PBBFAC,,, | Performed by: OPHTHALMOLOGY

## 2023-06-30 PROCEDURE — 99999 PR PBB SHADOW E&M-EST. PATIENT-LVL III: CPT | Mod: PBBFAC,,, | Performed by: OPHTHALMOLOGY

## 2023-06-30 PROCEDURE — 99024 PR POST-OP FOLLOW-UP VISIT: ICD-10-PCS | Mod: POP,,, | Performed by: OPHTHALMOLOGY

## 2023-06-30 PROCEDURE — 99213 OFFICE O/P EST LOW 20 MIN: CPT | Mod: PBBFAC | Performed by: OPHTHALMOLOGY

## 2023-06-30 NOTE — PROGRESS NOTES
HPI     Post-op Evaluation            Comments: Pt reports for 1 day post op SLT OD. Denies any pain or   irritation. Va stable. 100% compliant with gtts,           Comments    1. COAG/ LTG   SLT OD 9/29/23  -h/o non compliance   -intolerant of coag meds   -TRAB OD   -repeat SLT od done 8/9/2012 - good initial response IOP 18 to 13, SLT OD   08/17/17   -Primary SLT done os 9/13/2012 - minimal response 18 to 16   -Bleb Needling with [facility] Inj. OD 9/17/15   -SLT OS 9/29/2016 AND REPEAT SLT OS 12/10/2020   Xen Gel OD (09-26-19) Not under flap   Xen Gel OS (04-) abExt LOT #[id number] (under flap)  -G6 OD 3/23/23  2. PCIOL OD 6/9/22/ SN60WF 12.0/ CDE: 11.04  PCIOL OS 8/4/22 Sy60WF 12.5 / CDE: 8.51    *Stopped Alphagan OS TID because of dermatitis   *Stopped Betoptic bid ou due to slow pulse rate and feeling tired.   *latanoprost slows pulse rate   * BID (stopped after 2 weeks due to extreme fatigue)   *OD Rhopressa Qhs ( patient ran out in March never returned for followup )     *lotemax cost over $300   Poor response to G6     GCL: 17/21--- 08/16/21   GCL 19/22--2/22/23      OU: Rocklatan QHS  OU: Dorozolamide TID     OD: Keto QID  OD: Lotemax BID            Last edited by Macario Soriano on 6/30/2023  8:25 AM.            Assessment /Plan     For exam results, see Encounter Report.      ICD-10-CM ICD-9-CM    1. Post-operative state  Z98.890 V45.89       2. Primary open angle glaucoma of both eyes, severe stage  H40.1133 365.11      365.73           S/P SLT right eye   Doing well  Ketorolac QID for 5 days operative eye  Continue glaucoma medications as ordered  RTC 4 weeks

## 2023-08-17 ENCOUNTER — PES CALL (OUTPATIENT)
Dept: ADMINISTRATIVE | Facility: CLINIC | Age: 73
End: 2023-08-17
Payer: MEDICARE

## 2023-08-25 ENCOUNTER — PATIENT MESSAGE (OUTPATIENT)
Dept: ADMINISTRATIVE | Facility: HOSPITAL | Age: 73
End: 2023-08-25
Payer: MEDICARE

## 2023-08-30 ENCOUNTER — TELEPHONE (OUTPATIENT)
Dept: ADMINISTRATIVE | Facility: CLINIC | Age: 73
End: 2023-08-30
Payer: MEDICARE

## 2023-08-31 ENCOUNTER — OFFICE VISIT (OUTPATIENT)
Dept: FAMILY MEDICINE | Facility: CLINIC | Age: 73
End: 2023-08-31
Payer: MEDICARE

## 2023-08-31 VITALS
HEART RATE: 75 BPM | DIASTOLIC BLOOD PRESSURE: 82 MMHG | SYSTOLIC BLOOD PRESSURE: 127 MMHG | BODY MASS INDEX: 26.67 KG/M2 | WEIGHT: 176 LBS | HEIGHT: 68 IN | RESPIRATION RATE: 18 BRPM | OXYGEN SATURATION: 99 %

## 2023-08-31 DIAGNOSIS — F33.0 MILD EPISODE OF RECURRENT MAJOR DEPRESSIVE DISORDER: ICD-10-CM

## 2023-08-31 DIAGNOSIS — Z90.89 S/P PARATHYROIDECTOMY: ICD-10-CM

## 2023-08-31 DIAGNOSIS — M17.0 PRIMARY OSTEOARTHRITIS OF BOTH KNEES: ICD-10-CM

## 2023-08-31 DIAGNOSIS — Z98.890 S/P PARATHYROIDECTOMY: ICD-10-CM

## 2023-08-31 DIAGNOSIS — F41.9 ANXIETY: ICD-10-CM

## 2023-08-31 DIAGNOSIS — J30.9 ALLERGIC SINUSITIS: ICD-10-CM

## 2023-08-31 DIAGNOSIS — I25.10 NONOBSTRUCTIVE ATHEROSCLEROSIS OF CORONARY ARTERY: ICD-10-CM

## 2023-08-31 DIAGNOSIS — N18.30 STAGE 3 CHRONIC KIDNEY DISEASE, UNSPECIFIED WHETHER STAGE 3A OR 3B CKD: ICD-10-CM

## 2023-08-31 DIAGNOSIS — Z12.31 ENCOUNTER FOR SCREENING MAMMOGRAM FOR BREAST CANCER: ICD-10-CM

## 2023-08-31 DIAGNOSIS — E55.9 VITAMIN D DEFICIENCY: ICD-10-CM

## 2023-08-31 DIAGNOSIS — K21.9 GASTROESOPHAGEAL REFLUX DISEASE, UNSPECIFIED WHETHER ESOPHAGITIS PRESENT: ICD-10-CM

## 2023-08-31 DIAGNOSIS — E78.5 HYPERLIPIDEMIA, UNSPECIFIED HYPERLIPIDEMIA TYPE: ICD-10-CM

## 2023-08-31 DIAGNOSIS — R73.03 PREDIABETES: ICD-10-CM

## 2023-08-31 DIAGNOSIS — H40.1233 LOW-TENSION GLAUCOMA OF BOTH EYES, SEVERE STAGE: ICD-10-CM

## 2023-08-31 DIAGNOSIS — I34.1 MITRAL VALVE PROLAPSE: ICD-10-CM

## 2023-08-31 DIAGNOSIS — Z00.00 ENCOUNTER FOR MEDICARE ANNUAL WELLNESS EXAM: Primary | ICD-10-CM

## 2023-08-31 DIAGNOSIS — I10 PRIMARY HYPERTENSION: ICD-10-CM

## 2023-08-31 PROCEDURE — G0439 PPPS, SUBSEQ VISIT: HCPCS | Mod: ,,, | Performed by: PHYSICIAN ASSISTANT

## 2023-08-31 PROCEDURE — 99215 OFFICE O/P EST HI 40 MIN: CPT | Mod: PBBFAC,PO | Performed by: PHYSICIAN ASSISTANT

## 2023-08-31 PROCEDURE — G0439 PR MEDICARE ANNUAL WELLNESS SUBSEQUENT VISIT: ICD-10-PCS | Mod: ,,, | Performed by: PHYSICIAN ASSISTANT

## 2023-08-31 PROCEDURE — 99999 PR PBB SHADOW E&M-EST. PATIENT-LVL V: ICD-10-PCS | Mod: PBBFAC,,, | Performed by: PHYSICIAN ASSISTANT

## 2023-08-31 PROCEDURE — 99999 PR PBB SHADOW E&M-EST. PATIENT-LVL V: CPT | Mod: PBBFAC,,, | Performed by: PHYSICIAN ASSISTANT

## 2023-08-31 RX ORDER — MECLIZINE HYDROCHLORIDE 25 MG/1
25 TABLET ORAL EVERY 6 HOURS PRN
COMMUNITY
Start: 2023-07-15

## 2023-08-31 NOTE — PROGRESS NOTES
"  Suze Chino presented for a  Medicare AWV and comprehensive Health Risk Assessment today. The following components were reviewed and updated:    Medical history  Family History  Social history  Allergies and Current Medications  Health Risk Assessment  Health Maintenance  Care Team         ** See Completed Assessments for Annual Wellness Visit within the encounter summary.**         The following assessments were completed:  Living Situation  CAGE  Depression Screening  Timed Get Up and Go  Whisper Test  Cognitive Function Screening  Nutrition Screening  ADL Screening  PAQ Screening        Vitals:    08/31/23 1102   BP: 127/82   Pulse: 75   Resp: 18   SpO2: 99%   Weight: 79.8 kg (176 lb)   Height: 5' 8" (1.727 m)     Body mass index is 26.76 kg/m².  Physical Exam  Constitutional:       General: She is not in acute distress.     Appearance: Normal appearance. She is well-developed.   HENT:      Head: Normocephalic and atraumatic.   Eyes:      Conjunctiva/sclera: Conjunctivae normal.   Cardiovascular:      Rate and Rhythm: Normal rate and regular rhythm.      Pulses: Normal pulses.      Heart sounds: Normal heart sounds. No murmur heard.  Pulmonary:      Effort: Pulmonary effort is normal. No respiratory distress.      Breath sounds: Normal breath sounds.   Abdominal:      General: Bowel sounds are normal. There is no distension.      Palpations: Abdomen is soft.      Tenderness: There is no abdominal tenderness.   Musculoskeletal:         General: Normal range of motion.      Cervical back: Normal range of motion and neck supple.   Skin:     General: Skin is warm and dry.      Findings: No rash.   Neurological:      General: No focal deficit present.      Mental Status: She is alert and oriented to person, place, and time.   Psychiatric:         Mood and Affect: Mood normal.         Behavior: Behavior normal.               Diagnoses and health risks identified today and associated recommendations/orders:    1. " Encounter for Medicare annual wellness exam  Complete history and physical was completed today. Complete and thorough medication reconciliation was performed. Discussed risks and benefits of medications. Advised patient on orders and health maintenance. Continue current medications listed on your summary sheet. Recommend Shingles and Tdap vaccine at pharmacy. Also recommend pneumonia vaccine. Patient declined.     2. Anxiety  Chronic. Stable  Remains on Cymbalta and PRN Xanax  Continue current medications and plan of care per PCP and specialists     3. Mild episode of recurrent major depressive disorder  Chronic. Stable  Remains on Cymbalta and PRN Xanax  Continue current medications and plan of care per PCP and specialists     4. Low-tension glaucoma of both eyes, severe stage  Chronic. Stable  Followed by ophthalmology  Continue current medications and plan of care per PCP and specialists     5. Allergic sinusitis  Chronic. Stable  Remains on Allegra, Veramyst, Singulair, and PRN Albuterol  Continue current medications and plan of care per PCP and specialists     6. Primary hypertension  Chronic. Stable  BP at goal today  Continue lifestyle modification with low sodium diet and exercise   Continue current medications and plan of care per PCP and specialists   Hypertension Medications               amLODIPine (NORVASC) 5 MG tablet TAKE 1 TABLET BY MOUTH EVERY DAY    losartan (COZAAR) 100 MG tablet TAKE 1 TABLET BY MOUTH EVERY DAY          7. Mitral valve prolapse  Chronic. Stable  Requesting referral to cardiology  Continue current medications and plan of care per PCP and specialists     8. Nonobstructive atherosclerosis of coronary artery  Chronic. Stable  Remains on ASA  Requesting referral to cardiology  Continue current medications and plan of care per PCP and specialists   - Ambulatory referral/consult to Cardiology; Future    9. Hyperlipidemia, unspecified hyperlipidemia type  Chronic. Stable  Continue  current medications and plan of care per PCP and specialists   Most recent labs listed below:  Lab Results   Component Value Date    CHOL 208 (H) 03/17/2023     Lab Results   Component Value Date    HDL 50 03/17/2023     Lab Results   Component Value Date    LDLCALC 142.6 03/17/2023     Lab Results   Component Value Date    TRIG 77 03/17/2023     Lab Results   Component Value Date    ALT 30 03/15/2023    AST 42 (H) 03/15/2023    ALKPHOS 103 03/15/2023    BILITOT 1.3 03/15/2023     10. Stage 3 chronic kidney disease, unspecified whether stage 3a or 3b CKD  Chronic. Stable  Avoid nephrotoxic agents, including NSAIDs  Continue current medications and plan of care per PCP and specialists   Lab Results   Component Value Date    CREATININE 1.43 (H) 03/15/2023    BUN 12 03/15/2023     03/15/2023    K 4.3 03/15/2023    CL 97 03/15/2023    CO2 32 (H) 03/15/2023     11. Vitamin D deficiency  Chronic. Stable  Remains on Vitamin D supplementation  Continue current medications and plan of care per PCP and specialists     12. S/P parathyroidectomy  Chronic. Stable  Requesting referral to endocrinology  Continue current medications and plan of care per PCP and specialists   - Ambulatory referral/consult to Endocrinology; Future    13. Prediabetes  Chronic. Stable  Remains on Ozempic  Continue current medications and plan of care per PCP and specialists   Lab Results   Component Value Date    HGBA1C 5.8 (H) 03/17/2023    HGBA1C 5.8 (H) 03/17/2023     14. Gastroesophageal reflux disease, unspecified whether esophagitis present  Chronic. Worsening  Remains on daily PPI  Requesting referral to GI  Continue lifestyle modification with avoidance of acidic foods, caffeine, late night eating  Continue current medications and plan of care per PCP and specialists   - Ambulatory referral/consult to Gastroenterology; Future    15. Primary osteoarthritis of both knees  Chronic. Stable  Remains on PRN Tylenol  Continue current medications  and plan of care per PCP and specialists     16. Encounter for screening mammogram for breast cancer  - Mammo Digital Screening Bilat w/ Patrick; Future      I offered to discuss end of life issues, including information on how to make advance directives that the patient could use to name someone who would make medical decisions on their behalf if they became too ill to make themselves.    ___Patient declined - already done.    _x__Patient is interested, I provided paperwork and offered to discuss.      Provided Suze with a 5-10 year written screening schedule and personal prevention plan. Recommendations were developed using the USPSTF age appropriate recommendations. Education, counseling, and referrals were provided as needed. After Visit Summary printed and given to patient which includes a list of additional screenings\tests needed.    Follow up in about 6 months (around 2/29/2024) for with PCP.    Vicki Patricia PA-C

## 2023-09-07 ENCOUNTER — OFFICE VISIT (OUTPATIENT)
Dept: CARDIOLOGY | Facility: CLINIC | Age: 73
End: 2023-09-07
Payer: MEDICARE

## 2023-09-07 VITALS
SYSTOLIC BLOOD PRESSURE: 116 MMHG | DIASTOLIC BLOOD PRESSURE: 76 MMHG | BODY MASS INDEX: 26.53 KG/M2 | HEART RATE: 83 BPM | HEIGHT: 68 IN | WEIGHT: 175.06 LBS

## 2023-09-07 DIAGNOSIS — Z78.9 STATIN INTOLERANCE: Chronic | ICD-10-CM

## 2023-09-07 DIAGNOSIS — R73.03 PRE-DIABETES: ICD-10-CM

## 2023-09-07 DIAGNOSIS — R09.89 BRUIT OF RIGHT CAROTID ARTERY: ICD-10-CM

## 2023-09-07 DIAGNOSIS — E78.00 PURE HYPERCHOLESTEROLEMIA: Chronic | ICD-10-CM

## 2023-09-07 DIAGNOSIS — I10 PRIMARY HYPERTENSION: Chronic | ICD-10-CM

## 2023-09-07 DIAGNOSIS — I25.10 NONOBSTRUCTIVE ATHEROSCLEROSIS OF CORONARY ARTERY: Primary | Chronic | ICD-10-CM

## 2023-09-07 DIAGNOSIS — I34.0 MILD MITRAL REGURGITATION: Chronic | ICD-10-CM

## 2023-09-07 PROCEDURE — 99214 PR OFFICE/OUTPT VISIT, EST, LEVL IV, 30-39 MIN: ICD-10-PCS | Mod: S$PBB,,, | Performed by: INTERNAL MEDICINE

## 2023-09-07 PROCEDURE — 99213 OFFICE O/P EST LOW 20 MIN: CPT | Mod: PBBFAC,PO | Performed by: INTERNAL MEDICINE

## 2023-09-07 PROCEDURE — 99999 PR PBB SHADOW E&M-EST. PATIENT-LVL III: CPT | Mod: PBBFAC,,, | Performed by: INTERNAL MEDICINE

## 2023-09-07 PROCEDURE — 99214 OFFICE O/P EST MOD 30 MIN: CPT | Mod: S$PBB,,, | Performed by: INTERNAL MEDICINE

## 2023-09-07 PROCEDURE — 99999 PR PBB SHADOW E&M-EST. PATIENT-LVL III: ICD-10-PCS | Mod: PBBFAC,,, | Performed by: INTERNAL MEDICINE

## 2023-09-07 RX ORDER — BEMPEDOIC ACID 180 MG/1
180 TABLET, FILM COATED ORAL DAILY
Qty: 90 TABLET | Refills: 1 | Status: SHIPPED | OUTPATIENT
Start: 2023-09-07 | End: 2024-01-12

## 2023-09-07 NOTE — PROGRESS NOTES
Subjective:    Patient ID:  Suze Chino is a 72 y.o. female who presents for Nonobstructive atherosclerosis of coronary artery        HPI  NEW PATIENT EVALUATION WAS SEEN BY MULTIPLE CARDIOLOGISTS IN THE PAST DR. AVILA, DR. JAY AND DR. SANCHEZ, CARDIAC CATHETERIZATION IN 2021 DIFFUSE DISEASE IN A DIAGONAL LAST MILD DISEASE IN THE RCA, ECHO MILD MR AND TR NORMAL LV FUNCTION, LDL IN MARCH 143 UP FROM 73, HDL 50, TRIGLYCERIDE 77, HBA1C 5.8%, OCC CHEST PAIN WITH STRESS, WAS ON A STATIN YEARSB AGO, FELT BAD WITH IT, SEE ROS    Past Medical History:   Diagnosis Date    Anxiety     Cataract     Chronic kidney disease, stage 3 11/20/2020    COVID-19 ruled out by laboratory testing 03/18/2022    [facility]    Depression     GERD (gastroesophageal reflux disease)     Glaucoma     History of colon polyps 2009    history as per patient    History of colonoscopy 2009    ok per pt    Hyperplastic colon polyp 05/12/2015    Hypertension     Hypothyroid     ?    Mild mitral regurgitation     Mitral valve prolapse     Nonobstructive atherosclerosis of coronary artery 09/2021    Moderate LAD stenosis, mild RCA stenosis.    Peptic ulcer disease     with gi bleed    Prediabetes     Primary hyperparathyroidism     Right knee DJD     Right knee DJD     Sleep apnea     Sleep disorder     TMJ (temporomandibular joint disorder)     Vitamin D deficiency      Past Surgical History:   Procedure Laterality Date    BILATERAL SALPINGOOPHORECTOMY      CATARACT EXTRACTION W/  INTRAOCULAR LENS IMPLANT Right 06/09/2022    CATARACT EXTRACTION W/  INTRAOCULAR LENS IMPLANT Left 08/05/2022    COLONOSCOPY  2015    COLONOSCOPY N/A 12/20/2022    Procedure: COLONOSCOPY E consult sent for cc;  Surgeon: Mulugeta Calloway MD;  Location: [facility] ENDO;  Service: Endoscopy;  Laterality: N/A;    CORONARY ANGIOGRAPHY N/A 9/24/2021    Procedure: ANGIOGRAM, CORONARY ARTERY;  Surgeon: Chago Jay MD;  Location: [facility] CATH;  Service: Cardiology;  Laterality:  N/A;    EYE SURGERY      laser sx    GLAUCOMA SURGERY Right 09/26/2019    Xen Gel    GLAUCOMA SURGERY Left 04/05/2021    abext XenGel OS    HYSTERECTOMY      OOPHORECTOMY      PARATHYROIDECTOMY  12/01/2021    SLT - OU - BOTH EYES      TRABECULECTOMY Right 4/2/14    OD (dr. grossman)    UPPER GASTROINTESTINAL ENDOSCOPY  2003     Family History   Problem Relation Age of Onset    Heart disease Father         before age 50    Diabetes Mother     Glaucoma Mother     Leukemia Mother     Cataracts Mother     Breast cancer Mother 60    Diabetes Sister     Glaucoma Sister     Diabetes Brother     Glaucoma Brother     Lung cancer Brother     Breast cancer Other 40        genetics -    Colon cancer Maternal Aunt      Social History     Socioeconomic History    Marital status:    Tobacco Use    Smoking status: Never    Smokeless tobacco: Never   Substance and Sexual Activity    Alcohol use: Yes     Alcohol/week: 2.0 standard drinks of alcohol     Types: 2 Standard drinks or equivalent per week     Comment: occasional    Drug use: No    Sexual activity: Not Currently     Social Determinants of Health     Financial Resource Strain: High Risk (8/31/2023)    Overall Financial Resource Strain (CARDIA)     Difficulty of Paying Living Expenses: Hard   Food Insecurity: No Food Insecurity (8/31/2023)    Hunger Vital Sign     Worried About Running Out of Food in the Last Year: Never true     Ran Out of Food in the Last Year: Never true   Transportation Needs: No Transportation Needs (8/31/2023)    PRAPARE - Transportation     Lack of Transportation (Medical): No     Lack of Transportation (Non-Medical): No   Physical Activity: Sufficiently Active (8/31/2023)    Exercise Vital Sign     Days of Exercise per Week: 5 days     Minutes of Exercise per Session: 60 min   Stress: Stress Concern Present (8/31/2023)    [employer] [place] [employer] - Occupational Stress Questionnaire     Feeling of Stress : Very much   Social  Connections: Moderately Isolated (8/31/2023)    Social Connection and Isolation Panel [NHANES]     Frequency of Communication with Friends and Family: Three times a week     Frequency of Social Gatherings with Friends and Family: Three times a week     Attends Zoroastrian Services: Never     Active Member of Clubs or Organizations: No     Attends Club or Organization Meetings: 1 to 4 times per year     Marital Status:    Housing Stability: Low Risk  (8/31/2023)    Housing Stability Vital Sign     Unable to Pay for Housing in the Last Year: No     Number of Places Lived in the Last Year: 1     Unstable Housing in the Last Year: No       Review of patient's allergies indicates:   Allergen Reactions    Betoptic [betaxolol] Other (See Comments)     Fatigue, slowed heart rate.    Alphagan [brimonidine] Dermatitis    Crestor [rosuvastatin] Other (See Comments)     Muscle ache    Pravastatin Other (See Comments)     aches  aches       Current Outpatient Medications:     acetaminophen (TYLENOL) 650 MG TbSR, Take 1,300 mg by mouth 2 (two) times a day., Disp: , Rfl:     albuterol (PROAIR HFA) 90 mcg/actuation inhaler, Inhale 2 puffs into the lungs every 6 (six) hours as needed for Wheezing. Rescue, Disp: 18 g, Rfl: 5    ALPRAZolam (XANAX) 0.25 MG tablet, Take 1 tablet (0.25 mg total) by mouth 2 (two) times daily as needed for Anxiety., Disp: 60 tablet, Rfl: 5    amLODIPine (NORVASC) 5 MG tablet, TAKE 1 TABLET BY MOUTH EVERY DAY, Disp: 90 tablet, Rfl: 2    aspirin (ECOTRIN) 81 MG EC tablet, Take 1 tablet (81 mg total) by mouth once daily., Disp: 30 tablet, Rfl: 11    calcium carb/vit D3/minerals (CALTRATE PLUS ORAL), Take 1 tablet by mouth 2 (two) times a day., Disp: , Rfl:     dorzolamide (TRUSOPT) 2 % ophthalmic solution, PLACE 1 DROP INTO THE RIGHT EYE 2 (TWO) TIMES A DAY., Disp: 10 mL, Rfl: 6    DULoxetine (CYMBALTA) 60 MG capsule, TAKE 1 CAPSULE BY MOUTH EVERY DAY, Disp: 90 capsule, Rfl: 3    fexofenadine (ALLEGRA)  180 MG tablet, Allegra Allergy Take 1 Tablet (oral) 1 time per day for 14 days 20220318 tablet 1 time per day oral 14 days active 180 mg, Disp: 30 tablet, Rfl: 0    fluticasone (VERAMYST) 27.5 mcg/actuation nasal spray, 2 sprays by Nasal route once daily., Disp: 5.9 mL, Rfl: 11    ketorolac 0.5% (ACULAR) 0.5 % Drop, Place 1 drop into the right eye 4 (four) times daily., Disp: 5 mL, Rfl: 1    losartan (COZAAR) 100 MG tablet, TAKE 1 TABLET BY MOUTH EVERY DAY, Disp: 90 tablet, Rfl: 1    meclizine (ANTIVERT) 25 mg tablet, Take 25 mg by mouth every 6 (six) hours as needed., Disp: , Rfl:     montelukast (SINGULAIR) 10 mg tablet, Take 1 tablet (10 mg total) by mouth every evening., Disp: 90 tablet, Rfl: 3    netarsudiL-latanoprost (ROCKLATAN) 0.02-0.005 % Drop, Place 1 drop into both eyes Daily., Disp: 2.5 mL, Rfl: 11    pantoprazole (PROTONIX) 40 MG tablet, TAKE 1 TABLET BY MOUTH EVERY DAY, Disp: 90 tablet, Rfl: 3    semaglutide (OZEMPIC) 1 mg/dose (4 mg/3 mL), Inject 1 mg into the skin every 7 days., Disp: 3 mL, Rfl: 11    bempedoic acid (NEXLETOL) 180 mg Tab, Take 1 tablet (180 mg total) by mouth Daily., Disp: 90 tablet, Rfl: 1    Review of Systems   Constitutional: Positive for weight loss. Negative for chills and fever.   HENT: Negative.     Eyes:  Negative for blurred vision and visual disturbance.   Cardiovascular:  Positive for dyspnea on exertion. Negative for chest pain (OCC WITH STRESS), claudication, cyanosis, irregular heartbeat, leg swelling, orthopnea, palpitations and paroxysmal nocturnal dyspnea.   Respiratory:  Negative for cough, hemoptysis, shortness of breath and wheezing.    Endocrine: Negative.    Hematologic/Lymphatic: Does not bruise/bleed easily.   Skin:  Negative for nail changes and rash.   Musculoskeletal:  Positive for falls (WITH FALLS). Negative for back pain.   Gastrointestinal:  Positive for heartburn. Negative for abdominal pain, dysphagia, melena and nausea.   Genitourinary:  Negative  "for dysuria and flank pain.   Neurological:  Positive for vertigo. Negative for dizziness and headaches.   Psychiatric/Behavioral:  Negative for altered mental status and depression.    Allergic/Immunologic: Negative for hives and persistent infections.        Objective:      Vitals:    09/07/23 1609   BP: 116/76   Pulse: 83   Weight: 79.4 kg (175 lb 0.7 oz)   Height: 5' 8" (1.727 m)   PainSc: 0-No pain     Body mass index is 26.62 kg/m².    Physical Exam  Constitutional:       Appearance: She is well-developed.   HENT:      Head: Normocephalic and atraumatic.   Eyes:      Extraocular Movements: Extraocular movements intact.      Pupils: Pupils are equal, round, and reactive to light.   Neck:      Vascular: Carotid bruit present. No JVD.   Cardiovascular:      Rate and Rhythm: Normal rate and regular rhythm.      Pulses: Intact distal pulses.           Carotid pulses are 2+ on the right side with bruit and 2+ on the left side.       Radial pulses are 2+ on the right side and 2+ on the left side.        Posterior tibial pulses are 2+ on the right side and 2+ on the left side.      Heart sounds: Murmur heard.      No friction rub. No gallop.   Pulmonary:      Effort: Pulmonary effort is normal.      Breath sounds: No rales.   Abdominal:      General: Bowel sounds are normal.      Palpations: Abdomen is soft.      Tenderness: There is no abdominal tenderness.   Musculoskeletal:      Cervical back: Neck supple.      Right lower leg: No edema.      Left lower leg: Edema present.   Skin:     General: Skin is warm and dry.   Neurological:      General: No focal deficit present.      Mental Status: She is alert and oriented to person, place, and time.   Psychiatric:         Mood and Affect: Mood normal.         Behavior: Behavior normal.                 ..    Chemistry        Component Value Date/Time     03/15/2023 1006    K 4.3 03/15/2023 1006    CL 97 03/15/2023 1006    CO2 32 (H) 03/15/2023 1006    BUN 12 " 03/15/2023 1006    CREATININE 1.43 (H) 03/15/2023 1006     (H) 03/15/2023 1006        Component Value Date/Time    CALCIUM 9.3 03/15/2023 1006    ALKPHOS 103 03/15/2023 1006    AST 42 (H) 03/15/2023 1006    ALT 30 03/15/2023 1006    BILITOT 1.3 03/15/2023 1006    ESTGFRAFRICA 52.5 (A) 06/29/2022 0822    EGFRNONAA 45.6 (A) 06/29/2022 0822            ..  Lab Results   Component Value Date    CHOL 208 (H) 03/17/2023    CHOL 143 12/29/2021    CHOL 263 (H) 09/24/2021     Lab Results   Component Value Date    HDL 50 03/17/2023    HDL 59 12/29/2021    HDL 73 09/24/2021     Lab Results   Component Value Date    LDLCALC 142.6 03/17/2023    LDLCALC 73.0 12/29/2021    LDLCALC 180.2 (H) 09/24/2021     Lab Results   Component Value Date    TRIG 77 03/17/2023    TRIG 55 12/29/2021    TRIG 49 09/24/2021     Lab Results   Component Value Date    CHOLHDL 24.0 03/17/2023    CHOLHDL 41.3 12/29/2021    CHOLHDL 27.8 09/24/2021     ..  Lab Results   Component Value Date    WBC 13.27 (H) 03/15/2023    HGB 13.3 03/15/2023    HCT 42.3 03/15/2023    MCV 90 03/15/2023     03/15/2023       Test(s) Reviewed  I have reviewed the following in detail:  [] Stress test   [x] Angiography   [x] Echocardiogram   [x] Labs   [] Other:       Assessment:         ICD-10-CM ICD-9-CM   1. Nonobstructive atherosclerosis of coronary artery  I25.10 414.00   2. Mild mitral regurgitation  I34.0 424.0   3. Pure hypercholesterolemia  E78.00 272.0   4. Primary hypertension  I10 401.9   5. Pre-diabetes  R73.03 790.29   6. Bruit of right carotid artery  R09.89 785.9   7. Statin intolerance  Z78.9 995.27     Problem List Items Addressed This Visit          Cardiac/Vascular    Hypertension    Hyperlipidemia    Relevant Medications    bempedoic acid (NEXLETOL) 180 mg Tab    Nonobstructive atherosclerosis of coronary artery - Primary    Overview     Moderate LAD stenosis, mild RCA stenosis.         Relevant Medications    bempedoic acid (NEXLETOL) 180 mg Tab     Other Relevant Orders    Echo    Mild mitral regurgitation    Relevant Orders    Echo    Bruit of right carotid artery    Relevant Orders    CV Ultrasound Bilateral Doppler Carotid       Endocrine    Pre-diabetes       Other    Statin intolerance    Relevant Medications    bempedoic acid (NEXLETOL) 180 mg Tab        Plan:     ADD NEXLETOL FOR DYSLIPIDEMIA ATHEROSCLEROSIS CIRRHOSIS, INTOLERANT OF STATIN, CHECK ECHOCARDIOGRAM, AND CAROTID ULTRASOUND, NO ANGINA NO OVERT HEART FAILURE NO TIA TYPE SYMPTOMS NO NEAR-SYNCOPE DIET EXERCISE RETURN TO CLINIC IN FEW WEEKS AFTER TESTS      Nonobstructive atherosclerosis of coronary artery  -     Ambulatory referral/consult to Cardiology  -     Echo  -     bempedoic acid (NEXLETOL) 180 mg Tab; Take 1 tablet (180 mg total) by mouth Daily.  Dispense: 90 tablet; Refill: 1    Mild mitral regurgitation  -     Echo    Pure hypercholesterolemia  -     bempedoic acid (NEXLETOL) 180 mg Tab; Take 1 tablet (180 mg total) by mouth Daily.  Dispense: 90 tablet; Refill: 1    Primary hypertension  Comments:  CONTROLLED    Pre-diabetes    Bruit of right carotid artery  Comments:  US  Orders:  -     CV Ultrasound Bilateral Doppler Carotid; Future    Statin intolerance  -     bempedoic acid (NEXLETOL) 180 mg Tab; Take 1 tablet (180 mg total) by mouth Daily.  Dispense: 90 tablet; Refill: 1    RTC Low level/low impact aerobic exercise 5x's/wk. Heart healthy diet and risk factor modification.    See labs and med orders.    Aerobic exercise 5x's/wk. Heart healthy diet and risk factor modification.    See labs and med orders.

## 2023-09-11 RX ORDER — ALPRAZOLAM 0.25 MG/1
0.25 TABLET ORAL 2 TIMES DAILY PRN
Qty: 60 TABLET | Refills: 0 | Status: SHIPPED | OUTPATIENT
Start: 2023-09-11 | End: 2023-10-12

## 2023-09-11 NOTE — TELEPHONE ENCOUNTER
Care Due:                  Date            Visit Type   Department     Provider  --------------------------------------------------------------------------------                                EP -                              PRIMARY      [facility] FAMILY  Last Visit: 03-      CARE ([facility])   MEDICINE       Cody Lewis  Next Visit: None Scheduled  None         None Found                                                            Last  Test          Frequency    Reason                     Performed    Due Date  --------------------------------------------------------------------------------    HBA1C.......  6 months...  semaglutide..............  03- 09-    [facility] Embedded Care Due Messages. Reference number: [id number].   9/11/2023 2:09:22 PM CDT

## 2023-09-12 ENCOUNTER — LAB VISIT (OUTPATIENT)
Dept: LAB | Facility: HOSPITAL | Age: 73
End: 2023-09-12
Attending: FAMILY MEDICINE
Payer: MEDICARE

## 2023-09-12 DIAGNOSIS — R73.03 PREDIABETES: ICD-10-CM

## 2023-09-12 LAB
ESTIMATED AVG GLUCOSE: 114 MG/DL (ref 68–131)
HBA1C MFR BLD: 5.6 % (ref 4–5.6)

## 2023-09-12 PROCEDURE — 36415 COLL VENOUS BLD VENIPUNCTURE: CPT | Mod: PO | Performed by: FAMILY MEDICINE

## 2023-09-12 PROCEDURE — 83036 HEMOGLOBIN GLYCOSYLATED A1C: CPT | Performed by: FAMILY MEDICINE

## 2023-09-13 ENCOUNTER — HOSPITAL ENCOUNTER (OUTPATIENT)
Dept: RADIOLOGY | Facility: HOSPITAL | Age: 73
Discharge: HOME | End: 2023-09-13
Attending: PHYSICIAN ASSISTANT
Payer: MEDICARE

## 2023-09-13 DIAGNOSIS — Z12.31 ENCOUNTER FOR SCREENING MAMMOGRAM FOR BREAST CANCER: ICD-10-CM

## 2023-09-13 PROCEDURE — 77067 SCR MAMMO BI INCL CAD: CPT | Mod: 26,,, | Performed by: RADIOLOGY

## 2023-09-13 PROCEDURE — 77063 MAMMO DIGITAL SCREENING BILAT WITH TOMO: ICD-10-PCS | Mod: 26,,, | Performed by: RADIOLOGY

## 2023-09-13 PROCEDURE — 77067 MAMMO DIGITAL SCREENING BILAT WITH TOMO: ICD-10-PCS | Mod: 26,,, | Performed by: RADIOLOGY

## 2023-09-13 PROCEDURE — 77067 SCR MAMMO BI INCL CAD: CPT | Mod: TC,PO

## 2023-09-13 PROCEDURE — 77063 BREAST TOMOSYNTHESIS BI: CPT | Mod: 26,,, | Performed by: RADIOLOGY

## 2023-09-14 NOTE — PROGRESS NOTES
Normal mammogram, repeat in 1 year, results released through [employer]. Please verify that patient has viewed results. If not, please call patient with interpretation below:     I have reviewed the results of your mammogram and it appears that everything was read as normal.  Based on this, the radiologist has recommended that you recheck a mammogram in 1 year.

## 2023-09-25 ENCOUNTER — OFFICE VISIT (OUTPATIENT)
Dept: GASTROENTEROLOGY | Facility: CLINIC | Age: 73
End: 2023-09-25
Payer: MEDICARE

## 2023-09-25 VITALS — BODY MASS INDEX: 26.21 KG/M2 | WEIGHT: 172.38 LBS

## 2023-09-25 DIAGNOSIS — K59.04 CHRONIC IDIOPATHIC CONSTIPATION: Primary | ICD-10-CM

## 2023-09-25 DIAGNOSIS — K21.9 GASTROESOPHAGEAL REFLUX DISEASE, UNSPECIFIED WHETHER ESOPHAGITIS PRESENT: ICD-10-CM

## 2023-09-25 PROCEDURE — 99213 OFFICE O/P EST LOW 20 MIN: CPT | Mod: PBBFAC,PO

## 2023-09-25 PROCEDURE — 99214 PR OFFICE/OUTPT VISIT, EST, LEVL IV, 30-39 MIN: ICD-10-PCS | Mod: S$PBB,,,

## 2023-09-25 PROCEDURE — 99999 PR PBB SHADOW E&M-EST. PATIENT-LVL III: ICD-10-PCS | Mod: PBBFAC,,,

## 2023-09-25 PROCEDURE — 99999 PR PBB SHADOW E&M-EST. PATIENT-LVL III: CPT | Mod: PBBFAC,,,

## 2023-09-25 PROCEDURE — 99214 OFFICE O/P EST MOD 30 MIN: CPT | Mod: S$PBB,,,

## 2023-09-25 NOTE — PROGRESS NOTES
GASTROENTEROLOGY CLINIC NOTE    Reason for visit: The primary encounter diagnosis was Chronic idiopathic constipation. A diagnosis of Gastroesophageal reflux disease, unspecified whether esophagitis present was also pertinent to this visit.  Referring provider/PCP: Cody Lewis MD    HPI:  Suze Chino is a 72 y.o. female here today for GERD and constipation. Longstanding GERD, dx about 20 years ago. Currently taking protonix daily, only has breakthrough symptoms with dietary triggers. She denies any dysphagia, N/V. She also reports chronic constipation, can go 7+ days without BM. Associated abd pain and some straining. She has tried OTC products such as milk of magnesia and also increased fiber, water and prune use with some relief. Started on ozempic in January, no GI related side effects, has not worsened GERD or constipation.     Prior Endoscopy:  EGD: 2018 with Dr. Pascual:   - Z-line regular, 40 cm from the incisors.               - Normal esophagus.               - Normal stomach. Biopsied.               - Normal examined duodenum.    Colon: 2022 with Dr. Calloway:   - The rectum, sigmoid colon, descending colon, transverse colon, ascending colon, cecum, ileocecal valve and appendiceal orifice are normal.               - No specimens collected.     (Portions of this note were dictated using voice recognition software and may contain dictation related errors in spelling/grammar/syntax not found on text review)    Review of Systems   Gastrointestinal:  Positive for constipation.       Past Medical History: has a past medical history of Anxiety, Cataract, Chronic kidney disease, stage 3, COVID-19 ruled out by laboratory testing, Depression, GERD (gastroesophageal reflux disease), Glaucoma, History of colon polyps, History of colonoscopy, Hyperplastic colon polyp, Hypertension, Hypothyroid, Mild mitral regurgitation, Mitral valve prolapse, Nonobstructive atherosclerosis of coronary artery, Peptic  ulcer disease, Prediabetes, Primary hyperparathyroidism, Right knee DJD, Right knee DJD, Sleep apnea, Sleep disorder, TMJ (temporomandibular joint disorder), and Vitamin D deficiency.    Past Surgical History: has a past surgical history that includes Hysterectomy; Bilateral salpingoophorectomy; Trabeculectomy (Right, 4/2/14); Argon Trabeculoplasty - OU - Both Eyes; Eye surgery; Colonoscopy (2015); Upper gastrointestinal endoscopy (2003); Glaucoma surgery (Right, 09/26/2019); Oophorectomy; Glaucoma surgery (Left, 04/05/2021); Coronary angiography (N/A, 9/24/2021); Parathyroidectomy (12/01/2021); Cataract extraction w/  intraocular lens implant (Right, 06/09/2022); Cataract extraction w/  intraocular lens implant (Left, 08/05/2022); and Colonoscopy (N/A, 12/20/2022).    Home medications:   Current Outpatient Medications on File Prior to Visit   Medication Sig Dispense Refill    acetaminophen (TYLENOL) 650 MG TbSR Take 1,300 mg by mouth 2 (two) times a day.      albuterol (PROAIR HFA) 90 mcg/actuation inhaler Inhale 2 puffs into the lungs every 6 (six) hours as needed for Wheezing. Rescue 18 g 5    ALPRAZolam (XANAX) 0.25 MG tablet TAKE 1 TABLET BY MOUTH 2 TIMES DAILY AS NEEDED FOR ANXIETY. 60 tablet 0    amLODIPine (NORVASC) 5 MG tablet TAKE 1 TABLET BY MOUTH EVERY DAY 90 tablet 2    bempedoic acid (NEXLETOL) 180 mg Tab Take 1 tablet (180 mg total) by mouth Daily. 90 tablet 1    calcium carb/vit D3/minerals (CALTRATE PLUS ORAL) Take 1 tablet by mouth 2 (two) times a day.      dorzolamide (TRUSOPT) 2 % ophthalmic solution PLACE 1 DROP INTO THE RIGHT EYE 2 (TWO) TIMES A DAY. 10 mL 6    DULoxetine (CYMBALTA) 60 MG capsule TAKE 1 CAPSULE BY MOUTH EVERY DAY 90 capsule 3    fexofenadine (ALLEGRA) 180 MG tablet Allegra Allergy Take 1 Tablet (oral) 1 time per day for 14 days 20220318 tablet 1 time per day oral 14 days active 180 mg 30 tablet 0    fluticasone (VERAMYST) 27.5 mcg/actuation nasal spray 2 sprays by Nasal route  once daily. 5.9 mL 11    ketorolac 0.5% (ACULAR) 0.5 % Drop Place 1 drop into the right eye 4 (four) times daily. 5 mL 1    losartan (COZAAR) 100 MG tablet TAKE 1 TABLET BY MOUTH EVERY DAY 90 tablet 1    meclizine (ANTIVERT) 25 mg tablet Take 25 mg by mouth every 6 (six) hours as needed.      montelukast (SINGULAIR) 10 mg tablet Take 1 tablet (10 mg total) by mouth every evening. 90 tablet 3    netarsudiL-latanoprost (ROCKLATAN) 0.02-0.005 % Drop Place 1 drop into both eyes Daily. 2.5 mL 11    pantoprazole (PROTONIX) 40 MG tablet TAKE 1 TABLET BY MOUTH EVERY DAY 90 tablet 3    semaglutide (OZEMPIC) 1 mg/dose (4 mg/3 mL) Inject 1 mg into the skin every 7 days. 3 mL 11    aspirin (ECOTRIN) 81 MG EC tablet Take 1 tablet (81 mg total) by mouth once daily. 30 tablet 11     No current facility-administered medications on file prior to visit.       Vital signs:  Wt 78.2 kg (172 lb 6.4 oz)   LMP  (LMP Unknown)   BMI 26.21 kg/m²     Physical Exam  Constitutional:       Appearance: Normal appearance. She is normal weight.   Abdominal:      General: Abdomen is flat. There is no distension.      Palpations: Abdomen is soft.      Tenderness: There is no abdominal tenderness.   Neurological:      Mental Status: She is alert.       I have reviewed associated labs, imaging and notes.     Assessment:  1. Chronic idiopathic constipation    2. Gastroesophageal reflux disease, unspecified whether esophagitis present    CIC   Can go 7+ days without BM   Tried milk of mag, increase water/fiber and prune use   Intermittent abd pain with constipation, some straining   UTD on CRC screening  GERD, longstanding   Controlled with daily protonix   Breakthrough reflux with dietary triggers  No warning signs present    Plan:  Orders Placed This Encounter    linaCLOtide (LINZESS) 72 mcg Cap capsule     Continue protonix as previously prescribed  Start linzess daily   Can increase if needed    F/U in 6 months    Lindsey Ray, NP [facility] [place]

## 2023-09-26 ENCOUNTER — CLINICAL SUPPORT (OUTPATIENT)
Dept: CARDIOLOGY | Facility: HOSPITAL | Age: 73
End: 2023-09-26
Attending: INTERNAL MEDICINE
Payer: MEDICARE

## 2023-09-26 DIAGNOSIS — R09.89 BRUIT OF RIGHT CAROTID ARTERY: ICD-10-CM

## 2023-09-26 LAB
ASCENDING AORTA: 3.07 CM
AV INDEX (PROSTH): 0.6
AV MEAN GRADIENT: 3 MMHG
AV PEAK GRADIENT: 6 MMHG
AV VALVE AREA BY VELOCITY RATIO: 2.77 CM²
AV VALVE AREA: 2.31 CM²
AV VELOCITY RATIO: 0.72
CV ECHO LV RWT: 0.4 CM
DOP CALC AO PEAK VEL: 1.2 M/S
DOP CALC AO VTI: 31.6 CM
DOP CALC LVOT AREA: 3.9 CM2
DOP CALC LVOT DIAMETER: 2.22 CM
DOP CALC LVOT PEAK VEL: 0.86 M/S
DOP CALC LVOT STROKE VOLUME: 73.12 CM3
DOP CALCLVOT PEAK VEL VTI: 18.9 CM
E WAVE DECELERATION TIME: 187.92 MSEC
E/A RATIO: 0.86
E/E' RATIO: 8.75 M/S
ECHO LV POSTERIOR WALL: 0.89 CM (ref 0.6–1.1)
EJECTION FRACTION: 60 %
FRACTIONAL SHORTENING: 31 % (ref 28–44)
INTERVENTRICULAR SEPTUM: 0.85 CM (ref 0.6–1.1)
LEFT ARM DIASTOLIC BLOOD PRESSURE: 76 MMHG
LEFT ARM SYSTOLIC BLOOD PRESSURE: 116 MMHG
LEFT ATRIUM SIZE: 3.19 CM
LEFT ATRIUM VOLUME MOD: 36.96 CM3
LEFT CBA DIAS: 13 CM/S
LEFT CBA SYS: 49 CM/S
LEFT CCA DIST DIAS: 16 CM/S
LEFT CCA DIST SYS: 62 CM/S
LEFT CCA MID DIAS: 22 CM/S
LEFT CCA MID SYS: 103 CM/S
LEFT CCA PROX DIAS: 23 CM/S
LEFT CCA PROX SYS: 114 CM/S
LEFT ECA DIAS: 13 CM/S
LEFT ECA SYS: 75 CM/S
LEFT ICA DIST DIAS: 23 CM/S
LEFT ICA DIST SYS: 58 CM/S
LEFT ICA MID DIAS: 27 CM/S
LEFT ICA MID SYS: 62 CM/S
LEFT ICA PROX DIAS: 14 CM/S
LEFT ICA PROX SYS: 45 CM/S
LEFT INTERNAL DIMENSION IN SYSTOLE: 3.05 CM (ref 2.1–4)
LEFT VENTRICLE DIASTOLIC VOLUME: 88.94 ML
LEFT VENTRICLE SYSTOLIC VOLUME: 36.56 ML
LEFT VENTRICULAR INTERNAL DIMENSION IN DIASTOLE: 4.43 CM (ref 3.5–6)
LEFT VENTRICULAR MASS: 123.69 G
LEFT VERTEBRAL DIAS: 17 CM/S
LEFT VERTEBRAL SYS: 60 CM/S
LV LATERAL E/E' RATIO: 7 M/S
LV SEPTAL E/E' RATIO: 11.67 M/S
LVOT MG: 1.33 MMHG
LVOT MV: 0.53 CM/S
MV PEAK A VEL: 0.81 M/S
MV PEAK E VEL: 0.7 M/S
OHS CV CAROTID RIGHT ICA EDV HIGHEST: 22
OHS CV CAROTID ULTRASOUND LEFT ICA/CCA RATIO: 1
OHS CV CAROTID ULTRASOUND RIGHT ICA/CCA RATIO: 1.12
OHS CV PV CAROTID LEFT HIGHEST CCA: 114
OHS CV PV CAROTID LEFT HIGHEST ICA: 62
OHS CV PV CAROTID RIGHT HIGHEST CCA: 118
OHS CV PV CAROTID RIGHT HIGHEST ICA: 75
OHS CV US CAROTID LEFT HIGHEST EDV: 27
PISA TR MAX VEL: 2.43 M/S
PULM VEIN S/D RATIO: 0.68
PV PEAK D VEL: 0.5 M/S
PV PEAK S VEL: 0.34 M/S
RA PRESSURE ESTIMATED: 3 MMHG
RA VOL SYS: 19.07 ML
RIGHT ARM DIASTOLIC BLOOD PRESSURE: 76 MMHG
RIGHT ARM SYSTOLIC BLOOD PRESSURE: 116 MMHG
RIGHT ATRIAL AREA: 9.5 CM2
RIGHT CBA DIAS: 18 CM/S
RIGHT CBA SYS: 67 CM/S
RIGHT CCA DIST DIAS: 21 CM/S
RIGHT CCA DIST SYS: 67 CM/S
RIGHT CCA MID DIAS: 23 CM/S
RIGHT CCA MID SYS: 86 CM/S
RIGHT CCA PROX DIAS: 27 CM/S
RIGHT CCA PROX SYS: 118 CM/S
RIGHT ECA DIAS: 10 CM/S
RIGHT ECA SYS: 73 CM/S
RIGHT ICA DIST DIAS: 22 CM/S
RIGHT ICA DIST SYS: 56 CM/S
RIGHT ICA MID DIAS: 22 CM/S
RIGHT ICA MID SYS: 63 CM/S
RIGHT ICA PROX DIAS: 18 CM/S
RIGHT ICA PROX SYS: 75 CM/S
RIGHT VENTRICULAR END-DIASTOLIC DIMENSION: 3.72 CM
RIGHT VENTRICULAR LENGTH IN DIASTOLE (APICAL 4-CHAMBER VIEW): 6.58 CM
RIGHT VERTEBRAL DIAS: 21 CM/S
RIGHT VERTEBRAL SYS: 59 CM/S
RV MID DIAMA: 2.08 CM
RV TB RVSP: 5 MMHG
RV TISSUE DOPPLER FREE WALL SYSTOLIC VELOCITY 1 (APICAL 4 CHAMBER VIEW): 12.87 CM/S
SINUS: 2.95 CM
STJ: 3.13 CM
TDI LATERAL: 0.1 M/S
TDI SEPTAL: 0.06 M/S
TDI: 0.08 M/S
TR MAX PG: 24 MMHG
TRICUSPID ANNULAR PLANE SYSTOLIC EXCURSION: 2.39 CM
TV REST PULMONARY ARTERY PRESSURE: 27 MMHG

## 2023-09-26 PROCEDURE — 93880 EXTRACRANIAL BILAT STUDY: CPT | Mod: PO

## 2023-09-26 PROCEDURE — 93306 TTE W/DOPPLER COMPLETE: CPT | Mod: PO

## 2023-09-26 PROCEDURE — 93880 EXTRACRANIAL BILAT STUDY: CPT | Mod: 26,,, | Performed by: INTERNAL MEDICINE

## 2023-09-26 PROCEDURE — 93880 CV US DOPPLER CAROTID (CUPID ONLY): ICD-10-PCS | Mod: 26,,, | Performed by: INTERNAL MEDICINE

## 2023-10-12 RX ORDER — ALPRAZOLAM 0.25 MG/1
0.25 TABLET ORAL 2 TIMES DAILY
Qty: 60 TABLET | Refills: 1 | Status: SHIPPED | OUTPATIENT
Start: 2023-10-12 | End: 2024-01-12 | Stop reason: SDUPTHER

## 2023-10-12 NOTE — TELEPHONE ENCOUNTER
No care due was identified.  [facility] Embedded Care Due Messages. Reference number: [id number].   10/12/2023 2:45:39 PM CDT

## 2023-10-26 RX ORDER — LOSARTAN POTASSIUM 100 MG/1
TABLET ORAL
Qty: 90 TABLET | Refills: 1 | Status: SHIPPED | OUTPATIENT
Start: 2023-10-26

## 2023-10-26 NOTE — TELEPHONE ENCOUNTER
No care due was identified.  [facility] Embedded Care Due Messages. Reference number: [id number].   10/26/2023 12:58:29 AM CDT

## 2023-11-12 NOTE — TELEPHONE ENCOUNTER
No care due was identified.  [facility] Embedded Care Due Messages. Reference number: [id number].   11/12/2023 12:24:02 PM CST

## 2023-11-13 RX ORDER — MONTELUKAST SODIUM 10 MG/1
10 TABLET ORAL NIGHTLY
Qty: 90 TABLET | Refills: 0 | Status: SHIPPED | OUTPATIENT
Start: 2023-11-13

## 2024-01-04 ENCOUNTER — LAB VISIT (OUTPATIENT)
Dept: LAB | Facility: HOSPITAL | Age: 74
End: 2024-01-04
Attending: UROLOGY
Payer: MEDICARE

## 2024-01-04 ENCOUNTER — OFFICE VISIT (OUTPATIENT)
Dept: UROLOGY | Facility: CLINIC | Age: 74
End: 2024-01-04
Payer: MEDICARE

## 2024-01-04 VITALS
SYSTOLIC BLOOD PRESSURE: 111 MMHG | HEIGHT: 68 IN | WEIGHT: 164.88 LBS | BODY MASS INDEX: 24.99 KG/M2 | DIASTOLIC BLOOD PRESSURE: 76 MMHG | HEART RATE: 68 BPM

## 2024-01-04 DIAGNOSIS — N89.8 VAGINAL MASS: ICD-10-CM

## 2024-01-04 DIAGNOSIS — N95.2 VAGINAL ATROPHY: ICD-10-CM

## 2024-01-04 DIAGNOSIS — Z03.818 ENCOUNTER FOR PATIENT CONCERN ABOUT EXPOSURE TO INFECTIOUS ORGANISM: ICD-10-CM

## 2024-01-04 DIAGNOSIS — Z03.818 ENCOUNTER FOR PATIENT CONCERN ABOUT EXPOSURE TO INFECTIOUS ORGANISM: Primary | ICD-10-CM

## 2024-01-04 PROCEDURE — 99214 OFFICE O/P EST MOD 30 MIN: CPT | Mod: PBBFAC | Performed by: UROLOGY

## 2024-01-04 PROCEDURE — 86592 SYPHILIS TEST NON-TREP QUAL: CPT | Performed by: UROLOGY

## 2024-01-04 PROCEDURE — 36415 COLL VENOUS BLD VENIPUNCTURE: CPT | Performed by: UROLOGY

## 2024-01-04 PROCEDURE — 81002 URINALYSIS NONAUTO W/O SCOPE: CPT | Mod: PBBFAC | Performed by: UROLOGY

## 2024-01-04 PROCEDURE — 51701 INSERT BLADDER CATHETER: CPT | Mod: PBBFAC | Performed by: UROLOGY

## 2024-01-04 PROCEDURE — 87491 CHLMYD TRACH DNA AMP PROBE: CPT | Performed by: UROLOGY

## 2024-01-04 PROCEDURE — 99999PBSHW PR PBB SHADOW TECHNICAL ONLY FILED TO HB: Mod: PBBFAC,,,

## 2024-01-04 PROCEDURE — 99999 PR PBB SHADOW E&M-EST. PATIENT-LVL IV: CPT | Mod: PBBFAC,,, | Performed by: UROLOGY

## 2024-01-04 PROCEDURE — 51701 INSERT BLADDER CATHETER: CPT | Mod: S$PBB,,, | Performed by: UROLOGY

## 2024-01-04 PROCEDURE — 99204 OFFICE O/P NEW MOD 45 MIN: CPT | Mod: S$PBB,25,, | Performed by: UROLOGY

## 2024-01-04 RX ORDER — ESTRADIOL 0.1 MG/G
1 CREAM VAGINAL
Qty: 42.5 G | Refills: 3 | Status: SHIPPED | OUTPATIENT
Start: 2024-01-05 | End: 2025-01-04

## 2024-01-04 NOTE — PROGRESS NOTES
CHIEF COMPLAINT:    Mrs. Chino is a 73 y.o. female presenting as a new patient for concern about pelvic organ prolapse.     PRESENTING ILLNESS:    Suze Chino is a 73 y.o. female who presents with a history of stress incontinence.  She was treated with Macroplastique urethral bulking agent on 1/24/2017.  Last seen on 1/3/2019 with symptoms of a UTI.  She returns today because she had diarrhea on 12/29/2023.  After it had subsided, she felt a ball sensation at the vaginal introitus when wiping.  She felt like it was more difficult to void.  She does not need to splint to urinate or defecate.  She looked on line and found a recommendation that a tampon can help push the prolapse back.  She tried this once and got blood back on removal of the first tampon.  She tried again and this time she still had some blood return so she stopped.  She did feel like something got reduced.  The prolapse is not constant.     She is requesting to be tested for STD's as she found out that the person she has been in a 20 year on again off again relationship, has had another sexual partner.  She has no symptoms.  He claims to have been tested and was found to have none.     REVIEW OF SYSTEMS:    Review of Systems   Constitutional: Negative.    HENT: Negative.     Eyes: Negative.    Respiratory: Negative.     Cardiovascular: Negative.    Gastrointestinal: Negative.    Genitourinary:         [place] a vaginal bulge   Musculoskeletal: Negative.    Skin: Negative.    Neurological: Negative.    Endo/Heme/Allergies: Negative.    Psychiatric/Behavioral: Negative.         PATIENT HISTORY:    Past Medical History:   Diagnosis Date    Anxiety     Cataract     Chronic kidney disease, stage 3 11/20/2020    COVID-19 ruled out by laboratory testing 03/18/2022    [facility]    Depression     GERD (gastroesophageal reflux disease)     Glaucoma     History of colon polyps 2009    history as per patient    History of colonoscopy 2009    ok  per pt    Hyperplastic colon polyp 05/12/2015    Hypertension     Hypothyroid     ?    Mild mitral regurgitation     Mitral valve prolapse     Nonobstructive atherosclerosis of coronary artery 09/2021    Moderate LAD stenosis, mild RCA stenosis.    Peptic ulcer disease     with gi bleed    Prediabetes     Primary hyperparathyroidism     Right knee DJD     Right knee DJD     Sleep apnea     Sleep disorder     TMJ (temporomandibular joint disorder)     Vitamin D deficiency        Past Surgical History:   Procedure Laterality Date    BILATERAL SALPINGOOPHORECTOMY      CATARACT EXTRACTION W/  INTRAOCULAR LENS IMPLANT Right 06/09/2022    CATARACT EXTRACTION W/  INTRAOCULAR LENS IMPLANT Left 08/05/2022    COLONOSCOPY  2015    COLONOSCOPY N/A 12/20/2022    Procedure: COLONOSCOPY E consult sent for cc;  Surgeon: Mulugeta Calloway MD;  Location: [facility] ENDO;  Service: Endoscopy;  Laterality: N/A;    CORONARY ANGIOGRAPHY N/A 9/24/2021    Procedure: ANGIOGRAM, CORONARY ARTERY;  Surgeon: Chago Gregory MD;  Location: [facility] CATH;  Service: Cardiology;  Laterality: N/A;    EYE SURGERY      laser sx    GLAUCOMA SURGERY Right 09/26/2019    Xen Gel    GLAUCOMA SURGERY Left 04/05/2021    abext XenGel OS    HYSTERECTOMY      OOPHORECTOMY      PARATHYROIDECTOMY  12/01/2021    SLT - OU - BOTH EYES      TRABECULECTOMY Right 4/2/14    OD (dr. grossman)    UPPER GASTROINTESTINAL ENDOSCOPY  2003       Family History   Problem Relation Age of Onset    Diabetes Mother     Glaucoma Mother     Leukemia Mother     Cataracts Mother     Breast cancer Mother 60    Heart disease Father         before age 50    Diabetes Sister     Glaucoma Sister     Colon cancer Maternal Aunt     Diabetes Brother     Glaucoma Brother     Lung cancer Brother     Breast cancer Other 40        genetics -    Breast cancer Cousin        Social History     Socioeconomic History    Marital status:    Tobacco Use    Smoking status: Never    Smokeless tobacco: Never    Substance and Sexual Activity    Alcohol use: Yes     Alcohol/week: 2.0 standard drinks of alcohol     Types: 2 Standard drinks or equivalent per week     Comment: occasional    Drug use: No    Sexual activity: Not Currently     Social Determinants of Health     Financial Resource Strain: High Risk (8/31/2023)    Overall Financial Resource Strain (CARDIA)     Difficulty of Paying Living Expenses: Hard   Food Insecurity: No Food Insecurity (8/31/2023)    Hunger Vital Sign     Worried About Running Out of Food in the Last Year: Never true     Ran Out of Food in the Last Year: Never true   Transportation Needs: No Transportation Needs (8/31/2023)    PRAPARE - Transportation     Lack of Transportation (Medical): No     Lack of Transportation (Non-Medical): No   Physical Activity: Sufficiently Active (8/31/2023)    Exercise Vital Sign     Days of Exercise per Week: 5 days     Minutes of Exercise per Session: 60 min   Stress: Stress Concern Present (8/31/2023)    [employer] - Occupational Stress Questionnaire     Feeling of Stress : Very much   Social Connections: Moderately Isolated (8/31/2023)    Social Connection and Isolation Panel [NHANES]     Frequency of Communication with Friends and Family: Three times a week     Frequency of Social Gatherings with Friends and Family: Three times a week     Attends Anabaptist Services: Never     Active Member of Clubs or Organizations: No     Attends Club or Organization Meetings: 1 to 4 times per year     Marital Status:    Housing Stability: Low Risk  (8/31/2023)    Housing Stability Vital Sign     Unable to Pay for Housing in the Last Year: No     Number of Places Lived in the Last Year: 1     Unstable Housing in the Last Year: No       Allergies:  Betoptic [betaxolol], Alphagan [brimonidine], Crestor [rosuvastatin], and Pravastatin    Medications:  Outpatient Encounter Medications as of 1/4/2024   Medication Sig Dispense Refill     acetaminophen (TYLENOL) 650 MG TbSR Take 1,300 mg by mouth 2 (two) times a day.      albuterol (PROAIR HFA) 90 mcg/actuation inhaler Inhale 2 puffs into the lungs every 6 (six) hours as needed for Wheezing. Rescue 18 g 5    ALPRAZolam (XANAX) 0.25 MG tablet TAKE 1 TABLET BY MOUTH TWICE A DAY AS NEEDED FOR ANXIETY 60 tablet 1    amLODIPine (NORVASC) 5 MG tablet TAKE 1 TABLET BY MOUTH EVERY DAY 90 tablet 2    aspirin (ECOTRIN) 81 MG EC tablet Take 1 tablet (81 mg total) by mouth once daily. 30 tablet 11    bempedoic acid (NEXLETOL) 180 mg Tab Take 1 tablet (180 mg total) by mouth Daily. 90 tablet 1    benzonatate (TESSALON) 100 MG capsule Take 1 capsule (100 mg total) by mouth 3 (three) times daily as needed for Cough. 20 capsule 0    calcium carb/vit D3/minerals (CALTRATE PLUS ORAL) Take 1 tablet by mouth 2 (two) times a day.      cyclobenzaprine (FLEXERIL) 10 MG tablet Take 1 tablet (10 mg total) by mouth 3 (three) times daily as needed for Muscle spasms. 10 tablet 0    dorzolamide (TRUSOPT) 2 % ophthalmic solution PLACE 1 DROP INTO THE RIGHT EYE 2 (TWO) TIMES A DAY. 10 mL 6    DULoxetine (CYMBALTA) 60 MG capsule TAKE 1 CAPSULE BY MOUTH EVERY DAY 90 capsule 3    fexofenadine (ALLEGRA) 180 MG tablet Allegra Allergy Take 1 Tablet (oral) 1 time per day for 14 days 20220318 tablet 1 time per day oral 14 days active 180 mg 30 tablet 0    fluticasone (VERAMYST) 27.5 mcg/actuation nasal spray 2 sprays by Nasal route once daily. 5.9 mL 11    ketorolac 0.5% (ACULAR) 0.5 % Drop Place 1 drop into the right eye 4 (four) times daily. 5 mL 1    losartan (COZAAR) 100 MG tablet TAKE 1 TABLET BY MOUTH EVERY DAY 90 tablet 1    meclizine (ANTIVERT) 25 mg tablet Take 25 mg by mouth every 6 (six) hours as needed.      montelukast (SINGULAIR) 10 mg tablet TAKE 1 TABLET BY MOUTH EVERY DAY IN THE EVENING 90 tablet 0    netarsudiL-latanoprost (ROCKLATAN) 0.02-0.005 % Drop Place 1 drop into both eyes Daily. 2.5 mL 11    pantoprazole  (PROTONIX) 40 MG tablet TAKE 1 TABLET BY MOUTH EVERY DAY 90 tablet 3    semaglutide (OZEMPIC) 1 mg/dose (4 mg/3 mL) Inject 1 mg into the skin every 7 days. 3 mL 11     No facility-administered encounter medications on file as of 1/4/2024.         PHYSICAL EXAMINATION:    The patient generally appears in good health, is appropriately interactive, and is in no apparent distress.    Skin: No lesions.    Mental: Cooperative with normal affect.    Neuro: Grossly intact.    HEENT: Normal. No evidence of lymphadenopathy.    Chest:  normal inspiratory effort.    Abdomen: Soft, non-tender. No masses or organomegaly. Bladder is not palpable. No evidence of flank discomfort. No evidence of inguinal hernia.    Extremities: No clubbing, cyanosis, or edema    Normal external female genitalia  Grade II urogenital atrophy  Urethral meatus is normal  Urethra and bladder are nontender to bimanual exam  Well supported anteriorly and posteriorly   Uterus and cervix are surgically absent  No adnexal masses  PVR by catheterization was 30 ml     LABS:    Lab Results   Component Value Date    BUN 15 01/01/2024    CREATININE 1.45 (H) 01/01/2024       UA 1.010, pH 5, 30 protein, otherwise, negative.     IMPRESSION:    Vaginal bulge  Concern for exposure to STD  Vaginal atrophy    PLAN:    1. No prolapse seen today but she has a morning appointment.  Recommend that if this continues to be an issue, to let me know.  We will make her a late afternoon appointment  2.  Estrace cream.  3.  Urine sent for GC, chlamydia.  RPR ordered (explained this is a screening test for syphilis)  Offered the patient HIV testing but she declined.   3.  Follow up as needed.     I spent 45 minutes with the patient of which more than half was spent in direct consultation with the patient in regards to our treatment and plan.

## 2024-01-05 LAB
C TRACH DNA SPEC QL NAA+PROBE: NOT DETECTED
N GONORRHOEA DNA SPEC QL NAA+PROBE: NOT DETECTED
RPR SER QL: NORMAL

## 2024-01-12 ENCOUNTER — OFFICE VISIT (OUTPATIENT)
Dept: FAMILY MEDICINE | Facility: CLINIC | Age: 74
End: 2024-01-12
Payer: MEDICARE

## 2024-01-12 VITALS
HEIGHT: 68 IN | TEMPERATURE: 98 F | BODY MASS INDEX: 24.98 KG/M2 | DIASTOLIC BLOOD PRESSURE: 78 MMHG | WEIGHT: 164.81 LBS | SYSTOLIC BLOOD PRESSURE: 131 MMHG | HEART RATE: 79 BPM

## 2024-01-12 DIAGNOSIS — I10 PRIMARY HYPERTENSION: Primary | ICD-10-CM

## 2024-01-12 DIAGNOSIS — E78.00 PURE HYPERCHOLESTEROLEMIA: ICD-10-CM

## 2024-01-12 DIAGNOSIS — F33.0 MILD EPISODE OF RECURRENT MAJOR DEPRESSIVE DISORDER: ICD-10-CM

## 2024-01-12 DIAGNOSIS — Z90.89 S/P PARATHYROIDECTOMY: ICD-10-CM

## 2024-01-12 DIAGNOSIS — R73.03 PRE-DIABETES: ICD-10-CM

## 2024-01-12 DIAGNOSIS — I25.10 NONOBSTRUCTIVE ATHEROSCLEROSIS OF CORONARY ARTERY: ICD-10-CM

## 2024-01-12 DIAGNOSIS — N18.32 STAGE 3B CHRONIC KIDNEY DISEASE: ICD-10-CM

## 2024-01-12 DIAGNOSIS — Z98.890 S/P PARATHYROIDECTOMY: ICD-10-CM

## 2024-01-12 DIAGNOSIS — T46.6X5A ADVERSE REACTION TO STATIN MEDICATION: ICD-10-CM

## 2024-01-12 DIAGNOSIS — E89.2 S/P PARATHYROIDECTOMY: ICD-10-CM

## 2024-01-12 PROCEDURE — 99999 PR PBB SHADOW E&M-EST. PATIENT-LVL IV: CPT | Mod: PBBFAC,,, | Performed by: FAMILY MEDICINE

## 2024-01-12 PROCEDURE — 99214 OFFICE O/P EST MOD 30 MIN: CPT | Mod: S$PBB,,, | Performed by: FAMILY MEDICINE

## 2024-01-12 PROCEDURE — 99214 OFFICE O/P EST MOD 30 MIN: CPT | Mod: PBBFAC,PO | Performed by: FAMILY MEDICINE

## 2024-01-12 RX ORDER — BENZONATATE 100 MG/1
100 CAPSULE ORAL 3 TIMES DAILY PRN
Qty: 20 CAPSULE | Refills: 0 | Status: SHIPPED | OUTPATIENT
Start: 2024-01-12 | End: 2024-01-22

## 2024-01-12 RX ORDER — ALPRAZOLAM 0.25 MG/1
0.25 TABLET ORAL 2 TIMES DAILY
Qty: 60 TABLET | Refills: 5 | Status: SHIPPED | OUTPATIENT
Start: 2024-01-12

## 2024-01-12 RX ORDER — BENZONATATE 100 MG/1
100 CAPSULE ORAL 3 TIMES DAILY PRN
COMMUNITY
End: 2024-01-12 | Stop reason: SDUPTHER

## 2024-01-12 RX ORDER — AMLODIPINE BESYLATE 5 MG/1
5 TABLET ORAL DAILY
Qty: 90 TABLET | Refills: 3 | Status: SHIPPED | OUTPATIENT
Start: 2024-01-12

## 2024-01-12 RX ORDER — ALBUTEROL SULFATE 90 UG/1
2 AEROSOL, METERED RESPIRATORY (INHALATION) EVERY 6 HOURS PRN
Qty: 18 G | Refills: 1 | Status: SHIPPED | OUTPATIENT
Start: 2024-01-12

## 2024-01-12 RX ORDER — DULOXETIN HYDROCHLORIDE 60 MG/1
60 CAPSULE, DELAYED RELEASE ORAL DAILY
Qty: 90 CAPSULE | Refills: 3 | Status: SHIPPED | OUTPATIENT
Start: 2024-01-12

## 2024-01-12 RX ORDER — ASPIRIN 81 MG/1
81 TABLET ORAL DAILY
COMMUNITY

## 2024-01-12 NOTE — PATIENT INSTRUCTIONS
Recommend tetanus/diptheria (Tdap) and shingles (Shingrx) vaccines at the pharmacy. Recommend COVID vaccine update. Recommend RSV vaccine. Flu shot

## 2024-01-12 NOTE — PROGRESS NOTES
Presents follow-up.  Hypertension controlled.  Hyperlipidemia was not able take statins due side effects.  Stage 3 chronic kidney disease asymptomatic.  She is on Ozempic for prediabetes and metabolic syndrome/obesity.  She has never been diagnosed with diabetes.  She was taking metformin the past through Endocrine for prediabetes but now only using the Ozempic.  Previous parathyroidectomy due to hyperparathyroidism.  Clinically stable.  Nonobstructive coronary disease falls with Cardiology no angina.  Depression anxiety stable current medications.  No SI/HI.  Recent influenza a improving.    Suze was seen today for medication refill.    Diagnoses and all orders for this visit:    Primary hypertension    Pure hypercholesterolemia    Stage 3b chronic kidney disease    Pre-diabetes    Adverse reaction to statin medication    S/P parathyroidectomy    Mild episode of recurrent major depressive disorder    Nonobstructive atherosclerosis of coronary artery    Other orders  -     ALPRAZolam (XANAX) 0.25 MG tablet; Take 1 tablet (0.25 mg total) by mouth 2 (two) times daily.  -     benzonatate (TESSALON) 100 MG capsule; Take 1 capsule (100 mg total) by mouth 3 (three) times daily as needed for Cough.  -     albuterol (PROAIR HFA) 90 mcg/actuation inhaler; Inhale 2 puffs into the lungs every 6 (six) hours as needed for Wheezing. Rescue  -     amLODIPine (NORVASC) 5 MG tablet; Take 1 tablet (5 mg total) by mouth once daily.  -     DULoxetine (CYMBALTA) 60 MG capsule; Take 1 capsule (60 mg total) by mouth once daily.      Recent laboratory reviewed.  Continue current medications.  Continue ER.  Reviewed immunization recommendations.  Recent influenza refilled albuterol Tessalon.            Past Medical History:  Past Medical History:   Diagnosis Date    Anxiety     Cataract     Chronic kidney disease, stage 3 11/20/2020    COVID-19 ruled out by laboratory testing 03/18/2022    [facility]    Depression     GERD  (gastroesophageal reflux disease)     Glaucoma     History of colon polyps 2009    history as per patient    History of colonoscopy 2009    ok per pt    Hyperplastic colon polyp 05/12/2015    Hypertension     Hypothyroid     ?    Mild mitral regurgitation     Mitral valve prolapse     Nonobstructive atherosclerosis of coronary artery 09/2021    Moderate LAD stenosis, mild RCA stenosis.    Peptic ulcer disease     with gi bleed    Prediabetes     Primary hyperparathyroidism     Right knee DJD     Right knee DJD     Sleep apnea     Sleep disorder     TMJ (temporomandibular joint disorder)     Vitamin D deficiency      Past Surgical History:   Procedure Laterality Date    BILATERAL SALPINGOOPHORECTOMY      CATARACT EXTRACTION W/  INTRAOCULAR LENS IMPLANT Right 06/09/2022    CATARACT EXTRACTION W/  INTRAOCULAR LENS IMPLANT Left 08/05/2022    COLONOSCOPY  2015    COLONOSCOPY N/A 12/20/2022    Procedure: COLONOSCOPY E consult sent for cc;  Surgeon: Mulugeta Calloway MD;  Location: [facility] ENDO;  Service: Endoscopy;  Laterality: N/A;    CORONARY ANGIOGRAPHY N/A 9/24/2021    Procedure: ANGIOGRAM, CORONARY ARTERY;  Surgeon: Chago Gregory MD;  Location: [facility] CATH;  Service: Cardiology;  Laterality: N/A;    EYE SURGERY      laser sx    GLAUCOMA SURGERY Right 09/26/2019    Xen Gel    GLAUCOMA SURGERY Left 04/05/2021    abext XenGel OS    HYSTERECTOMY      OOPHORECTOMY      PARATHYROIDECTOMY  12/01/2021    SLT - OU - BOTH EYES      TRABECULECTOMY Right 4/2/14    OD (dr. grossman)    UPPER GASTROINTESTINAL ENDOSCOPY  2003     Review of patient's allergies indicates:   Allergen Reactions    Betoptic [betaxolol] Other (See Comments)     Fatigue, slowed heart rate.    Alphagan [brimonidine] Dermatitis    Crestor [rosuvastatin] Other (See Comments)     Muscle ache    Pravastatin Other (See Comments)     aches  aches     Current Outpatient Medications on File Prior to Visit   Medication Sig Dispense Refill    acetaminophen (TYLENOL) 650  MG TbSR Take 1,300 mg by mouth 2 (two) times a day.      calcium carb/vit D3/minerals (CALTRATE PLUS ORAL) Take 1 tablet by mouth 2 (two) times a day.      dorzolamide (TRUSOPT) 2 % ophthalmic solution PLACE 1 DROP INTO THE RIGHT EYE 2 (TWO) TIMES A DAY. 10 mL 6    estradioL (ESTRACE) 0.01 % (0.1 mg/gram) vaginal cream Place 1 g vaginally 3 (three) times a week. 42.5 g 3    fexofenadine (ALLEGRA) 180 MG tablet Allegra Allergy Take 1 Tablet (oral) 1 time per day for 14 days 20220318 tablet 1 time per day oral 14 days active 180 mg 30 tablet 0    fluticasone (VERAMYST) 27.5 mcg/actuation nasal spray 2 sprays by Nasal route once daily. 5.9 mL 11    ketorolac 0.5% (ACULAR) 0.5 % Drop Place 1 drop into the right eye 4 (four) times daily. 5 mL 1    losartan (COZAAR) 100 MG tablet TAKE 1 TABLET BY MOUTH EVERY DAY 90 tablet 1    meclizine (ANTIVERT) 25 mg tablet Take 25 mg by mouth every 6 (six) hours as needed.      montelukast (SINGULAIR) 10 mg tablet TAKE 1 TABLET BY MOUTH EVERY DAY IN THE EVENING 90 tablet 0    netarsudiL-latanoprost (ROCKLATAN) 0.02-0.005 % Drop Place 1 drop into both eyes Daily. 2.5 mL 11    pantoprazole (PROTONIX) 40 MG tablet TAKE 1 TABLET BY MOUTH EVERY DAY 90 tablet 3    semaglutide (OZEMPIC) 1 mg/dose (4 mg/3 mL) Inject 1 mg into the skin every 7 days. 3 mL 11    [DISCONTINUED] albuterol (PROAIR HFA) 90 mcg/actuation inhaler Inhale 2 puffs into the lungs every 6 (six) hours as needed for Wheezing. Rescue 18 g 5    [DISCONTINUED] ALPRAZolam (XANAX) 0.25 MG tablet TAKE 1 TABLET BY MOUTH TWICE A DAY AS NEEDED FOR ANXIETY 60 tablet 1    [DISCONTINUED] amLODIPine (NORVASC) 5 MG tablet TAKE 1 TABLET BY MOUTH EVERY DAY 90 tablet 2    [DISCONTINUED] bempedoic acid (NEXLETOL) 180 mg Tab Take 1 tablet (180 mg total) by mouth Daily. 90 tablet 1    [DISCONTINUED] benzonatate (TESSALON) 100 MG capsule Take 100 mg by mouth 3 (three) times daily as needed for Cough.      [DISCONTINUED] DULoxetine (CYMBALTA) 60  MG capsule TAKE 1 CAPSULE BY MOUTH EVERY DAY 90 capsule 3    aspirin (ECOTRIN) 81 MG EC tablet Take 81 mg by mouth once daily.      [DISCONTINUED] amLODIPine benzoate 1 mg/mL Susp amLODIPine Benzoate      [DISCONTINUED] aspirin (ECOTRIN) 81 MG EC tablet Take 1 tablet (81 mg total) by mouth once daily. 30 tablet 11    [DISCONTINUED] benzonatate (TESSALON) 100 MG capsule Take 1 capsule (100 mg total) by mouth 3 (three) times daily as needed for Cough. 20 capsule 0    [DISCONTINUED] prednisoLONE 5 mg (21 tabs) DsPk prednisoLONE       No current facility-administered medications on file prior to visit.     Social History     Socioeconomic History    Marital status:    Tobacco Use    Smoking status: Never    Smokeless tobacco: Never   Substance and Sexual Activity    Alcohol use: Yes     Alcohol/week: 2.0 standard drinks of alcohol     Types: 2 Standard drinks or equivalent per week     Comment: occasional    Drug use: No    Sexual activity: Not Currently     Social Determinants of Health     Financial Resource Strain: High Risk (8/31/2023)    Overall Financial Resource Strain (CARDIA)     Difficulty of Paying Living Expenses: Hard   Food Insecurity: No Food Insecurity (8/31/2023)    Hunger Vital Sign     Worried About Running Out of Food in the Last Year: Never true     Ran Out of Food in the Last Year: Never true   Transportation Needs: No Transportation Needs (8/31/2023)    PRAPARE - Transportation     Lack of Transportation (Medical): No     Lack of Transportation (Non-Medical): No   Physical Activity: Sufficiently Active (8/31/2023)    Exercise Vital Sign     Days of Exercise per Week: 5 days     Minutes of Exercise per Session: 60 min   Stress: Stress Concern Present (8/31/2023)    [employer] - Occupational Stress Questionnaire     Feeling of Stress : Very much   Social Connections: Moderately Isolated (8/31/2023)    Social Connection and Isolation Panel [NHANES]     Frequency  "of Communication with Friends and Family: Three times a week     Frequency of Social Gatherings with Friends and Family: Three times a week     Attends Bahai Services: Never     Active Member of Clubs or Organizations: No     Attends Club or Organization Meetings: 1 to 4 times per year     Marital Status:    Housing Stability: Low Risk  (8/31/2023)    Housing Stability Vital Sign     Unable to Pay for Housing in the Last Year: No     Number of Places Lived in the Last Year: 1     Unstable Housing in the Last Year: No     Family History   Problem Relation Age of Onset    Diabetes Mother     Glaucoma Mother     Leukemia Mother     Cataracts Mother     Breast cancer Mother 60    Heart disease Father         before age 50    Diabetes Sister     Glaucoma Sister     Colon cancer Maternal Aunt     Diabetes Brother     Glaucoma Brother     Lung cancer Brother     Breast cancer Other 40        genetics -    Breast cancer Cousin            ROS:  GENERAL: No fever, chills,  or significant weight changes.   CARDIOVASCULAR: Denies chest pain, PND, orthopnea or reduced exercise tolerance.  ABDOMEN: Appetite fine. Denies diarrhea, abdominal pain, hematemesis or blood in stool.  URINARY: No flank pain, dysuria or hematuria.    Vitals:    01/12/24 1258   BP: 131/78   Pulse: 79   Temp: 97.7 °F (36.5 °C)   TempSrc: Temporal   Weight: 74.8 kg (164 lb 12.8 oz)   Height: 5' 8" (1.727 m)     Wt Readings from Last 3 Encounters:   01/12/24 74.8 kg (164 lb 12.8 oz)   01/04/24 74.8 kg (164 lb 14.5 oz)   01/01/24 72.6 kg (160 lb)       OBJECTIVE:   APPEARANCE: Well nourished, well developed, in no acute distress.    HEAD: Normocephalic.  Atraumatic.  No sinus tenderness.  EYES:   Right eye: Pupil reactive.  Conjunctiva clear.    Left eye: Pupil reactive.  Conjunctiva clear.  EOMI.    EARS: TM's intact. Light reflex normal. No retraction or perforation.    NOSE:  clear.  MOUTH & THROAT:  No pharyngeal erythema or exudate. No " lesions.  NECK: Supple. No bruits.  No JVD.  No cervical lymphadenopathy.  No thyromegaly.    CHEST: Breath sounds clear bilaterally.  Normal respiratory effort  CARDIOVASCULAR: Normal rate.  Regular rhythm.  No murmurs.  No rub.  No gallops.  ABDOMEN: Bowel sounds normal.  Soft.  No tenderness.  No organomegaly.  PERIPHERAL VASCULAR: No cyanosis.  No clubbing.  No edema.  NEUROLOGIC: No focal findings.  MENTAL STATUS: Alert.  Oriented x 3.

## 2024-01-12 NOTE — Clinical Note
Patient on diabetes registry, has not been diagnosed with diabetes, prediabetes only.  Thanks, Dr. Lewis

## 2024-02-29 ENCOUNTER — OFFICE VISIT (OUTPATIENT)
Dept: CARDIOLOGY | Facility: CLINIC | Age: 74
End: 2024-02-29
Payer: MEDICARE

## 2024-02-29 VITALS
WEIGHT: 168.88 LBS | SYSTOLIC BLOOD PRESSURE: 138 MMHG | HEIGHT: 68 IN | DIASTOLIC BLOOD PRESSURE: 82 MMHG | BODY MASS INDEX: 25.59 KG/M2 | HEART RATE: 62 BPM

## 2024-02-29 DIAGNOSIS — I34.0 MILD MITRAL REGURGITATION: Chronic | ICD-10-CM

## 2024-02-29 DIAGNOSIS — N18.32 STAGE 3B CHRONIC KIDNEY DISEASE: Chronic | ICD-10-CM

## 2024-02-29 DIAGNOSIS — E78.00 PURE HYPERCHOLESTEROLEMIA: Chronic | ICD-10-CM

## 2024-02-29 DIAGNOSIS — Z78.9 STATIN INTOLERANCE: Chronic | ICD-10-CM

## 2024-02-29 DIAGNOSIS — I77.9 MILD CAROTID ARTERY DISEASE: ICD-10-CM

## 2024-02-29 DIAGNOSIS — I25.10 NONOBSTRUCTIVE ATHEROSCLEROSIS OF CORONARY ARTERY: Primary | Chronic | ICD-10-CM

## 2024-02-29 DIAGNOSIS — I10 PRIMARY HYPERTENSION: Chronic | ICD-10-CM

## 2024-02-29 PROCEDURE — 99999 PR PBB SHADOW E&M-EST. PATIENT-LVL III: CPT | Mod: PBBFAC,,, | Performed by: INTERNAL MEDICINE

## 2024-02-29 PROCEDURE — 99214 OFFICE O/P EST MOD 30 MIN: CPT | Mod: S$PBB,,, | Performed by: INTERNAL MEDICINE

## 2024-02-29 PROCEDURE — 99213 OFFICE O/P EST LOW 20 MIN: CPT | Mod: PBBFAC,PO | Performed by: INTERNAL MEDICINE

## 2024-02-29 RX ORDER — EZETIMIBE 10 MG/1
10 TABLET ORAL DAILY
Qty: 90 TABLET | Refills: 1 | Status: SHIPPED | OUTPATIENT
Start: 2024-02-29 | End: 2025-02-28

## 2024-02-29 NOTE — PROGRESS NOTES
Subjective:    Patient ID:  Suze Chino is a 73 y.o. female who presents for Follow-up and Heart Problem        HPI  ER VISIT IN JANUARY FOR INFLUENZA A, LABS NOTED GFR 38, DOING WELL, CAROTID ULTRASOUND SHOWED MILD CAROTID PLAQUE, NOT TAKING NEXLETOL  B/O  COST.SEE ROS    Past Medical History:   Diagnosis Date    Anxiety     Cataract     Chronic kidney disease, stage 3 11/20/2020    COVID-19 ruled out by laboratory testing 03/18/2022    [facility]    Depression     GERD (gastroesophageal reflux disease)     Glaucoma     History of colon polyps 2009    history as per patient    History of colonoscopy 2009    ok per pt    Hyperplastic colon polyp 05/12/2015    Hypertension     Hypothyroid     ?    Mild mitral regurgitation     Mitral valve prolapse     Nonobstructive atherosclerosis of coronary artery 09/2021    Moderate LAD stenosis, mild RCA stenosis.    Peptic ulcer disease     with gi bleed    Prediabetes     Primary hyperparathyroidism     Right knee DJD     Right knee DJD     Sleep apnea     Sleep disorder     TMJ (temporomandibular joint disorder)     Vitamin D deficiency      Past Surgical History:   Procedure Laterality Date    BILATERAL SALPINGOOPHORECTOMY      CATARACT EXTRACTION W/  INTRAOCULAR LENS IMPLANT Right 06/09/2022    CATARACT EXTRACTION W/  INTRAOCULAR LENS IMPLANT Left 08/05/2022    COLONOSCOPY  2015    COLONOSCOPY N/A 12/20/2022    Procedure: COLONOSCOPY E consult sent for cc;  Surgeon: Mulugeta Calloway MD;  Location: [facility] ENDO;  Service: Endoscopy;  Laterality: N/A;    CORONARY ANGIOGRAPHY N/A 9/24/2021    Procedure: ANGIOGRAM, CORONARY ARTERY;  Surgeon: Chago Gregory MD;  Location: [facility] CATH;  Service: Cardiology;  Laterality: N/A;    EYE SURGERY      laser sx    GLAUCOMA SURGERY Right 09/26/2019    Xen Gel    GLAUCOMA SURGERY Left 04/05/2021    abext XenGel OS    HYSTERECTOMY      OOPHORECTOMY      PARATHYROIDECTOMY  12/01/2021    SLT - OU - BOTH EYES      TRABECULECTOMY  Right 4/2/14    OD (dr. grossman)    UPPER GASTROINTESTINAL ENDOSCOPY  2003     Family History   Problem Relation Age of Onset    Diabetes Mother     Glaucoma Mother     Leukemia Mother     Cataracts Mother     Breast cancer Mother 60    Heart disease Father         before age 50    Diabetes Sister     Glaucoma Sister     Colon cancer Maternal Aunt     Diabetes Brother     Glaucoma Brother     Lung cancer Brother     Breast cancer Other 40        genetics -    Breast cancer Cousin      Social History     Socioeconomic History    Marital status:    Tobacco Use    Smoking status: Never    Smokeless tobacco: Never   Substance and Sexual Activity    Alcohol use: Yes     Alcohol/week: 2.0 standard drinks of alcohol     Types: 2 Standard drinks or equivalent per week     Comment: occasional    Drug use: No    Sexual activity: Not Currently     Social Determinants of Health     Financial Resource Strain: High Risk (8/31/2023)    Overall Financial Resource Strain (CARDIA)     Difficulty of Paying Living Expenses: Hard   Food Insecurity: No Food Insecurity (8/31/2023)    Hunger Vital Sign     Worried About Running Out of Food in the Last Year: Never true     Ran Out of Food in the Last Year: Never true   Transportation Needs: No Transportation Needs (8/31/2023)    PRAPARE - Transportation     Lack of Transportation (Medical): No     Lack of Transportation (Non-Medical): No   Physical Activity: Sufficiently Active (8/31/2023)    Exercise Vital Sign     Days of Exercise per Week: 5 days     Minutes of Exercise per Session: 60 min   Stress: Stress Concern Present (8/31/2023)    [employer] [place] [employer] - Occupational Stress Questionnaire     Feeling of Stress : Very much   Social Connections: Moderately Isolated (8/31/2023)    Social Connection and Isolation Panel [NHANES]     Frequency of Communication with Friends and Family: Three times a week     Frequency of Social Gatherings with Friends and  Family: Three times a week     Attends Sabianism Services: Never     Active Member of Clubs or Organizations: No     Attends Club or Organization Meetings: 1 to 4 times per year     Marital Status:    Housing Stability: Low Risk  (8/31/2023)    Housing Stability Vital Sign     Unable to Pay for Housing in the Last Year: No     Number of Places Lived in the Last Year: 1     Unstable Housing in the Last Year: No       Review of patient's allergies indicates:   Allergen Reactions    Betoptic [betaxolol] Other (See Comments)     Fatigue, slowed heart rate.    Alphagan [brimonidine] Dermatitis    Crestor [rosuvastatin] Other (See Comments)     Muscle ache    Pravastatin Other (See Comments)     aches  aches       Current Outpatient Medications:     acetaminophen (TYLENOL) 650 MG TbSR, Take 1,300 mg by mouth 2 (two) times a day., Disp: , Rfl:     albuterol (PROAIR HFA) 90 mcg/actuation inhaler, Inhale 2 puffs into the lungs every 6 (six) hours as needed for Wheezing. Rescue, Disp: 18 g, Rfl: 1    ALPRAZolam (XANAX) 0.25 MG tablet, Take 1 tablet (0.25 mg total) by mouth 2 (two) times daily., Disp: 60 tablet, Rfl: 5    amLODIPine (NORVASC) 5 MG tablet, Take 1 tablet (5 mg total) by mouth once daily., Disp: 90 tablet, Rfl: 3    aspirin (ECOTRIN) 81 MG EC tablet, Take 81 mg by mouth once daily., Disp: , Rfl:     calcium carb/vit D3/minerals (CALTRATE PLUS ORAL), Take 1 tablet by mouth 2 (two) times a day., Disp: , Rfl:     dorzolamide (TRUSOPT) 2 % ophthalmic solution, PLACE 1 DROP INTO THE RIGHT EYE 2 (TWO) TIMES A DAY., Disp: 10 mL, Rfl: 6    DULoxetine (CYMBALTA) 60 MG capsule, Take 1 capsule (60 mg total) by mouth once daily., Disp: 90 capsule, Rfl: 3    estradioL (ESTRACE) 0.01 % (0.1 mg/gram) vaginal cream, Place 1 g vaginally 3 (three) times a week., Disp: 42.5 g, Rfl: 3    fexofenadine (ALLEGRA) 180 MG tablet, Allegra Allergy Take 1 Tablet (oral) 1 time per day for 14 days 20220318 tablet 1 time per day oral  14 days active 180 mg, Disp: 30 tablet, Rfl: 0    fluticasone (VERAMYST) 27.5 mcg/actuation nasal spray, 2 sprays by Nasal route once daily., Disp: 5.9 mL, Rfl: 11    ketorolac 0.5% (ACULAR) 0.5 % Drop, Place 1 drop into the right eye 4 (four) times daily., Disp: 5 mL, Rfl: 1    losartan (COZAAR) 100 MG tablet, TAKE 1 TABLET BY MOUTH EVERY DAY, Disp: 90 tablet, Rfl: 1    meclizine (ANTIVERT) 25 mg tablet, Take 25 mg by mouth every 6 (six) hours as needed., Disp: , Rfl:     montelukast (SINGULAIR) 10 mg tablet, TAKE 1 TABLET BY MOUTH EVERY DAY IN THE EVENING, Disp: 90 tablet, Rfl: 0    netarsudiL-latanoprost (ROCKLATAN) 0.02-0.005 % Drop, Place 1 drop into both eyes Daily., Disp: 2.5 mL, Rfl: 11    pantoprazole (PROTONIX) 40 MG tablet, TAKE 1 TABLET BY MOUTH EVERY DAY, Disp: 90 tablet, Rfl: 3    semaglutide (OZEMPIC) 1 mg/dose (4 mg/3 mL), Inject 1 mg into the skin every 7 days., Disp: 3 mL, Rfl: 11    ezetimibe (ZETIA) 10 mg tablet, Take 1 tablet (10 mg total) by mouth once daily., Disp: 90 tablet, Rfl: 1    Review of Systems   Constitutional: Negative for chills, fever and weight loss.   HENT:  Negative for congestion and stridor.    Eyes:  Negative for blurred vision and visual disturbance.   Cardiovascular:  Negative for chest pain, claudication, cyanosis, dyspnea on exertion (MILD), irregular heartbeat, leg swelling, near-syncope, orthopnea, palpitations and paroxysmal nocturnal dyspnea.   Respiratory:  Negative for cough, hemoptysis, shortness of breath and wheezing.    Endocrine: Negative for polyphagia and polyuria.   Hematologic/Lymphatic: Does not bruise/bleed easily.   Skin:  Negative for nail changes and rash.   Musculoskeletal:  Positive for falls. Negative for back pain.   Gastrointestinal:  Negative for abdominal pain, dysphagia, melena and nausea.   Genitourinary:  Negative for dysuria and flank pain.   Neurological:  Positive for vertigo. Negative for dizziness and headaches.  "  Psychiatric/Behavioral:  Negative for altered mental status and depression.    Allergic/Immunologic: Negative for hives and persistent infections.        Objective:      Vitals:    02/29/24 1545   BP: 138/82   Pulse: 62   Weight: 76.6 kg (168 lb 14 oz)   Height: 5' 8" (1.727 m)   PainSc: 0-No pain     Body mass index is 25.68 kg/m².    Physical Exam  Constitutional:       Appearance: She is well-developed.   HENT:      Head: Normocephalic and atraumatic.   Eyes:      General: No scleral icterus.     Extraocular Movements: Extraocular movements intact.   Neck:      Vascular: No JVD.   Cardiovascular:      Rate and Rhythm: Normal rate and regular rhythm. No extrasystoles are present.     Pulses: Intact distal pulses.           Carotid pulses are 2+ on the right side and 2+ on the left side.       Radial pulses are 2+ on the right side and 2+ on the left side.        Posterior tibial pulses are 2+ on the right side and 2+ on the left side.      Heart sounds: Murmur heard.      Systolic murmur is present with a grade of 1/6 at the lower left sternal border.      No friction rub. No gallop.   Pulmonary:      Effort: Pulmonary effort is normal.      Breath sounds: No rales.   Abdominal:      General: Bowel sounds are normal.      Palpations: Abdomen is soft.      Tenderness: There is no abdominal tenderness.   Musculoskeletal:      Cervical back: Neck supple.      Right lower leg: No edema.      Left lower leg: No edema.   Skin:     General: Skin is warm and dry.   Neurological:      General: No focal deficit present.      Mental Status: She is alert and oriented to person, place, and time.   Psychiatric:         Mood and Affect: Mood normal.         Behavior: Behavior normal.               ..    Chemistry        Component Value Date/Time     (L) 01/01/2024 0945    K 4.2 01/01/2024 0945    CL 98 01/01/2024 0945    CO2 27 01/01/2024 0945    BUN 15 01/01/2024 0945    CREATININE 1.45 (H) 01/01/2024 0945     " 01/01/2024 0945        Component Value Date/Time    CALCIUM 8.7 01/01/2024 0945    ALKPHOS 101 01/01/2024 0945    AST 48 (H) 01/01/2024 0945    ALT 23 01/01/2024 0945    BILITOT 0.8 01/01/2024 0945    ESTGFRAFRICA 52.5 (A) 06/29/2022 0822    EGFRNONAA 45.6 (A) 06/29/2022 0822            ..  Lab Results   Component Value Date    CHOL 208 (H) 03/17/2023    CHOL 143 12/29/2021    CHOL 263 (H) 09/24/2021     Lab Results   Component Value Date    HDL 50 03/17/2023    HDL 59 12/29/2021    HDL 73 09/24/2021     Lab Results   Component Value Date    LDLCALC 142.6 03/17/2023    LDLCALC 73.0 12/29/2021    LDLCALC 180.2 (H) 09/24/2021     Lab Results   Component Value Date    TRIG 77 03/17/2023    TRIG 55 12/29/2021    TRIG 49 09/24/2021     Lab Results   Component Value Date    CHOLHDL 24.0 03/17/2023    CHOLHDL 41.3 12/29/2021    CHOLHDL 27.8 09/24/2021     ..  Lab Results   Component Value Date    WBC 7.61 01/01/2024    HGB 11.9 (L) 01/01/2024    HCT 38.0 01/01/2024    MCV 91 01/01/2024     01/01/2024       Test(s) Reviewed  I have reviewed the following in detail:  [] Stress test   [] Angiography   [] Echocardiogram   [x] Labs   [x] Other:       Assessment:         ICD-10-CM ICD-9-CM   1. Nonobstructive atherosclerosis of coronary artery  I25.10 414.00   2. Mild mitral regurgitation  I34.0 424.0   3. Mild carotid artery disease  I77.9 447.9   4. Stage 3b chronic kidney disease  N18.32 585.3   5. Statin intolerance  Z78.9 995.27   6. Pure hypercholesterolemia  E78.00 272.0   7. Primary hypertension  I10 401.9     Problem List Items Addressed This Visit          Cardiac/Vascular    Hypertension    Relevant Orders    Comprehensive Metabolic Panel    Hyperlipidemia    Relevant Orders    Comprehensive Metabolic Panel    Lipid Panel    Nonobstructive atherosclerosis of coronary artery - Primary    Overview     Moderate LAD stenosis, mild RCA stenosis.         Relevant Orders    Comprehensive Metabolic Panel    Lipid Panel     Mild mitral regurgitation    Mild carotid artery disease       Renal/    Chronic kidney disease, stage 3       Other    Statin intolerance        Plan:    ADD ZETIA, PATIENT HAS STATIN INTOLERANCE, TARGET LDL LOWER FOLLOW-UP WITH PRIMARY CARE AND NEPHROLOGY REGARDING WORSENING KIDNEY FUNCTION, HYDRATION AVOID DEHYDRATION.ALL CV CLINICALLY STABLE, NO ANGINA, NO HF, NO TIA, NO CLINICAL ARRHYTHMIA,CONTINUE CURRENT MEDS, EDUCATION, DIET, EXERCISE, RETURN TO CLINIC IN  9 MO, INCREASED CV RISK WITH CKD      Nonobstructive atherosclerosis of coronary artery  -     Comprehensive Metabolic Panel; Future; Expected date: 08/29/2024  -     Lipid Panel; Future; Expected date: 08/29/2024    Mild mitral regurgitation    Mild carotid artery disease    Stage 3b chronic kidney disease    Statin intolerance    Pure hypercholesterolemia  -     Comprehensive Metabolic Panel; Future; Expected date: 08/29/2024  -     Lipid Panel; Future; Expected date: 08/29/2024    Primary hypertension  -     Comprehensive Metabolic Panel; Future; Expected date: 08/29/2024    Other orders  -     ezetimibe (ZETIA) 10 mg tablet; Take 1 tablet (10 mg total) by mouth once daily.  Dispense: 90 tablet; Refill: 1    RTC Low level/low impact aerobic exercise 5x's/wk. Heart healthy diet and risk factor modification.    See labs and med orders.    Aerobic exercise 5x's/wk. Heart healthy diet and risk factor modification.    See labs and med orders.

## 2024-03-05 RX ORDER — ALBUTEROL SULFATE 90 UG/1
2 AEROSOL, METERED RESPIRATORY (INHALATION) EVERY 6 HOURS PRN
Qty: 54 G | Refills: 3 | Status: SHIPPED | OUTPATIENT
Start: 2024-03-05

## 2024-03-05 NOTE — TELEPHONE ENCOUNTER
No care due was identified.  [facility] Embedded Care Due Messages. Reference number: [id number].   3/05/2024 12:05:17 AM CST

## 2024-03-05 NOTE — TELEPHONE ENCOUNTER
Refill Routing Note   Medication(s) are not appropriate for processing by [facility] for the following reason(s):        New or recently adjusted medication    [facility] action(s):  Defer        Medication Therapy Plan: Patient has been taking Proair; DEFER      Appointments  past 12m or future 3m with PCP    Date Provider   Last Visit   1/12/2024 Cody Lewis MD   Next Visit   Visit date not found Cody Lewis MD   ED visits in past 90 days: 1        Note composed:6:19 AM 03/05/2024

## 2024-03-06 ENCOUNTER — OFFICE VISIT (OUTPATIENT)
Dept: OPHTHALMOLOGY | Facility: CLINIC | Age: 74
End: 2024-03-06
Payer: MEDICARE

## 2024-03-06 DIAGNOSIS — H40.1133 PRIMARY OPEN ANGLE GLAUCOMA OF BOTH EYES, SEVERE STAGE: Primary | ICD-10-CM

## 2024-03-06 DIAGNOSIS — Z98.41 CATARACT EXTRACTION STATUS, RIGHT: ICD-10-CM

## 2024-03-06 DIAGNOSIS — Z91.148 NONCOMPLIANCE WITH MEDICATION REGIMEN: ICD-10-CM

## 2024-03-06 PROCEDURE — 99213 OFFICE O/P EST LOW 20 MIN: CPT | Mod: PBBFAC | Performed by: OPHTHALMOLOGY

## 2024-03-06 PROCEDURE — 99999 PR PBB SHADOW E&M-EST. PATIENT-LVL III: CPT | Mod: PBBFAC,,, | Performed by: OPHTHALMOLOGY

## 2024-03-06 PROCEDURE — 99214 OFFICE O/P EST MOD 30 MIN: CPT | Mod: S$PBB,,, | Performed by: OPHTHALMOLOGY

## 2024-03-06 RX ORDER — NETARSUDIL AND LATANOPROST OPHTHALMIC SOLUTION, 0.02%/0.005% .2; .05 MG/ML; MG/ML
1 SOLUTION/ DROPS OPHTHALMIC; TOPICAL DAILY
Qty: 2.5 ML | Refills: 2 | Status: SHIPPED | OUTPATIENT
Start: 2024-03-06 | End: 2024-06-18 | Stop reason: SDUPTHER

## 2024-03-06 RX ORDER — DORZOLAMIDE HCL 20 MG/ML
1 SOLUTION/ DROPS OPHTHALMIC 2 TIMES DAILY
Qty: 10 ML | Refills: 2 | Status: SHIPPED | OUTPATIENT
Start: 2024-03-06 | End: 2024-06-18 | Stop reason: SDUPTHER

## 2024-03-06 NOTE — PROGRESS NOTES
HPI     Glaucoma            Comments: 8 MO Recheck IOP: Pt states she last used both eyedrops 2 weeks   ago. States she has a pain in the right eye she describes as achey, off   and on, started 6 months ago. States vision is unchanged since last visit.   Does not use the drops regularly.           Comments    Referred by Dr. Lorenzana     1. COAG/ LTG   SLT OD 9/29/23  -h/o non compliance   -intolerant of coag meds   -TRAB OD   -repeat SLT od done 8/9/2012 - good initial response IOP 18 to 13, SLT OD   08/17/17   -Primary SLT done os 9/13/2012 - minimal response 18 to 16   -Bleb Needling with [facility] Inj. OD 9/17/15   -SLT OS 9/29/2016 AND REPEAT SLT OS 12/10/2020   Xen Gel OD (09-26-19) Not under flap   Xen Gel OS (04-) abExt LOT #[id number] (under flap)  -G6 OD 3/23/23  2. PCIOL OD 6/9/22/ SN60WF 12.0/ CDE: 11.04  PCIOL OS 8/4/22 Sy60WF 12.5 / CDE: 8.51    *Stopped Alphagan OS TID because of dermatitis   *Stopped Betoptic bid ou due to slow pulse rate and feeling tired.   *latanoprost slows pulse rate   * BID (stopped after 2 weeks due to extreme fatigue)   *OD Rhopressa Qhs ( patient ran out in March never returned for followup )     *lotemax cost over $300   Poor response to G6     GCL: 17/21--- 08/16/21   GCL 19/22--2/22/23      OU: Rocklatan QHS  OU: Dorozolamide TID     OD: Keto QID             Last edited by Macario Shankar MD on 3/6/2024 10:12 AM.            Assessment /Plan     For exam results, see Encounter Report.      ICD-10-CM ICD-9-CM    1. Primary open angle glaucoma of both eyes, severe stage  H40.1133 365.11 IOP not within acceptable range relative to target IOP with risk of irreversible visual loss. Additional treatment required.  Discussed options, risks, and benefits of additional medication, SLT laser, or incisional glaucoma surgery.     Recommend: Medication Compliance     Patient chooses the above     Reviewed importance of continued compliance with treatment and follow up.       365.73       2. Cataract extraction status, right  Z98.41 V45.61       3. Noncompliance with medication regimen  Z91.148 V15.81           Return to clinic 4-6 weeks for IOP on meds      OU: Rocklatan QHS  OU: Dorozolamide TID

## 2024-03-28 RX ORDER — PANTOPRAZOLE SODIUM 40 MG/1
TABLET, DELAYED RELEASE ORAL
Qty: 90 TABLET | Refills: 3 | Status: SHIPPED | OUTPATIENT
Start: 2024-03-28

## 2024-03-28 NOTE — TELEPHONE ENCOUNTER
Provider Staff:  Action required for this patient    Requires labs      Please see care gap opportunities below in Care Due Message.    Thanks!  [facility]     Appointments      Date Provider   Last Visit   1/12/2024 Cody Lewis MD   Next Visit   Visit date not found Cody Lewis MD     Refill Decision Note   Suze Chino  is requesting a refill authorization.    Brief Assessment and Rationale for Refill:   Approve       Medication Therapy Plan:         Comments:     Note composed:12:33 PM 03/28/2024

## 2024-03-28 NOTE — TELEPHONE ENCOUNTER
Care Due:                  Date            Visit Type   Department     Provider  --------------------------------------------------------------------------------                                EP -                              PRIMARY      [facility]  Last Visit: 01-      CARE ([facility])   MEDICINE       Cody Lewis  Next Visit: None Scheduled  None         None Found                                                            Last  Test          Frequency    Reason                     Performed    Due Date  --------------------------------------------------------------------------------    HBA1C.......  6 months...  semaglutide..............  09- 03-    [facility] Embedded Care Due Messages. Reference number: [id number].   3/28/2024 12:31:27 AM CDT

## 2024-04-17 ENCOUNTER — OFFICE VISIT (OUTPATIENT)
Dept: OPHTHALMOLOGY | Facility: CLINIC | Age: 74
End: 2024-04-17
Payer: MEDICARE

## 2024-04-17 ENCOUNTER — DOCUMENTATION ONLY (OUTPATIENT)
Dept: OPHTHALMOLOGY | Facility: CLINIC | Age: 74
End: 2024-04-17

## 2024-04-17 DIAGNOSIS — H40.1133 PRIMARY OPEN ANGLE GLAUCOMA OF BOTH EYES, SEVERE STAGE: Primary | ICD-10-CM

## 2024-04-17 DIAGNOSIS — Z91.148 NONCOMPLIANCE WITH MEDICATION REGIMEN: ICD-10-CM

## 2024-04-17 DIAGNOSIS — Z98.41 CATARACT EXTRACTION STATUS, RIGHT: ICD-10-CM

## 2024-04-17 PROCEDURE — 99214 OFFICE O/P EST MOD 30 MIN: CPT | Mod: S$PBB,,, | Performed by: OPHTHALMOLOGY

## 2024-04-17 PROCEDURE — 99213 OFFICE O/P EST LOW 20 MIN: CPT | Mod: PBBFAC,25 | Performed by: OPHTHALMOLOGY

## 2024-04-17 PROCEDURE — 92133 CPTRZD OPH DX IMG PST SGM ON: CPT | Mod: PBBFAC | Performed by: OPHTHALMOLOGY

## 2024-04-17 PROCEDURE — 99999 PR PBB SHADOW E&M-EST. PATIENT-LVL III: CPT | Mod: PBBFAC,,, | Performed by: OPHTHALMOLOGY

## 2024-04-17 RX ORDER — MOXIFLOXACIN 5 MG/ML
1 SOLUTION/ DROPS OPHTHALMIC 4 TIMES DAILY
Qty: 3 ML | Refills: 1 | Status: SHIPPED | OUTPATIENT
Start: 2024-04-17

## 2024-04-17 RX ORDER — PREDNISOLONE ACETATE 10 MG/ML
1 SUSPENSION/ DROPS OPHTHALMIC 4 TIMES DAILY
Qty: 5 ML | Refills: 1 | Status: SHIPPED | OUTPATIENT
Start: 2024-04-17 | End: 2024-06-18

## 2024-04-17 RX ORDER — BROMFENAC SODIUM 0.81 MG/ML
1 SOLUTION/ DROPS OPHTHALMIC DAILY
Qty: 3 ML | Refills: 1 | Status: SHIPPED | OUTPATIENT
Start: 2024-04-17 | End: 2024-06-18

## 2024-04-17 NOTE — PROGRESS NOTES
HPI     Glaucoma            Comments: 6 Week IOP Check (on drops) Pt states she is using the drops as   directed. Stated she does not know the names of the drops and has no   inclination or time ....          Comments    Referred by Dr. Lorenzana     1. COAG/ LTG   SLT OD 9/29/23  -h/o non compliance   -intolerant of coag meds   -TRAB OD   -repeat SLT od done 8/9/2012 - good initial response IOP 18 to 13, SLT OD   08/17/17   -Primary SLT done os 9/13/2012 - minimal response 18 to 16   -Bleb Needling with [facility] Inj. OD 9/17/15   -SLT OS 9/29/2016 AND REPEAT SLT OS 12/10/2020   Xen Gel OD (09-26-19) Not under flap   Xen Gel OS (04-) abExt LOT #[id number] (under flap)  -G6 OD 3/23/23  2. PCIOL OD 6/9/22/ SN60WF 12.0/ CDE: 11.04  PCIOL OS 8/4/22 Sy60WF 12.5 / CDE: 8.51    *Stopped Alphagan OS TID because of dermatitis   *Stopped Betoptic bid ou due to slow pulse rate and feeling tired.   *latanoprost slows pulse rate   * BID (stopped after 2 weeks due to extreme fatigue)   *OD Rhopressa Qhs ( patient ran out in March never returned for followup )     *lotemax cost over $300   Poor response to G6     GCL: 17/21--- 08/16/21   GCL 19/22--2/22/23      OU: Rocklatan QHS  OU: Dorozolamide TID             Last edited by Ana Foster on 4/17/2024  1:46 PM.            Assessment /Plan     For exam results, see Encounter Report.      ICD-10-CM ICD-9-CM    1. Primary open angle glaucoma of both eyes, severe stage  H40.1133 365.11 IOP not within acceptable range relative to target IOP with risk of irreversible visual loss. Additional treatment required.  Discussed options, risks, and benefits of additional medication, SLT laser, or incisional glaucoma surgery.     Recommend: Clearpath w/ rip chord OD, possible trabeculectomy   Pred/Prolensa/Gati QID OD    Patient chooses the above     Reviewed importance of continued compliance with treatment and follow up.     Uncontrolled glaucoma of the Right  eye(s) on maximum tolerated  therapy: Significant risk of continued irreversible glaucomatous vision loss with current level of treatment. Therefore, surgical options were reviewed at great length with the patient, and Clearpath w/ rip chord OD, possible trab recommended in hopes of reducing the IOP to a more tolerable level.   Explained that trabeculectomy requires post operative laser treatment as a staged procedure(s) and Xengel surgery requires postoperative injection of Mitomycin to prevent scarring as a staged procedure(s).    A lengthy discussion of the risks, benefits and alternatives was presented to the patient (including balancing the immediate risks of vision loss from surgery vs. the likelihood of continued vision loss from the inadequately controlled glaucoma). The need for frequent, careful follow-up exams for monitoring and other possible postop adjustments was also discussed.        365.73       2. Cataract extraction status, right  Z98.41 V45.61       3. Noncompliance with medication regimen  Z91.148 V15.81

## 2024-04-17 NOTE — PROGRESS NOTES
Short Stay Record    CC: Uncontrolled glaucoma right eye    HPI:  Suze Chino is a 73 y.o. female who presents for evaluation prior to ophthalmic surgery, right eye. Patient presents with uncontrolled glaucoma with intraocular pressure above target with significant risk of irreversible vision loss which requires surgical intervention.      Past Surgical History:   Procedure Laterality Date    BILATERAL SALPINGOOPHORECTOMY      CATARACT EXTRACTION W/  INTRAOCULAR LENS IMPLANT Right 06/09/2022    CATARACT EXTRACTION W/  INTRAOCULAR LENS IMPLANT Left 08/05/2022    COLONOSCOPY  2015    COLONOSCOPY N/A 12/20/2022    Procedure: COLONOSCOPY E consult sent for cc;  Surgeon: Mulugeta Calloway MD;  Location: [facility] ENDO;  Service: Endoscopy;  Laterality: N/A;    CORONARY ANGIOGRAPHY N/A 9/24/2021    Procedure: ANGIOGRAM, CORONARY ARTERY;  Surgeon: Chago Gregory MD;  Location: [facility] CATH;  Service: Cardiology;  Laterality: N/A;    EYE SURGERY      laser sx    GLAUCOMA SURGERY Right 09/26/2019    Xen Gel    GLAUCOMA SURGERY Left 04/05/2021    abext XenGel OS    HYSTERECTOMY      OOPHORECTOMY      PARATHYROIDECTOMY  12/01/2021    SLT - OU - BOTH EYES      TRABECULECTOMY Right 4/2/14    OD (dr. grossman)    UPPER GASTROINTESTINAL ENDOSCOPY  2003     Review of patient's allergies indicates:   Allergen Reactions    Betoptic [betaxolol] Other (See Comments)     Fatigue, slowed heart rate.    Alphagan [brimonidine] Dermatitis    Crestor [rosuvastatin] Other (See Comments)     Muscle ache    Pravastatin Other (See Comments)     aches  aches       Current Outpatient Medications:     acetaminophen (TYLENOL) 650 MG TbSR, Take 1,300 mg by mouth 2 (two) times a day., Disp: , Rfl:     albuterol (PROVENTIL/VENTOLIN HFA) 90 mcg/actuation inhaler, INHALE 2 PUFFS INTO THE LUNGS EVERY 6 (SIX) HOURS AS NEEDED FOR WHEEZING. RESCUE, Disp: 54 g, Rfl: 3    ALPRAZolam (XANAX) 0.25 MG tablet, Take 1 tablet (0.25 mg total) by mouth 2 (two) times  daily., Disp: 60 tablet, Rfl: 5    amLODIPine (NORVASC) 5 MG tablet, Take 1 tablet (5 mg total) by mouth once daily., Disp: 90 tablet, Rfl: 3    aspirin (ECOTRIN) 81 MG EC tablet, Take 81 mg by mouth once daily., Disp: , Rfl:     bromfenac (PROLENSA) 0.07 % Drop, Apply 1 drop to eye Daily., Disp: 3 mL, Rfl: 1    calcium carb/vit D3/minerals (CALTRATE PLUS ORAL), Take 1 tablet by mouth 2 (two) times a day., Disp: , Rfl:     dorzolamide (TRUSOPT) 2 % ophthalmic solution, Place 1 drop into the right eye 2 (two) times a day., Disp: 10 mL, Rfl: 2    DULoxetine (CYMBALTA) 60 MG capsule, Take 1 capsule (60 mg total) by mouth once daily., Disp: 90 capsule, Rfl: 3    estradioL (ESTRACE) 0.01 % (0.1 mg/gram) vaginal cream, Place 1 g vaginally 3 (three) times a week., Disp: 42.5 g, Rfl: 3    ezetimibe (ZETIA) 10 mg tablet, Take 1 tablet (10 mg total) by mouth once daily., Disp: 90 tablet, Rfl: 1    fexofenadine (ALLEGRA) 180 MG tablet, Allegra Allergy Take 1 Tablet (oral) 1 time per day for 14 days 20220318 tablet 1 time per day oral 14 days active 180 mg, Disp: 30 tablet, Rfl: 0    fluticasone (VERAMYST) 27.5 mcg/actuation nasal spray, 2 sprays by Nasal route once daily., Disp: 5.9 mL, Rfl: 11    losartan (COZAAR) 100 MG tablet, TAKE 1 TABLET BY MOUTH EVERY DAY, Disp: 90 tablet, Rfl: 1    meclizine (ANTIVERT) 25 mg tablet, Take 25 mg by mouth every 6 (six) hours as needed., Disp: , Rfl:     montelukast (SINGULAIR) 10 mg tablet, TAKE 1 TABLET BY MOUTH EVERY DAY IN THE EVENING, Disp: 90 tablet, Rfl: 0    moxifloxacin (VIGAMOX) 0.5 % ophthalmic solution, Place 1 drop into the right eye 4 (four) times daily., Disp: 3 mL, Rfl: 1    netarsudiL-latanoprost (ROCKLATAN) 0.02-0.005 % Drop, Place 1 drop into both eyes Daily., Disp: 2.5 mL, Rfl: 2    pantoprazole (PROTONIX) 40 MG tablet, TAKE 1 TABLET BY MOUTH EVERY DAY, Disp: 90 tablet, Rfl: 3    prednisoLONE acetate (PRED FORTE) 1 % DrpS, Place 1 drop into the right eye 4 (four) times  daily., Disp: 5 mL, Rfl: 1    semaglutide (OZEMPIC) 1 mg/dose (4 mg/3 mL), Inject 1 mg into the skin every 7 days., Disp: 3 mL, Rfl: 11    Review of Systems:  A comprehensive review of systems was negative.    Physical Exam:  General Appearance:    A&Ox3, no distress, appears stated age   Head:    Normocephalic, without obvious abnormality, atraumatic   Eyes:    PERRL, EOM's intact   Back:     Symmetric, no curvature   Lungs:     Respirations unlabored   Chest Wall:    No tenderness or deformity    Heart:  Abdomen:  Extremities:  Skin:    S1 and S2 present    Soft, non-tender    Extremities normal, atraumatic    Skin color, texture, turgor normal   Base Eye Exam    Visual Acuity (Snellen - Linear)     Right Left   Dist sc 20/80 +1 20/30 -1   Dist ph sc 20/60 -2 20/NI     Tonometry (Applanation, 2:22 PM)     Right Left   Pressure 27 17     Target Pressure     Right Left   Target 14 14    Neuro/Psych    Oriented x3: Yes   Mood/Affect: Normal     Dilation    Both eyes: 1% Mydriacyl, 2.5% Phenylephrine @ 2:31 PM      Edited by: Ana Foster; Macario Shankar MD        Slit Lamp and Fundus Exam      External Exam     Right Left   External 2+ Ptosis Normal     Slit Lamp Exam     Right Left   Lids/Lashes Normal Normal   Conjunctiva/Sclera Large  Bleb spreading diffusely across sup scleral surface, sutures, ab external xen gel (not under flap) Nice Bleb with avascularity, under flap OS   Cornea Clear Clear   Anterior Chamber Deep and quiet Deep and quiet   Iris Peripheral iridectomy Round and reactive   Lens Posterior chamber intraocular lens, 1+ Posterior capsular opacification Posterior chamber intraocular lens, 1+ Posterior capsular opacification   Vitreous Posterior vitreous detachment Posterior vitreous detachment     Fundus Exam     Right Left   Disc loss inf rim, inf/temp slope inf/temp thinning, slope   C/D Ratio 0.9 0.85   Macula No Diabetic Retinopathy No Diabetic Retinopathy   Vessels Normal Normal   Periphery  No Diabetic Retinopathy No Diabetic Retinopathy     Edited by: Macario Shankar MD            Impression: Primary Open Angle Glaucoma severe stage : Right eye    Planned Procedure: Clearpath w/ rip chord OD, possible trabeculectomy       Patient cleared for ophthalmic surgery.     Discharge Summary:    Admitting Diagnosis: Primary open angle glaucoma severe stage OD    Discharge Diagnosis: same    Procedure: s/p glaucoma surgery as per dictation    Complications: None  Discharge Condition: Stable

## 2024-05-01 NOTE — TELEPHONE ENCOUNTER
No care due was identified.  [facility] Embedded Care Due Messages. Reference number: [id number].   5/01/2024 4:36:03 PM CDT

## 2024-05-02 RX ORDER — SEMAGLUTIDE 1.34 MG/ML
1 INJECTION, SOLUTION SUBCUTANEOUS
Qty: 3 ML | Refills: 11 | Status: SHIPPED | OUTPATIENT
Start: 2024-05-02 | End: 2025-05-02

## 2024-05-09 RX ORDER — LOSARTAN POTASSIUM 100 MG/1
TABLET ORAL
Qty: 90 TABLET | Refills: 2 | Status: SHIPPED | OUTPATIENT
Start: 2024-05-09

## 2024-05-09 NOTE — TELEPHONE ENCOUNTER
Refill Decision Note   Suze Matti  is requesting a refill authorization.  Brief Assessment and Rationale for Refill:  Approve     Medication Therapy Plan:         Comments:     Note composed:12:02 PM 05/09/2024

## 2024-05-09 NOTE — TELEPHONE ENCOUNTER
Refill Routing Note   Medication(s) are not appropriate for processing by [facility] for the following reason(s):        Required labs abnormal    [facility] action(s):  Defer     Requires labs : Yes             Appointments  past 12m or future 3m with PCP    Date Provider   Last Visit   1/12/2024 Cody Lewis MD   Next Visit   Visit date not found Cody Lewis MD   ED visits in past 90 days: 0        Note composed:8:26 AM 05/09/2024

## 2024-05-09 NOTE — TELEPHONE ENCOUNTER
No care due was identified.  [facility] Embedded Care Due Messages. Reference number: [id number].   5/09/2024 12:11:17 AM CDT

## 2024-05-21 ENCOUNTER — LAB VISIT (OUTPATIENT)
Dept: LAB | Facility: HOSPITAL | Age: 74
End: 2024-05-21
Attending: FAMILY MEDICINE
Payer: MEDICARE

## 2024-05-21 ENCOUNTER — OFFICE VISIT (OUTPATIENT)
Dept: FAMILY MEDICINE | Facility: CLINIC | Age: 74
End: 2024-05-21
Payer: MEDICARE

## 2024-05-21 VITALS
BODY MASS INDEX: 25.05 KG/M2 | WEIGHT: 165.31 LBS | SYSTOLIC BLOOD PRESSURE: 120 MMHG | HEIGHT: 68 IN | TEMPERATURE: 98 F | DIASTOLIC BLOOD PRESSURE: 76 MMHG | HEART RATE: 63 BPM

## 2024-05-21 DIAGNOSIS — F33.0 MILD EPISODE OF RECURRENT MAJOR DEPRESSIVE DISORDER: ICD-10-CM

## 2024-05-21 DIAGNOSIS — R73.03 PRE-DIABETES: ICD-10-CM

## 2024-05-21 DIAGNOSIS — G47.52 DREAM ENACTMENT BEHAVIOR: ICD-10-CM

## 2024-05-21 DIAGNOSIS — R53.83 FATIGUE, UNSPECIFIED TYPE: ICD-10-CM

## 2024-05-21 DIAGNOSIS — B00.1 FEVER BLISTER: ICD-10-CM

## 2024-05-21 DIAGNOSIS — R82.90 ABNORMAL URINE ODOR: Primary | ICD-10-CM

## 2024-05-21 DIAGNOSIS — Z78.9 STATIN INTOLERANCE: ICD-10-CM

## 2024-05-21 DIAGNOSIS — N18.32 STAGE 3B CHRONIC KIDNEY DISEASE: ICD-10-CM

## 2024-05-21 DIAGNOSIS — I25.10 NONOBSTRUCTIVE ATHEROSCLEROSIS OF CORONARY ARTERY: ICD-10-CM

## 2024-05-21 LAB
ALBUMIN SERPL BCP-MCNC: 4.2 G/DL (ref 3.5–5.2)
ALP SERPL-CCNC: 102 U/L (ref 55–135)
ALT SERPL W/O P-5'-P-CCNC: 15 U/L (ref 10–44)
ANION GAP SERPL CALC-SCNC: 8 MMOL/L (ref 8–16)
AST SERPL-CCNC: 26 U/L (ref 10–40)
BASOPHILS # BLD AUTO: 0.04 K/UL (ref 0–0.2)
BASOPHILS NFR BLD: 0.5 % (ref 0–1.9)
BILIRUB SERPL-MCNC: 1.2 MG/DL (ref 0.1–1)
BILIRUB UR QL STRIP: NEGATIVE
BUN SERPL-MCNC: 18 MG/DL (ref 8–23)
CALCIUM SERPL-MCNC: 10 MG/DL (ref 8.7–10.5)
CHLORIDE SERPL-SCNC: 105 MMOL/L (ref 95–110)
CLARITY UR: CLEAR
CO2 SERPL-SCNC: 27 MMOL/L (ref 23–29)
COLOR UR: YELLOW
CREAT SERPL-MCNC: 1.4 MG/DL (ref 0.5–1.4)
DIFFERENTIAL METHOD BLD: ABNORMAL
EOSINOPHIL # BLD AUTO: 0.1 K/UL (ref 0–0.5)
EOSINOPHIL NFR BLD: 0.6 % (ref 0–8)
ERYTHROCYTE [DISTWIDTH] IN BLOOD BY AUTOMATED COUNT: 13.6 % (ref 11.5–14.5)
EST. GFR  (NO RACE VARIABLE): 39.7 ML/MIN/1.73 M^2
GLUCOSE SERPL-MCNC: 96 MG/DL (ref 70–110)
GLUCOSE UR QL STRIP: NEGATIVE
HCT VFR BLD AUTO: 40.9 % (ref 37–48.5)
HGB BLD-MCNC: 12.7 G/DL (ref 12–16)
HGB UR QL STRIP: NEGATIVE
IMM GRANULOCYTES # BLD AUTO: 0.03 K/UL (ref 0–0.04)
IMM GRANULOCYTES NFR BLD AUTO: 0.4 % (ref 0–0.5)
KETONES UR QL STRIP: NEGATIVE
LEUKOCYTE ESTERASE UR QL STRIP: NEGATIVE
LYMPHOCYTES # BLD AUTO: 2.5 K/UL (ref 1–4.8)
LYMPHOCYTES NFR BLD: 32.4 % (ref 18–48)
MCH RBC QN AUTO: 29.7 PG (ref 27–31)
MCHC RBC AUTO-ENTMCNC: 31.1 G/DL (ref 32–36)
MCV RBC AUTO: 96 FL (ref 82–98)
MONOCYTES # BLD AUTO: 0.5 K/UL (ref 0.3–1)
MONOCYTES NFR BLD: 6.8 % (ref 4–15)
NEUTROPHILS # BLD AUTO: 4.6 K/UL (ref 1.8–7.7)
NEUTROPHILS NFR BLD: 59.3 % (ref 38–73)
NITRITE UR QL STRIP: NEGATIVE
NRBC BLD-RTO: 0 /100 WBC
PH UR STRIP: 6 [PH] (ref 5–8)
PLATELET # BLD AUTO: 317 K/UL (ref 150–450)
PMV BLD AUTO: 11.3 FL (ref 9.2–12.9)
POTASSIUM SERPL-SCNC: 4.6 MMOL/L (ref 3.5–5.1)
PROT SERPL-MCNC: 7.6 G/DL (ref 6–8.4)
PROT UR QL STRIP: ABNORMAL
RBC # BLD AUTO: 4.28 M/UL (ref 4–5.4)
SODIUM SERPL-SCNC: 140 MMOL/L (ref 136–145)
SP GR UR STRIP: 1.01 (ref 1–1.03)
TSH SERPL DL<=0.005 MIU/L-ACNC: 1.26 UIU/ML (ref 0.4–4)
URN SPEC COLLECT METH UR: ABNORMAL
WBC # BLD AUTO: 7.74 K/UL (ref 3.9–12.7)

## 2024-05-21 PROCEDURE — 36415 COLL VENOUS BLD VENIPUNCTURE: CPT | Mod: PO | Performed by: FAMILY MEDICINE

## 2024-05-21 PROCEDURE — 85025 COMPLETE CBC W/AUTO DIFF WBC: CPT | Performed by: FAMILY MEDICINE

## 2024-05-21 PROCEDURE — 99999 PR PBB SHADOW E&M-EST. PATIENT-LVL V: CPT | Mod: PBBFAC,,, | Performed by: FAMILY MEDICINE

## 2024-05-21 PROCEDURE — G2211 COMPLEX E/M VISIT ADD ON: HCPCS | Mod: S$PBB,,, | Performed by: FAMILY MEDICINE

## 2024-05-21 PROCEDURE — 99215 OFFICE O/P EST HI 40 MIN: CPT | Mod: PBBFAC,PO | Performed by: FAMILY MEDICINE

## 2024-05-21 PROCEDURE — 84443 ASSAY THYROID STIM HORMONE: CPT | Performed by: FAMILY MEDICINE

## 2024-05-21 PROCEDURE — 80053 COMPREHEN METABOLIC PANEL: CPT | Performed by: FAMILY MEDICINE

## 2024-05-21 PROCEDURE — 81003 URINALYSIS AUTO W/O SCOPE: CPT | Mod: PO | Performed by: FAMILY MEDICINE

## 2024-05-21 PROCEDURE — 99214 OFFICE O/P EST MOD 30 MIN: CPT | Mod: S$PBB,,, | Performed by: FAMILY MEDICINE

## 2024-05-21 RX ORDER — DULOXETIN HYDROCHLORIDE 30 MG/1
30 CAPSULE, DELAYED RELEASE ORAL DAILY
Qty: 90 CAPSULE | Refills: 0 | Status: SHIPPED | OUTPATIENT
Start: 2024-05-21

## 2024-05-21 NOTE — PROGRESS NOTES
Patient states had abnormal odor in the urine.  Ate asparagus recently.  No dysuria.  She had a fever blister recently on the lip nearly resolved now.  Remote history of fever blisters.  Depression still having symptoms despite current medication.  She does have some fatigue and sleeps a lot.  She states she did have some chest tightness yesterday.  No exertional chest pain.  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  Recent cardiology follow-up benign.  Prior nonobstructive coronary disease.  Intolerant of statin.  Prescribed Zetia but has not been taking it.  She has some issues with her sleep and has had problems with parasomnias in the past.  Has not seen Sleep Clinic in a while.  3B chronic kidney disease asymptomatic.    Suze was seen today for follow-up.    Diagnoses and all orders for this visit:    Abnormal urine odor  -     Urinalysis, Reflex to Urine Culture Urine, Clean Catch    Fever blister    Mild episode of recurrent major depressive disorder  -     Ambulatory referral/consult to Psychiatry; Future    Nonobstructive atherosclerosis of coronary artery    Pre-diabetes    Statin intolerance    Dream enactment behavior  -     Ambulatory referral/consult to Psychiatry; Future  -     Ambulatory referral/consult to Sleep Disorders; Future    Stage 3b chronic kidney disease  -     Comprehensive Metabolic Panel; Future  -     CBC Auto Differential; Future    Fatigue, unspecified type  -     Comprehensive Metabolic Panel; Future  -     CBC Auto Differential; Future  -     TSH; Future    Other orders  -     DULoxetine (CYMBALTA) 30 MG capsule; Take 1 capsule (30 mg total) by mouth once daily.      Increase Cymbalta up to 90 mg total daily.  Laboratory as above.  Follow-up in 1 month if she has not seeing the psychiatrist.  Continue follow-up with Cardiology.  ER precautions if increased symptoms.  Urinalysis no evidence UTI today.            Past Medical History:  Past Medical  History:   Diagnosis Date    Anxiety     Cataract     Chronic kidney disease, stage 3 11/20/2020    COVID-19 ruled out by laboratory testing 03/18/2022    [facility]    Depression     GERD (gastroesophageal reflux disease)     Glaucoma     History of colon polyps 2009    history as per patient    History of colonoscopy 2009    ok per pt    Hyperplastic colon polyp 05/12/2015    Hypertension     Hypothyroid     ?    Mild mitral regurgitation     Mitral valve prolapse     Nonobstructive atherosclerosis of coronary artery 09/2021    Moderate LAD stenosis, mild RCA stenosis.    Peptic ulcer disease     with gi bleed    Prediabetes     Primary hyperparathyroidism     Right knee DJD     Right knee DJD     Sleep apnea     Sleep disorder     TMJ (temporomandibular joint disorder)     Vitamin D deficiency      Past Surgical History:   Procedure Laterality Date    BILATERAL SALPINGOOPHORECTOMY      CATARACT EXTRACTION W/  INTRAOCULAR LENS IMPLANT Right 06/09/2022    CATARACT EXTRACTION W/  INTRAOCULAR LENS IMPLANT Left 08/05/2022    COLONOSCOPY  2015    COLONOSCOPY N/A 12/20/2022    Procedure: COLONOSCOPY E consult sent for cc;  Surgeon: Mulugeta Calloway MD;  Location: [facility] ENDO;  Service: Endoscopy;  Laterality: N/A;    CORONARY ANGIOGRAPHY N/A 9/24/2021    Procedure: ANGIOGRAM, CORONARY ARTERY;  Surgeon: Chago Gregory MD;  Location: [facility] CATH;  Service: Cardiology;  Laterality: N/A;    EYE SURGERY      laser sx    GLAUCOMA SURGERY Right 09/26/2019    Xen Gel    GLAUCOMA SURGERY Left 04/05/2021    abext XenGel OS    HYSTERECTOMY      OOPHORECTOMY      PARATHYROIDECTOMY  12/01/2021    SLT - OU - BOTH EYES      TRABECULECTOMY Right 4/2/14    OD (dr. grsosman)    UPPER GASTROINTESTINAL ENDOSCOPY  2003     Review of patient's allergies indicates:   Allergen Reactions    Betoptic [betaxolol] Other (See Comments)     Fatigue, slowed heart rate.    Alphagan [brimonidine] Dermatitis    Crestor [rosuvastatin] Other  (See Comments)     Muscle ache    Pravastatin Other (See Comments)     aches  aches     Current Outpatient Medications on File Prior to Visit   Medication Sig Dispense Refill    acetaminophen (TYLENOL) 650 MG TbSR Take 1,300 mg by mouth 2 (two) times a day.      albuterol (PROVENTIL/VENTOLIN HFA) 90 mcg/actuation inhaler INHALE 2 PUFFS INTO THE LUNGS EVERY 6 (SIX) HOURS AS NEEDED FOR WHEEZING. RESCUE 54 g 3    ALPRAZolam (XANAX) 0.25 MG tablet Take 1 tablet (0.25 mg total) by mouth 2 (two) times daily. 60 tablet 5    amLODIPine (NORVASC) 5 MG tablet Take 1 tablet (5 mg total) by mouth once daily. 90 tablet 3    aspirin (ECOTRIN) 81 MG EC tablet Take 81 mg by mouth once daily.      bromfenac (PROLENSA) 0.07 % Drop Apply 1 drop to eye Daily. 3 mL 1    calcium carb/vit D3/minerals (CALTRATE PLUS ORAL) Take 1 tablet by mouth 2 (two) times a day.      dorzolamide (TRUSOPT) 2 % ophthalmic solution Place 1 drop into the right eye 2 (two) times a day. 10 mL 2    DULoxetine (CYMBALTA) 60 MG capsule Take 1 capsule (60 mg total) by mouth once daily. 90 capsule 3    estradioL (ESTRACE) 0.01 % (0.1 mg/gram) vaginal cream Place 1 g vaginally 3 (three) times a week. 42.5 g 3    ezetimibe (ZETIA) 10 mg tablet Take 1 tablet (10 mg total) by mouth once daily. 90 tablet 1    fexofenadine (ALLEGRA) 180 MG tablet Allegra Allergy Take 1 Tablet (oral) 1 time per day for 14 days 20220318 tablet 1 time per day oral 14 days active 180 mg 30 tablet 0    fluticasone (VERAMYST) 27.5 mcg/actuation nasal spray 2 sprays by Nasal route once daily. 5.9 mL 11    losartan (COZAAR) 100 MG tablet TAKE 1 TABLET BY MOUTH EVERY DAY 90 tablet 2    meclizine (ANTIVERT) 25 mg tablet Take 25 mg by mouth every 6 (six) hours as needed.      montelukast (SINGULAIR) 10 mg tablet TAKE 1 TABLET BY MOUTH EVERY DAY IN THE EVENING 90 tablet 0    moxifloxacin (VIGAMOX) 0.5 % ophthalmic solution Place 1 drop into the right eye 4 (four) times daily. 3 mL 1     netarsudiL-latanoprost (ROCKLATAN) 0.02-0.005 % Drop Place 1 drop into both eyes Daily. 2.5 mL 2    pantoprazole (PROTONIX) 40 MG tablet TAKE 1 TABLET BY MOUTH EVERY DAY 90 tablet 3    prednisoLONE acetate (PRED FORTE) 1 % DrpS Place 1 drop into the right eye 4 (four) times daily. 5 mL 1    semaglutide (OZEMPIC) 1 mg/dose (4 mg/3 mL) Inject 1 mg into the skin every 7 days. 3 mL 11     No current facility-administered medications on file prior to visit.     Social History     Socioeconomic History    Marital status:    Tobacco Use    Smoking status: Never    Smokeless tobacco: Never   Substance and Sexual Activity    Alcohol use: Yes     Alcohol/week: 2.0 standard drinks of alcohol     Types: 2 Standard drinks or equivalent per week     Comment: occasional    Drug use: No    Sexual activity: Not Currently     Social Determinants of Health     Financial Resource Strain: High Risk (8/31/2023)    Overall Financial Resource Strain (CARDIA)     Difficulty of Paying Living Expenses: Hard   Food Insecurity: No Food Insecurity (8/31/2023)    Hunger Vital Sign     Worried About Running Out of Food in the Last Year: Never true     Ran Out of Food in the Last Year: Never true   Transportation Needs: No Transportation Needs (8/31/2023)    PRAPARE - Transportation     Lack of Transportation (Medical): No     Lack of Transportation (Non-Medical): No   Physical Activity: Sufficiently Active (8/31/2023)    Exercise Vital Sign     Days of Exercise per Week: 5 days     Minutes of Exercise per Session: 60 min   Stress: Stress Concern Present (8/31/2023)    [employer] [place] [employer] - Occupational Stress Questionnaire     Feeling of Stress : Very much   Housing Stability: Low Risk  (8/31/2023)    Housing Stability Vital Sign     Unable to Pay for Housing in the Last Year: No     Number of Places Lived in the Last Year: 1     Unstable Housing in the Last Year: No     Family History   Problem Relation Name Age of  "Onset    Diabetes Mother      Glaucoma Mother      Leukemia Mother      Cataracts Mother      Breast cancer Mother  60    Heart disease Father          before age 50    Diabetes Sister      Glaucoma Sister      Colon cancer Maternal Aunt      Diabetes Brother      Glaucoma Brother      Lung cancer Brother      Breast cancer Other Mat Niece 40        genetics -    Breast cancer Cousin             ROS:  GENERAL: No fever, chills,  or significant weight changes.   CARDIOVASCULAR: Denies chest pain, PND, orthopnea or reduced exercise tolerance.  ABDOMEN: Appetite fine. Denies diarrhea, abdominal pain, hematemesis or blood in stool.  URINARY: No flank pain, dysuria or hematuria.    Vitals:    05/21/24 1346 05/21/24 1444   BP: (!) 143/83 120/76   Pulse: 63    Temp: 97.5 °F (36.4 °C)    TempSrc: Temporal    Weight: 75 kg (165 lb 4.8 oz)    Height: 5' 8" (1.727 m)      Wt Readings from Last 3 Encounters:   05/21/24 75 kg (165 lb 4.8 oz)   02/29/24 76.6 kg (168 lb 14 oz)   01/12/24 74.8 kg (164 lb 12.8 oz)       OBJECTIVE:   APPEARANCE: Well nourished, well developed, in no acute distress.    HEAD: Normocephalic.  Atraumatic.  No sinus tenderness.  EYES:   Right eye: Pupil reactive.  Conjunctiva clear.    Left eye: Pupil reactive.  Conjunctiva clear.  EOMI.    EARS: TM's intact. Light reflex normal. No retraction or perforation.    NOSE:  clear.  MOUTH & THROAT:  No pharyngeal erythema or exudate. No lesions.  NECK: Supple. No bruits.  No JVD.  No cervical lymphadenopathy.  No thyromegaly.    CHEST: Breath sounds clear bilaterally.  Normal respiratory effort  CARDIOVASCULAR: Normal rate.  Regular rhythm.  No murmurs.  No rub.  No gallops.  ABDOMEN: Bowel sounds normal.  Soft.  No tenderness.  No organomegaly.  PERIPHERAL VASCULAR: No cyanosis.  No clubbing.  No edema.  NEUROLOGIC: No focal findings.  MENTAL STATUS: Alert.  Oriented x 3.        "

## 2024-05-21 NOTE — PATIENT INSTRUCTIONS
Psychiatry, Psychotherapy, Licensed Counselors    [facility]  [place]: [phone]  [place]: [phone]  [place]: [phone]  [place]: [phone]    [facility]   [address], [place] [address]  [place] [phone]    [facility]  [address]  [place] [id number] [phone]  Alma Vazquez, Ph.D., M.P  Varun Feliz, Ph.D., M.P      [facility]  [address] [place] [address]  Jaelyn Larson [facility]  [phone]  Salina Adair LPC, [place]  [phone]        Stephanie Navarrete, [facility], LMFT  [address]  [address]  [place] [address] [phone]        Shell Perry MA, LPC  Phone: [phone]  Fax: [phone]  6yrs- Adult  Areas of Service: Depression, anxiety, medication management, substance abuse, anger management, coping with grief, communication skills, parenting skills, and addiction.  Accepts private insurance and cash payments        Dr. Cristino Alan- [facility] [place]  [facility]  [address]   [address]  [place], [place] [address] [phone]  ACCEPTED INSURANCE:  [facility]  Aetna  [facility]  Lists of hospitals in the [place] and [facility]  Com Psych  Humana  [place]  [facility]  Value Options        Moe Cruz LPC  Faith counselor  [facility]  [address], [place] [id number] [phone]  AVG Cost per Session: $80 - $130  ACCEPTED INSURANCE:  BC  Value Options  (License to [facility])          [facility]  [address] [place] [address]  Accept Medicaid  Other [facility]  Catherine Vargas C.H.ELIZA ALEXH.C  [facility] C.H.C  [facility].  [place]  C.H.C  [place] [facility]  [place] [facility]    Our skilled providers specialize in treating:  PTSD, Anxiety and Depression, Bipolar Disorder, ADHD, Obesity, Marital Distress, Chronic Tension, Panic Attacks, as well as Phobias          [facility]  [address] [place] [address]  Hours: Monday-Friday, 8 a.m.-5 p.m  Phone: [phone]  [facility]  [address] [place] [address]  Tel. [phone]  Fax [phone]  [facility]  [address] [place] [address]  Tel. [phone]  Fax [phone]  [facility]  [address] [place] [address]  Tel. [phone]  Fax [phone]  [facility]  [address] [place], [place] [address] [place] [address]  Tel. [phone]  Fax [phone]  Walk -In till 4pm all insurances accepted      [facility]  Our skilled providers specialize in treating:  Attention Deficit Hyperactivity Disorder  Attention Deficit Disorder  Obsessive Compulsive Disorder   Autism  Bipolar Disorder  Depression   Anxiety  A variety of other psychological disorders  [place]  Phone: [phone]  [place]  [address] [place], [place], [address]  [phone]  [place]  [address] [place], [place], [address]  [phone]  [place]  [address] [place], [place], [address]  [phone]  [facility]  [address] [place] [address]  [phone]  ACCEPTED INSURANCE:  AETNA  [facility]  CIGNA  [place]  [place]  [place]  [place]    MULTIPLAN  PHCS UNITED HEALTHCARE [facility]  MD Charles Rogers NP Elisa Himel, NP  The providers of [facility] help patients age 16 and over with the treatment of a variety of  mental disorders, including:  Stress  Depression  Anxiety  Schizophrenia  Dementia  Bipolar  Developmental disabilities  Other psychiatric mental health issues  [facility]   [address]   [place] [address]   Hours: Monday-Friday, 8 a.m.-5 p.m.   Phone: [phone]   Fax: [phone]  ACCEPTED INSURANCE  [facility] (PPO, OGB-PPO)*  Humana  Medicare  *PPO: Preferred Provider Organization        Medicaid [facility]: [phone]  [facility]: [phone]  [facility]: [phone]  [facility]: [phone]  [facility]: [phone]      [facility]   Family and Marriage Counseling   Addiction   [address] [facility] [place] [address] [phone]

## 2024-05-23 ENCOUNTER — OUTSIDE PLACE OF SERVICE (OUTPATIENT)
Dept: OPHTHALMOLOGY | Facility: CLINIC | Age: 74
End: 2024-05-23
Payer: MEDICARE

## 2024-05-24 ENCOUNTER — OFFICE VISIT (OUTPATIENT)
Dept: OPHTHALMOLOGY | Facility: CLINIC | Age: 74
End: 2024-05-24
Payer: MEDICARE

## 2024-05-24 DIAGNOSIS — H40.1133 PRIMARY OPEN ANGLE GLAUCOMA OF BOTH EYES, SEVERE STAGE: ICD-10-CM

## 2024-05-24 DIAGNOSIS — Z98.890 POST-OPERATIVE STATE: Primary | ICD-10-CM

## 2024-05-24 PROCEDURE — 99999 PR PBB SHADOW E&M-EST. PATIENT-LVL III: CPT | Mod: PBBFAC,,, | Performed by: OPHTHALMOLOGY

## 2024-05-24 PROCEDURE — 99213 OFFICE O/P EST LOW 20 MIN: CPT | Mod: PBBFAC | Performed by: OPHTHALMOLOGY

## 2024-05-24 PROCEDURE — 99024 POSTOP FOLLOW-UP VISIT: CPT | Mod: POP,,, | Performed by: OPHTHALMOLOGY

## 2024-05-24 NOTE — PROGRESS NOTES
HPI     Post-op Evaluation            Comments: Clearpath w/ripcord OD 05/23/24    Patient states some soreness last night, will start drops today now th   patch has been removed.            Comments    Referred by Dr. Lorenzana     1. COAG/ LTG   SLT OD 9/29/23  -h/o non compliance   -intolerant of coag meds   -TRAB OD   -repeat SLT od done 8/9/2012 - good initial response IOP 18 to 13, SLT OD   08/17/17   -Primary SLT done os 9/13/2012 - minimal response 18 to 16   -Bleb Needling with MMC Inj. OD 9/17/15   -SLT OS 9/29/2016 AND REPEAT SLT OS 12/10/2020   Xen Gel OD (09-26-19) Not under flap   Xen Gel OS (04-) abExt LOT #[id number] (under flap)  -G6 OD 3/23/23  Clearpath w/ripcord OD 05/23/24  2. PCIOL OD 6/9/22/ SN60WF 12.0/ CDE: 11.04  PCIOL OS 8/4/22 Sy60WF 12.5 / CDE: 8.51    *Stopped Alphagan OS TID because of dermatitis   *Stopped Betoptic bid ou due to slow pulse rate and feeling tired.   *latanoprost slows pulse rate   * BID (stopped after 2 weeks due to extreme fatigue)   *OD Rhopressa Qhs ( patient ran out in March never returned for followup )     *lotemax cost over $300   Poor response to G6     GCL: 17/21--- 08/16/21   GCL 19/22--2/22/23      OU: Rocklatan QHS  OU: Dorozolamide TID    OS: Pred & Keto QID, Gati BID             Last edited by Iris Adams, Patient Care Assistant on 5/24/2024  8:37   AM.            Assessment /Plan     For exam results, see Encounter Report.      ICD-10-CM ICD-9-CM    1. Post-operative state  Z98.890 V45.89       2. Primary open angle glaucoma of both eyes, severe stage  H40.1133 365.11      365.73           PO Day 1 S/P Clearpath right eye  Doing well.    Use Prednisolone Acetate- 4xs daily  and Gatifloxacin- 4xs daily   opening date July 5     Reinstructed in importance of absolute compliance with Post-OP instructions including medications, shield at bedtime, and limitation of activities. Follow up appointments in approximately one and six weeks or call  immediately for increased pain, redness or vision loss.     OS: Rocklatan QHS  OS: Dorozolamide TID    Shield at night, do not rub the eye.

## 2024-05-31 ENCOUNTER — OFFICE VISIT (OUTPATIENT)
Dept: OPHTHALMOLOGY | Facility: CLINIC | Age: 74
End: 2024-05-31
Payer: MEDICARE

## 2024-05-31 DIAGNOSIS — Z98.890 POST-OPERATIVE STATE: Primary | ICD-10-CM

## 2024-05-31 DIAGNOSIS — H40.1133 PRIMARY OPEN ANGLE GLAUCOMA OF BOTH EYES, SEVERE STAGE: ICD-10-CM

## 2024-05-31 PROCEDURE — 99024 POSTOP FOLLOW-UP VISIT: CPT | Mod: POP,,, | Performed by: OPHTHALMOLOGY

## 2024-05-31 PROCEDURE — 99213 OFFICE O/P EST LOW 20 MIN: CPT | Mod: PBBFAC | Performed by: OPHTHALMOLOGY

## 2024-05-31 PROCEDURE — 99999 PR PBB SHADOW E&M-EST. PATIENT-LVL III: CPT | Mod: PBBFAC,,, | Performed by: OPHTHALMOLOGY

## 2024-05-31 RX ORDER — LOTEPREDNOL ETABONATE 5 MG/ML
1 SUSPENSION/ DROPS OPHTHALMIC 3 TIMES DAILY
Qty: 5 ML | Refills: 1 | Status: SHIPPED | OUTPATIENT
Start: 2024-05-31 | End: 2024-06-18 | Stop reason: SDUPTHER

## 2024-05-31 NOTE — PROGRESS NOTES
HPI    1 week post Clearpath OD    Patient states that she had pain in her right eye 8 on the pain scale last   night relieved with Tylenol. No eye pain today    1. COAG/ LTG   SLT OD 9/29/23  -h/o non compliance   -intolerant of coag meds   -TRAB OD   -repeat SLT od done 8/9/2012 - good initial response IOP 18 to 13, SLT OD   08/17/17   -Primary SLT done os 9/13/2012 - minimal response 18 to 16   -Bleb Needling with MMC Inj. OD 9/17/15   -SLT OS 9/29/2016 AND REPEAT SLT OS 12/10/2020   Xen Gel OD (09-26-19) Not under flap   Xen Gel OS (04-) abExt LOT #[id number] (under flap)  -G6 OD 3/23/23  Clearpath w/ripcord OD 05/23/24 - opening date July 5  2. PCIOL OD 6/9/22/ SN60WF 12.0/ CDE: 11.04  PCIOL OS 8/4/22 Sy60WF 12.5 / CDE: 8.51    *Stopped Alphagan OS TID because of dermatitis   *Stopped Betoptic bid ou due to slow pulse rate and feeling tired.   *latanoprost slows pulse rate   * BID (stopped after 2 weeks due to extreme fatigue)   *OD Rhopressa Qhs ( patient ran out in March never returned for followup )     *lotemax cost over $300   Poor response to G6     GCL: 17/21--- 08/16/21   GCL 19/22--2/22/23      OS: Rocklatan QHS  OS: Dorozolamide TID    OD: Pred & Moxi QID     Last edited by Ewelina Ferguson MA on 5/31/2024 11:22 AM.            Assessment /Plan     For exam results, see Encounter Report.      ICD-10-CM ICD-9-CM    1. Post-operative state  Z98.890 V45.89       2. Primary open angle glaucoma of both eyes, severe stage  H40.1133 365.11      365.73           S/p clearpath OD  IOP elevated today  Resume Rocklatan and Dorzolamide OD  D/c Pred and Moxi  Start Lotemax OD TID  Advised pain likely to improve once IOP lowers      Return to clinic Monday      OU: Rocklatan QHS  OU: Dorozolamide TID  OD: Lotemax TID      Cheiloplasty (Less Than 50%) Text: A decision was made to reconstruct the defect with a  cheiloplasty.  The defect was undermined extensively.  Additional obicularis oris muscle was excised with a 15 blade scalpel.  The defect was converted into a full thickness wedge, of less than 50% of the vertical height of the lip, to facilite a better cosmetic result.  Small vessels were then tied off with 5-0 monocyrl. The obicularis oris, superficial fascia, adipose and dermis were then reapproximated.  After the deeper layers were approximated the epidermis was reapproximated with particular care given to realign the vermilion border.

## 2024-06-03 ENCOUNTER — OFFICE VISIT (OUTPATIENT)
Dept: OPHTHALMOLOGY | Facility: CLINIC | Age: 74
End: 2024-06-03
Payer: MEDICARE

## 2024-06-03 DIAGNOSIS — H40.1133 PRIMARY OPEN ANGLE GLAUCOMA OF BOTH EYES, SEVERE STAGE: ICD-10-CM

## 2024-06-03 DIAGNOSIS — Z98.890 POST-OPERATIVE STATE: Primary | ICD-10-CM

## 2024-06-03 PROCEDURE — 99999 PR PBB SHADOW E&M-EST. PATIENT-LVL III: CPT | Mod: PBBFAC,,, | Performed by: OPHTHALMOLOGY

## 2024-06-03 PROCEDURE — 99213 OFFICE O/P EST LOW 20 MIN: CPT | Mod: PBBFAC | Performed by: OPHTHALMOLOGY

## 2024-06-03 PROCEDURE — 99024 POSTOP FOLLOW-UP VISIT: CPT | Mod: POP,,, | Performed by: OPHTHALMOLOGY

## 2024-06-03 NOTE — PROGRESS NOTES
HPI     Post-op Evaluation            Comments: 3 Day recheck; S/P Clearpath OD 5/23/24: Pt states there is   less discomfort compared to Friday. States she is using drops as directed.            Comments    Referred by Dr. Lorenzana     1. COAG/ LTG   SLT OD 9/29/23  -h/o non compliance   -intolerant of coag meds   -TRAB OD   -repeat SLT od done 8/9/2012 - good initial response IOP 18 to 13, SLT OD   08/17/17   -Primary SLT done os 9/13/2012 - minimal response 18 to 16   -Bleb Needling with MMC Inj. OD 9/17/15   -SLT OS 9/29/2016 AND REPEAT SLT OS 12/10/2020   Xen Gel OD (09-26-19) Not under flap   Xen Gel OS (04-) abExt LOT #[id number] (under flap)  -G6 OD 3/23/23  Clearpath w/ripcord OD 05/23/24 - opening date July 5  2. PCIOL OD 6/9/22/ SN60WF 12.0/ CDE: 11.04  PCIOL OS 8/4/22 Sy60WF 12.5 / CDE: 8.51    *Stopped Alphagan OS TID because of dermatitis   *Stopped Betoptic bid ou due to slow pulse rate and feeling tired.   *latanoprost slows pulse rate   * BID (stopped after 2 weeks due to extreme fatigue)   *OD Rhopressa Qhs ( patient ran out in March never returned for followup )     *lotemax cost over $300   Poor response to G6      GCL 17/21-- 08/16/21   GCL 19/22-- 2/22/23      OS: Rocklatan QHS  OS: Dorozolamide TID  OD: Pred & Moxi QID             Last edited by Ana Foster on 6/3/2024 10:07 AM.            Assessment /Plan     For exam results, see Encounter Report.      ICD-10-CM ICD-9-CM    1. Post-operative state  Z98.890 V45.89       2. Primary open angle glaucoma of both eyes, severe stage  H40.1133 365.11      365.73             S/P Clearpath OD 5/23/24: - doing well   RETURN TO CLINIC 2 weeks     Opening Day July 5, 2024       OU: Rocklatan QHS  OU: Dorozolamide TID  OD: Lotemax TID

## 2024-06-18 ENCOUNTER — OFFICE VISIT (OUTPATIENT)
Dept: OPHTHALMOLOGY | Facility: CLINIC | Age: 74
End: 2024-06-18
Payer: MEDICARE

## 2024-06-18 DIAGNOSIS — H40.1133 PRIMARY OPEN ANGLE GLAUCOMA OF BOTH EYES, SEVERE STAGE: ICD-10-CM

## 2024-06-18 DIAGNOSIS — Z98.890 POST-OPERATIVE STATE: Primary | ICD-10-CM

## 2024-06-18 PROCEDURE — 99024 POSTOP FOLLOW-UP VISIT: CPT | Mod: POP,,, | Performed by: OPHTHALMOLOGY

## 2024-06-18 PROCEDURE — 99213 OFFICE O/P EST LOW 20 MIN: CPT | Mod: PBBFAC | Performed by: OPHTHALMOLOGY

## 2024-06-18 PROCEDURE — 99999 PR PBB SHADOW E&M-EST. PATIENT-LVL III: CPT | Mod: PBBFAC,,, | Performed by: OPHTHALMOLOGY

## 2024-06-18 RX ORDER — DORZOLAMIDE HCL 20 MG/ML
1 SOLUTION/ DROPS OPHTHALMIC 3 TIMES DAILY
Qty: 10 ML | Refills: 2 | Status: SHIPPED | OUTPATIENT
Start: 2024-06-18

## 2024-06-18 RX ORDER — NETARSUDIL AND LATANOPROST OPHTHALMIC SOLUTION, 0.02%/0.005% .2; .05 MG/ML; MG/ML
1 SOLUTION/ DROPS OPHTHALMIC; TOPICAL DAILY
Qty: 2.5 ML | Refills: 2 | Status: SHIPPED | OUTPATIENT
Start: 2024-06-18

## 2024-06-18 RX ORDER — LOTEPREDNOL ETABONATE 5 MG/ML
1 SUSPENSION/ DROPS OPHTHALMIC 3 TIMES DAILY
Qty: 5 ML | Refills: 2 | Status: SHIPPED | OUTPATIENT
Start: 2024-06-18 | End: 2025-06-18

## 2024-06-18 NOTE — PROGRESS NOTES
HPI     Glaucoma            Comments: S/p clearpath OD. Denies pain or irritation. Va stable. Using   drops as recommended.           Comments    Referred by Dr. Lorenzana     1. COAG/ LTG   SLT OD 9/29/23  -h/o non compliance   -intolerant of coag meds   -TRAB OD   -repeat SLT od done 8/9/2012 - good initial response IOP 18 to 13, SLT OD   08/17/17   -Primary SLT done os 9/13/2012 - minimal response 18 to 16   -Bleb Needling with MMC Inj. OD 9/17/15   -SLT OS 9/29/2016 AND REPEAT SLT OS 12/10/2020   Xen Gel OD (09-26-19) Not under flap   Xen Gel OS (04-) abExt LOT #[id number] (under flap)  -G6 OD 3/23/23  Clearpath w/ripcord OD 05/23/24 - opening date July 5  2. PCIOL OD 6/9/22/ SN60WF 12.0/ CDE: 11.04  PCIOL OS 8/4/22 Sy60WF 12.5 / CDE: 8.51    *Stopped Alphagan OS TID because of dermatitis   *Stopped Betoptic bid ou due to slow pulse rate and feeling tired.   *latanoprost slows pulse rate   * BID (stopped after 2 weeks due to extreme fatigue)   *OD Rhopressa Qhs ( patient ran out in March never returned for followup )     *lotemax cost over $300   Poor response to G6      GCL 17/21-- 08/16/21   GCL 19/22-- 2/22/23      OS: Rocklatan QHS  OS: Dorozolamide TID  OD: Pred &  Moxi QID            Last edited by Macario Shankar MD on 6/18/2024  8:14 AM.            Assessment /Plan     For exam results, see Encounter Report.      ICD-10-CM ICD-9-CM    1. Post-operative state  Z98.890 V45.89       2. Primary open angle glaucoma of both eyes, severe stage  H40.1133 365.11      365.73           S/p Clearpath w/ripcord OD 05/23/24 - opening date July 5   Doing well  Will d/c rocklatan and dorzolamide OD on 6/26/24      Return to clinic 7/1/24 at [facility]       OU: Rocklatan QHS  OU: Dorozolamide TID  OD: Lotemax TID

## 2024-06-18 NOTE — PATIENT INSTRUCTIONS
Current drop routine:  Both eyes: Rocklatan 1x daily   Both eyes: Dorozolamide 3xs daily   Right eye: Lotemax 3xs daily       DISCONTINUE ROCKLATAN AND DORZOLAMIDE ON 6/26/24  **right eye only    
Fuad Corrigan(Resident)

## 2024-07-01 ENCOUNTER — OFFICE VISIT (OUTPATIENT)
Dept: OPHTHALMOLOGY | Facility: CLINIC | Age: 74
End: 2024-07-01
Payer: MEDICARE

## 2024-07-01 DIAGNOSIS — H40.1133 PRIMARY OPEN ANGLE GLAUCOMA OF BOTH EYES, SEVERE STAGE: ICD-10-CM

## 2024-07-01 DIAGNOSIS — Z98.890 POST-OPERATIVE STATE: Primary | ICD-10-CM

## 2024-07-01 PROCEDURE — 99213 OFFICE O/P EST LOW 20 MIN: CPT | Mod: PBBFAC | Performed by: OPHTHALMOLOGY

## 2024-07-01 PROCEDURE — 99024 POSTOP FOLLOW-UP VISIT: CPT | Mod: POP,,, | Performed by: OPHTHALMOLOGY

## 2024-07-01 PROCEDURE — 99999 PR PBB SHADOW E&M-EST. PATIENT-LVL III: CPT | Mod: PBBFAC,,, | Performed by: OPHTHALMOLOGY

## 2024-07-01 NOTE — PROGRESS NOTES
HPI     Post-op Evaluation            Comments: 6 Week S/P Clearpath w/ ripcord OD 5/23/24: Pt states she   forgot to DC the Rocklatan and Dorzolamide OD on 6/26/24.   No pain or irritation.            Comments    Referred by Dr. Lorenzana     1. COAG/ LTG   SLT OD 9/29/23  -h/o non compliance   -intolerant of coag meds   -TRAB OD   -repeat SLT od done 8/9/2012 - good initial response IOP 18 to 13, SLT OD   08/17/17   -Primary SLT done os 9/13/2012 - minimal response 18 to 16   -Bleb Needling with MMC Inj. OD 9/17/15   -SLT OS 9/29/2016 AND REPEAT SLT OS 12/10/2020   Xen Gel OD (09-26-19) Not under flap   Xen Gel OS (04-) abExt LOT #[id number] (under flap)  -G6 OD 3/23/23  Clearpath w/ripcord OD 05/23/24 - opening date July 5  2. PCIOL OD 6/9/22/ SN60WF 12.0/ CDE: 11.04  PCIOL OS 8/4/22 Sy60WF 12.5 / CDE: 8.51    *Stopped Alphagan OS TID because of dermatitis   *Stopped Betoptic bid ou due to slow pulse rate and feeling tired.   *latanoprost slows pulse rate   * BID (stopped after 2 weeks due to extreme fatigue)   *OD Rhopressa Qhs ( patient ran out in March never returned for followup )     *lotemax cost over $300   Poor response to G6      GCL 17/21-- 08/16/21   GCL 19/22-- 2/22/23      OU: Rocklatan QHS  OU: Dorozolamide TID  OD: Lotemax TID             Last edited by Ana Foster on 7/1/2024  9:55 AM.            Assessment /Plan     For exam results, see Encounter Report.      ICD-10-CM ICD-9-CM    1. Post-operative state  Z98.890 V45.89 Clearpath w/ripcord OD 05/23/24 - opening date July 5     Not open yet at this time       2. Primary open angle glaucoma of both eyes, severe stage  H40.1133 365.11      365.73           Stop Dorzolamide and Rocklatan to the Right eye today   In preparation for opening date of 7/524     Stop Lotemax and change to Pred   OS Rocklatan QHS  OS: Dorozolamide TID    RETURN TO CLINIC 2 weeks

## 2024-07-17 ENCOUNTER — OFFICE VISIT (OUTPATIENT)
Dept: OPHTHALMOLOGY | Facility: CLINIC | Age: 74
End: 2024-07-17
Payer: MEDICARE

## 2024-07-17 ENCOUNTER — OFFICE VISIT (OUTPATIENT)
Dept: SLEEP MEDICINE | Facility: CLINIC | Age: 74
End: 2024-07-17
Payer: MEDICARE

## 2024-07-17 VITALS
HEART RATE: 77 BPM | RESPIRATION RATE: 12 BRPM | SYSTOLIC BLOOD PRESSURE: 126 MMHG | WEIGHT: 170.88 LBS | BODY MASS INDEX: 25.9 KG/M2 | HEIGHT: 68 IN | OXYGEN SATURATION: 98 % | DIASTOLIC BLOOD PRESSURE: 70 MMHG

## 2024-07-17 DIAGNOSIS — Z98.890 POST-OPERATIVE STATE: Primary | ICD-10-CM

## 2024-07-17 DIAGNOSIS — F41.9 ANXIETY: ICD-10-CM

## 2024-07-17 DIAGNOSIS — H40.1133 PRIMARY OPEN ANGLE GLAUCOMA OF BOTH EYES, SEVERE STAGE: ICD-10-CM

## 2024-07-17 DIAGNOSIS — F33.0 MILD EPISODE OF RECURRENT MAJOR DEPRESSIVE DISORDER: ICD-10-CM

## 2024-07-17 DIAGNOSIS — G47.52 DREAM ENACTMENT BEHAVIOR: ICD-10-CM

## 2024-07-17 DIAGNOSIS — Z72.821 POOR SLEEP HYGIENE: Primary | ICD-10-CM

## 2024-07-17 PROCEDURE — 99213 OFFICE O/P EST LOW 20 MIN: CPT | Mod: PBBFAC,27 | Performed by: OPHTHALMOLOGY

## 2024-07-17 PROCEDURE — 99999 PR PBB SHADOW E&M-EST. PATIENT-LVL V: CPT | Mod: PBBFAC,,, | Performed by: NURSE PRACTITIONER

## 2024-07-17 PROCEDURE — 99999 PR PBB SHADOW E&M-EST. PATIENT-LVL III: CPT | Mod: PBBFAC,,, | Performed by: OPHTHALMOLOGY

## 2024-07-17 PROCEDURE — 66250 FOLLOW-UP SURGERY OF EYE: CPT | Mod: 58,PBBFAC | Performed by: OPHTHALMOLOGY

## 2024-07-17 PROCEDURE — 99215 OFFICE O/P EST HI 40 MIN: CPT | Mod: PBBFAC | Performed by: NURSE PRACTITIONER

## 2024-07-17 PROCEDURE — 99204 OFFICE O/P NEW MOD 45 MIN: CPT | Mod: S$PBB,,, | Performed by: NURSE PRACTITIONER

## 2024-07-17 NOTE — PROGRESS NOTES
Subjective:      Patient ID: Suze Chino is a 73 y.o. female.    Chief Complaint: parasomnia    HPI  Follow-up for RBD.  Dream reenactment.  Last visit 2017.  Sleep study negative for sleep apnea.  Patient states the frequency of her RBD has improved.  She just remembers one episode this year.  She has a more consistent bedtime routine during the school year.  At this time she is going to bed around 9:00 p.m. and waking up around 10 a.m. due to the fact that she has not sleeping well at night.  When she wakes up in the middle the night she may be up for 2 hours at a time.  This could happen a few times during the night.  She does sleep with the overhead light on bright all night long.  She states she is drinking more alcohol.  She is more sleep deprived.  She has active stress and depression.  She has been referred to Psychiatry she has not made an appointment yet.  She is taking Cymbalta.  She is provide anymore safe environment in the bedroom to prevent injury.  Patient Active Problem List   Diagnosis    Hypertension    History of hypothyroidism    Anxiety    GERD (gastroesophageal reflux disease)    Mitral valve prolapse    Myopia of both eyes - Both Eyes    Visual field defect    Optic pit - Left Eye    Primary open-angle glaucoma, severe stage - Both Eyes    Nuclear sclerosis    History of colon polyps    Bradycardia, drug induced    Right knee DJD    Severe stage chronic open angle glaucoma    Low-tension glaucoma of both eyes, severe stage    Lateral cystocele    Acquired genu valgum of right knee    Right knee pain    Dream enactment behavior    Secondary parasomnia    Primary snoring    Allergic sinusitis    Breast pain    Chest pain    Slow transit constipation    Vitamin D deficiency    Precordial pain    Chronic kidney disease, stage 3    Primary osteoarthritis of both knees    ACP (advance care planning)    Dyspnea    Family history of early CAD    Hyperlipidemia    Nonobstructive atherosclerosis of  "coronary artery    Mild mitral regurgitation    Positive QUETA (antinuclear antibody)    S/P parathyroidectomy    Decreased range of motion (ROM) of both knees    Weakness of both lower extremities    Decreased functional mobility    Pre-diabetes    Episode of recurrent major depressive disorder    Bruit of right carotid artery    Statin intolerance    Mild carotid artery disease     /70   Pulse 77   Resp 12   Ht 5' 8" (1.727 m)   Wt 77.5 kg (170 lb 13.7 oz)   LMP  (LMP Unknown)   SpO2 98%   BMI 25.98 kg/m²   Body mass index is 25.98 kg/m².    Review of Systems   Psychiatric/Behavioral:  Positive for sleep disturbance. The patient is nervous/anxious.    All other systems reviewed and are negative.    Objective:      Physical Exam  Constitutional:       Appearance: She is well-developed.   HENT:      Head: Normocephalic and atraumatic.   Pulmonary:      Effort: Pulmonary effort is normal. No tachypnea, bradypnea, accessory muscle usage or respiratory distress.   Musculoskeletal:      Cervical back: Normal range of motion.   Skin:     Findings: No rash.   Neurological:      Mental Status: She is alert and oriented to person, place, and time.   Psychiatric:         Behavior: Behavior normal.         Thought Content: Thought content normal.         Judgment: Judgment normal.       Personal Diagnostic Review      7/17/2024     1:26 PM   EPWORTH SLEEPINESS SCALE   Sitting and reading 1   Watching TV 0   Sitting, inactive in a public place (e.g. a theatre or a meeting) 0   As a passenger in a car for an hour without a break 2   Lying down to rest in the afternoon when circumstances permit 3   Sitting and talking to someone 0   Sitting quietly after a lunch without alcohol 0   In a car, while stopped for a few minutes in traffic 0   Total score 6            Assessment:       1. Poor sleep hygiene    2. Dream enactment behavior    3. Anxiety    4. Mild episode of recurrent major depressive disorder      "   Outpatient Encounter Medications as of 7/17/2024   Medication Sig Dispense Refill    acetaminophen (TYLENOL) 650 MG TbSR Take 1,300 mg by mouth 2 (two) times a day.      albuterol (PROVENTIL/VENTOLIN HFA) 90 mcg/actuation inhaler INHALE 2 PUFFS INTO THE LUNGS EVERY 6 (SIX) HOURS AS NEEDED FOR WHEEZING. RESCUE 54 g 3    ALPRAZolam (XANAX) 0.25 MG tablet Take 1 tablet (0.25 mg total) by mouth 2 (two) times daily. 60 tablet 5    amLODIPine (NORVASC) 5 MG tablet Take 1 tablet (5 mg total) by mouth once daily. 90 tablet 3    aspirin (ECOTRIN) 81 MG EC tablet Take 81 mg by mouth once daily.      calcium carb/vit D3/minerals (CALTRATE PLUS ORAL) Take 1 tablet by mouth 2 (two) times a day.      dorzolamide (TRUSOPT) 2 % ophthalmic solution Place 1 drop into both eyes 3 (three) times daily. 10 mL 2    DULoxetine (CYMBALTA) 30 MG capsule Take 1 capsule (30 mg total) by mouth once daily. 90 capsule 0    DULoxetine (CYMBALTA) 60 MG capsule Take 1 capsule (60 mg total) by mouth once daily. 90 capsule 3    estradioL (ESTRACE) 0.01 % (0.1 mg/gram) vaginal cream Place 1 g vaginally 3 (three) times a week. 42.5 g 3    ezetimibe (ZETIA) 10 mg tablet Take 1 tablet (10 mg total) by mouth once daily. 90 tablet 1    fexofenadine (ALLEGRA) 180 MG tablet Allegra Allergy Take 1 Tablet (oral) 1 time per day for 14 days 20220318 tablet 1 time per day oral 14 days active 180 mg 30 tablet 0    fluticasone (VERAMYST) 27.5 mcg/actuation nasal spray 2 sprays by Nasal route once daily. 5.9 mL 11    losartan (COZAAR) 100 MG tablet TAKE 1 TABLET BY MOUTH EVERY DAY 90 tablet 2    loteprednol (LOTEMAX) 0.5 % ophthalmic suspension Place 1 drop into the right eye 3 (three) times daily. 5 mL 2    meclizine (ANTIVERT) 25 mg tablet Take 25 mg by mouth every 6 (six) hours as needed.      montelukast (SINGULAIR) 10 mg tablet TAKE 1 TABLET BY MOUTH EVERY DAY IN THE EVENING 90 tablet 0    moxifloxacin (VIGAMOX) 0.5 % ophthalmic solution Place 1 drop into the  right eye 4 (four) times daily. 3 mL 1    netarsudiL-latanoprost (ROCKLATAN) 0.02-0.005 % Drop Place 1 drop into both eyes Daily. 2.5 mL 2    pantoprazole (PROTONIX) 40 MG tablet TAKE 1 TABLET BY MOUTH EVERY DAY 90 tablet 3    semaglutide (OZEMPIC) 1 mg/dose (4 mg/3 mL) Inject 1 mg into the skin every 7 days. 3 mL 11     No facility-administered encounter medications on file as of 7/17/2024.     No orders of the defined types were placed in this encounter.    Plan:       1. Poor sleep hygiene    2. Dream enactment behavior  -     Ambulatory referral/consult to Sleep Disorders    3. Anxiety    4. Mild episode of recurrent major depressive disorder       One episode of RBD this year. She has melatonin dosing to start if it becomes disruptive or more frequent.   Discussed improving sleep hygiene. Consistent sleep /wake times.   Bright overhead light is disruptive.   Discussed alcohol intake.  Taking SNRI currently-no change in meds as RBD is not frequent but recommend establishing with psychiatry.   Follow up as needed.             I spent a total of 45 minutes on the day of the visit.  This includes face to face time and non-face to face time preparing to see the patient (eg, review of tests), obtaining and/or reviewing separately obtained history, documenting clinical information in the electronic or other health record, independently interpreting results and communicating results to the patient/family/caregiver, or care coordinator.           Elizabeth LeJeune, ACNP, ANP

## 2024-07-17 NOTE — PROGRESS NOTES
HPI     Post-op Evaluation            Comments: Pt is here for 2 week IOP Chk. Denies pain or irritation. Va   stable. 100% with gtts           Comments    Referred by Dr. Lorenzana     1. COAG/ LTG   SLT OD 9/29/23  -h/o non compliance   -intolerant of coag meds   -TRAB OD   -repeat SLT od done 8/9/2012 - good initial response IOP 18 to 13, SLT OD   08/17/17   -Primary SLT done os 9/13/2012 - minimal response 18 to 16   -Bleb Needling with MMC Inj. OD 9/17/15   -SLT OS 9/29/2016 AND REPEAT SLT OS 12/10/2020   Xen Gel OD (09-26-19) Not under flap   Xen Gel OS (04-) abExt LOT #[id number] (under flap)  -G6 OD 3/23/23  Clearpath w/ripcord OD 05/23/24 - opening date July 5  2. PCIOL OD 6/9/22/ SN60WF 12.0/ CDE: 11.04  PCIOL OS 8/4/22 Sy60WF 12.5 / CDE: 8.51    *Stopped Alphagan OS TID because of dermatitis   *Stopped Betoptic bid ou due to slow pulse rate and feeling tired.   *latanoprost slows pulse rate   * BID (stopped after 2 weeks due to extreme fatigue)   *OD Rhopressa Qhs ( patient ran out in March never returned for followup )     *lotemax cost over $300   Poor response to G6      GCL 17/21-- 08/16/21   GCL 19/22-- 2/22/23              Last edited by Artemio Navarro on 7/17/2024  2:36 PM.        Preo Diagnosis: uncontrolled primary open angle glaucoma severe stage of the right eye  Post op Diagnosis: same  Procedure: wound revision - pulling of rip cord OD  Surgeon LEIA Shankar  Complications: none  Anesthesia: topical  After obtaining informed consent the patient was taken to the minor O.R. Topical anesthesia was obtained with a cascade of tetracaine drops. Topical betadine was applied to the conjunctival surface. A lid speculum was placed in the eye after a sterile prep and drape. The Rip cord suture was identified and dissected free with vannas scissors. The suture was pulled with a forcep. The patient tolerated the procedure well and was examined in the clinic following the procedure.      Assessment  /Plan     For exam results, see Encounter Report.      ICD-10-CM ICD-9-CM    1. Post-operative state  Z98.890 V45.89 Iop elevated - rip cord pulled OD as above      2. Primary open angle glaucoma of both eyes, severe stage  H40.1133 365.11      365.73           S/p clearpath OD 5/23/24 - rip cord pulled today  Doing well     OS: Rocklatan QHS  OS: Dorozolamide TID  OD: Pred QID    Return to clinic 7-9 days

## 2024-07-24 DIAGNOSIS — H40.1133 PRIMARY OPEN ANGLE GLAUCOMA OF BOTH EYES, SEVERE STAGE: ICD-10-CM

## 2024-07-24 RX ORDER — PREDNISOLONE ACETATE 10 MG/ML
1 SUSPENSION/ DROPS OPHTHALMIC 4 TIMES DAILY
Qty: 5 ML | Refills: 1 | Status: SHIPPED | OUTPATIENT
Start: 2024-07-24

## 2024-07-24 RX ORDER — PREDNISOLONE ACETATE 10 MG/ML
1 SUSPENSION/ DROPS OPHTHALMIC 4 TIMES DAILY
Qty: 5 ML | Refills: 1 | Status: CANCELLED | OUTPATIENT
Start: 2024-07-24

## 2024-07-24 RX ORDER — PREDNISOLONE ACETATE 10 MG/ML
1 SUSPENSION/ DROPS OPHTHALMIC DAILY
Qty: 10 ML | Refills: 1 | Status: CANCELLED | OUTPATIENT
Start: 2024-07-24 | End: 2024-10-22

## 2024-07-29 ENCOUNTER — OFFICE VISIT (OUTPATIENT)
Dept: OPHTHALMOLOGY | Facility: CLINIC | Age: 74
End: 2024-07-29
Payer: MEDICARE

## 2024-07-29 DIAGNOSIS — Z98.42 CATARACT EXTRACTION STATUS, LEFT: ICD-10-CM

## 2024-07-29 DIAGNOSIS — Z98.41 CATARACT EXTRACTION STATUS, RIGHT: ICD-10-CM

## 2024-07-29 DIAGNOSIS — H40.1133 PRIMARY OPEN ANGLE GLAUCOMA OF BOTH EYES, SEVERE STAGE: ICD-10-CM

## 2024-07-29 DIAGNOSIS — Z98.890 POST-OPERATIVE STATE: Primary | ICD-10-CM

## 2024-07-29 PROCEDURE — 99999 PR PBB SHADOW E&M-EST. PATIENT-LVL III: CPT | Mod: PBBFAC,,, | Performed by: OPHTHALMOLOGY

## 2024-07-29 PROCEDURE — 99024 POSTOP FOLLOW-UP VISIT: CPT | Mod: POP,,, | Performed by: OPHTHALMOLOGY

## 2024-07-29 PROCEDURE — 99213 OFFICE O/P EST LOW 20 MIN: CPT | Mod: PBBFAC | Performed by: OPHTHALMOLOGY

## 2024-07-29 RX ORDER — ALPRAZOLAM 0.25 MG/1
0.25 TABLET ORAL 2 TIMES DAILY
Qty: 60 TABLET | Refills: 3 | Status: SHIPPED | OUTPATIENT
Start: 2024-07-29

## 2024-07-29 NOTE — TELEPHONE ENCOUNTER
Care Due:                  Date            Visit Type   Department     Provider  --------------------------------------------------------------------------------                                EP -                              PRIMARY      [facility] FAMILY  Last Visit: 05-      CARE ([facility])   MEDICINE       Cody Lewis  Next Visit: None Scheduled  None         None Found                                                            Last  Test          Frequency    Reason                     Performed    Due Date  --------------------------------------------------------------------------------    HBA1C.......  6 months...  semaglutide..............  09- 03-    [facility] Embedded Care Due Messages. Reference number: [id number].   7/29/2024 2:17:09 PM CDT

## 2024-08-05 ENCOUNTER — OFFICE VISIT (OUTPATIENT)
Dept: OPHTHALMOLOGY | Facility: CLINIC | Age: 74
End: 2024-08-05
Payer: MEDICARE

## 2024-08-05 DIAGNOSIS — H40.1133 PRIMARY OPEN ANGLE GLAUCOMA OF BOTH EYES, SEVERE STAGE: ICD-10-CM

## 2024-08-05 DIAGNOSIS — Z98.890 POST-OPERATIVE STATE: Primary | ICD-10-CM

## 2024-08-05 PROCEDURE — 99999 PR PBB SHADOW E&M-EST. PATIENT-LVL III: CPT | Mod: PBBFAC,,, | Performed by: OPHTHALMOLOGY

## 2024-08-05 PROCEDURE — 99024 POSTOP FOLLOW-UP VISIT: CPT | Mod: POP,,, | Performed by: OPHTHALMOLOGY

## 2024-08-05 PROCEDURE — 99213 OFFICE O/P EST LOW 20 MIN: CPT | Mod: PBBFAC | Performed by: OPHTHALMOLOGY

## 2024-08-05 RX ORDER — DORZOLAMIDE HCL 20 MG/ML
1 SOLUTION/ DROPS OPHTHALMIC 3 TIMES DAILY
Qty: 10 ML | Refills: 6 | Status: SHIPPED | OUTPATIENT
Start: 2024-08-05

## 2024-08-12 ENCOUNTER — TELEPHONE (OUTPATIENT)
Dept: GASTROENTEROLOGY | Facility: CLINIC | Age: 74
End: 2024-08-12
Payer: MEDICARE

## 2024-08-12 NOTE — TELEPHONE ENCOUNTER
Spoke with patient to offer clinic visit. Patient states the she would rather have procedure before seeing NP. I informed patient that it would be best for her to be seen in clinic prior to procedure due to symptoms and haven't been seen since 2023. Patient declined clinic visit and ended the call.    ----- Message from Marcin Orona sent at 8/12/2024 11:28 AM CDT -----  Contact: pt  Type: Requesting to speak with nurse        Who Called: PT  Regarding: would like to schedule an Endoscopy due to having black stool  Would the patient rather a call back or a response via [facility]? Call back  Best Call Back Number: [phone]  Additional Information:

## 2024-08-19 ENCOUNTER — OFFICE VISIT (OUTPATIENT)
Dept: OPHTHALMOLOGY | Facility: CLINIC | Age: 74
End: 2024-08-19
Payer: MEDICARE

## 2024-08-19 DIAGNOSIS — Z98.890 POST-OPERATIVE STATE: Primary | ICD-10-CM

## 2024-08-19 DIAGNOSIS — H40.1133 PRIMARY OPEN ANGLE GLAUCOMA OF BOTH EYES, SEVERE STAGE: ICD-10-CM

## 2024-08-19 PROCEDURE — 99024 POSTOP FOLLOW-UP VISIT: CPT | Mod: POP,,, | Performed by: OPHTHALMOLOGY

## 2024-08-19 PROCEDURE — 99999 PR PBB SHADOW E&M-EST. PATIENT-LVL III: CPT | Mod: PBBFAC,,, | Performed by: OPHTHALMOLOGY

## 2024-08-19 PROCEDURE — 99213 OFFICE O/P EST LOW 20 MIN: CPT | Mod: PBBFAC | Performed by: OPHTHALMOLOGY

## 2024-08-19 NOTE — PROGRESS NOTES
HPI     Post-op Evaluation            Comments: Clearpath w/ripcord OD 05/23/24 - opening date July 5 ripchord   pulled 07/17/24  Patient states OD is doing well, no pain or irritation and using drops in   OS as directed.             Comments    Referred by Dr. Lorenzana     1. COAG/ LTG   SLT OD 9/29/23  -h/o non compliance   -intolerant of coag meds   -TRAB OD   -repeat SLT od done 8/9/2012 - good initial response IOP 18 to 13, SLT OD   08/17/17   -Primary SLT done os 9/13/2012 - minimal response 18 to 16   -Bleb Needling with MMC Inj. OD 9/17/15   -SLT OS 9/29/2016 AND REPEAT SLT OS 12/10/2020   Xen Gel OD (09-26-19) Not under flap   Xen Gel OS (04-) abExt LOT #[id number] (under flap)  -G6 OD 3/23/23  Clearpath w/ripcord OD 05/23/24 - opening date July 5 ripchord pulled   07/17/24  2. PCIOL OD 6/9/22/ SN60WF 12.0/ CDE: 11.04  PCIOL OS 8/4/22 Sy60WF 12.5 / CDE: 8.51    *Stopped Alphagan OS TID because of dermatitis   *Stopped Betoptic bid ou due to slow pulse rate and feeling tired.   *latanoprost slows pulse rate   * BID (stopped after 2 weeks due to extreme fatigue)   *OD Rhopressa Qhs ( patient ran out in March never returned for followup )     *lotemax cost over $300   Poor response to G6      GCL 17/21-- 08/16/21   GCL 19/22-- 2/22/23      OD: Lotemax BID  OU Rocklatan QHS  OU: Dorozolamide TID            Last edited by Jordy Soriano on 8/19/2024 10:15 AM.            Assessment /Plan     For exam results, see Encounter Report.      ICD-10-CM ICD-9-CM    1. Post-operative state  Z98.890 V45.89 Clearpath w/ripcord OD 05/23/24 -  chord pulled 07/17/24  -       2. Primary open angle glaucoma of both eyes, severe stage  H40.1133 365.11      365.73         Increased IOP   Stop Lotemax OD today     OU: Rocklatan QHS  OU: Dorozolamide TID      RETURN TO CLINIC 1 week

## 2024-08-26 ENCOUNTER — OFFICE VISIT (OUTPATIENT)
Dept: OPHTHALMOLOGY | Facility: CLINIC | Age: 74
End: 2024-08-26
Payer: MEDICARE

## 2024-08-26 ENCOUNTER — TELEPHONE (OUTPATIENT)
Dept: OPHTHALMOLOGY | Facility: CLINIC | Age: 74
End: 2024-08-26
Payer: MEDICARE

## 2024-08-26 DIAGNOSIS — H40.1133 PRIMARY OPEN ANGLE GLAUCOMA OF BOTH EYES, SEVERE STAGE: Primary | ICD-10-CM

## 2024-08-26 PROCEDURE — 99024 POSTOP FOLLOW-UP VISIT: CPT | Mod: POP,,, | Performed by: OPHTHALMOLOGY

## 2024-08-26 PROCEDURE — 99213 OFFICE O/P EST LOW 20 MIN: CPT | Mod: PBBFAC,PO | Performed by: OPHTHALMOLOGY

## 2024-08-26 PROCEDURE — 99999 PR PBB SHADOW E&M-EST. PATIENT-LVL III: CPT | Mod: PBBFAC,,, | Performed by: OPHTHALMOLOGY

## 2024-08-26 NOTE — Clinical Note
Daja, I have been treating this patient for years. She has failed trab, Gprobe, xen and now recent clearpath. I am not sure what is the best way forward for her. Do you have any recommendations? She has a FH of severe visual loss from glaucoma. I would like her to see someone else at this point in her treatment.  Thanks

## 2024-08-26 NOTE — TELEPHONE ENCOUNTER
----- Message from Macario Shankar MD sent at 8/26/2024  9:10 AM CDT -----  Daja,  I have been treating this patient for years. She has failed trab, Gprobe, xen and now recent clearpath. I am not sure what is the best way forward for her. Do you have any recommendations?  She has a FH of severe visual loss from glaucoma. I would like her to see someone else at this point in her treatment.   Thanks

## 2024-08-26 NOTE — PROGRESS NOTES
HPI     Post-op Evaluation            Comments: Clearpath w/ripcord OD 05/23/24 -  chord pulled 07/17/24.   States eye has become slightly painful since stopping Lotemax           Comments    Referred by Dr. Lorenzana     1. COAG/ LTG   SLT OD 9/29/23  -h/o non compliance   -intolerant of coag meds   -TRAB OD   -repeat SLT od done 8/9/2012 - good initial response IOP 18 to 13, SLT OD   08/17/17   -Primary SLT done os 9/13/2012 - minimal response 18 to 16   -Bleb Needling with [facility] Inj. OD 9/17/15   -SLT OS 9/29/2016 AND REPEAT SLT OS 12/10/2020   Xen Gel OD (09-26-19) Not under flap   Xen Gel OS (04-) abExt LOT #[id number] (under flap)  -G6 OD 3/23/23  Clearpath w/ripcord OD 05/23/24 - opening date July 5 ripchord pulled   07/17/24  2. PCIOL OD 6/9/22/ SN60WF 12.0/ CDE: 11.04  PCIOL OS 8/4/22 Sy60WF 12.5 / CDE: 8.51    *Stopped Alphagan OS TID because of dermatitis   *Stopped Betoptic bid ou due to slow pulse rate and feeling tired.   *latanoprost slows pulse rate   * BID (stopped after 2 weeks due to extreme fatigue)   *OD Rhopressa Qhs ( patient ran out in March never returned for followup )     *lotemax cost over $300   Poor response to G6      GCL 17/21-- 08/16/21   GCL 19/22-- 2/22/23      OU: Rocklatan QHS  OU: Dorozolamide TID            Last edited by Macario Shankar MD on 8/26/2024  8:43 AM.            Assessment /Plan     For exam results, see Encounter Report.      ICD-10-CM ICD-9-CM    1. Primary open angle glaucoma of both eyes, severe stage  H40.1133 365.11 Persistent iop elevation despite pulling of rip cord. Question of scarring around bleb on slit lamp exam but clearpath placed posteriorly making visibility at the slit lamp somewhat difficult.      365.73           OU: Rocklatan QHS  OU: Dorozolamide TID  Lotemax prn pain  Allergic to alphagan  Intolerant of DMX  Low pulse rate on betablockers including betoptic.   Will arrange second opinion regarding the best route forward.

## 2024-09-06 NOTE — TELEPHONE ENCOUNTER
No care due was identified.  [facility] Embedded Care Due Messages. Reference number: [id number].   9/06/2024 5:01:58 PM CDT

## 2024-09-07 RX ORDER — DULOXETIN HYDROCHLORIDE 30 MG/1
30 CAPSULE, DELAYED RELEASE ORAL
Qty: 90 CAPSULE | Refills: 2 | Status: SHIPPED | OUTPATIENT
Start: 2024-09-07

## 2024-09-07 NOTE — TELEPHONE ENCOUNTER
Refill Decision Note   Suze Chino  is requesting a refill authorization.  Brief Assessment and Rationale for Refill:  Approve     Medication Therapy Plan:         Alert overridden per protocol: Yes   Comments:     Note composed:8:34 AM 09/07/2024

## 2024-09-18 DIAGNOSIS — R73.03 PREDIABETES: ICD-10-CM

## 2024-09-25 DIAGNOSIS — Z00.00 ENCOUNTER FOR MEDICARE ANNUAL WELLNESS EXAM: ICD-10-CM

## 2024-09-30 NOTE — TELEPHONE ENCOUNTER
Econsult was sent.   What Type Of Note Output Would You Prefer (Optional)?: Standard Output What Is The Reason For Today's Visit?: Full Body Skin Examination What Is The Reason For Today's Visit? (Being Monitored For X): the development of new lesions How Severe Are Your Spot(S)?: mild

## 2024-10-21 ENCOUNTER — OFFICE VISIT (OUTPATIENT)
Dept: OPHTHALMOLOGY | Facility: CLINIC | Age: 74
End: 2024-10-21
Payer: MEDICARE

## 2024-10-21 DIAGNOSIS — H40.1133 PRIMARY OPEN ANGLE GLAUCOMA OF BOTH EYES, SEVERE STAGE: Primary | ICD-10-CM

## 2024-10-21 DIAGNOSIS — Z91.148 NONCOMPLIANCE WITH MEDICATION REGIMEN: ICD-10-CM

## 2024-10-21 DIAGNOSIS — Z96.1 PSEUDOPHAKIA OF BOTH EYES: ICD-10-CM

## 2024-10-21 DIAGNOSIS — H26.491 PCO (POSTERIOR CAPSULAR OPACIFICATION), RIGHT: ICD-10-CM

## 2024-10-21 PROCEDURE — 99214 OFFICE O/P EST MOD 30 MIN: CPT | Mod: 25,S$PBB,, | Performed by: OPHTHALMOLOGY

## 2024-10-21 PROCEDURE — 99213 OFFICE O/P EST LOW 20 MIN: CPT | Mod: PBBFAC | Performed by: OPHTHALMOLOGY

## 2024-10-21 PROCEDURE — 99999 PR PBB SHADOW E&M-EST. PATIENT-LVL III: CPT | Mod: PBBFAC,,, | Performed by: OPHTHALMOLOGY

## 2024-10-21 PROCEDURE — 66821 AFTER CATARACT LASER SURGERY: CPT | Mod: S$PBB,RT,, | Performed by: OPHTHALMOLOGY

## 2024-10-21 PROCEDURE — 66821 AFTER CATARACT LASER SURGERY: CPT | Mod: PBBFAC,RT | Performed by: OPHTHALMOLOGY

## 2024-10-21 NOTE — PROGRESS NOTES
HPI     Glaucoma            Comments: 2 month IOP check. Denies change in vision. Denies   pain/irritation, has not needed to use Lotemax in ~1 week. Using gtts as   recommended.           Comments    Referred by Dr. Lorenzana     1. COAG/ LTG   SLT OD 9/29/23  -h/o non compliance   -intolerant of coag meds   -TRAB OD   -repeat SLT od done 8/9/2012 - good initial response IOP 18 to 13, SLT OD   08/17/17   -Primary SLT done os 9/13/2012 - minimal response 18 to 16   -Bleb Needling with [facility] Inj. OD 9/17/15   -SLT OS 9/29/2016 AND REPEAT SLT OS 12/10/2020   Xen Gel OD (09-26-19) Not under flap   Xen Gel OS (04-) abExt LOT #[id number] (under flap)  -G6 OD 3/23/23  Clearpath w/ripcord OD 05/23/24 - opening date July 5 ripchord pulled   07/17/24  2. PCIOL OD 6/9/22/ SN60WF 12.0/ CDE: 11.04  PCIOL OS 8/4/22 Sy60WF 12.5 / CDE: 8.51    *Stopped Alphagan OS TID because of dermatitis   *Stopped Betoptic bid ou due to slow pulse rate and feeling tired.   *latanoprost slows pulse rate   * BID (stopped after 2 weeks due to extreme fatigue)   *OD Rhopressa Qhs ( patient ran out in March never returned for followup )     *lotemax cost over $300   Poor response to G6      GCL 17/21-- 08/16/21   GCL 19/22-- 2/22/23      OU: Rocklatan QHS  OU: Dorozolamide TID            Last edited by Macario Shankar MD on 10/21/2024  8:19 AM.            Assessment /Plan     For exam results, see Encounter Report.      ICD-10-CM ICD-9-CM    1. Primary open angle glaucoma of both eyes, severe stage  H40.1133 365.11 Iop above target OD - patient has second opinion with Dr Charles in 1-2 weeks. IOP OS acceptable.      365.73       2. PCO (posterior capsular opacification), right  H26.491 366.50 Clinically significant with increased difficulty with reading and glare.  Risk, benefits, and alternatives of Yag capsulotomy discussed.    Yag Laser treatment to the right eye.         3. Noncompliance with medication regimen  Z91.148 V15.81 Doing  better       4. Pseudophakia of both eyes  Z96.1 V43.1           Yag Operative Procedure Note    73 y.o. year old patient experiencing a symptomatic decrease in vision OD with inability to perform activities of daily living including reading.      SLE: Posterior intraocular lens implant with capsular fibrosis     Risks, benefits and alternatives of Yag Laser Capsulotomy discussed. Including risks of retinal detachment (1-3%), macular edema, dislocated implant, pain, inflammation elevated intraocular pressure and vision loss. Consent signed.  Time out procedure form completed prior to laser.    Medications given:  Alphagan 0.15%  Tetracaine    Laser energy settings:    2.0 energy per shot  72 bursts  1 to 1 ratio per shot     Central capsular opening created without difficulty    OU: Rocklatan QHS  OU: Dorozolamide TID  Lotemax qid OD for 5 days and d/c

## 2024-11-05 ENCOUNTER — OFFICE VISIT (OUTPATIENT)
Dept: OPHTHALMOLOGY | Facility: CLINIC | Age: 74
End: 2024-11-05
Payer: MEDICARE

## 2024-11-05 DIAGNOSIS — H40.1133 PRIMARY OPEN ANGLE GLAUCOMA OF BOTH EYES, SEVERE STAGE: Primary | ICD-10-CM

## 2024-11-05 DIAGNOSIS — H04.123 DRY EYE SYNDROME OF BOTH EYES: ICD-10-CM

## 2024-11-05 DIAGNOSIS — Z96.1 PSEUDOPHAKIA OF BOTH EYES: ICD-10-CM

## 2024-11-05 PROCEDURE — G2211 COMPLEX E/M VISIT ADD ON: HCPCS | Mod: S$PBB,,, | Performed by: STUDENT IN AN ORGANIZED HEALTH CARE EDUCATION/TRAINING PROGRAM

## 2024-11-05 PROCEDURE — 92133 CPTRZD OPH DX IMG PST SGM ON: CPT | Mod: PBBFAC | Performed by: STUDENT IN AN ORGANIZED HEALTH CARE EDUCATION/TRAINING PROGRAM

## 2024-11-05 PROCEDURE — 99214 OFFICE O/P EST MOD 30 MIN: CPT | Mod: S$PBB,,, | Performed by: STUDENT IN AN ORGANIZED HEALTH CARE EDUCATION/TRAINING PROGRAM

## 2024-11-05 PROCEDURE — 92020 GONIOSCOPY: CPT | Mod: PBBFAC | Performed by: STUDENT IN AN ORGANIZED HEALTH CARE EDUCATION/TRAINING PROGRAM

## 2024-11-05 PROCEDURE — 92020 GONIOSCOPY: CPT | Mod: S$PBB,,, | Performed by: STUDENT IN AN ORGANIZED HEALTH CARE EDUCATION/TRAINING PROGRAM

## 2024-11-05 NOTE — PROGRESS NOTES
Subjective:  HPI    Chief complaint: 73 y.o. female presents for 2nd opinion/glaucoma eval.   Patient was being seen previously by Dr. Shankar but he wanted patient to   seek a second opinion. Patient complains of poor vision OD, no problem OS.   Complains of occasional pain OD, uses tylenol for comfort. Complains of   sinus headaches.     Past medical history?    HTN   Prediabetes    Carotid artery disease   Chronic Kidney Disease, stage 3  Past ocular history?    Pseudophakia OU   POAG OU   PCO OD     Glaucoma history:  Diagnosed with glaucoma when? 20-30 years ago  Hx eye surgery?    Trab OD   SLT OD 8/9/2012, 9/29/23, 9/29/2016   SLT OS 9/13/2012, 12/10/2020   XEN Gel OD 9/26/2019   XEN Gel OS 4/5/2021   Bleb Needling MMC Inj OD 9/17/2015   G6 OD 3/23/23   Clearpath w/ripcord OD 5/23/24   PCIOL OD 6/9/22   PCIOL OS 8/4/22  Hx eye lasers? Yes, see above   History of low blood pressure? none  History of migraines? None  History of blunt trauma to eye? none  History of steroid use? PF follow surgeries, oral steroids for respiratory   problems last used 1 year ago   Family history of glaucoma? Mother, Maternal Aunts, 5 siblings, Son (at   risk), Niece   What is the highest your eye pressure has been? 40s OD    Eye drops?   Dorzolamide BID OU  Rocklatan qhs OU  Rhopressa qhs OD    Allergies to drops  Alphagan (Dermatitis)  Betoptic (slow HR)  Latanoprost (slow HR)  Diamox (fatigue)    Last edited by Liv Looney on 11/5/2024  2:32 PM.        Exam:  See encounter report for full exam    Assessment:  1. Primary open angle glaucoma of both eyes, severe stage  - Referral from: Dr. Shankar. Establishing me 11/2024.  - Surg hx:   Trab with M[facility] OD Harriett 4/2014  Xen with MMC OD Raad 10/2019  Xen with MMC OS Raad 5/2021  Phaco/IOL OD Raad 6/2022  Phaco/IOL OS Raad 9/2022  CPC OD Raad 6/2023  Clearpath OD Raad 8/2024  - Laser hx: CPC OD, SLT OD  - Glaucoma FHx: mother, maternal aunts, 5 siblings, son, niece  -   / 506  - Gonio: scattered PAS OU/pseudophakic (Araceli 11/2024)    11/05/2024  IOP above target on regimen below  OCT RNFL: diffuse thinning, avg 45 OD  sup<inf thinning, avg 68 OS -- baseline    2. Pseudophakia of both eyes  Good lens position  Monitor    3. Dry eye syndrome of both eyes  ATs QID OU    Plan:  IOP above goal for severity of glaucoma OD. S/p multiple surgeries, hesitant for more. Discussed repeat CPC avoiding areas of prior sx vs inferonasal GDI. Patient would like to discuss with family and get back to me. Risk of irreversible blindness over time discussed.  - Continue Dorz 2/2, Rock qhs/qhs    Today's visit is associated with current and anticipated ongoing medical care related to this patient's single serious/complex condition (glaucoma). Follow up is to be continued indefinitely to monitor the condition.    Return 1 month -- [facility], IOP, sx decision    Isa Charles MD  [facility]

## 2024-12-04 DIAGNOSIS — N18.30 CHRONIC KIDNEY DISEASE, STAGE 3: ICD-10-CM

## 2025-01-23 NOTE — TELEPHONE ENCOUNTER
Care Due:                  Date            Visit Type   Department     Provider  --------------------------------------------------------------------------------                                EP -                              PRIMARY      [facility] FAMILY  Last Visit: 05-      CARE ([facility])   MEDICINE       Cody Lewis  Next Visit: None Scheduled  None         None Found                                                            Last  Test          Frequency    Reason                     Performed    Due Date  --------------------------------------------------------------------------------    HBA1C.......  6 months...  semaglutide..............  09- 03-    [facility] Embedded Care Due Messages. Reference number: [id number].   1/23/2025 11:57:29 AM CST

## 2025-01-24 RX ORDER — ALPRAZOLAM 0.25 MG/1
0.25 TABLET ORAL 2 TIMES DAILY
Qty: 60 TABLET | Refills: 3 | OUTPATIENT
Start: 2025-01-24

## 2025-01-27 ENCOUNTER — OFFICE VISIT (OUTPATIENT)
Dept: FAMILY MEDICINE | Facility: CLINIC | Age: 75
End: 2025-01-27
Payer: MEDICARE

## 2025-01-27 ENCOUNTER — TELEPHONE (OUTPATIENT)
Dept: FAMILY MEDICINE | Facility: CLINIC | Age: 75
End: 2025-01-27
Payer: MEDICARE

## 2025-01-27 VITALS
BODY MASS INDEX: 25.78 KG/M2 | TEMPERATURE: 97 F | SYSTOLIC BLOOD PRESSURE: 138 MMHG | DIASTOLIC BLOOD PRESSURE: 78 MMHG | HEIGHT: 68 IN | WEIGHT: 170.13 LBS

## 2025-01-27 DIAGNOSIS — F41.9 ANXIETY: ICD-10-CM

## 2025-01-27 DIAGNOSIS — R73.03 PREDIABETES: ICD-10-CM

## 2025-01-27 DIAGNOSIS — H40.1133 PRIMARY OPEN ANGLE GLAUCOMA (POAG) OF BOTH EYES, SEVERE STAGE: ICD-10-CM

## 2025-01-27 DIAGNOSIS — Z12.31 ENCOUNTER FOR SCREENING MAMMOGRAM FOR MALIGNANT NEOPLASM OF BREAST: ICD-10-CM

## 2025-01-27 DIAGNOSIS — N18.32 STAGE 3B CHRONIC KIDNEY DISEASE: ICD-10-CM

## 2025-01-27 DIAGNOSIS — F33.0 MILD EPISODE OF RECURRENT MAJOR DEPRESSIVE DISORDER: ICD-10-CM

## 2025-01-27 DIAGNOSIS — I10 PRIMARY HYPERTENSION: Primary | ICD-10-CM

## 2025-01-27 PROBLEM — I77.9 MILD CAROTID ARTERY DISEASE: Status: RESOLVED | Noted: 2024-02-29 | Resolved: 2025-01-27

## 2025-01-27 PROCEDURE — 99214 OFFICE O/P EST MOD 30 MIN: CPT | Mod: S$PBB,,, | Performed by: FAMILY MEDICINE

## 2025-01-27 PROCEDURE — 99213 OFFICE O/P EST LOW 20 MIN: CPT | Mod: PBBFAC,PO | Performed by: FAMILY MEDICINE

## 2025-01-27 PROCEDURE — G2211 COMPLEX E/M VISIT ADD ON: HCPCS | Mod: S$PBB,,, | Performed by: FAMILY MEDICINE

## 2025-01-27 PROCEDURE — 99999 PR PBB SHADOW E&M-EST. PATIENT-LVL III: CPT | Mod: PBBFAC,,, | Performed by: FAMILY MEDICINE

## 2025-01-27 RX ORDER — IRBESARTAN 300 MG/1
300 TABLET ORAL DAILY
Qty: 90 TABLET | Refills: 3 | Status: SHIPPED | OUTPATIENT
Start: 2025-01-27 | End: 2026-01-27

## 2025-01-27 RX ORDER — ALPRAZOLAM 0.25 MG/1
0.25 TABLET ORAL 2 TIMES DAILY
Qty: 60 TABLET | Refills: 3 | Status: SHIPPED | OUTPATIENT
Start: 2025-01-27

## 2025-01-27 RX ORDER — DULOXETIN HYDROCHLORIDE 60 MG/1
60 CAPSULE, DELAYED RELEASE ORAL DAILY
Qty: 90 CAPSULE | Refills: 3 | Status: SHIPPED | OUTPATIENT
Start: 2025-01-27

## 2025-01-27 RX ORDER — LANSOPRAZOLE 30 MG/1
30 CAPSULE, DELAYED RELEASE ORAL DAILY
Qty: 90 CAPSULE | Refills: 3 | Status: SHIPPED | OUTPATIENT
Start: 2025-01-27 | End: 2026-01-27

## 2025-01-27 RX ORDER — AMLODIPINE BESYLATE 5 MG/1
5 TABLET ORAL DAILY
Qty: 90 TABLET | Refills: 3 | Status: SHIPPED | OUTPATIENT
Start: 2025-01-27

## 2025-01-27 NOTE — PATIENT INSTRUCTIONS
Health Maintenance Due   Topic Date Due    TETANUS VACCINE  Never done    Shingles Vaccine (1 of 2) Never done    Pneumococcal Vaccines (Age 50+) (1 of 1 - PCV) Never done    RSV Vaccine (Age 60+ and Pregnant patients) (1 - Risk 60-74 years 1-dose series) Never done    Annual UACr  03/17/2024    Lipid Panel  03/17/2024    Influenza Vaccine (1) Never done    COVID-19 Vaccine (6 - 2024-25 season) 09/01/2024    Hemoglobin A1c (Prediabetes)  09/12/2024    Mammogram  09/13/2024

## 2025-01-28 NOTE — PROGRESS NOTES
History of Present Illness    Ms. Chino reports needing several medication refills, including Xanax, duloxetine, amlodipine, Losartan, and pantoprazole. Xanax usage varies from occasional missed doses to 3 times daily during high stress periods. She reports long-term use of pantoprazole for stomach issues, expressing dependence on the medication.    Ms. Chino reports weight stable while taking semaglutide (Ozempic). She has a history of prediabetes and parathyroid removal, for which she continues to take calcium and vitamin D supplements.    She discloses significant vision loss in her right eye, reporting multiple unsuccessful interventions including surgery. She mentions regular ophthalmologist follow-ups for this issue and that she has not been advised to discontinue driving.    Ms. Chino takes Singulair only during peak allergy season.  Her blood pressure is controlled.  Chronic kidney disease stable.    ROS:  General: +weight gain  Eyes: +vision changes  Gastrointestinal: +heartburn          uSze was seen today for annual exam.    Diagnoses and all orders for this visit:    Primary hypertension  -     Comprehensive Metabolic Panel; Future  -     Lipid Panel; Future    Anxiety  -     ALPRAZolam (XANAX) 0.25 MG tablet; Take 1 tablet (0.25 mg total) by mouth 2 (two) times daily.    Mild episode of recurrent major depressive disorder    Stage 3b chronic kidney disease  -     Comprehensive Metabolic Panel; Future    Prediabetes  -     Hemoglobin A1C; Future    Encounter for screening mammogram for malignant neoplasm of breast  -     Mammo Digital Screening Bilat w/ Patrick; Future    Primary open angle glaucoma (POAG) of both eyes, severe stage    Other orders  -     amLODIPine (NORVASC) 5 MG tablet; Take 1 tablet (5 mg total) by mouth once daily.  -     DULoxetine (CYMBALTA) 60 MG capsule; Take 1 capsule (60 mg total) by mouth once daily.  -     irbesartan (AVAPRO) 300 MG tablet; Take 1 tablet (300 mg total)  by mouth once daily.  -     lansoprazole (PREVACID) 30 MG capsule; Take 1 capsule (30 mg total) by mouth once daily.          Assessment & Plan    IMPRESSION:  - Reviewed current medications and adjusted for cost-effectiveness and symptom management.  We substituted irbesartan in place of losartan and lansoprazole in place of pantoprazole due to cost issues.  Continue other current medications.  Schedule fasting lab work.  Continue follow-up ophthalmologist        This note was generated with the assistance of ambient listening technology. Verbal consent was obtained by the patient and accompanying visitor(s) for the recording of patient appointment to facilitate this note. I attest to having reviewed and edited the generated note for accuracy, though some syntax or spelling errors may persist. Please contact the author of this note for any clarification.        Past Medical History:  Past Medical History:   Diagnosis Date    Anxiety     Cataract     Chronic kidney disease, stage 3 11/20/2020    COVID-19 ruled out by laboratory testing 03/18/2022    [facility]    Depression     GERD (gastroesophageal reflux disease)     Glaucoma     History of colon polyps 2009    history as per patient    History of colonoscopy 2009    ok per pt    Hyperplastic colon polyp 05/12/2015    Hypertension     Hypothyroid     ?    Mild mitral regurgitation     Mitral valve prolapse     Nonobstructive atherosclerosis of coronary artery 09/2021    Moderate LAD stenosis, mild RCA stenosis.    Peptic ulcer disease     with gi bleed    Prediabetes     Primary hyperparathyroidism     Right knee DJD     Right knee DJD     Sleep apnea     Sleep disorder     TMJ (temporomandibular joint disorder)     Vitamin D deficiency      Past Surgical History:   Procedure Laterality Date    BILATERAL SALPINGOOPHORECTOMY      CATARACT EXTRACTION W/  INTRAOCULAR LENS IMPLANT Right 06/09/2022    CATARACT EXTRACTION W/  INTRAOCULAR LENS IMPLANT Left  08/05/2022    COLONOSCOPY  2015    COLONOSCOPY N/A 12/20/2022    Procedure: COLONOSCOPY E consult sent for cc;  Surgeon: Mulugeta Calloway MD;  Location: [facility] ENDO;  Service: Endoscopy;  Laterality: N/A;    CORONARY ANGIOGRAPHY N/A 9/24/2021    Procedure: ANGIOGRAM, CORONARY ARTERY;  Surgeon: Chago Gregory MD;  Location: [facility] CATH;  Service: Cardiology;  Laterality: N/A;    EYE SURGERY      laser sx    GLAUCOMA SURGERY Right 09/26/2019    Xen Gel    GLAUCOMA SURGERY Left 04/05/2021    abext XenGel OS    HYSTERECTOMY      OOPHORECTOMY      PARATHYROIDECTOMY  12/01/2021    SLT - OU - BOTH EYES      TRABECULECTOMY Right 4/2/14    OD (dr. grossman)    UPPER GASTROINTESTINAL ENDOSCOPY  2003     Review of patient's allergies indicates:   Allergen Reactions    Betoptic [betaxolol] Other (See Comments)     Fatigue, slowed heart rate.    Alphagan [brimonidine] Dermatitis    Crestor [rosuvastatin] Other (See Comments)     Muscle ache    Pravastatin Other (See Comments)     aches  aches     Current Outpatient Medications on File Prior to Visit   Medication Sig Dispense Refill    acetaminophen (TYLENOL) 650 MG TbSR Take 1,300 mg by mouth 2 (two) times a day.      albuterol (PROVENTIL/VENTOLIN HFA) 90 mcg/actuation inhaler INHALE 2 PUFFS INTO THE LUNGS EVERY 6 (SIX) HOURS AS NEEDED FOR WHEEZING. RESCUE 54 g 3    aspirin (ECOTRIN) 81 MG EC tablet Take 81 mg by mouth once daily.      calcium carb/vit D3/minerals (CALTRATE PLUS ORAL) Take 1 tablet by mouth 2 (two) times a day.      dorzolamide (TRUSOPT) 2 % ophthalmic solution Place 1 drop into both eyes 3 (three) times daily. 10 mL 6    DULoxetine (CYMBALTA) 30 MG capsule TAKE 1 CAPSULE BY MOUTH EVERY DAY 90 capsule 2    ezetimibe (ZETIA) 10 mg tablet Take 1 tablet (10 mg total) by mouth once daily. 90 tablet 1    fexofenadine (ALLEGRA) 180 MG tablet Allegra Allergy Take 1 Tablet (oral) 1 time per day for 14 days 20220318 tablet 1 time per day oral 14 days active 180 mg 30 tablet  0    fluticasone (VERAMYST) 27.5 mcg/actuation nasal spray 2 sprays by Nasal route once daily. 5.9 mL 11    loteprednol (LOTEMAX) 0.5 % ophthalmic suspension Place 1 drop into the right eye 3 (three) times daily. 5 mL 2    meclizine (ANTIVERT) 25 mg tablet Take 25 mg by mouth every 6 (six) hours as needed.      montelukast (SINGULAIR) 10 mg tablet TAKE 1 TABLET BY MOUTH EVERY DAY IN THE EVENING 90 tablet 0    netarsudiL-latanoprost (ROCKLATAN) 0.02-0.005 % Drop Place 1 drop into both eyes Daily. 2.5 mL 2    semaglutide (OZEMPIC) 1 mg/dose (4 mg/3 mL) Inject 1 mg into the skin every 7 days. 3 mL 11    [DISCONTINUED] ALPRAZolam (XANAX) 0.25 MG tablet TAKE 1 TABLET BY MOUTH 2 TIMES DAILY. 60 tablet 3    [DISCONTINUED] amLODIPine (NORVASC) 5 MG tablet Take 1 tablet (5 mg total) by mouth once daily. 90 tablet 3    [DISCONTINUED] DULoxetine (CYMBALTA) 60 MG capsule Take 1 capsule (60 mg total) by mouth once daily. 90 capsule 3    [DISCONTINUED] losartan (COZAAR) 100 MG tablet TAKE 1 TABLET BY MOUTH EVERY DAY 90 tablet 2    [DISCONTINUED] pantoprazole (PROTONIX) 40 MG tablet TAKE 1 TABLET BY MOUTH EVERY DAY 90 tablet 3    estradioL (ESTRACE) 0.01 % (0.1 mg/gram) vaginal cream Place 1 g vaginally 3 (three) times a week. 42.5 g 3     No current facility-administered medications on file prior to visit.     Social History     Socioeconomic History    Marital status:    Tobacco Use    Smoking status: Never    Smokeless tobacco: Never   Substance and Sexual Activity    Alcohol use: Yes     Alcohol/week: 2.0 standard drinks of alcohol     Types: 2 Standard drinks or equivalent per week     Comment: occasional    Drug use: No    Sexual activity: Not Currently     Social Drivers of Health     Financial Resource Strain: High Risk (8/31/2023)    Overall Financial Resource Strain (CARDIA)     Difficulty of Paying Living Expenses: Hard   Food Insecurity: No Food Insecurity (8/31/2023)    Hunger Vital Sign     Worried About  "Running Out of Food in the Last Year: Never true     Ran Out of Food in the Last Year: Never true   Transportation Needs: No Transportation Needs (8/31/2023)    PRAPARE - Transportation     Lack of Transportation (Medical): No     Lack of Transportation (Non-Medical): No   Physical Activity: Sufficiently Active (8/31/2023)    Exercise Vital Sign     Days of Exercise per Week: 5 days     Minutes of Exercise per Session: 60 min   Stress: Stress Concern Present (8/31/2023)    [employer] - Occupational Stress Questionnaire     Feeling of Stress : Very much   Housing Stability: Low Risk  (8/31/2023)    Housing Stability Vital Sign     Unable to Pay for Housing in the Last Year: No     Number of Places Lived in the Last Year: 1     Unstable Housing in the Last Year: No     Family History   Problem Relation Name Age of Onset    Diabetes Mother      Glaucoma Mother      Leukemia Mother      Cataracts Mother      Breast cancer Mother  60    Heart disease Father          before age 50    Diabetes Sister      Glaucoma Sister      Colon cancer Maternal Aunt      Diabetes Brother      Glaucoma Brother      Lung cancer Brother      Breast cancer Other Mat Niece 40        genetics -    Breast cancer Cousin             ROS:  GENERAL: No fever, chills,  or significant weight changes.   CARDIOVASCULAR: Denies chest pain, PND, orthopnea or reduced exercise tolerance.  ABDOMEN: Appetite fine. Denies diarrhea, abdominal pain, hematemesis or blood in stool.  URINARY: No flank pain, dysuria or hematuria.    Vitals:    01/27/25 1524   BP: 138/78   Temp: 97.3 °F (36.3 °C)   TempSrc: Temporal   Weight: 77.2 kg (170 lb 1.6 oz)   Height: 5' 8" (1.727 m)     Wt Readings from Last 3 Encounters:   01/27/25 77.2 kg (170 lb 1.6 oz)   07/17/24 77.5 kg (170 lb 13.7 oz)   05/21/24 75 kg (165 lb 4.8 oz)       OBJECTIVE:   APPEARANCE: Well nourished, well developed, in no acute distress.    HEAD: Normocephalic.  Atraumatic. "  No sinus tenderness.  EYES:   Right eye: Pupil reactive.  Conjunctiva clear.    Left eye: Pupil reactive.  Conjunctiva clear.  EOMI.    EARS: TM's intact. Light reflex normal. No retraction or perforation.    NOSE:  clear.  MOUTH & THROAT:  No pharyngeal erythema or exudate. No lesions.  NECK: Supple. No bruits.  No JVD.  No cervical lymphadenopathy.  No thyromegaly.    CHEST: Breath sounds clear bilaterally.  Normal respiratory effort  CARDIOVASCULAR: Normal rate.  Regular rhythm.  No murmurs.  No rub.  No gallops.  ABDOMEN: Bowel sounds normal.  Soft.  No tenderness.  No organomegaly.  PERIPHERAL VASCULAR: No cyanosis.  No clubbing.  No edema.  NEUROLOGIC: No focal findings.  MENTAL STATUS: Alert.  Oriented x 3.

## 2025-02-24 DIAGNOSIS — Z00.00 ENCOUNTER FOR MEDICARE ANNUAL WELLNESS EXAM: ICD-10-CM

## 2025-02-27 ENCOUNTER — PATIENT OUTREACH (OUTPATIENT)
Dept: ADMINISTRATIVE | Facility: HOSPITAL | Age: 75
End: 2025-02-27
Payer: MEDICARE

## 2025-02-27 NOTE — PROGRESS NOTES
Working CKD Lab Report.  Pt has lab appt scheduled, 3/04/25.  Micro Albumin urine linked to appt,

## 2025-03-04 ENCOUNTER — LAB VISIT (OUTPATIENT)
Dept: LAB | Facility: HOSPITAL | Age: 75
End: 2025-03-04
Attending: FAMILY MEDICINE
Payer: MEDICARE

## 2025-03-04 DIAGNOSIS — N18.32 STAGE 3B CHRONIC KIDNEY DISEASE: ICD-10-CM

## 2025-03-04 DIAGNOSIS — R73.03 PREDIABETES: ICD-10-CM

## 2025-03-04 DIAGNOSIS — N18.30 CHRONIC KIDNEY DISEASE, STAGE 3: ICD-10-CM

## 2025-03-04 DIAGNOSIS — I10 PRIMARY HYPERTENSION: ICD-10-CM

## 2025-03-04 LAB
ALBUMIN SERPL BCP-MCNC: 3.8 G/DL (ref 3.5–5.2)
ALBUMIN/CREAT UR: 26.4 UG/MG (ref 0–30)
ALP SERPL-CCNC: 95 U/L (ref 40–150)
ALT SERPL W/O P-5'-P-CCNC: 16 U/L (ref 10–44)
ANION GAP SERPL CALC-SCNC: 9 MMOL/L (ref 8–16)
AST SERPL-CCNC: 48 U/L (ref 10–40)
BILIRUB SERPL-MCNC: 0.5 MG/DL (ref 0.1–1)
BUN SERPL-MCNC: 16 MG/DL (ref 8–23)
CALCIUM SERPL-MCNC: 8.9 MG/DL (ref 8.7–10.5)
CHLORIDE SERPL-SCNC: 107 MMOL/L (ref 95–110)
CHOLEST SERPL-MCNC: 192 MG/DL (ref 120–199)
CHOLEST/HDLC SERPL: 2.8 {RATIO} (ref 2–5)
CO2 SERPL-SCNC: 25 MMOL/L (ref 23–29)
CREAT SERPL-MCNC: 0.9 MG/DL (ref 0.5–1.4)
CREAT UR-MCNC: 163 MG/DL (ref 15–325)
EST. GFR  (NO RACE VARIABLE): >60 ML/MIN/1.73 M^2
ESTIMATED AVG GLUCOSE: 114 MG/DL (ref 68–131)
GLUCOSE SERPL-MCNC: 89 MG/DL (ref 70–110)
HBA1C MFR BLD: 5.6 % (ref 4–5.6)
HDLC SERPL-MCNC: 68 MG/DL (ref 40–75)
HDLC SERPL: 35.4 % (ref 20–50)
LDLC SERPL CALC-MCNC: 112.6 MG/DL (ref 63–159)
MICROALBUMIN UR DL<=1MG/L-MCNC: 43 UG/ML
NONHDLC SERPL-MCNC: 124 MG/DL
POTASSIUM SERPL-SCNC: 4.5 MMOL/L (ref 3.5–5.1)
PROT SERPL-MCNC: 7.2 G/DL (ref 6–8.4)
SODIUM SERPL-SCNC: 141 MMOL/L (ref 136–145)
TRIGL SERPL-MCNC: 57 MG/DL (ref 30–150)

## 2025-03-04 PROCEDURE — 83036 HEMOGLOBIN GLYCOSYLATED A1C: CPT | Performed by: FAMILY MEDICINE

## 2025-03-04 PROCEDURE — 36415 COLL VENOUS BLD VENIPUNCTURE: CPT | Mod: PO | Performed by: FAMILY MEDICINE

## 2025-03-04 PROCEDURE — 80061 LIPID PANEL: CPT | Performed by: FAMILY MEDICINE

## 2025-03-04 PROCEDURE — 80053 COMPREHEN METABOLIC PANEL: CPT | Performed by: FAMILY MEDICINE

## 2025-03-04 PROCEDURE — 82043 UR ALBUMIN QUANTITATIVE: CPT | Performed by: FAMILY MEDICINE

## 2025-03-05 ENCOUNTER — RESULTS FOLLOW-UP (OUTPATIENT)
Dept: FAMILY MEDICINE | Facility: CLINIC | Age: 75
End: 2025-03-05

## 2025-03-10 ENCOUNTER — TELEPHONE (OUTPATIENT)
Dept: FAMILY MEDICINE | Facility: CLINIC | Age: 75
End: 2025-03-10
Payer: MEDICARE

## 2025-03-10 NOTE — TELEPHONE ENCOUNTER
----- Message from Tia sent at 3/10/2025 10:09 AM CDT -----  Who Called: PtWhat is the request in detail: Requesting call back to discuss needing order for diagnostic mammo. Please adviseCan the clinic reply by [facility]? Robin Call Back Number: [phone] Information:

## 2025-03-10 NOTE — TELEPHONE ENCOUNTER
----- Message from Trubates sent at 3/10/2025  9:40 AM CDT -----  Good MorningPatient is needing to have order for Diagnostic Mammogram, and Ultrasound placed; patient states she is experiencing breast pain.  Please call back at .[phone].  MD Fany

## 2025-04-23 ENCOUNTER — OFFICE VISIT (OUTPATIENT)
Dept: DERMATOLOGY | Facility: CLINIC | Age: 75
End: 2025-04-23
Payer: MEDICARE

## 2025-04-23 DIAGNOSIS — D48.5 NEOPLASM OF UNCERTAIN BEHAVIOR OF SKIN: ICD-10-CM

## 2025-04-23 DIAGNOSIS — D23.9 DERMATOFIBROMA: Primary | ICD-10-CM

## 2025-04-23 PROCEDURE — 11104 PUNCH BX SKIN SINGLE LESION: CPT | Mod: PBBFAC | Performed by: DERMATOLOGY

## 2025-04-23 PROCEDURE — 99203 OFFICE O/P NEW LOW 30 MIN: CPT | Mod: 25,S$PBB,, | Performed by: DERMATOLOGY

## 2025-04-23 PROCEDURE — 11104 PUNCH BX SKIN SINGLE LESION: CPT | Mod: S$PBB,,, | Performed by: DERMATOLOGY

## 2025-04-23 PROCEDURE — 99212 OFFICE O/P EST SF 10 MIN: CPT | Mod: PBBFAC | Performed by: DERMATOLOGY

## 2025-04-23 PROCEDURE — 99999 PR PBB SHADOW E&M-EST. PATIENT-LVL II: CPT | Mod: PBBFAC,,, | Performed by: DERMATOLOGY

## 2025-04-23 NOTE — PATIENT INSTRUCTIONS
Punch Biopsy Wound Care    Your doctor has performed a punch biopsy today.  A band aid and antibiotic ointment has been placed over the site.  This should remain in place for 24 hours.  It is recommended that you keep the area dry for the first 24 hours.  After 24 hours, you may remove the band aid and wash the area with warm soap and water and apply Vaseline jelly.  Many patients prefer to use Neosporin or Bacitracin ointment.  This is acceptable; however know that you can develop an allergy to this medication even if you have used it safely for years.  It is important to keep the area moist.  Letting it dry out and get air slows healing time, will worsen the scar, and make it more difficult to remove the stitches if they were placed.  Band aid is optional after first 24 hours.      If you notice increasing redness, tenderness, pain, or yellow drainage at the biopsy or surgical site, please notify your doctor.  These are signs of an infection.    If your biopsy/surgical site is bleeding, apply firm pressure for 15 minutes straight.  Repeat for another 15 minutes, if it is still bleeding.   If the surgical site continues to bleed, then please contact your doctor.      [facility]   DERMATOLOGY  [address]   [place] [address]   Dept: [phone]   Dept Fax: [phone]

## 2025-04-23 NOTE — PROGRESS NOTES
Subjective:      Patient ID:  Suze Chino is a 74 y.o. female who presents for   Chief Complaint   Patient presents with    Mole     On Left leg      History of Present Illness: The patient presents with chief complaint of lesion.  Location: left lower leg  Duration: 20-30 years  Signs/Symptoms: tenderness    Prior treatments: none    The patient denies personal or family history of skin cancer.        Mole        Review of Systems   Constitutional:  Negative for fever and chills.   Gastrointestinal:  Negative for nausea and vomiting.   Skin:  Positive for activity-related sunscreen use. Negative for daily sunscreen use and recent sunburn.   Hematologic/Lymphatic: Does not bruise/bleed easily.       Objective:   Physical Exam   Constitutional: She appears well-developed and well-nourished. No distress.   Neurological: She is alert and oriented to person, place, and time. She is not disoriented.   Psychiatric: She has a normal mood and affect.   Skin:   Areas Examined (abnormalities noted in diagram):   Head / Face Inspection Performed  Neck Inspection Performed  RUE Inspected  LUE Inspection Performed  RLE Inspected  LLE Inspection Performed  Nails and Digits Inspection Performed            Diagram Legend       Firm pink to brown papule c/w dermatofibroma       Assessment / Plan:      Pathology Orders:       Normal Orders This Visit    Specimen to Pathology, Dermatology     Questions:    Procedure Type: Dermatology and skin neoplasms    Number of Specimens: 1    ------------------------: -------------------------    Spec 1 Procedure: Punch Biopsy    Spec 1 Clinical Impression: DF vs. other. overlying possible varcisoe veins    Spec 1 Source: left lower leg    Clinical Information: see above    Clinical History: see above    Specimen Source: Skin    Release to patient: Immediate    Send normal result to authorizing provider's In Basket if patient is active on MyChart: Yes          Dermatofibroma  Reassurance  given.  Lesions are benign.    Neoplasm of uncertain behavior of skin  -     Specimen to Pathology, Dermatology  -     Punch biopsy done.  RTC in 2 weeks for s/r and results.              Follow up in about 2 weeks (around 5/7/2025).    PROCEDURE NOTE -- PUNCH BIOPSY  Location: see above    After risks, benefits, and alternatives were discussed with the patient, the patient agrees to the procedure by verbal consent. The area(s) is cleansed with alcohol. A total of 2 cc of lidocaine 1% with plain epinephrine was injected for local anesthesia. A 5 mm punch biopsy was used to remove part or all of the lesion(s). The specimen was sent for tissue pathology. The defect(s) was then closed with interrupted 4.0 Ethilon sutures. The area was dressed with antibiotic ointment and bandaged. The patient tolerated the procedure well without adverse events.  Wound care instructions were given to the patient on the AVS.  The patient will be notified of pathology results once available. Results will also be available in Epic.

## 2025-04-28 RX ORDER — DULOXETIN HYDROCHLORIDE 30 MG/1
30 CAPSULE, DELAYED RELEASE ORAL DAILY
Qty: 90 CAPSULE | Refills: 2 | Status: SHIPPED | OUTPATIENT
Start: 2025-04-28

## 2025-04-28 NOTE — TELEPHONE ENCOUNTER
No care due was identified.  [facility] Embedded Care Due Messages. Reference number: [id number].   4/28/2025 3:41:08 PM CDT

## 2025-04-30 ENCOUNTER — RESULTS FOLLOW-UP (OUTPATIENT)
Dept: DERMATOLOGY | Facility: CLINIC | Age: 75
End: 2025-04-30

## 2025-05-02 ENCOUNTER — PATIENT OUTREACH (OUTPATIENT)
Dept: ADMINISTRATIVE | Facility: HOSPITAL | Age: 75
End: 2025-05-02
Payer: MEDICARE

## 2025-06-24 NOTE — TELEPHONE ENCOUNTER
This patient is being scheduled for one of the following procedures: Colonoscopy    Please indicate if the patient is cleared for surgery from a cardiac standpoint.    Patient gave verbal consent for e-consult     Cardiac PMHx: Hypertension  Mitral valve prolapse  Bradycardia, drug induced  Chest pain  Precordial pain  HTN (hypertension)  Family history of early CAD  Hyperlipidemia  Nonobstructive atherosclerosis of coronary artery  Mild mitral regurgitation     Renal/    Last Echo: Results for orders placed during the hospital encounter of 09/23/21    Echo Saline Bubble? No    Interpretation Summary  · The left ventricle is normal in size with normal systolic function.  · Grade I left ventricular diastolic dysfunction.  · The estimated PA systolic pressure is 11 mmHg.  · Normal right ventricular size with normal right ventricular systolic function.  · Normal central venous pressure (3 mmHg).  · The estimated ejection fraction is 55%.  · Mild mitral regurgitation.  · Mild tricuspid regurgitation.      Anticoagulants: ls anticoag list: none    If you have questions or concerns, please call us at [phone].      full range of motion in all extremities

## 2025-06-25 ENCOUNTER — HOSPITAL ENCOUNTER (OUTPATIENT)
Dept: RADIOLOGY | Facility: HOSPITAL | Age: 75
Discharge: HOME | End: 2025-06-25
Attending: FAMILY MEDICINE
Payer: MEDICARE

## 2025-06-25 ENCOUNTER — OFFICE VISIT (OUTPATIENT)
Dept: FAMILY MEDICINE | Facility: CLINIC | Age: 75
End: 2025-06-25
Payer: MEDICARE

## 2025-06-25 VITALS
WEIGHT: 169.63 LBS | BODY MASS INDEX: 25.71 KG/M2 | OXYGEN SATURATION: 100 % | SYSTOLIC BLOOD PRESSURE: 136 MMHG | HEART RATE: 61 BPM | DIASTOLIC BLOOD PRESSURE: 79 MMHG | HEIGHT: 68 IN | TEMPERATURE: 97 F

## 2025-06-25 DIAGNOSIS — F41.9 ANXIETY: ICD-10-CM

## 2025-06-25 DIAGNOSIS — H40.1193 SEVERE STAGE CHRONIC OPEN ANGLE GLAUCOMA, UNSPECIFIED LATERALITY: ICD-10-CM

## 2025-06-25 DIAGNOSIS — I10 PRIMARY HYPERTENSION: ICD-10-CM

## 2025-06-25 DIAGNOSIS — F33.0 MILD EPISODE OF RECURRENT MAJOR DEPRESSIVE DISORDER: ICD-10-CM

## 2025-06-25 DIAGNOSIS — I25.10 NONOBSTRUCTIVE ATHEROSCLEROSIS OF CORONARY ARTERY: ICD-10-CM

## 2025-06-25 DIAGNOSIS — E55.9 VITAMIN D DEFICIENCY: ICD-10-CM

## 2025-06-25 DIAGNOSIS — K21.9 GASTROESOPHAGEAL REFLUX DISEASE, UNSPECIFIED WHETHER ESOPHAGITIS PRESENT: ICD-10-CM

## 2025-06-25 DIAGNOSIS — Z00.00 ENCOUNTER FOR MEDICARE ANNUAL WELLNESS EXAM: Primary | ICD-10-CM

## 2025-06-25 DIAGNOSIS — F10.90 ALCOHOL USE: ICD-10-CM

## 2025-06-25 DIAGNOSIS — N18.32 STAGE 3B CHRONIC KIDNEY DISEASE: ICD-10-CM

## 2025-06-25 DIAGNOSIS — E89.2 POSTPROCEDURAL HYPOPARATHYROIDISM: ICD-10-CM

## 2025-06-25 DIAGNOSIS — Z12.31 ENCOUNTER FOR SCREENING MAMMOGRAM FOR MALIGNANT NEOPLASM OF BREAST: ICD-10-CM

## 2025-06-25 DIAGNOSIS — I34.0 MILD MITRAL REGURGITATION: ICD-10-CM

## 2025-06-25 DIAGNOSIS — E78.00 PURE HYPERCHOLESTEROLEMIA: ICD-10-CM

## 2025-06-25 DIAGNOSIS — I34.1 MITRAL VALVE PROLAPSE: ICD-10-CM

## 2025-06-25 PROCEDURE — 77067 SCR MAMMO BI INCL CAD: CPT | Mod: TC,PO

## 2025-06-25 PROCEDURE — 77063 BREAST TOMOSYNTHESIS BI: CPT | Mod: 26,,, | Performed by: STUDENT IN AN ORGANIZED HEALTH CARE EDUCATION/TRAINING PROGRAM

## 2025-06-25 PROCEDURE — 99215 OFFICE O/P EST HI 40 MIN: CPT | Mod: PBBFAC,PO | Performed by: NURSE PRACTITIONER

## 2025-06-25 PROCEDURE — 77067 SCR MAMMO BI INCL CAD: CPT | Mod: 26,,, | Performed by: STUDENT IN AN ORGANIZED HEALTH CARE EDUCATION/TRAINING PROGRAM

## 2025-06-25 PROCEDURE — 99999 PR PBB SHADOW E&M-EST. PATIENT-LVL V: CPT | Mod: PBBFAC,,, | Performed by: NURSE PRACTITIONER

## 2025-06-25 NOTE — PROGRESS NOTES
"  Suze Chino presented for a follow-up Medicare AWV today. The following components were reviewed and updated:    Medical history  Family History  Social history  Allergies and Current Medications  Health Risk Assessment  Health Maintenance  Care Team    **See Completed Assessments for Annual Wellness visit with in the encounter summary    The following assessments were completed:  Depression Screening  Cognitive function Screening  Timed Get Up Test  Whisper Test      Opioid documentation:      Patient does not have a current opioid prescription.          Vitals:    06/25/25 1007   BP: 136/79   Pulse: 61   Temp: 96.6 °F (35.9 °C)   SpO2: 100%   Weight: 76.9 kg (169 lb 9.6 oz)   Height: 5' 8" (1.727 m)     Body mass index is 25.79 kg/m².       Physical Exam  Vitals and nursing note reviewed.   Constitutional:       Appearance: Normal appearance.   HENT:      Head: Normocephalic.      Right Ear: Tympanic membrane, ear canal and external ear normal.      Left Ear: Tympanic membrane, ear canal and external ear normal.      Nose: Nose normal.      Mouth/Throat:      Mouth: Mucous membranes are moist.      Pharynx: Oropharynx is clear.   Eyes:      Conjunctiva/sclera: Conjunctivae normal.      Pupils: Pupils are equal, round, and reactive to light.   Cardiovascular:      Rate and Rhythm: Normal rate and regular rhythm.      Pulses: Normal pulses.      Heart sounds: Normal heart sounds.   Pulmonary:      Effort: Pulmonary effort is normal.      Breath sounds: Normal breath sounds.   Abdominal:      General: Bowel sounds are normal.      Palpations: Abdomen is soft.   Musculoskeletal:         General: Normal range of motion.      Cervical back: Normal range of motion and neck supple.   Skin:     General: Skin is warm and dry.      Capillary Refill: Capillary refill takes 2 to 3 seconds.   Neurological:      Mental Status: She is alert and oriented to person, place, and time.   Psychiatric:         Mood and Affect: Mood " normal.         Behavior: Behavior normal.         Thought Content: Thought content normal.         Judgment: Judgment normal.       Diagnoses and health risks identified today and associated recommendations/orders:  1. Encounter for Medicare annual wellness exam  Stable  Up to date  - Referral to Enhanced Annual Wellness Visit (eAWV) W+1    2. Stage 3b chronic kidney disease  Stable  Managed by PCP  Taking ARB  Continue current medications, plan of care  F/U with PCP as advised    3. Postprocedural hypoparathyroidism  Stable  Managed by PCP  Continue current medications, plan of care  F/U with PCP as advised    4. Mild episode of recurrent major depressive disorder  Stable  Managed by PCP, psychiatry  Currently taking cymbalta  Declines psychiatry f/u  Continue current medications, plan of care  F/U with PCP as advised    5. Primary hypertension  Stable  Managed by PCP, cardiology  Taking irbesartan, norvasc  Continue current medications, plan of care  F/U with PCP as advised    6. Pure hypercholesterolemia  Stable  Managed by PCP  Taking zetia  Continue current medications, plan of care  F/U with PCP as advised    7. Mitral valve prolapse  Stable  Managed by cardiology  Continue current medications, plan of care  F/U with specialist as advised    8. Mild mitral regurgitation  Stable  Managed by cardiology  Continue current medications, plan of care  F/U with specialist as advised    9. Nonobstructive atherosclerosis of coronary artery  Stable  Managed by cardiology  Continue current medications, plan of care  F/U with specialist as advised    10. Vitamin D deficiency  Stable  Managed by PCP  Currently taking caltrate  Continue current medications, plan of care  F/U with PCP as advised    11. Gastroesophageal reflux disease, unspecified whether esophagitis present  Stable  Managed by cardiology  Currently taking prevacid  Continue current medications, plan of care  F/U with specialist as advised    12. Severe stage  chronic open angle glaucoma, unspecified laterality  Stable  Managed by ophthalmology  Continue current medications, plan of care  F/U with specialist as advised    13. Anxiety  Stable  Managed by PCP, psychiatry  Currently taking xanax  Declines psychiatry f/u  Continue current medications, plan of care  F/U with PCP as advised    14. Alcohol use  Unstable  Pt declines psychiatry, AA referral  Advised to f/u with PCP    Provided Suze with a 5-10 year written screening schedule and personal prevention plan. Recommendations were developed using the USPSTF age appropriate recommendations. Education, counseling, and referrals were provided as needed.  After Visit Summary printed and given to patient which includes a list of additional screenings\tests needed.    No follow-ups on file.      Christine Lao, KENN    I offered to discuss advanced care planning, including how to pick a person who would make decisions for you if you were unable to make them for yourself, called a health care power of , and what kind of decisions you might make such as use of life sustaining treatments such as ventilators and tube feeding when faced with a life limiting illness recorded on a living will that they will need to know. (How you want to be cared for as you near the end of your natural life)     X Patient is interested in learning more about how to make advanced directives.  I provided them paperwork and offered to discuss this with them.

## 2025-06-25 NOTE — PATIENT INSTRUCTIONS
Continue current plan of care  F/U with PCP, specialists as advised  Report to ER immediately if symptoms worsen or persist    Hi Suze,     If you are due for any health screening(s) below please notify me so we can arrange them to be ordered and scheduled. Most healthy patients at your age complete them, but you are free to accept or refuse.     If you can't do it, I'll definitely understand. If you can, I'd certainly appreciate it!    Tests to Keep You Healthy    Mammogram: SCHEDULED  Colon Cancer Screening: Met on 12/20/2022  Last Blood Pressure <= 139/89 (6/25/2025): Yes      Schedule your breast cancer screening today     Breast cancer is the second most common cancer in women,  and the second leading cause of death from cancer. Mammograms can detect breast cancer early, which significantly increases the chances of curing the cancer.       Our records indicate that you may be overdue for breast cancer screening. Cancer screenings save lives, so schedule yours today to stay healthy.     If you recently had a mammogram performed outside of [facility], please let your Health care team know so that they can update your health record.                  Counseling and Referral of Other Preventative  (Italic type indicates deductible and co-insurance are waived)    Patient Name: Suze Chino  Today's Date: 6/25/2025    Health Maintenance       Date Due Completion Date    TETANUS VACCINE Never done ---    Shingles Vaccine (1 of 2) Never done ---    Pneumococcal Vaccines (Age 50+) (1 of 1 - PCV) Never done ---    RSV Vaccine (Age 60+ and Pregnant patients) (1 - Risk 60-74 years 1-dose series) Never done ---    COVID-19 Vaccine (6 - 2024-25 season) 09/01/2024 5/1/2023    Mammogram 09/13/2024 9/13/2023    Influenza Vaccine (Season Ended) 09/01/2025 ---    Hemoglobin A1c (Prediabetes) 03/04/2026 3/4/2025    Annual UACr 03/04/2026 3/4/2025    Lipid Panel 03/04/2026 3/4/2025    Aspirin/Antiplatelet Therapy  06/25/2026 6/25/2025    Colorectal Cancer Screening 12/20/2027 12/20/2022    DEXA Scan 06/09/2031 6/9/2021        No orders of the defined types were placed in this encounter.    The following information is provided to all patients.  This information is to help you find resources for any of the problems found today that may be affecting your health:                  Living healthy guide: www.[facility].[email or web address].[facility]      Understanding Diabetes: www.diabetes.org      Eating healthy: www.cdc.gov/healthyweight      CDC home safety checklist: www.cdc.gov/steadi/patient.html      Agency on Aging: www.goea.[place].[facility]      Alcoholics anonymous (AA): www.aa.org      Physical Activity: www.daniel.nih.gov/uz9gmba      Tobacco use: www.quitwithusla.org

## 2025-07-28 ENCOUNTER — TELEPHONE (OUTPATIENT)
Dept: FAMILY MEDICINE | Facility: CLINIC | Age: 75
End: 2025-07-28

## 2025-07-28 ENCOUNTER — OFFICE VISIT (OUTPATIENT)
Dept: FAMILY MEDICINE | Facility: CLINIC | Age: 75
End: 2025-07-28
Payer: MEDICARE

## 2025-07-28 ENCOUNTER — HOSPITAL ENCOUNTER (OUTPATIENT)
Dept: RADIOLOGY | Facility: HOSPITAL | Age: 75
Discharge: HOME | End: 2025-07-28
Attending: NURSE PRACTITIONER
Payer: MEDICARE

## 2025-07-28 VITALS
DIASTOLIC BLOOD PRESSURE: 80 MMHG | WEIGHT: 168.31 LBS | OXYGEN SATURATION: 99 % | HEART RATE: 81 BPM | TEMPERATURE: 96 F | SYSTOLIC BLOOD PRESSURE: 134 MMHG | BODY MASS INDEX: 25.51 KG/M2 | HEIGHT: 68 IN

## 2025-07-28 DIAGNOSIS — R60.9 SWELLING: ICD-10-CM

## 2025-07-28 DIAGNOSIS — R07.89 CHEST DISCOMFORT: ICD-10-CM

## 2025-07-28 DIAGNOSIS — R60.9 SWELLING: Primary | ICD-10-CM

## 2025-07-28 LAB
OHS QRS DURATION: 116 MS
OHS QTC CALCULATION: 436 MS

## 2025-07-28 PROCEDURE — 70450 CT HEAD/BRAIN W/O DYE: CPT | Mod: TC

## 2025-07-28 PROCEDURE — 99999 PR PBB SHADOW E&M-EST. PATIENT-LVL V: CPT | Mod: PBBFAC,,, | Performed by: NURSE PRACTITIONER

## 2025-07-28 PROCEDURE — 99215 OFFICE O/P EST HI 40 MIN: CPT | Mod: PBBFAC,PO,25 | Performed by: NURSE PRACTITIONER

## 2025-07-28 PROCEDURE — 76536 US EXAM OF HEAD AND NECK: CPT | Mod: TC

## 2025-07-28 PROCEDURE — 70450 CT HEAD/BRAIN W/O DYE: CPT | Mod: 26,,, | Performed by: RADIOLOGY

## 2025-07-28 PROCEDURE — 93010 ELECTROCARDIOGRAM REPORT: CPT | Mod: S$PBB,,, | Performed by: INTERNAL MEDICINE

## 2025-07-28 PROCEDURE — 76536 US EXAM OF HEAD AND NECK: CPT | Mod: 26,,, | Performed by: STUDENT IN AN ORGANIZED HEALTH CARE EDUCATION/TRAINING PROGRAM

## 2025-07-28 PROCEDURE — 93005 ELECTROCARDIOGRAM TRACING: CPT | Mod: PBBFAC,PO | Performed by: INTERNAL MEDICINE

## 2025-07-28 PROCEDURE — 99213 OFFICE O/P EST LOW 20 MIN: CPT | Mod: S$PBB,,, | Performed by: NURSE PRACTITIONER

## 2025-07-28 NOTE — PROGRESS NOTES
Subjective     Patient ID: Suze Chino is a 74 y.o. female.    Chief Complaint: Headache    Mass  This is a new (Right forehead; states noticed after strain from coughing) problem. The current episode started in the past 7 days. The problem occurs daily. The problem has been unchanged. Associated symptoms include chest pain (yesterday). Pertinent negatives include no abdominal pain, anorexia, arthralgias, change in bowel habit, chills, congestion, coughing, diaphoresis, fatigue, fever, headaches, joint swelling, myalgias, nausea, neck pain, numbness, rash, sore throat, swollen glands, urinary symptoms, vertigo, visual change, vomiting or weakness. Nothing aggravates the symptoms. She has tried nothing (Pt states has seen cardiology in the past for this; requests new cardiology referral) for the symptoms. The treatment provided no relief.   Chest Pain   This is a new problem. The current episode started yesterday. The onset quality is undetermined. The problem occurs rarely. The problem has been resolved. The pain is present in the substernal region. The pain is mild. The quality of the pain is described as pressure and tightness. The pain radiates to the left arm. Pertinent negatives include no abdominal pain, back pain, claudication, cough, diaphoresis, dizziness, exertional chest pressure, fever, headaches, hemoptysis, irregular heartbeat, leg pain, lower extremity edema, malaise/fatigue, nausea, near-syncope, numbness, orthopnea, palpitations, PND, shortness of breath, sputum production, syncope, vomiting or weakness. The pain is aggravated by nothing. She has tried nothing for the symptoms. The treatment provided significant relief. Risk factors include obesity and post-menopausal.   Her past medical history is significant for anxiety/panic attacks, hypertension, sleep apnea, thyroid problem and valve disorder.   Pertinent negatives for past medical history include no aneurysm, no aortic aneurysm, no aortic  dissection, no arrhythmia, no bicuspid aortic valve, no CAD, no cancer, no congenital heart disease, no connective tissue disease, no COPD, no CHF, no diabetes, no DVT, no hyperhomocysteinemia, no hyperlipidemia, no Kawasaki disease, no Marfan's syndrome, no MI, no mitral valve prolapse, no pacemaker, no PE, no PVD, no recent injury, no rheumatic fever, no seizures, no sickle cell disease, no spontaneous pneumothorax, no stimulant use, no strokes, no TIA and Kerr syndrome.   Pertinent negatives for family medical history include: no aortic dissection, no CAD, no connective tissue disease, no diabetes, no heart disease, no hyperlipidemia, no hypertension, no Marfan's syndrome, no early MI, no PE, no PVD, no sickle cell disease, no stroke, no sudden death and no TIA. Prior diagnostic workup includes echocardiogram.     Past Medical History:   Diagnosis Date    Anxiety     Cataract     Chronic kidney disease, stage 3 11/20/2020    COVID-19 ruled out by laboratory testing 03/18/2022    [facility]    Depression     GERD (gastroesophageal reflux disease)     Glaucoma     History of colon polyps 2009    history as per patient    History of colonoscopy 2009    ok per pt    Hyperplastic colon polyp 05/12/2015    Hypertension     Hypothyroid     ?    Mild mitral regurgitation     Mitral valve prolapse     Nonobstructive atherosclerosis of coronary artery 09/2021    Moderate LAD stenosis, mild RCA stenosis.    Peptic ulcer disease     with gi bleed    Prediabetes     Primary hyperparathyroidism     Right knee DJD     Right knee DJD     Sleep apnea     Sleep disorder     TMJ (temporomandibular joint disorder)     Vitamin D deficiency      Social History[1]  Past Surgical History:   Procedure Laterality Date    BILATERAL SALPINGOOPHORECTOMY      CATARACT EXTRACTION W/  INTRAOCULAR LENS IMPLANT Right 06/09/2022    CATARACT EXTRACTION W/  INTRAOCULAR LENS IMPLANT Left 08/05/2022    COLONOSCOPY  2015    COLONOSCOPY  N/A 12/20/2022    Procedure: COLONOSCOPY E consult sent for cc;  Surgeon: Mulugeta Calloway MD;  Location: [facility] ENDO;  Service: Endoscopy;  Laterality: N/A;    CORONARY ANGIOGRAPHY N/A 9/24/2021    Procedure: ANGIOGRAM, CORONARY ARTERY;  Surgeon: Chago Gregory MD;  [facility];  Service: Cardiology;  Laterality: N/A;    EYE SURGERY      laser sx    GLAUCOMA SURGERY Right 09/26/2019    Xen Gel    GLAUCOMA SURGERY Left 04/05/2021    abext XenGel OS    HYSTERECTOMY      OOPHORECTOMY      PARATHYROIDECTOMY  12/01/2021    SLT - OU - BOTH EYES      TRABECULECTOMY Right 4/2/14    OD (dr. grossman)    UPPER GASTROINTESTINAL ENDOSCOPY  2003       Review of Systems   Constitutional: Negative.  Negative for chills, diaphoresis, fatigue, fever and malaise/fatigue.   HENT: Negative.  Negative for nasal congestion and sore throat.    Eyes: Negative.    Respiratory:  Negative for cough, hemoptysis, sputum production and shortness of breath.    Cardiovascular:  Positive for chest pain (yesterday). Negative for palpitations, orthopnea, claudication, syncope, PND and near-syncope.   Gastrointestinal:  Negative for abdominal pain, anorexia, change in bowel habit, nausea and vomiting.   Endocrine: Negative.    Genitourinary: Negative.    Musculoskeletal: Negative.  Negative for arthralgias, back pain, joint swelling, leg pain, myalgias and neck pain.   Integumentary:  Negative for rash.        Swollen area to right forehead   Allergic/Immunologic: Negative.    Neurological: Negative.  Negative for dizziness, vertigo, seizures, weakness, numbness and headaches.   Psychiatric/Behavioral: Negative.            Objective     Physical Exam  Vitals and nursing note reviewed.   Constitutional:       Appearance: Normal appearance.   HENT:      Head: Normocephalic.        Right Ear: Tympanic membrane, ear canal and external ear normal.      Left Ear: Tympanic membrane, ear canal and external ear normal.      Nose: Nose normal.       Mouth/Throat:      Mouth: Mucous membranes are moist.      Pharynx: Oropharynx is clear.   Eyes:      Conjunctiva/sclera: Conjunctivae normal.      Pupils: Pupils are equal, round, and reactive to light.   Cardiovascular:      Rate and Rhythm: Normal rate and regular rhythm.      Pulses: Normal pulses.      Heart sounds: Normal heart sounds.   Pulmonary:      Effort: Pulmonary effort is normal.      Breath sounds: Normal breath sounds.   Abdominal:      General: Bowel sounds are normal.      Palpations: Abdomen is soft.   Musculoskeletal:         General: Normal range of motion.      Cervical back: Normal range of motion and neck supple.   Skin:     General: Skin is warm and dry.      Capillary Refill: Capillary refill takes 2 to 3 seconds.   Neurological:      Mental Status: She is alert and oriented to person, place, and time.   Psychiatric:         Mood and Affect: Mood normal.         Behavior: Behavior normal.         Thought Content: Thought content normal.         Judgment: Judgment normal.            Assessment and Plan     1. Swelling  Comments:  forehead  Orders:  -     US Soft Tissue Head Neck; Future; Expected date: 07/28/2025  -     CT Head Without Contrast; Future; Expected date: 07/28/2025    2. Chest discomfort  -     Ambulatory referral/consult to Cardiology; Future; Expected date: 08/04/2025  -     IN OFFICE EKG 12-LEAD (to [place])    Report to ER immediately if symptoms worsen or persist               No follow-ups on file.         [1]   Social History  Socioeconomic History    Marital status:    Tobacco Use    Smoking status: Never    Smokeless tobacco: Never   Substance and Sexual Activity    Alcohol use: Yes     Alcohol/week: 2.0 standard drinks of alcohol     Types: 2 Standard drinks or equivalent per week     Comment: occasional    Drug use: No    Sexual activity: Not Currently     Social Drivers of Health     Financial Resource Strain: High Risk (8/31/2023)    Overall Financial Resource  Strain (CARDIA)     Difficulty of Paying Living Expenses: Hard   Food Insecurity: No Food Insecurity (8/31/2023)    Hunger Vital Sign     Worried About Running Out of Food in the Last Year: Never true     Ran Out of Food in the Last Year: Never true   Transportation Needs: No Transportation Needs (8/31/2023)    PRAPARE - Transportation     Lack of Transportation (Medical): No     Lack of Transportation (Non-Medical): No   Physical Activity: Sufficiently Active (8/31/2023)    Exercise Vital Sign     Days of Exercise per Week: 5 days     Minutes of Exercise per Session: 60 min   Stress: Stress Concern Present (8/31/2023)    [employer] [place] [employer] - Occupational Stress Questionnaire     Feeling of Stress : Very much   Housing Stability: Low Risk  (8/31/2023)    Housing Stability Vital Sign     Unable to Pay for Housing in the Last Year: No     Number of Places Lived in the Last Year: 1     Unstable Housing in the Last Year: No

## 2025-07-28 NOTE — TELEPHONE ENCOUNTER
Copied from CRM #[id number]. Topic: General Inquiry - Patient Advice  >> Jul 28, 2025 12:41 PM Ayaka wrote:  Type:  Needs Medical Advice    Who Called: Suze Chino  Would the patient rather a call back or a response via [facility]? Call  Best Call Back Number: Telephone Information:  Mobile          [phone]      Additional Information: Pt stated she missed a call from the office and requesting a call back today if possible.

## 2025-07-28 NOTE — PATIENT INSTRUCTIONS
Report to ER immediately if symptoms worsen or persist      Sky Arciniega,     If you are due for any health screening(s) below please notify me so we can arrange them to be ordered and scheduled. Most healthy patients at your age complete them, but you are free to accept or refuse.     If you can't do it, I'll definitely understand. If you can, I'd certainly appreciate it!    All of your core healthy metrics are met.

## 2025-07-30 ENCOUNTER — TELEPHONE (OUTPATIENT)
Dept: CARDIOLOGY | Facility: CLINIC | Age: 75
End: 2025-07-30
Payer: MEDICARE

## 2025-07-30 DIAGNOSIS — F41.9 ANXIETY: ICD-10-CM

## 2025-07-30 RX ORDER — ALPRAZOLAM 0.25 MG/1
0.25 TABLET ORAL 2 TIMES DAILY
Qty: 60 TABLET | Refills: 5 | Status: SHIPPED | OUTPATIENT
Start: 2025-07-30

## 2025-07-30 NOTE — TELEPHONE ENCOUNTER
Contacted patient regarding referral for chest pain. Pt had appt scheduled for 7/31/2025, she mistakenly cancelled the appt. I attempted to schedule her for another appointment and she refused. Stated she is tired and will just go to the ED for any issues she may have.

## 2025-07-30 NOTE — TELEPHONE ENCOUNTER
No care due was identified.  [facility] Embedded Care Due Messages. Reference number: [id number].   7/30/2025 1:54:40 PM CDT